# Patient Record
Sex: MALE | Race: WHITE | NOT HISPANIC OR LATINO | URBAN - METROPOLITAN AREA
[De-identification: names, ages, dates, MRNs, and addresses within clinical notes are randomized per-mention and may not be internally consistent; named-entity substitution may affect disease eponyms.]

---

## 2022-05-02 ENCOUNTER — TELEPHONE (OUTPATIENT)
Dept: HEMATOLOGY ONCOLOGY | Facility: CLINIC | Age: 57
End: 2022-05-02

## 2022-05-02 NOTE — TELEPHONE ENCOUNTER
Patients spouse, Sophia Waddell, calling to make an appointment, She will callback tomorrow because that is his eff date on her insurance and she doesn't have her card with her to give information  I also gave her fax number so patients notes can be sent  She indicated spouse was incarcerated and was not treated for his condition

## 2022-05-03 NOTE — TELEPHONE ENCOUNTER
Made call to [facility] to potentially schedule patient for a new patient appointment for [facility], left detail message to return my call at [phone]

## 2022-05-05 ENCOUNTER — TELEPHONE (OUTPATIENT)
Dept: HEMATOLOGY ONCOLOGY | Facility: CLINIC | Age: 57
End: 2022-05-05

## 2022-05-05 ENCOUNTER — DOCUMENTATION (OUTPATIENT)
Dept: HEMATOLOGY ONCOLOGY | Facility: CLINIC | Age: 57
End: 2022-05-05

## 2022-05-05 NOTE — TELEPHONE ENCOUNTER
Soft Intake Form   Patient Details   Jr Banuelos     1965     Reason For Appointment   Who is Calling? Spouse   If not patient, Name? Pattie Alicea   DID YOU CONFIRM INSURANCE WITH PATIENT? Approved by [employer], insurance by wife's plan pending   Who is the Referring Doctor? Doctor while incarcerated   What is the diagnosis? Liver cell carcinoma   Has this diagnosis been confirmed by a biopsy/surgery? If yes, what is the date it was done? Yes, Feb 2022 while incarcerated   Biopsy done at [place] [address]? If not, where was it done? Yes at [facility]   Was imaging done, and was it done at [facility]? If not, where was it done? Yes at [facility] and in [place]   Have you been seen by another Oncologist?  If so, who and where (name of facility, city and state) Yes, while incarcerated   For 2nd Opinions Only: Are you currently undergoing treatment, or are you scheduled to start treatment? If yes, name of facility, city and state  No   For "History Of" only: Have you completed treatment? Yes, long ago as child  Have you had Genetic Testing done in the past?  If so, advise to bring test results to their visit No   Record Gathering Information   Did you advise to have records faxed to [phone]? Yes   Did you advise to have disks sent to the proper address with imaging? ("History of" Patients)  5 years of imaging for breast patients-Mammos, [place] etc Yes   Scheduling Information   Did you send new patient paperwork? Email or mail? Yes, sent to email address on file  What is the best call back number?    (If the [facility] is calling, please use their number) [phone]   Miscellaneous Information    This intake approved by Fredy Ng and Elena Briones  Scheduled appmt 6/23 9:40AM DR Jaky Matthew at [facility]

## 2022-05-05 NOTE — PROGRESS NOTES
Will wait for patients records to begin coordination process and determine what the patient will need prior to his consult appointment

## 2022-05-06 NOTE — TELEPHONE ENCOUNTER
Intake received  No ins listed on acct  Will call pt to find out about ins    05/25/22  On 05/09/22 called pt to see about ins got voicemail left a message for pt to call me back    2ND ATTEMPT  Called pt to see about ins got voicemail left a message fo rpt to loren beye back  Checked promise & pt has active  [facility] effective 04/27/22 under recipient id# [id number]  Emailed the billing dept to see if they can add coverage  insurance education outreach not needed at this time

## 2022-05-11 ENCOUNTER — DOCUMENTATION (OUTPATIENT)
Dept: HEMATOLOGY ONCOLOGY | Facility: CLINIC | Age: 57
End: 2022-05-11

## 2022-05-11 NOTE — PROGRESS NOTES
Telephone call to Sis Beckham, patients spouse, regarding records as well as insurance information    Left my contact information and requested a return call

## 2022-05-11 NOTE — PROGRESS NOTES
Patients spouse Jesus Lee returned my call  She did request records be sent to us and also requested Bassem's state provided insurance be canceled to he could be added to her insurance  She has not received any updates  I provided my contact information and encouraged her to reach out for any needs with regard to Bassem's diagnosis  She was very appreciative

## 2022-06-06 ENCOUNTER — TELEPHONE (OUTPATIENT)
Dept: HEMATOLOGY ONCOLOGY | Facility: CLINIC | Age: 57
End: 2022-06-06

## 2022-06-06 ENCOUNTER — DOCUMENTATION (OUTPATIENT)
Dept: HEMATOLOGY ONCOLOGY | Facility: CLINIC | Age: 57
End: 2022-06-06

## 2022-06-06 NOTE — TELEPHONE ENCOUNTER
Soft Intake Form   Patient Details   Janelle Tom     1965     Reason For Appointment   Who is Calling? Spouse   If not patient, Name? Kerri Canseco     DID YOU CONFIRM INSURANCE WITH PATIENT? Who is the Referring Doctor? Provider while pt incarcerated   What is the diagnosis? Liver   Has this diagnosis been confirmed by a biopsy/surgery? If yes, what is the date it was done? Yes    Biopsy done at [facility]? If not, where was it done? No [facility]   Was imaging done, and was it done at [address] [place] [address]? If not, where was it done? no   Have you been seen by another Oncologist?  If so, who and where (name of facility, city and state) Yes while incarcerated   For 2nd Opinions Only: Are you currently undergoing treatment, or are you scheduled to start treatment? If yes, name of facility, city and state     For "History Of" only: Have you completed treatment? Have you had Genetic Testing done in the past?  If so, advise to bring test results to their visit no   Record Gathering Information   Did you advise to have records faxed to [phone]? Records scanned into chart   Did you advise to have disks sent to the proper address with imaging? ("History of" Patients)  5 years of imaging for breast patients-Mammos, [place] etc Yes from original intake   Scheduling Information   Did you send new patient paperwork? Email or mail? What is the best call back number? (If the [facility] is calling, please use their number) [phone]   Miscellaneous Information      Patient initially scheduled with Hem Onc    Rescheduled with Dr Suzan Andrade 6/14 NORIS

## 2022-06-06 NOTE — PROGRESS NOTES
Message noted: Chart reviewed and may refill medication as requested times one. Prescription sent to listed pharmacy. Pharmacy to notify patient.    Pt notified through [facility] Telephone call from Keely, patients spouse, regarding the change in [employer] insurance  He is now covered under her [address]  She will take a photo of the card - front and back - and will email to me  Aaliyah fabianized understanding

## 2022-06-09 PROBLEM — C22.0 HEPATOCELLULAR CARCINOMA (HCC): Status: ACTIVE | Noted: 2022-06-09

## 2022-06-10 ENCOUNTER — TELEPHONE (OUTPATIENT)
Dept: CARDIAC SURGERY | Facility: CLINIC | Age: 57
End: 2022-06-10

## 2022-06-10 NOTE — TELEPHONE ENCOUNTER
 Hello, can I please speak to (patient name) this is (enter your name here) calling from [facility]  Lu's practice) to remind you of your appointment on (date and time) at (location)  I am calling to review our no-show/cancelation policy and masking policy  Do you have a few minutes? We ask that you come at least 15 minutes early for your appointment to complete all paperwork  However, If you are up to 20 minutes late for your appointment, we may need to reschedule you  Any lateness to an appointment may result in an shorted visit, for our providers to offer you the most out of your consult, please arrive early  We require at least 24-hour notice for cancelations and if you miss your appointment 3 times, we may unfortunately not be able to reschedule any future visits  We ask that you please call our office in the event you are feeling ill as we may need to reschedule your appointment  You are allowed to bring only one visitor with you to your appointment, if your visitor is not feeling well we ask that you don't bring them  Our current masking policy is: if you are vaccinated masking is optional, if you are unvaccinated masking is required  We look forward to seeing you at your upcoming appointment!

## 2022-06-14 ENCOUNTER — TELEPHONE (OUTPATIENT)
Dept: GASTROENTEROLOGY | Facility: CLINIC | Age: 57
End: 2022-06-14

## 2022-06-14 ENCOUNTER — CONSULT (OUTPATIENT)
Dept: SURGICAL ONCOLOGY | Facility: CLINIC | Age: 57
End: 2022-06-14
Payer: COMMERCIAL

## 2022-06-14 VITALS
TEMPERATURE: 98 F | DIASTOLIC BLOOD PRESSURE: 98 MMHG | HEART RATE: 78 BPM | BODY MASS INDEX: 31.03 KG/M2 | HEIGHT: 69 IN | WEIGHT: 209.5 LBS | OXYGEN SATURATION: 98 % | RESPIRATION RATE: 16 BRPM | SYSTOLIC BLOOD PRESSURE: 140 MMHG

## 2022-06-14 DIAGNOSIS — C22.0 HEPATOCELLULAR CARCINOMA (HCC): Primary | ICD-10-CM

## 2022-06-14 PROCEDURE — 99244 OFF/OP CNSLTJ NEW/EST MOD 40: CPT | Performed by: STUDENT IN AN ORGANIZED HEALTH CARE EDUCATION/TRAINING PROGRAM

## 2022-06-14 NOTE — PROGRESS NOTES
Surgical Oncology Consultation    [address] [address] [place] [address]  [phone]    Patient:  Agustina Mota  1965  [id number]    Primary Care provider:  No primary care provider on file  No primary provider on file  Referring provider:  No referring provider defined for this encounter  Diagnoses and all orders for this visit:    Hepatocellular carcinoma ([address] )  -     MRI abdomen w wo contrast; Future  -     CT chest wo contrast; Future  -     AFP tumor marker; Future  -     CBC and Platelet; Future  -     Comprehensive metabolic panel; Future  -     Protime-INR; Future  -     Ambulatory referral to Gastroenterology; Future  -     Ambulatory Referral to Interventional Radiology; Future        Chief Complaint   Patient presents with    Consult       Return in about 3 months (around 9/14/2022) for Office Visit  Oncology History   Hepatocellular carcinoma ([address] )   2/8/2022 Biopsy    Poorly differentiated [address]  with patchy necrosis     6/9/2022 Initial Diagnosis    Hepatocellular carcinoma ([address] )         History of Present Illness  : This is a 49-year-old gentleman with what appears to be a diagnosis of multifocal HCC  The patient was previously incarcerated and treated at several different institutions  I have gathered this history from the patient as well as review of his records that have been sent to our facility  Briefly, the patient was treated for hepatitis-C years ago with complete sustained viral response  In 2018 it appears he had a Fibrosure liver biopsy, demonstrating a fibrosis score of F4  3/2020 he had labs drawn which suggested Kojo Lack A cirrhosis with preserved liver function  When incarcerated recently, he underwent ultrasound of the liver, which determined that there was a concerning lesion  This was in the fall   He then had an MRI 10/2021, which from what I can gather appeared to have 3 separate lesions  One was LI-RADS 4, 1 was LIRADS 5, and the other concerning for cholangiocarcinoma (this was reported in an oncology consult)  In February he underwent a percutaneous biopsy of the lesion suspicious for cholangio, revealing tissue consistent with hepatocellular carcinoma  It was then recommended by transplant surgery that he should undergo TACE, however, he ultimately did not end up having this procedure  He has since been released and would like to further pursue care  He states today that he is essentially asymptomatic  He states he has had no sequela of liver disease with no episodes of ascites, encephalopathy, or decompensation  He is eating well with regular bowel movements  No weight loss, no chest pain, no shortness of breath, no other systemic symptoms  ECOG of 0  Review of Systems  Complete ROS Surg Onc:   Constitutional: The patient denies new or recent history of general fatigue, no recent weight loss, no change in appetite  Eyes: No complaints of visual problems, no scleral icterus  ENT: No complaints of ear pain, no hoarseness, no difficulty swallowing,  no tinnitus and no new masses in head, oral cavity, or neck  Cardiovascular: No complaints of chest pain, no palpitations, no ankle edema  Respiratory: No complaints of shortness of breath, no cough  Gastrointestinal: No complaints of jaundice, no bloody stools, no pale stools  Genitourinary: No complaints of dysuria, no hematuria, no nocturia, no frequent urination, no urethral discharge  Musculoskeletal: No complaints of weakness, paralysis, joint stiffness or arthralgias  Integumentary: No complaints of rash, no new lesions  Neurological: No complaints of convulsions, no seizures, no dizziness  Hematologic/Lymphatic: No complaints of easy bruising  Endocrine:  No hot or cold intolerance  No polydipsia, polyphagia, or polyuria  Allergy/immunology:  No environmental allergies  No food allergies    Not immunocompromised  Patient Active Problem List   Diagnosis    Hepatocellular carcinoma ([facility] [address] )     No past medical history on file  No past surgical history on file  No family history on file  Social History     Socioeconomic History    Marital status: /Civil Union     Spouse name: Not on file    Number of children: Not on file    Years of education: Not on file    Highest education level: Not on file   Occupational History    Not on file   Tobacco Use    Smoking status: Not on file    Smokeless tobacco: Not on file   Substance and Sexual Activity    Alcohol use: Not on file    Drug use: Not on file    Sexual activity: Not on file   Other Topics Concern    Not on file   Social History Narrative    Not on file     Social Determinants of Health     Financial Resource Strain: Not on file   Food Insecurity: Not on file   Transportation Needs: Not on file   Physical Activity: Not on file   Stress: Not on file   Social Connections: Not on file   Intimate Partner Violence: Not on file   Housing Stability: Not on file     No current outpatient medications on file  No Known Allergies    Vitals:    06/14/22 0932   BP: 140/98   Pulse: 78   Resp: 16   Temp: 98 °F (36 7 °C)   SpO2: 98%       Physical Exam   General: Appears well, appears stated age  Skin: Warm, anicteric  HEENT: Normocephalic, atraumatic; sclera aniceteric, mucous membranes moist; cervical nodes without adenopathy  Cardiopulmonary: RRR, Easy WOB, no BLE edema  Abd: Flat and soft, nontender, no masses appreciated, no hepatosplenomegaly  MSK: Symmetric, no cyanosis, no overt weakness  Lymphatic: No cervical, axillary or inguinal lymphadenopathy  Neuro: Affect appropriate, no gross motor abnormalities      Pathology:  Reviewed in Media    Labs: Reviewed in EPIC    Imaging  No results found      I independently reviewed and interpreted the above laboratory and imaging data, including extensive oncology records, laboratory records, pathology records, imaging records  Discussion/Summary: This is a 51-year-old gentleman with what appears to be multifocal [address]  in the right lobe of the liver  At this juncture, he has not had cross-sectional imaging since the fall of 2021, and will would require repeat MRI as well as staging CT of the chest   Likewise, he will require AFP as well as complete laboratory evaluation to gauge the degree of his liver disease  I will refer him today to hepatology to establish care for his ongoing issues with his liver  Likewise, based on what I can gather thus far, it does appear that TACE or Y90 lobectomy will be the most appropriate course of treatment for him, and I will provide a referral to Interventional Radiology today  Once the MR and CT have been completing completed and his restaging is complete, I will call the patient to discuss any changes in treatment  I will otherwise see the patient in 3 months time after interventional radiology treatment  All questions were answered today the patient and his wife

## 2022-06-14 NOTE — TELEPHONE ENCOUNTER
Patients GI provider:  Dr Onofre Linear    Number to return call: [phone]    Reason for call: Hep/Onc calling to schedule NP appt  For Hepatocellular Carcinoma    Please call patient if can be seen sooner    Scheduled procedure/appointment date if applicable: Apt/procedure 8/31/22

## 2022-06-16 ENCOUNTER — TELEPHONE (OUTPATIENT)
Dept: GASTROENTEROLOGY | Facility: CLINIC | Age: 57
End: 2022-06-16

## 2022-06-16 NOTE — TELEPHONE ENCOUNTER
Called patient to see if he would like to come into GI office to see Dr Clinton Bishop around 1pm - wife answered and stated that she and patient are at work now and would not be able to come in

## 2022-06-16 NOTE — TELEPHONE ENCOUNTER
Called patient to see if he would like to come into hepatology office at 1pm at [facility] with Dr Brodie Bailey - left message for call back

## 2022-06-21 ENCOUNTER — HOSPITAL ENCOUNTER (OUTPATIENT)
Dept: RADIOLOGY | Facility: MEDICAL CENTER | Age: 57
Discharge: HOME | End: 2022-06-21
Payer: COMMERCIAL

## 2022-06-21 DIAGNOSIS — C22.0 HEPATOCELLULAR CARCINOMA (HCC): ICD-10-CM

## 2022-06-21 PROCEDURE — G1004 CDSM NDSC: HCPCS

## 2022-06-21 PROCEDURE — 71250 CT THORAX DX C-: CPT

## 2022-06-24 ENCOUNTER — TELEPHONE (OUTPATIENT)
Dept: HEMATOLOGY ONCOLOGY | Facility: CLINIC | Age: 57
End: 2022-06-24

## 2022-06-24 NOTE — TELEPHONE ENCOUNTER
Vee calling from [facility] radiology to inform Dr Shaggy Nettles that  Ct of chest from 6/21/22 has significant finds in Epic

## 2022-06-29 NOTE — TELEPHONE ENCOUNTER
Spoke to patient's wife Ty Rodriguez to offer sooner appointment 7/13/22 with Dr Stefan Watts as requested by Dr Reyna Sosa  MRI schedule 7/13/22 at 8 pm        Do you want to move patient to 7/13 or keep 8/31/22 scheduled appointment since MRI will not be completed?

## 2022-07-13 ENCOUNTER — HOSPITAL ENCOUNTER (OUTPATIENT)
Dept: MRI IMAGING | Facility: HOSPITAL | Age: 57
Discharge: HOME | End: 2022-07-13
Attending: STUDENT IN AN ORGANIZED HEALTH CARE EDUCATION/TRAINING PROGRAM
Payer: COMMERCIAL

## 2022-07-13 DIAGNOSIS — C22.0 HEPATOCELLULAR CARCINOMA (HCC): ICD-10-CM

## 2022-07-13 PROCEDURE — A9585 GADOBUTROL INJECTION: HCPCS | Performed by: STUDENT IN AN ORGANIZED HEALTH CARE EDUCATION/TRAINING PROGRAM

## 2022-07-13 PROCEDURE — 74183 MRI ABD W/O CNTR FLWD CNTR: CPT

## 2022-07-13 PROCEDURE — G1004 CDSM NDSC: HCPCS

## 2022-07-13 RX ADMIN — GADOBUTROL 9 ML: 604.72 INJECTION INTRAVENOUS at 21:42

## 2022-07-19 ENCOUNTER — TELEPHONE (OUTPATIENT)
Dept: SURGICAL ONCOLOGY | Facility: CLINIC | Age: 57
End: 2022-07-19

## 2022-07-19 DIAGNOSIS — C22.0 HEPATOCELLULAR CARCINOMA (HCC): Primary | ICD-10-CM

## 2022-07-19 NOTE — TELEPHONE ENCOUNTER
Tc to pts phone to discuss reason for not scheduling IR appt  Wife answered  She stated she was not aware that IR had been trying to call them to schedule an appt  She asked what this appt would be for  Informed her that they are interventional radiologists who would be further discussing liver directed therapy for the pts [address]  such as TACE or Y90  Wife verbalized understanding and agreeable  Discussed that I would be placing another referral for IR and that she should be expecting a call to schedule  Instructed her to call back with any further questions or concerns

## 2022-08-31 ENCOUNTER — OFFICE VISIT (OUTPATIENT)
Dept: GASTROENTEROLOGY | Facility: CLINIC | Age: 57
End: 2022-08-31
Payer: COMMERCIAL

## 2022-08-31 VITALS
HEART RATE: 82 BPM | OXYGEN SATURATION: 95 % | SYSTOLIC BLOOD PRESSURE: 136 MMHG | HEIGHT: 69 IN | TEMPERATURE: 98.1 F | DIASTOLIC BLOOD PRESSURE: 82 MMHG | WEIGHT: 208.4 LBS | BODY MASS INDEX: 30.87 KG/M2

## 2022-08-31 DIAGNOSIS — Z12.11 COLON CANCER SCREENING: Primary | ICD-10-CM

## 2022-08-31 DIAGNOSIS — C22.0 HEPATOCELLULAR CARCINOMA (HCC): ICD-10-CM

## 2022-08-31 PROCEDURE — 99244 OFF/OP CNSLTJ NEW/EST MOD 40: CPT | Performed by: INTERNAL MEDICINE

## 2022-08-31 NOTE — PATIENT INSTRUCTIONS
I will call you if I want you to have a liver biopsy with pressure measurement across your liver (of unaffected liver)

## 2022-09-01 NOTE — PROGRESS NOTES
[facility] - Outpatient Consultation  Honey Christine 62 y o  male MRN: [id number]  Encounter: [id number]    PCP:  No primary care provider on file , None  Referring Provider:  Trinidad Thomas MD, [phone]        ASSESSMENT AND PLAN:      Summary:    Mr David Mckeon is a 62y o  year old male with cirrhosis secondary to Chronic hepatitis-C and Chronic alcohol abuse which is well compensated, but complicated with multifocal [address]     Problem List and Plan:    1  Cirrhosis:   MELD-Na: 15  He has no physical evidence of cirrhosis/portal HTN  MRI confirms cirrhotic appearing liver, but no evidence of portal HTN (no splenomegaly or ascites)  2  Multifocal HCC:  Following with Dr Tory Whittaker  He has been trying to schedule an appointment with IR to discuss localregional therapy  I think given his poor follow-up (or inability to schedule appointments) TACE will likely be his best treatment option  3  Volume: No history of edema or ascites  Will continue to monitor with serial physical exams on subsequent office visits  Mr David Mckeon will contact our office if he should develop abdominal distention or lower extremity edema     4  Hepatic Encephalopathy: No history of hepatic encephalopathy  Mr David Mckeon and his family/care giver are aware of the signs/symptoms of hepatic encephalopathy and understand to seek immediate medical attention should they arise     5  Colon Cancer Screening: Mr David Mckeon has not yet had a screening colonoscopy  I have recommended he schedule a colonoscopy  6  Nutritional status: No significant sarcopenia noted  Encouraged high protein snacking, low salt diet  If weight loss evident, will refer to nutritional counseling  Health Maintenance:  1  Mr David Mckeon was counseled to avoid alcohol and tobacco products    2  Mr David Mckeon was also advised to avoid raw seafood/oysters and from swimming in unchlorinated perry, particularly from the [facility], due to increased risk of infection from Vibrio Vulnificus  3  Mr Adwoa Cartagena was also instructed to seek immediate medical attention should he develop fevers over 101 F, evidence of GI bleeding (black or bloody stools, bloody or coffee ground emesis) or confusion  4  Mr Adwoa Cartagena was advised to avoid NSAIDs, if possible, due to increase risk of renal dysfunction, and advised that if he should use acetaminophen, dose should not exceed 2 grams/day  5  Mr Adwoa Cartagena was recommend to remain up to date with adult vaccination, including influenza, pneumovax and meningococcus  Inactivated HSV and zoster vaccine is safe and encouraged by PCP  6  Mr Adwoa Cartagena was instructed to call either our office or that of his referring doctor should he develop worsening symptoms related to lower extremity edema, ascites, jaundice or excessive bruising/bleeding  FOLLOW-UP:  Return in about 4 weeks (around 9/28/2022)  VISIT DIAGNOSES AND ORDERS:      1  Colon cancer screening    2  Hepatocellular carcinoma ([facility] )      Orders Placed This Encounter   Procedures    CBC and differential    Protime-INR    Comprehensive metabolic panel    AFP tumor marker    Colonoscopy     ______________________________________________________________________    HPI:  Mr Sheree Rose is a 62 y o  male with medical history as outlined below, including hypertension, chronic hepatitis C and alcohol-related cirrhosis and multifocal HCC in the right lobe of the liver, who comes in today for initial consultation  He informs me that he was treated for chronic hepatitis C in [facility] (in which he spent 5 years) and achieved a sustained virologic response  He believes he had a liver biopsy at that time which showed cirrhosis  He denies any history of decompensated liver disease  During incarceration, he had an ultrasound which revealed a liver lesion, and an MRI in October 2021 showed there to be 3 separate lesions    He underwent a liver biopsy of the most easily accessible lesion in February which confirmed hepatocellular carcinoma  He saw oncology and was recommended to be seen by Surgical Oncology for discussion of surgical resection  He saw Dr Naheed Hamilton in mid June and had repeat MRI in mid July  This showed 4 lesions Segment 8, 4 0 x 3 1 cm, Segment 7/8, 4 3 x 4 6 cm, Segment 8, 1 6 x 1 6 cm, Segment 6, 1 1 x 1 2 cm  He was recommended to see Interventional Radiology for local regional therapy with either Y90 or tace, however he was unable to make a consultation appointment due to timing of his work  He is a  and works to 5:00 p m  and Interventional Radiology initially had offered appointment during the day, however did offer to make an appointment after hours  Unfortunately they have not yet been able to connect  Mr Ibrahima Quinones denies recent or history of yellow eyes/skin, dark urine, GI bleeding, abdominal distention with fluid, lower extremity swelling, easy bruising, excessive bleeding, pruritus or confusion  He denies abdominal pain, nausea, vomiting, heartburn, reflux, difficulty swallowing, early satiety, bloating, diarrhea, constipation or straining with passing stools  He denies weight loss  He has not had a colonoscopy  He states he had blood work done recently, and our office staff called to obtain a report      REVIEW OF SYSTEMS:    Review of Systems      Historical Information   Patient Active Problem List   Diagnosis    Hepatocellular carcinoma [facility] )     Past Medical History:   Diagnosis Date    Hypertension      Past Surgical History:   Procedure Laterality Date    HERNIA REPAIR       Social History   Social History     Substance and Sexual Activity   Alcohol Use Not Currently     Social History     Substance and Sexual Activity   Drug Use Never     Social History     Tobacco Use   Smoking Status Never Smoker   Smokeless Tobacco Current User    Types: Chew     Family History   Problem Relation Age of Onset    Cancer Mother Meds/Allergies     No current outpatient medications on file  No Known Allergies        Objective     Blood pressure 136/82, pulse 82, temperature 98 1 °F (36 7 °C), temperature source Tympanic, height 5' 9" (1 753 m), weight 94 5 kg (208 lb 6 4 oz), SpO2 95 %  Body mass index is 30 78 kg/m²  PHYSICAL EXAM:           Physical Exam         Lab Results:   06/21/22: INR 1 0, Cr 1 48, TBili 1 0, Na 134, Albumin 4 1, AST 27, ALT 35, Alk Phos 111, Hgb 14 6, Plts 206    MELD-Na:  15    Radiology Results:   I have reviewed the results of any radiology studies performed within the last 90 days  This includes:      No results found        Madina Grimm MD  Hepatology/Gastroenterology

## 2022-09-15 ENCOUNTER — DOCUMENTATION (OUTPATIENT)
Dept: HEMATOLOGY ONCOLOGY | Facility: CLINIC | Age: 57
End: 2022-09-15

## 2022-09-15 NOTE — PROGRESS NOTES
RECTAL/GI MULTIDISCIPLINARY CASE REVIEW    DATE:  9/15/2022      PRESENTING DOCTOR: Dr Jason Waddell       DIAGNOSIS: Hepatocellular carcinoma      Randell Lomas was presented at the Rectal/GI Multidisciplinary Conference today  PHYSICIAN RECOMMENDED PLAN:    -Recommend liver directed therapy   -Patient is scheduled with IR on 9/20/2022   -Follow up appointment with Dr Jason Waddell (surgical oncology) on 9/21/2022  Team agreed to plan  The final treatment plan will be left to the discretion of the patient and the treating physician  DISCLAIMERS:  TO THE TREATING PHYSICIAN:  This conference is a meeting of clinicians from various specialty areas who evaluate and discuss patients for whom a multidisciplinary treatment approach is being considered  Please note that the above opinion was a consensus of the conference attendees and is intended only to assist in quality care of the discussed patient  The responsibility for follow up on the input given during the conference, along with any final decisions regarding plan of care, is that of the patient and the patient's provider  Accordingly, appointments have only been recommended based on this information and have NOT been scheduled unless otherwise noted  TO THE PATIENT:  This summary is a brief record of major aspects of your cancer treatment  You may choose to share a copy with any of your doctors or nurses  However, this is not a detailed or comprehensive record of your care        NCCN guidelines were readily available for review at this discussion

## 2022-09-20 ENCOUNTER — TELEMEDICINE (OUTPATIENT)
Dept: INTERVENTIONAL RADIOLOGY/VASCULAR | Facility: CLINIC | Age: 57
End: 2022-09-20
Payer: COMMERCIAL

## 2022-09-20 DIAGNOSIS — C22.0 HEPATOCELLULAR CARCINOMA (HCC): ICD-10-CM

## 2022-09-20 PROCEDURE — 99243 OFF/OP CNSLTJ NEW/EST LOW 30: CPT | Performed by: RADIOLOGY

## 2022-09-20 RX ORDER — SODIUM CHLORIDE 9 MG/ML
75 INJECTION, SOLUTION INTRAVENOUS CONTINUOUS
OUTPATIENT
Start: 2022-09-20

## 2022-09-20 NOTE — PROGRESS NOTES
Virtual Brief Visit    Patient is located in the following state in which I hold an active license [place]      Assessment/Plan:    Problem List Items Addressed This Visit        Digestive    Hepatocellular carcinoma ([facility] [address] )     I had a long conversation with the patient and his wife over the phone  This is a 80-year-old male with history of hepatitis C and hepatocellular carcinoma  He had prior workup and treatment at different institution/s per chart review  He reported to me that he was initially diagnosed with liver tumor approximately 2 years ago and he was previously incarcerated and did not end up having any treatment for his liver tumor  A MRI abdomen from July 2022 demonstrated multiple lesions in the right hepatic lobe concerning for multifocal hepatocellular carcinoma  He has been referred to me by Dr Anand Smith (surgical oncology) to discuss local regional treatment options  His performance status is good (ECOG 0)  There is no recent laboratory work up in our [address] [facility] record but he reportedly has reasonable background liver function in child Myers A category based on my chart review of clinic notes  Based on [address] liver cancer [facility]) staging system, she is currently BCLC stage B (intermediate stage)  He is currently outside of the [address] [place] [address] for liver transplant consideration  The risks, benefits and alternatives have been discussed with the patient, including ablation, bland embolization, chemoembolization, radioembolization, systemic therapy, surgical resection and no therapy  Given the multifocal disease limited to the right hepatic lobe, I think lobar radioembolization is an excellent choice for him and likely gives him the longest duration of response among the above local regional treatments  We discussed the benefits versus risks including but not limited to bleeding, infection, and nontarget embolization    We also discussed that there is a chance of residual, recurrent, new disease or a combination which may require additional future treatments  I described to him some procedural details of Y 90 and what to expect on the days of the procedures (mapping and implant)  We talked about left radial artery access versus right groin access  We discussed that the purpose of the mapping study is to determine his candidacy for Y 90 treatment and there is a chance of not being able to offer Y 90 implant if his vascular anatomy and lung shunt fraction is not favorable  I emphasized the fact that this is not a curative treatment although he can potentially have a decent duration of sustained treatment response  After our discussion, he is agreeable to schedule for Y 90 mapping and implant     - ambulatory referral to radiation oncology for Y 90 radioembolization evaluation  - schedule for Y 90 mapping and treatment   - No food or drink after midnight prior to the procedures except for medications           Relevant Orders    IR Y-90 PRE-ANGIO/EMBO W/ LUNG SCAN    IR Y-90 RADIOEMBOLIZATION    Ambulatory Referral to Radiation Oncology          Recent Visits  No visits were found meeting these conditions  Showing recent visits within past 7 days and meeting all other requirements  Today's Visits  Date Type Provider Dept   09/20/22 Telemedicine MD Gerardo Rodriguez 57 today's visits and meeting all other requirements  Future Appointments  No visits were found meeting these conditions    Showing future appointments within next 150 days and meeting all other requirements         I spent 40 minutes directly with the patient during this visit

## 2022-09-20 NOTE — LETTER
September 20, 2022     Gail [address] [address]    Patient: Bret Diamond   YOB: 1965   Date of Visit: 9/20/2022       Dear Dr Karlie Lemus:    Thank you for referring Bret Diamond to me for evaluation  Below are my notes for this consultation  If you have questions, please do not hesitate to call me  I look forward to following your patient along with you  Sincerely,        Raymundo Cedeno MD        CC: MD Raymundo Schneider MD  9/20/2022  6:10 PM  Sign when Signing Visit    Virtual Brief Visit    Patient is located in the following state in which I hold an active license [place]      Assessment/Plan:    Problem List Items Addressed This Visit        Digestive    Hepatocellular carcinoma [facility])     I had a long conversation with the patient and his wife over the phone  This is a 63-year-old male with history of hepatitis C and hepatocellular carcinoma  He had prior workup and treatment at different institution/s per chart review  He reported to me that he was initially diagnosed with liver tumor approximately 2 years ago and he was previously incarcerated and did not end up having any treatment for his liver tumor  A MRI abdomen from July 2022 demonstrated multiple lesions in the right hepatic lobe concerning for multifocal hepatocellular carcinoma  He has been referred to me by Dr Jimmy Min (surgical oncology) to discuss local regional treatment options  His performance status is good (ECOG 0)  There is no recent laboratory work up in our [address] [place] [address] record but he reportedly has reasonable background liver function in child Myers A category based on my chart review of clinic notes  Based on [place] [address] clinic liver cancer [facility]) staging system, she is currently BCLC stage B (intermediate stage)  He is currently outside of the [address] [place] [address] for liver transplant consideration    The risks, benefits and alternatives have been discussed with the patient, including ablation, bland embolization, chemoembolization, radioembolization, systemic therapy, surgical resection and no therapy  Given the multifocal disease limited to the right hepatic lobe, I think lobar radioembolization is an excellent choice for him and likely gives him the longest duration of response among the above local regional treatments  We discussed the benefits versus risks including but not limited to bleeding, infection, and nontarget embolization  We also discussed that there is a chance of residual, recurrent, new disease or a combination which may require additional future treatments  I described to him some procedural details of Y 90 and what to expect on the days of the procedures (mapping and implant)  We talked about left radial artery access versus right groin access  We discussed that the purpose of the mapping study is to determine his candidacy for Y 90 treatment and there is a chance of not being able to offer Y 90 implant if his vascular anatomy and lung shunt fraction is not favorable  I emphasized the fact that this is not a curative treatment although he can potentially have a decent duration of sustained treatment response and the goal of treatment is at this time is to down stage him for consideration of liver transplant  After our discussion, he is agreeable to schedule for Y 90 mapping and implant     - ambulatory referral to radiation oncology for Y 90 radioembolization evaluation  - schedule for Y 90 mapping and treatment   - No food or drink after midnight prior to the procedures except for medications           Relevant Orders    IR Y-90 PRE-ANGIO/EMBO W/ LUNG SCAN    IR Y-90 RADIOEMBOLIZATION          Recent Visits  No visits were found meeting these conditions    Showing recent visits within past 7 days and meeting all other requirements  Today's Visits  Date Type Provider Dept   09/20/22 Telemedicine Malina Christianson MD [place] [address] [facility] Modi Showing today's visits and meeting all other requirements  Future Appointments  No visits were found meeting these conditions    Showing future appointments within next 150 days and meeting all other requirements         I spent 40 minutes directly with the patient during this visit

## 2022-09-20 NOTE — ASSESSMENT & PLAN NOTE
I had a long conversation with the patient and his wife over the phone  This is a 49-year-old male with history of hepatitis C and hepatocellular carcinoma  He had prior workup and treatment at different institution/s per chart review  He reported to me that he was initially diagnosed with liver tumor approximately 2 years ago and he was previously incarcerated and did not end up having any treatment for his liver tumor  A MRI abdomen from July 2022 demonstrated multiple lesions in the right hepatic lobe concerning for multifocal hepatocellular carcinoma  He has been referred to me by Dr Anjum Cisneros (surgical oncology) to discuss local regional treatment options  His performance status is good (ECOG 0)  There is no recent laboratory work up in our [address] [facility] record but he reportedly has reasonable background liver function in child Myers A category based on my chart review of clinic notes  Based on [facility] [place] [address] clinic liver cancer [facility]) staging system, she is currently BCLC stage B (intermediate stage)  He is currently outside of the [address] [place] [address] for liver transplant consideration  The risks, benefits and alternatives have been discussed with the patient, including ablation, bland embolization, chemoembolization, radioembolization, systemic therapy, surgical resection and no therapy  Given the multifocal disease limited to the right hepatic lobe, I think lobar radioembolization is an excellent choice for him and likely gives him the longest duration of response among the above local regional treatments  We discussed the benefits versus risks including but not limited to bleeding, infection, and nontarget embolization  We also discussed that there is a chance of residual, recurrent, new disease or a combination which may require additional future treatments  I described to him some procedural details of Y 90 and what to expect on the days of the procedures (mapping and implant)    We talked about left radial artery access versus right groin access  We discussed that the purpose of the mapping study is to determine his candidacy for Y 90 treatment and there is a chance of not being able to offer Y 90 implant if his vascular anatomy and lung shunt fraction is not favorable  I emphasized the fact that this is not a curative treatment although he can potentially have a decent duration of sustained treatment response   After our discussion, he is agreeable to schedule for Y 90 mapping and implant     - ambulatory referral to radiation oncology for Y 90 radioembolization evaluation  - schedule for Y 90 mapping and treatment   - No food or drink after midnight prior to the procedures except for medications

## 2022-09-20 NOTE — LETTER
September 20, 2022     [address] [address]    Patient: Randell Lomas   YOB: 1965   Date of Visit: 9/20/2022       Dear Dr Meghan Tapia:    Thank you for referring Randell Lomas to me for evaluation  Below are my notes for this consultation  If you have questions, please do not hesitate to call me  I look forward to following your patient along with you  Sincerely,        Toribio Conway MD        CC: MD Toribio Ambriz Ra, MD  9/20/2022  6:13 PM  Sign when Signing Visit    Virtual Brief Visit    Patient is located in the following state in which I hold an active license [place]      Assessment/Plan:    Problem List Items Addressed This Visit        Digestive    Hepatocellular carcinoma [facility])     I had a long conversation with the patient and his wife over the phone  This is a 59-year-old male with history of hepatitis C and hepatocellular carcinoma  He had prior workup and treatment at different institution/s per chart review  He reported to me that he was initially diagnosed with liver tumor approximately 2 years ago and he was previously incarcerated and did not end up having any treatment for his liver tumor  A MRI abdomen from July 2022 demonstrated multiple lesions in the right hepatic lobe concerning for multifocal hepatocellular carcinoma  He has been referred to me by Dr Jason Waddell (surgical oncology) to discuss local regional treatment options  His performance status is good (ECOG 0)  There is no recent laboratory work up in our [address] [place] [address] record but he reportedly has reasonable background liver function in child Myers A category based on my chart review of clinic notes  [facility] [place] [address] [facility]) staging system, she is currently BCLC stage B (intermediate stage)  He is currently outside of the [address] [place] [address] for liver transplant consideration    The risks, benefits and alternatives have been discussed with the patient, including ablation, bland embolization, chemoembolization, radioembolization, systemic therapy, surgical resection and no therapy  Given the multifocal disease limited to the right hepatic lobe, I think lobar radioembolization is an excellent choice for him and likely gives him the longest duration of response among the above local regional treatments  We discussed the benefits versus risks including but not limited to bleeding, infection, and nontarget embolization  We also discussed that there is a chance of residual, recurrent, new disease or a combination which may require additional future treatments  I described to him some procedural details of Y 90 and what to expect on the days of the procedures (mapping and implant)  We talked about left radial artery access versus right groin access  We discussed that the purpose of the mapping study is to determine his candidacy for Y 90 treatment and there is a chance of not being able to offer Y 90 implant if his vascular anatomy and lung shunt fraction is not favorable  I emphasized the fact that this is not a curative treatment although he can potentially have a decent duration of sustained treatment response  After our discussion, he is agreeable to schedule for Y 90 mapping and implant     - ambulatory referral to radiation oncology for Y 90 radioembolization evaluation  - schedule for Y 90 mapping and treatment   - No food or drink after midnight prior to the procedures except for medications           Relevant Orders    IR Y-90 PRE-ANGIO/EMBO W/ LUNG SCAN    IR Y-90 RADIOEMBOLIZATION    Ambulatory Referral to Radiation Oncology          Recent Visits  No visits were found meeting these conditions    Showing recent visits within past 7 days and meeting all other requirements  Today's Visits  Date Type Provider Dept   09/20/22 Telemedicine MD Gerardo Ren today's visits and meeting all other requirements  Future Appointments  No visits were found meeting these conditions    Showing future appointments within next 150 days and meeting all other requirements         I spent 40 minutes directly with the patient during this visit

## 2022-09-21 ENCOUNTER — APPOINTMENT (OUTPATIENT)
Dept: LAB | Facility: CLINIC | Age: 57
End: 2022-09-21
Payer: COMMERCIAL

## 2022-09-21 ENCOUNTER — OFFICE VISIT (OUTPATIENT)
Dept: SURGICAL ONCOLOGY | Facility: CLINIC | Age: 57
End: 2022-09-21
Payer: COMMERCIAL

## 2022-09-21 VITALS
HEART RATE: 71 BPM | OXYGEN SATURATION: 97 % | WEIGHT: 207 LBS | BODY MASS INDEX: 30.66 KG/M2 | TEMPERATURE: 97.3 F | HEIGHT: 69 IN | RESPIRATION RATE: 16 BRPM | SYSTOLIC BLOOD PRESSURE: 142 MMHG | DIASTOLIC BLOOD PRESSURE: 96 MMHG

## 2022-09-21 DIAGNOSIS — C22.0 HEPATOCELLULAR CARCINOMA (HCC): Primary | ICD-10-CM

## 2022-09-21 DIAGNOSIS — C22.0 HEPATOCELLULAR CARCINOMA (HCC): ICD-10-CM

## 2022-09-21 DIAGNOSIS — Z12.11 COLON CANCER SCREENING: ICD-10-CM

## 2022-09-21 LAB
AFP-TM SERPL-MCNC: 4961.3 NG/ML (ref 0.5–8)
ALBUMIN SERPL BCP-MCNC: 4 G/DL (ref 3.5–5)
ALP SERPL-CCNC: 120 U/L (ref 46–116)
ALT SERPL W P-5'-P-CCNC: 29 U/L (ref 12–78)
ANION GAP SERPL CALCULATED.3IONS-SCNC: 3 MMOL/L (ref 4–13)
AST SERPL W P-5'-P-CCNC: 22 U/L (ref 5–45)
BILIRUB SERPL-MCNC: 0.99 MG/DL (ref 0.2–1)
BUN SERPL-MCNC: 12 MG/DL (ref 5–25)
CALCIUM SERPL-MCNC: 9.4 MG/DL (ref 8.3–10.1)
CHLORIDE SERPL-SCNC: 105 MMOL/L (ref 96–108)
CO2 SERPL-SCNC: 28 MMOL/L (ref 21–32)
CREAT SERPL-MCNC: 1.19 MG/DL (ref 0.6–1.3)
ERYTHROCYTE [DISTWIDTH] IN BLOOD BY AUTOMATED COUNT: 13.6 % (ref 11.6–15.1)
GFR SERPL CREATININE-BSD FRML MDRD: 67 ML/MIN/1.73SQ M
GLUCOSE SERPL-MCNC: 97 MG/DL (ref 65–140)
HCT VFR BLD AUTO: 47.9 % (ref 36.5–49.3)
HGB BLD-MCNC: 15.7 G/DL (ref 12–17)
INR PPP: 0.97 (ref 0.84–1.19)
MCH RBC QN AUTO: 28.2 PG (ref 26.8–34.3)
MCHC RBC AUTO-ENTMCNC: 32.8 G/DL (ref 31.4–37.4)
MCV RBC AUTO: 86 FL (ref 82–98)
PLATELET # BLD AUTO: 212 THOUSANDS/UL (ref 149–390)
PMV BLD AUTO: 9.7 FL (ref 8.9–12.7)
POTASSIUM SERPL-SCNC: 4.4 MMOL/L (ref 3.5–5.3)
PROT SERPL-MCNC: 8.8 G/DL (ref 6.4–8.4)
PROTHROMBIN TIME: 13 SECONDS (ref 11.6–14.5)
RBC # BLD AUTO: 5.57 MILLION/UL (ref 3.88–5.62)
SODIUM SERPL-SCNC: 136 MMOL/L (ref 135–147)
WBC # BLD AUTO: 7.89 THOUSAND/UL (ref 4.31–10.16)

## 2022-09-21 PROCEDURE — 80053 COMPREHEN METABOLIC PANEL: CPT

## 2022-09-21 PROCEDURE — 82105 ALPHA-FETOPROTEIN SERUM: CPT

## 2022-09-21 PROCEDURE — 85027 COMPLETE CBC AUTOMATED: CPT

## 2022-09-21 PROCEDURE — 85610 PROTHROMBIN TIME: CPT

## 2022-09-21 PROCEDURE — 99214 OFFICE O/P EST MOD 30 MIN: CPT | Performed by: STUDENT IN AN ORGANIZED HEALTH CARE EDUCATION/TRAINING PROGRAM

## 2022-09-21 PROCEDURE — 36415 COLL VENOUS BLD VENIPUNCTURE: CPT

## 2022-09-21 NOTE — PROGRESS NOTES
Surgical Oncology Consultation    [address] Sanchez  [address] [facility] [address] [phone]    Patient:  Marko Jon  1965  [id number]    Primary Care provider:  Rodrigo Baldwin MD  [address] [address] [address] [address]    Referring provider:  No referring provider defined for this encounter  Diagnoses and all orders for this visit:    Hepatocellular carcinoma [facility])        Chief Complaint   Patient presents with    Follow-up       Return in about 4 months (around 1/21/2023) for Office Visit  Oncology History   Hepatocellular carcinoma ([facility] [address] )   2/8/2022 Biopsy    Poorly differentiated [facility] [address]  with patchy necrosis     6/9/2022 Initial Diagnosis    Hepatocellular carcinoma ([address] )         History of Present Illness  : This is a 70-year-old gentleman with what appears to be a diagnosis of multifocal HCC  The patient was previously incarcerated and treated at several different institutions  I have gathered this history from the patient as well as review of his records that have been sent to our facility  Briefly, the patient was treated for hepatitis-C years ago with complete sustained viral response  In 2018 it appears he had a Fibrosure liver biopsy, demonstrating a fibrosis score of F4  3/2020 he had labs drawn which suggested Aleck Aglvan A cirrhosis with preserved liver function  When incarcerated recently, he underwent ultrasound of the liver, which determined that there was a concerning lesion  This was in the fall  He then had an MRI 10/2021, which from what I can gather appeared to have 3 separate lesions  One was LI-RADS 4, 1 was LIRADS 5, and the other concerning for cholangiocarcinoma (this was reported in an oncology consult)  In February he underwent a percutaneous biopsy of the lesion suspicious for cholangio, revealing tissue consistent with hepatocellular carcinoma   It was then recommended by transplant surgery that he should undergo TACE, however, he ultimately did not end up having this procedure  He has since been released and would like to further pursue care  He states today that he is essentially asymptomatic  He states he has had no sequela of liver disease with no episodes of ascites, encephalopathy, or decompensation  He is eating well with regular bowel movements  No weight loss, no chest pain, no shortness of breath, no other systemic symptoms  ECOG of 0  Multifocal HCC in the right lobe of the liver  At this juncture, he has not had cross-sectional imaging since the fall of 2021, and will would require repeat MRI as well as staging CT of the chest   Likewise, he will require AFP as well as complete laboratory evaluation to gauge the degree of his liver disease  I will refer him today to hepatology to establish care for his ongoing issues with his liver  Likewise, based on what I can gather thus far, it does appear that TACE or Y90 lobectomy will be the most appropriate course of treatment for him, and I will provide a referral to Interventional Radiology today  Once the MR and CT have been completing completed and his restaging is complete, I will call the patient to discuss any changes in treatment  Interval   Labs were obtained at outside lab in June with lizette A-B cirrhosis  Delay in care but saw Dr Carlos Farah with IR patel and will be scheduled for Y90 2/2 multifocal HCC of right lobe  Will see Dr Bronwyn Polk in f/u for his liver disease next week  No changes since last being seen  Feels well, no abd pain, bone pain, HA, dizziness, fevers, chills, jaundice, weight loss  Review of Systems  Complete ROS Surg Onc:   Constitutional: The patient denies new or recent history of general fatigue, no recent weight loss, no change in appetite  Eyes: No complaints of visual problems, no scleral icterus     ENT: No complaints of ear pain, no hoarseness, no difficulty swallowing,  no tinnitus and no new masses in head, oral cavity, or neck  Cardiovascular: No complaints of chest pain, no palpitations, no ankle edema  Respiratory: No complaints of shortness of breath, no cough  Gastrointestinal: No complaints of jaundice, no bloody stools, no pale stools  Genitourinary: No complaints of dysuria, no hematuria, no nocturia, no frequent urination, no urethral discharge  Musculoskeletal: No complaints of weakness, paralysis, joint stiffness or arthralgias  Integumentary: No complaints of rash, no new lesions  Neurological: No complaints of convulsions, no seizures, no dizziness  Hematologic/Lymphatic: No complaints of easy bruising  Endocrine:  No hot or cold intolerance  No polydipsia, polyphagia, or polyuria  Allergy/immunology:  No environmental allergies  No food allergies  Not immunocompromised        Patient Active Problem List   Diagnosis    Hepatocellular carcinoma ([facility] )     Past Medical History:   Diagnosis Date    Hypertension      Past Surgical History:   Procedure Laterality Date    HERNIA REPAIR       Family History   Problem Relation Age of Onset    Cancer Mother      Social History     Socioeconomic History    Marital status: /Civil Union     Spouse name: Not on file    Number of children: Not on file    Years of education: Not on file    Highest education level: Not on file   Occupational History    Not on file   Tobacco Use    Smoking status: Never Smoker    Smokeless tobacco: Current User     Types: Chew   Vaping Use    Vaping Use: Never used   Substance and Sexual Activity    Alcohol use: Not Currently    Drug use: Never    Sexual activity: Not on file   Other Topics Concern    Not on file   Social History Narrative    Not on file     Social Determinants of Health     Financial Resource Strain: Not on file   Food Insecurity: Not on file   Transportation Needs: Not on file   Physical Activity: Not on file   Stress: Not on file   Social Connections: Not on file   Intimate Partner Violence: Not on file   Housing Stability: Not on file     No current outpatient medications on file  No Known Allergies    Vitals:    09/21/22 0942   BP: 142/96   Pulse: 71   Resp: 16   Temp: (!) 97 3 °F (36 3 °C)   SpO2: 97%       Physical Exam   General: Appears well, appears stated age  Skin: Warm, anicteric  HEENT: Normocephalic, atraumatic; sclera aniceteric, mucous membranes moist; cervical nodes without adenopathy  Cardiopulmonary: RRR, Easy WOB, no BLE edema  Abd: Flat and soft, nontender, no masses appreciated, no hepatosplenomegaly  MSK: Symmetric, no cyanosis, no overt weakness  Lymphatic: No cervical, axillary or inguinal lymphadenopathy  Neuro: Affect appropriate, no gross motor abnormalities      Pathology:  Reviewed in Media    Labs: Reviewed in EPIC    Imaging  No results found  I independently reviewed and interpreted the above laboratory and imaging data, including extensive oncology records, laboratory records, pathology records, imaging records  Discussed with Dr Juventino amato      Discussion/Summary: This is a 79-year-old gentleman with multifocal HCC of RT hepatic lobe  Last liver labs in June; pt will obtain repeat with AFP today  To be scheduled for Y90  I will see the patient in 4 months time after treatment  All questions were answered today the patient and his wife

## 2022-09-26 ENCOUNTER — DOCUMENTATION (OUTPATIENT)
Dept: HEMATOLOGY ONCOLOGY | Facility: CLINIC | Age: 57
End: 2022-09-26

## 2022-09-26 ENCOUNTER — PATIENT OUTREACH (OUTPATIENT)
Dept: HEMATOLOGY ONCOLOGY | Facility: CLINIC | Age: 57
End: 2022-09-26

## 2022-09-26 NOTE — PROGRESS NOTES
Received email from clara goldstein to see if pt has active medicaid  [facility]   I checked back to  06/01/22  Active under recipient RC#[id number]  Emailed this to clara pat

## 2022-09-26 NOTE — PROGRESS NOTES
Intake received, chart reviewed for need of external records      Outside records requested from:  Pathology completed on:02/08/2022 Chet  Fax: [phone]  Imaging completed on:  Fax:  Phone:

## 2022-09-28 ENCOUNTER — OFFICE VISIT (OUTPATIENT)
Dept: GASTROENTEROLOGY | Facility: CLINIC | Age: 57
End: 2022-09-28
Payer: COMMERCIAL

## 2022-09-28 VITALS
SYSTOLIC BLOOD PRESSURE: 140 MMHG | WEIGHT: 206 LBS | BODY MASS INDEX: 30.51 KG/M2 | HEART RATE: 76 BPM | OXYGEN SATURATION: 99 % | HEIGHT: 69 IN | DIASTOLIC BLOOD PRESSURE: 84 MMHG

## 2022-09-28 DIAGNOSIS — K70.30 ALCOHOLIC CIRRHOSIS OF LIVER WITHOUT ASCITES (HCC): ICD-10-CM

## 2022-09-28 DIAGNOSIS — C22.0 HEPATOCELLULAR CARCINOMA (HCC): Primary | ICD-10-CM

## 2022-09-28 PROCEDURE — 99215 OFFICE O/P EST HI 40 MIN: CPT | Performed by: INTERNAL MEDICINE

## 2022-10-04 ENCOUNTER — HOSPITAL ENCOUNTER (OUTPATIENT)
Dept: MRI IMAGING | Facility: HOSPITAL | Age: 57
Discharge: HOME | End: 2022-10-04
Attending: RADIOLOGY
Payer: COMMERCIAL

## 2022-10-04 ENCOUNTER — HOSPITAL ENCOUNTER (OUTPATIENT)
Dept: CT IMAGING | Facility: HOSPITAL | Age: 57
Discharge: HOME | End: 2022-10-04
Attending: RADIOLOGY
Payer: COMMERCIAL

## 2022-10-04 ENCOUNTER — RADIATION ONCOLOGY CONSULT (OUTPATIENT)
Dept: RADIATION ONCOLOGY | Facility: HOSPITAL | Age: 57
End: 2022-10-04
Attending: RADIOLOGY
Payer: COMMERCIAL

## 2022-10-04 VITALS
BODY MASS INDEX: 30.13 KG/M2 | SYSTOLIC BLOOD PRESSURE: 138 MMHG | DIASTOLIC BLOOD PRESSURE: 82 MMHG | WEIGHT: 204 LBS | HEART RATE: 75 BPM | OXYGEN SATURATION: 97 % | RESPIRATION RATE: 18 BRPM | TEMPERATURE: 97.1 F

## 2022-10-04 DIAGNOSIS — C22.0 HEPATOCELLULAR CARCINOMA (HCC): Primary | ICD-10-CM

## 2022-10-04 DIAGNOSIS — C22.0 HEPATOCELLULAR CARCINOMA (HCC): ICD-10-CM

## 2022-10-04 PROCEDURE — 99211 OFF/OP EST MAY X REQ PHY/QHP: CPT | Performed by: RADIOLOGY

## 2022-10-04 PROCEDURE — G0463 HOSPITAL OUTPT CLINIC VISIT: HCPCS | Performed by: RADIOLOGY

## 2022-10-04 PROCEDURE — G1004 CDSM NDSC: HCPCS

## 2022-10-04 PROCEDURE — 99204 OFFICE O/P NEW MOD 45 MIN: CPT | Performed by: RADIOLOGY

## 2022-10-04 PROCEDURE — 71260 CT THORAX DX C+: CPT

## 2022-10-04 PROCEDURE — A9585 GADOBUTROL INJECTION: HCPCS | Performed by: RADIOLOGY

## 2022-10-04 PROCEDURE — 74183 MRI ABD W/O CNTR FLWD CNTR: CPT

## 2022-10-04 PROCEDURE — 74177 CT ABD & PELVIS W/CONTRAST: CPT

## 2022-10-04 RX ORDER — LISINOPRIL AND HYDROCHLOROTHIAZIDE 25; 20 MG/1; MG/1
1 TABLET ORAL DAILY
COMMUNITY
End: 2022-10-13 | Stop reason: SDUPTHER

## 2022-10-04 RX ADMIN — GADOBUTROL 9 ML: 604.72 INJECTION INTRAVENOUS at 17:24

## 2022-10-04 RX ADMIN — IOHEXOL 100 ML: 300 INJECTION, SOLUTION INTRAVENOUS at 13:56

## 2022-10-04 NOTE — PROGRESS NOTES
Consultation - Radiation Oncology      YIN:[id number] : 1965  Encounter: [id number]  Patient Information: Omar Aguilar      CHIEF COMPLAINT  Chief Complaint   Patient presents with    Consult     Radiation Oncology      Cancer Staging  No matching staging information was found for the patient  History of Present Illness   Omar Aguilar is a 62 y o  male with Child Class A5 cirrhosis (prior history of hepatitis C and alcohol abuse, total bilirubin 0 99) and multifocal right hepatic lobe HCC, AJCC stage II T2N0M0 (multifocal, none >  5 cm) and BCLC stage B, presenting for consideration of Y-90 liver directed therapy  He is currently not meeting Donn criteria (multiple lesions,at least one > 3 cm)  He was under medical care at [facility] and surveillance liver US demonstrated an incidental liver mass in   MRI revealed two liver lesions (3 1 cm and 1 5 cm in the right lobe)  US guided liver biopsy [facility]  After discharge from the institution, he established care at [facility] in 2022  He underwent CT and MRI after meeting with Dr Pamela Yousif  MRI findings are described below  He is not a transplant candidate (outside [address] [place] [address])  He was referred for consideration of liver-directed therapy  Work-up is as below:  21 U/S of liver (indication: HCV) - [place] X-ray  Impression  1  Echogenic liver, consistent with steatosis vs hepatocellular disease  2  Isoechoic mass in the right lobe of the liver, cannot exclude HCC  Recommend further evaluation with three-phase liver CT or MRI       10/19/21 MRI abdomen [facility])  Impression:   Suspect hepatic cirrhosis  There are 2 right lobe hepatic lesions suspicious for hepatocellular carcinoma  Prominent upper abdominal lymph node          22 [facility]   Liver needle biopsy:  Poorly differentiated HCC with patchy necrosis        22 CT chest wo contrast  1   Several right lower lobe subcentimeter pulmonary nodules  Based on current Fleischner Society 2017 Guidelines on incidental pulmonary nodule, patients with a known malignancy are at increased risk of metastasis and should receive initial three month follow-up chest CT    2  Partially visualized hypoattenuating liver lesions, likely representing known hepatocellular carcinoma         7/13/22 MRI abdomen    1   Cirrhosis   Multiple right hepatic lobe observations:  - Segment 8, 4 0 x 3 1 cm, LR-M   - Segment 7/8, 4 3 x 4 6 cm, LR-5   - Segment 8, 1 6 x 1 6 cm, LR-M   - Segment 6, 1 1 x 1 2 cm, LR-5      One of these lesions has reportedly previously been biopsied showing hepatocellular carcinoma, although it is not clear which lesion was sampled   Although some of the lesions meet criteria for LR-M, all of the lesions may be part of the same process         9/15/22 RECTAL/GI MULTIDISCIPLINARY CASE REVIEW  -Recommend liver directed therapy   -Patient is scheduled with IR on 9/20/2022   -Follow up appointment with Dr Marah Edwards (surgical oncology) on 9/21/2022 9/20/22 IR - Dr Celia Saavedra  Given the multifocal disease limited to the right hepatic lobe, lobar radioembolization is an excellent choice for him and likely gives him the longest duration of response among the above local regional treatments  Pt agreed to scheduling for Y 90 mapping and implant  Referred to Rad Onc          9/21/22 Dr Marah Edwards, surg onc follow-up  Pt scheduled for Y90  Plan to repeat AFP today     Follow-up in 4 months, after treatment             Component AFP-TUMOR MARKER   Latest Ref Rng & Units 0 5 - 8 ng/mL   9/21/2022 4,961 3 (H)         Component      Latest Ref Rng & Units 9/21/2022   Sodium      135 - 147 mmol/L 136   Potassium      3 5 - 5 3 mmol/L 4 4   Chloride      96 - 108 mmol/L 105   CO2      21 - 32 mmol/L 28   Anion Gap      4 - 13 mmol/L 3 (L)   BUN      5 - 25 mg/dL 12   Creatinine      0 60 - 1 30 mg/dL 1 19   Glucose, Random      65 - 140 mg/dL 97   Calcium      8 3 - 10 1 mg/dL 9 4   AST      5 - 45 U/L 22   ALT      12 - 78 U/L 29   Alkaline Phosphatase      46 - 116 U/L 120 (H)   Total Protein      6 4 - 8 4 g/dL 8 8 (H)   Albumin      3 5 - 5 0 g/dL 4 0   TOTAL BILIRUBIN      0 20 - 1 00 mg/dL 0 99   eGFR      ml/min/1 73sq m 67         Upcomin23 Surg Ifeoma Ordoñez    Upon interview, he endorses the above history  He denies jaundice, pruritis, darker urine or acholic stools  He denies abdominal pain  He denies encephalopathy or ascites  He is without additional acute concerns  Historical Information   Oncology History   Hepatocellular carcinoma ([facility] )    Initial Diagnosis    Hepatocellular carcinoma ([facility] )     2022 Biopsy    [facility] DOSHI US guided liver biopsy: right liver lobe:  Poorly differentiated HCC with patchy necrosis             Past Medical History:   Diagnosis Date    Hypertension     Liver cancer ([facility] )      Past Surgical History:   Procedure Laterality Date    HERNIA REPAIR         Family History   Problem Relation Age of Onset    Cancer Mother        Social History   Social History     Substance and Sexual Activity   Alcohol Use Not Currently     Social History     Substance and Sexual Activity   Drug Use Yes    Types: Marijuana    Comment: medical marijuana     Social History     Tobacco Use   Smoking Status Former Smoker    Packs/day: 1 00    Types: Cigarettes    Quit date:     Years since quittin 7   Smokeless Tobacco Current User   Tobacco Comment    nicotine pouch (on)         Meds/Allergies     Current Outpatient Medications:     lisinopril-hydrochlorothiazide (PRINZIDE,ZESTORETIC) 20-25 MG per tablet, Take 1 tablet by mouth daily, Disp: , Rfl:   No Known Allergies      Review of Systems   Constitutional: Negative  HENT: Negative  Eyes: Negative  Respiratory: Negative  Cardiovascular: Negative  Gastrointestinal: Positive for abdominal pain (right sided in the morning)  Endocrine: Negative  Genitourinary: Negative  Musculoskeletal: Positive for back pain  Skin: Negative  Allergic/Immunologic: Negative  Neurological: Negative  Hematological: Negative  Psychiatric/Behavioral: Negative  OBJECTIVE:   /82 (BP Location: Right arm)   Pulse 75   Temp (!) 97 1 °F (36 2 °C) (Temporal)   Resp 18   Wt 92 5 kg (204 lb)   SpO2 97%   BMI 30 13 kg/m²   Pain Assessment:  0  Performance Status: Karnofsky: 90 - Able to carry on normal activity; minor signs or symptoms of disease     Physical Exam  HENT:      Head: Normocephalic and atraumatic  Mouth/Throat:      Mouth: Mucous membranes are moist       Pharynx: Oropharynx is clear  Eyes:      General: No scleral icterus  Pupils: Pupils are equal, round, and reactive to light  Cardiovascular:      Rate and Rhythm: Normal rate  Pulmonary:      Effort: Pulmonary effort is normal    Abdominal:      General: Abdomen is flat  There is no distension  Musculoskeletal:         General: Normal range of motion  Cervical back: Normal range of motion  Skin:     General: Skin is warm and dry  Coloration: Skin is not jaundiced  Neurological:      General: No focal deficit present  Mental Status: He is alert  Psychiatric:         Mood and Affect: Mood normal               RESULTS  Lab Results    Chemistry        Component Value Date/Time    K 4 4 09/21/2022 1100     09/21/2022 1100    CO2 28 09/21/2022 1100    BUN 12 09/21/2022 1100    CREATININE 1 19 09/21/2022 1100        Component Value Date/Time    CALCIUM 9 4 09/21/2022 1100    ALKPHOS 120 (H) 09/21/2022 1100    AST 22 09/21/2022 1100    ALT 29 09/21/2022 1100            Lab Results   Component Value Date    WBC 7 89 09/21/2022    HGB 15 7 09/21/2022    HCT 47 9 09/21/2022    MCV 86 09/21/2022     09/21/2022         Imaging Studies  No results found  Pathology: See above  ASSESSMENT  1   Hepatocellular carcinoma [facility])  Ambulatory Referral to Radiation Oncology    CT chest abdomen pelvis w contrast    MRI abdomen w wo contrast     Cancer Staging  Child Class A5 cirrhosis (prior history of hepatitis C and alcohol abuse, total bilirubin 0 99) and multifocal right hepatic lobe HCC, AJCC stage II T2N0M0 (multifocal, none >  5 cm) and BCLC stage B, presenting for consideration of Y-90 liver directed therapy  He is currently not meeting Ola criteria (multiple lesions,at least one > 3 cm)  PLAN/DISCUSSION  Orders Placed This Encounter   Procedures    CT chest abdomen pelvis w contrast    MRI abdomen w wo contrast          Annabelle Uribe is a 62 y o  male with Child Class A5 cirrhosis (prior history of hepatitis C and alcohol abuse, total bilirubin 0 99) and multifocal right hepatic lobe HCC, AJCC stage II T2N0M0 (multifocal, none >  5 cm) and BCLC stage B, presenting for consideration of Y-90 liver directed therapy  He is currently not meeting Donn criteria (multiple lesions,at least one > 3 cm)  He has been evaluated by Dr Roxane Borjas who obtained pre-treatment assessment of liver function and additional imaging to evaluate arterial anatomy and calculate estimated dose to the lung and GI structures  Lung shunting was up to 9 6% and there was no significant gastric or bowel activity  His recent bilirubin was 1 17, AlkPhos 265, and AST/ALT of 51/68 on 7/18/22  He is on anticoagulation for subsegmental PE but INR was 1 2 on 7/20/22  AFP was repeated 5,022 1 on 5/23/22  CT chest/abdomen and pelvis will be repeated today      We reviewed the options for locoregional therapy for liver HCCs including resection, ablation, Y90 radioembolization, TACE, and external beam radiation  Given the location and size of these lesions, I agree that he would be a candidate for Y-90 radioembolization, targeting the right hepatic lobe   We discussed the advantages and disadvantages of this approach, including the associated risks and toxicities of radioembolization including, but not limited to, fatigue, nausea, abdominal discomfort, diarrhea, decreased appetite, elevation of liver enzymes, radiation pneumonitis, injury to other abdominal viscera, and hepatic failure  Following completion of treatment he would have repeat blood work in approximately 6 weeks with repeat MRI of the liver performed in approximately 3 months  I discussed curative approaches for the management of [address]  including resection or transplantation  I also reviewed the potential downstaging achieved with Y-90 which may facilitate candidacy for curative treatment      The patient was given the opportunity to ask multiple questions, all of which were answered to his satisfaction  He was amenable to proceeding as described, and informed consent was signed  Prior to treatment, I have ordered repeat CT chest/abdomen/pelvis and MRI abdomen to assess local disease burden and reevaluate pulmonary nodules detected in 6/2022  Pre-treatment evaluate will be arranged through IR, Dr Marito Avila       He was encouraged to call with future questions or concerns  Thank you for involving me in [facility] opal  Esperanza Canseco  10/4/2022,11:15 AM      Portions of the record may have been created with voice recognition software  Occasional wrong word or "sound a like" substitutions may have occurred due to the inherent limitations of voice recognition software  Read the chart carefully and recognize, using context, where substitutions have occurred

## 2022-10-04 NOTE — PROGRESS NOTES
Jermaine Taylor 1965 is a 62 y o  male presents for Radiation Oncology consult for [address] , referred by IR, Dr Debi Hill to discuss Y-90 SIRSphere therapy  62year old male with history of cirrhosis, alcohol abuse, Hepatitis C treated with antiretroviral therapy  He was under medical care at [facility] while in a correctional institution ([address] [place] [address]) and surveillance US of liver showed an incidental liver mass in 2021  MRI showed 2 liver lesions (3 1 cm and 1 5 cm right lobe)  Prior US in 2019 was normal  US guided liver biopsy performed at [facility] on 2/8/22 revealed [address]  He was deemed not a good surgical candidate  Recommended regional liver chemoembolization with cisplatin to right liver lobe at [facility] liver cancer [place]  This was not done  After discharge from the institution, he established care at [place] [address] in June 2022 and met with Dr Fran Chang, Surg Onc  Further workup was ordered for restaging, then proceed with possible TACE or Y90 Sirspheres treatment  9/17/21 U/S of liver (indication: HCV) - [place] X-ray  Impression  1  Echogenic liver, consistent with steatosis vs hepatocellular disease  2  Isoechoic mass in the right lobe of the liver, cannot exclude HCC  Recommend further evaluation with three-phase liver CT or MRI  10/19/21 MRI abdomen [facility])  Impression:   Suspect hepatic cirrhosis  There are 2 right lobe hepatic lesions suspicious for hepatocellular carcinoma  Prominent upper abdominal lymph node  2/8/22 [facility]   Liver needle biopsy:  Poorly differentiated [address]  with patchy necrosis      6/21/22 CT chest wo contrast  1  Several right lower lobe subcentimeter pulmonary nodules  Based on current Fleischner Society 2017 Guidelines on incidental pulmonary nodule, patients with a known malignancy are at increased risk of metastasis and should receive initial three month follow-up chest CT     2  Partially visualized hypoattenuating liver lesions, likely representing known hepatocellular carcinoma  22 MRI abdomen    1  Cirrhosis  Multiple right hepatic lobe observations:  - Segment 8, 4 0 x 3 1 cm, LR-M   - Segment 7/8, 4 3 x 4 6 cm, LR-5   - Segment 8, 1 6 x 1 6 cm, LR-M   - Segment 6, 1 1 x 1 2 cm, LR-5  One of these lesions has reportedly previously been biopsied showing hepatocellular carcinoma, although it is not clear which lesion was sampled  Although some of the lesions meet criteria for LR-M, all of the lesions may be part of the same process  9/15/22 RECTAL/GI MULTIDISCIPLINARY CASE REVIEW  -Recommend liver directed therapy   -Patient is scheduled with IR on 2022   -Follow up appointment with Dr Wenceslao Castellanos (surgical oncology) on 2022 IR - Dr Ashutosh Lin  Given the multifocal disease limited to the right hepatic lobe, lobar radioembolization is an excellent choice for him and likely gives him the longest duration of response among the above local regional treatments  Pt agreed to scheduling for Y 90 mapping and implant  Referred to Rad Onc        22 Dr Wenceslao Castellanos, surg onc follow-up  Pt scheduled for Y90  Plan to repeat AFP today  Follow-up in 4 months, after treatment           Component AFP-TUMOR MARKER   Latest Ref Rng & Units 0 5 - 8 ng/mL   2022 4,961 3 (H)       Component      Latest Ref Rng & Units 2022   Sodium      135 - 147 mmol/L 136   Potassium      3 5 - 5 3 mmol/L 4 4   Chloride      96 - 108 mmol/L 105   CO2      21 - 32 mmol/L 28   Anion Gap      4 - 13 mmol/L 3 (L)   BUN      5 - 25 mg/dL 12   Creatinine      0 60 - 1 30 mg/dL 1 19   Glucose, Random      65 - 140 mg/dL 97   Calcium      8 3 - 10 1 mg/dL 9 4   AST      5 - 45 U/L 22   ALT      12 - 78 U/L 29   Alkaline Phosphatase      46 - 116 U/L 120 (H)   Total Protein      6 4 - 8 4 g/dL 8 8 (H)   Albumin      3 5 - 5 0 g/dL 4 0   TOTAL BILIRUBIN      0 20 - 1 00 mg/dL 0 99   eGFR      ml/min/1 73sq m 67       Upcomin23 Surg Onc, Georgiana Sukumar              Oncology History   Hepatocellular carcinoma ([facility] )   2022 Initial Diagnosis    Hepatocellular carcinoma ([facility] )     2/8/2022 Biopsy    [facility] Ronald DOSHI US guided liver biopsy: right liver lobe:  Poorly differentiated HCC with patchy necrosis           Review of Systems:  Review of Systems   Constitutional: Negative  HENT: Negative  Eyes: Negative  Respiratory: Negative  Cardiovascular: Negative  Gastrointestinal: Positive for abdominal pain (right sided in the morning)  Endocrine: Negative  Genitourinary: Negative  Musculoskeletal: Positive for back pain  Skin: Negative  Allergic/Immunologic: Negative  Neurological: Negative  Hematological: Negative  Psychiatric/Behavioral: Negative          Clinical Trial: no      Pain assessment: 3/10 right abdomen in the morning    PFT: n/a    Prior Radiation: no    Te[facility]    MST completed     Implantable Devices (Port, pacemaker, pain stimulator): no    Hip Replacement: no    Covid Vaccine Status: vaccinated     Health Maintenance   Topic Date Due    COVID-19 Vaccine (1) Never done    Hepatitis A Vaccine (1 of 2 - Risk 2-dose series) Never done    Pneumococcal Vaccine: Pediatrics (0 to 5 Years) and At-Risk Patients (6 to 59 Years) (1 - PCV) Never done    HIV Screening  Never done    BMI: Followup Plan  Never done    Annual Physical  Never done    Hepatitis B Vaccine (1 of 3 - Risk 3-dose series) Never done    DTaP,Tdap,and Td Vaccines (1 - Tdap) Never done    Colorectal Cancer Screening  Never done    Influenza Vaccine (1) Never done    Depression Screening  10/04/2023    BMI: Adult  10/04/2023    Hepatitis C Screening  Completed    HIB Vaccine  Aged Out    IPV Vaccine  Aged Out    Meningococcal ACWY Vaccine  Aged Out    HPV Vaccine  Aged Out       Past Medical History:   Diagnosis Date    Hypertension     Liver cancer ([facility] )        Past Surgical History:   Procedure Laterality Date  HERNIA REPAIR         Family History   Problem Relation Age of Onset    Cancer Mother        Social History     Tobacco Use    Smoking status: Former Smoker     Packs/day: 1 00     Types: Cigarettes     Quit date:      Years since quittin 7    Smokeless tobacco: Current User    Tobacco comment: nicotine pouch (on)   Vaping Use    Vaping Use: Never used   Substance Use Topics    Alcohol use: Not Currently    Drug use: Yes     Types: Marijuana     Comment: medical marijuana          Current Outpatient Medications:     lisinopril-hydrochlorothiazide (PRINZIDE,ZESTORETIC) 20-25 MG per tablet, Take 1 tablet by mouth daily, Disp: , Rfl:     No Known Allergies     Vitals:    10/04/22 1020   BP: 138/82   BP Location: Right arm   Pulse: 75   Resp: 18   Temp: (!) 97 1 °F (36 2 °C)   TempSrc: Temporal   SpO2: 97%   Weight: 92 5 kg (204 lb)

## 2022-10-11 ENCOUNTER — TELEPHONE (OUTPATIENT)
Dept: HEMATOLOGY ONCOLOGY | Facility: CLINIC | Age: 57
End: 2022-10-11

## 2022-10-11 NOTE — TELEPHONE ENCOUNTER
CALL TRANSFER   Reason for patient call? Patient returning Payal's call to further discuss his blood pressure medication  Patient's primary physician? Dr Wenceslao Castellanos   RN call was transferred to and time it was transferred? Jonn Demario @ 11:44AM   Informed patient that the message will be forwarded to the team and someone will get back to them as soon as possible    Did you relay this information to the patient?  Yes

## 2022-10-11 NOTE — TELEPHONE ENCOUNTER
Returned call to pt and left vmm explaining that Dr Ramin Hair can not refill his blood pressure medication as it was not ordered by her  Encouraged pt to check the prescription bottle to find ordering doctor and call them to request a refill  Instructed to call with any questions or concerns

## 2022-10-11 NOTE — TELEPHONE ENCOUNTER
CALL TRANSFER   Reason for patient call? Patient calling with concern as he is needing a refill of his blood pressure medication but is unsure of how to go about refilling this    Patient's primary physician? Dr Pamela Yousif   RN call was transferred to and time it was transferred? Lui Lay @ 11:12AM   Informed patient that the message will be forwarded to the team and someone will get back to them as soon as possible    Did you relay this information to the patient?  Yes

## 2022-10-11 NOTE — TELEPHONE ENCOUNTER
Returned call to pt  Pt calling from a different number than what is listed in his chart- [phone]  Added to his contact information as this is his mobile number  Pt states that he is almost out of his bp medications  The prescribing MD is from when he was incarcerated  Pt states that he has not established care with a pcp since then  Pt states he was not taking the bp meds until recently as his bp was elevated at an office visit  He is concerned as he is scheduled for surgery and does not want it to be delayed if his bp is high and he is out of his bp meds  Encouraged pt to establish care with a pcp who can take over managing his medications  Explained to him that Dr Pamela Yousif is not able to refill this medication as she is not the managing physician  Pt states his wife has a pcp  Encouraged him to reach out to their office to establish care and explain his situation as they may be able to supply a refill until the pt is able to be seen in the office  Pt verbalized understanding

## 2022-10-13 ENCOUNTER — OFFICE VISIT (OUTPATIENT)
Dept: FAMILY MEDICINE CLINIC | Facility: CLINIC | Age: 57
End: 2022-10-13
Payer: COMMERCIAL

## 2022-10-13 VITALS
RESPIRATION RATE: 18 BRPM | TEMPERATURE: 97.2 F | OXYGEN SATURATION: 97 % | HEIGHT: 69 IN | HEART RATE: 66 BPM | BODY MASS INDEX: 30.36 KG/M2 | DIASTOLIC BLOOD PRESSURE: 80 MMHG | SYSTOLIC BLOOD PRESSURE: 124 MMHG | WEIGHT: 205 LBS

## 2022-10-13 DIAGNOSIS — H66.3X9: ICD-10-CM

## 2022-10-13 DIAGNOSIS — C22.0 HEPATOCELLULAR CARCINOMA (HCC): Primary | ICD-10-CM

## 2022-10-13 DIAGNOSIS — I10 ESSENTIAL HYPERTENSION: ICD-10-CM

## 2022-10-13 PROCEDURE — 99204 OFFICE O/P NEW MOD 45 MIN: CPT | Performed by: FAMILY MEDICINE

## 2022-10-13 RX ORDER — LISINOPRIL AND HYDROCHLOROTHIAZIDE 25; 20 MG/1; MG/1
1 TABLET ORAL DAILY
Qty: 90 TABLET | Refills: 0 | Status: SHIPPED | OUTPATIENT
Start: 2022-10-13

## 2022-10-13 NOTE — PROGRESS NOTES
Subjective:      Patient ID: Vicenta Chavarria is a 62 y o  male  With hepatocellular carcinoma due to Hep C infection (was treated) and hypertension presents to re-establish care, has not been seen in 5 years  Released from skilled nursing 6 months ago, has a team of specialists treating his Hep C    Medication Refill  Pertinent negatives include no abdominal pain, arthralgias, chest pain, chills, coughing, fever, rash, sore throat or vomiting  Past Medical History:   Diagnosis Date   • Hypertension    • Liver cancer ([facility] [address] )        Family History   Problem Relation Age of Onset   • Cancer Mother        Past Surgical History:   Procedure Laterality Date   • HERNIA REPAIR          reports that he quit smoking about 5 years ago  His smoking use included cigarettes  He smoked 1 00 pack per day  He uses smokeless tobacco  He reports previous alcohol use  He reports current drug use  Drug: Marijuana  Current Outpatient Medications:   •  lisinopril-hydrochlorothiazide (PRINZIDE,ZESTORETIC) 20-25 MG per tablet, Take 1 tablet by mouth daily, Disp: 90 tablet, Rfl: 0    The following portions of the patient's history were reviewed and updated as appropriate: allergies, current medications, past family history, past medical history, past social history, past surgical history and problem list     Review of Systems   Constitutional: Negative for chills and fever  HENT: Negative for ear pain and sore throat  Eyes: Negative for pain and visual disturbance  Respiratory: Negative for cough and shortness of breath  Cardiovascular: Negative for chest pain and palpitations  Gastrointestinal: Negative for abdominal pain and vomiting  Genitourinary: Negative for dysuria and hematuria  Musculoskeletal: Negative for arthralgias and back pain  Skin: Negative for color change and rash  Neurological: Negative for seizures and syncope  All other systems reviewed and are negative          PHQ-2/9 Depression Screening Objective:    /80 (BP Location: Right arm, Patient Position: Sitting, Cuff Size: Adult)   Pulse 66   Temp (!) 97 2 °F (36 2 °C) (Temporal)   Resp 18   Ht 5' 9" (1 753 m)   Wt 93 kg (205 lb)   SpO2 97%   BMI 30 27 kg/m²      Physical Exam  Vitals and nursing note reviewed  Constitutional:       Appearance: Normal appearance  HENT:      Right Ear: External ear normal       Left Ear: Tympanic membrane, ear canal and external ear normal       Ears:      Comments: Right ear-Surgically absent ear drum, pus drainage    Mastoidectomy-right  Eyes:      General:         Right eye: No discharge  Left eye: No discharge  Conjunctiva/sclera: Conjunctivae normal    Cardiovascular:      Rate and Rhythm: Normal rate and regular rhythm  Heart sounds: No murmur heard  Pulmonary:      Effort: Pulmonary effort is normal       Breath sounds: Normal breath sounds  No wheezing  Abdominal:      General: There is no distension  Palpations: Abdomen is soft  Tenderness: There is no abdominal tenderness  Musculoskeletal:      Right lower leg: No edema  Left lower leg: No edema  Skin:     Findings: No lesion or rash  Neurological:      Mental Status: He is alert  Mental status is at baseline  Psychiatric:         Mood and Affect: Mood normal          Thought Content:  Thought content normal            Recent Results (from the past 8736 hour(s))   AFP tumor marker    Collection Time: 09/21/22 11:00 AM   Result Value Ref Range    AFP TUMOR MARKER 4,961 3 (H) 0 5 - 8 ng/mL   CBC and Platelet    Collection Time: 09/21/22 11:00 AM   Result Value Ref Range    WBC 7 89 4 31 - 10 16 Thousand/uL    RBC 5 57 3 88 - 5 62 Million/uL    Hemoglobin 15 7 12 0 - 17 0 g/dL    Hematocrit 47 9 36 5 - 49 3 %    MCV 86 82 - 98 fL    MCH 28 2 26 8 - 34 3 pg    MCHC 32 8 31 4 - 37 4 g/dL    RDW 13 6 11 6 - 15 1 %    Platelets 082 303 - 622 Thousands/uL    MPV 9 7 8 9 - 12 7 fL   Comprehensive metabolic panel    Collection Time: 09/21/22 11:00 AM   Result Value Ref Range    Sodium 136 135 - 147 mmol/L    Potassium 4 4 3 5 - 5 3 mmol/L    Chloride 105 96 - 108 mmol/L    CO2 28 21 - 32 mmol/L    ANION GAP 3 (L) 4 - 13 mmol/L    BUN 12 5 - 25 mg/dL    Creatinine 1 19 0 60 - 1 30 mg/dL    Glucose 97 65 - 140 mg/dL    Calcium 9 4 8 3 - 10 1 mg/dL    AST 22 5 - 45 U/L    ALT 29 12 - 78 U/L    Alkaline Phosphatase 120 (H) 46 - 116 U/L    Total Protein 8 8 (H) 6 4 - 8 4 g/dL    Albumin 4 0 3 5 - 5 0 g/dL    Total Bilirubin 0 99 0 20 - 1 00 mg/dL    eGFR 67 ml/min/1 73sq m   Protime-INR    Collection Time: 09/21/22 11:00 AM   Result Value Ref Range    Protime 13 0 11 6 - 14 5 seconds    INR 0 97 0 84 - 1 19   LAB PATH REFERRAL    Collection Time: 10/06/22  8:29 AM   Result Value Ref Range    Case Report       Surgical Pathology Report                         Case: [id number]                                   Authorizing Provider:  Miroslava Bowman MD      Collected:           10/06/2022 [id number]              Ordering Location:     [address] [place] [address]      Received:            10/06/2022 [place] [address] Specialty                                                                                  Laboratory                                                                   Pathologist:           Kristian Chung MD                                                                    Specimen:    Liver, Liver Biopsy (10 slides [id number] Counts include 234 beds at the [facility], collected 2/8/2022)                Final Diagnosis       OUTSIDE SLIDES FOR REVIEW (10 slides [id number] Counts include 234 beds at the [facility], collected 2/8/2022):      A  Liver, core needle biopsy:  -   Poorly differentiated carcinoma with patchy necrosis (see comment)  -   Background liver parenchyma with chronic inflammation        Comment: Submitted immunohistochemical stains show the tumor cells are positive for CK7 and CK19, and are negative for CK20,  HepPar1, , TTF1 and   Additional stains were performed by outside institution and are not submitted for review  Per report, the tumor cells stain positive for glypican 3, AFP, albumin mRNA and claudin 4, and stain negative for arginase, PAX8, GATA3, CDX2, and NKX3 1 and mucicarmine  The histomorphologic and immunophenotypic features are consistent with poorly differentiated carcinoma  The clinical history of cirrhosis, AFP and glypican 3 positivity favor hepatocellular carcinoma  However, lack of staining for HepPar1 and arginase, and expression of claudin 4, CK7 and CK19 suggest a cholangiocarcinoma component  Strong expression of albumin mRNA can be found in a majority of hepatocellular carcinoma  However, albumin mRNA can also be positive in a subset of intrahepatic cholangiocarcinoma and adenocarcinoma from other sites  Therefore, hepatocellular carcinoma with a cholangiocarcinoma component, so-called combined hepatocellular carcinoma-cholangiocarcinoma, cannot be excluded  There is insufficient background liver parenchyma for a definitive diagnosis of cirrhosis  Reference: Nery Guevara HD, Neelam T, Amanda Bell SS, Ruth Mendoza TC, Erasmo Watson RK, Chayito ARREOLA, Ashanti Levin TT, Jairo RP  Albumin In Situ Hybridization Can Be Positive in Adenocarcinomas and Other Tumors From Diverse Sites  Am J Clin Pathol  2019 Jul 5;152(2):190-199  doi: 10 [email or web address]  PMID: [id number]  Interpretation performed at [address] [address]       Microscopic Description       A  Liver, Liver Biopsy (10 slides [id number] [facility], collected 2/8/2022): Additional Information       All reported additional testing was performed with appropriately reactive controls    These tests were developed and their performance characteristics determined by [facility] or appropriate performing facility, though some tests may be performed on tissues which have not been validated for performance characteristics (such as staining performed on alcohol exposed cell blocks and decalcified tissues)  Results should be interpreted with caution and in the context of the patients’ clinical condition  These tests may not be cleared or approved by the [place]  Food and Drug Administration, though the FDA has determined that such clearance or approval is not necessary  These tests are used for clinical purposes and they should not be regarded as investigational or for research  This laboratory has been approved by [facility], designated as a high-complexity laboratory and is qualified to perform these tests  Shagufta Molina Description          Received for consult from [facility], [address] [facility], [address] [place] [address] ([phone]) 10 slides labeled [id number] and the corresponding pathology report on patient Omar Aguilar  Clinical Information       64year old male with liver lesions,cirrhosis, possible cholangiocarcinoma  Per EMR,   62year old male with history of cirrhosis, alcohol abuse, Hepatitis C treated with antiretroviral therapy  He was under medical care at [facility] while in a correctional institution ([address] [place] [address]) and surveillance US of liver showed an incidental liver mass in 2021  MRI showed 2 liver lesions (3 1 cm and 1 5 cm right lobe)  Prior US in 2019 was normal  US guided liver biopsy performed at [facility] on 2/8/22 revealed [address]  He was deemed not a good surgical candidate  Recommended regional liver chemoembolization with cisplatin to right liver lobe at [facility] liver cancer center  This was not done  After discharge from the institution, he established care at [place] [address] in June 2022 and met with Dr Pamela Yousif, Surg Onc   Further workup was ordered for restaging, then proceed with possible TACE or Y90 Sirspheres treatment      9/17/21 U/S of liver (indication: HCV) - [facility] X-ray  Impression  1  Echogenic liver, consistent with steatosis vs hepatocellular disease  2  Isoechoic mass in the right lobe of the liver, cannot exclude HCC  Recommend further evaluation with three-phase liver CT or MRI       10/19/21 MRI abdomen [facility])  Impression:   Suspect hepatic cirrhosis  There are 2 right lobe hepatic lesions suspicious for hepatocellular carcinoma  Prominent upper abdominal lymph node        2/8/22 [facility]   Liver needle biopsy:  Poorly differentiated HCC with patchy necrosis     6/21/22 CT chest wo contrast  1  Several right lower lobe subcentimeter pulmonary nodules  Based on current Fleischner Society 2017 Guidelines on incidental pulmonary nodule, patients with a known malignancy are at increased risk of metastasis and should receive initial three month follow-up chest CT    2  Partially visualized hypoattenuating liver lesions, likely representing known hepatocellular carcinoma       7/13/22 MRI abdomen    1   Cirrhosis   Multiple right hepatic lobe observations:  -  Segment 8, 4 0 x 3 1 cm, LR-M   - Segment 7/8, 4 3 x 4 6 cm, LR-5   - Segment 8, 1 6 x 1 6 cm, LR-M   - Segment 6, 1 1 x 1 2 cm, LR-5      One of these lesions has reportedly previously been biopsied showing hepatocellular carcinoma, although it is not clear which lesion was sampled   Although some of the lesions meet criteria for LR-M, all of the lesions may be part of the same process  Laboratory Results: I have personally reviewed the pertinent laboratory results/reports     Radiology/Other Diagnostic Testing Results: I have personally reviewed pertinent reports  MRI abdomen w wo contrast    Result Date: 10/5/2022  MRI - ABDOMEN - WITH AND WITHOUT CONTRAST INDICATION: 62 years / Male  C22 0: Liver cell carcinoma  Dr Rosales Courser note from 10/4/2022 was reviewed    Patient has history of hepatitis C, alcohol abuse, cirrhosis, and multifocal hepatocellular carcinoma with one of the lesions biopsied at an outside institution proven to be hepatocellular carcinoma  This MR was obtained to evaluate disease burden  COMPARISON: Abdomen MR from 7/13/2022  TECHNIQUE:  The following pulse sequences were obtained:  axial T1 weighted in/out of phase images, multiplanar T2 weighted images, axial DWI/ADC, pre-contrast axial T1 with fat saturation and dynamic multiphase post-contrast fat suppressed T1 weighted images    IV Contrast:  9 mL of Gadobutrol injection (SINGLE-DOSE) FINDINGS: LOWER CHEST:   Unremarkable  LIVER: Cirrhotic morphology of the liver  Observation: 1      Size: 5 5 x 4 8, increased from 4 0 x 3 1 cm (series 12 image 29)      Location: Segment 8      Enhancement: Rim arterial phase hyperenhancement  Nonperipheral "washout": No      Enhancing "capsule": Possible/Indeterminate      Threshold growth: No   Ancillary features: *  Favoring [address]  in particular: None *  Favoring malignancy: Restricted diffusion  Mild T2 hyperintensity  *  Favoring benignity: None  LI-RADS Category: LR-M Observation: 2      Size: 4 4 x 4 2 cm, not significantly changed from 4 3 x 4 6 cm (lesion best seen and measured on series 15 image 40, also seen on series 12 image 38 )       Location: Segment 7/8      Enhancement: Nonrim arterial phase hyperenhancement  Nonperipheral "washout": Yes      Enhancing "capsule": Yes      Threshold growth: No   Ancillary features: *  Favoring [address]  in particular: None *  Favoring malignancy:Restricted diffusion  Mild to moderate T2 hyperintensity  *  Favoring benignity:None  LI-RADS Category: LR-5  Observation: 3      Size: 1 7 x 1 5 cm not significantly changed in size (lesion best seen and measured on series 15 image 46  Also visible on series 12 image 42 )      Location: Segment 8      Enhancement: No arterial phase hyperenhancement  Nonperipheral "washout": No      Enhancing "capsule": Yes      Threshold growth: Not applicable   Ancillary features:  *  Favoring [address]  in particular: None *  Favoring malignancy:Restricted diffusion  Mild to moderate T2 hyperintensity  *  Favoring benignity:None  LI-RADS Category: LR-M Observation: 4      Size: 1 6 x 1 2 cm, mildly increased from 1 1 x 1 2 cm (series 12 image 53)      Location: Segment 6      Enhancement: Nonrim arterial phase hyperenhancement  Nonperipheral "washout": Yes (series 12 image 294)      Enhancing "capsule": Yes      Threshold growth: No Ancillary features: *  Favoring [address]  in particular: None *  Favoring malignancy:Restricted diffusion  Mild T2 hyperintensity  *  Favoring benignity:None  LI-RADS Category: LR-5 BILE DUCTS: No intrahepatic or extrahepatic bile duct dilation  GALLBLADDER:  Normal  PANCREAS:  Unremarkable  ADRENAL GLANDS:  Normal  SPLEEN:  Normal  KIDNEYS/PROXIMAL URETERS: No hydroureteronephrosis  No suspicious renal mass  BOWEL:   No dilated loops of bowel  PERITONEUM/RETROPERITONEUM: No mass  No ascites  LYMPH NODES: No abdominal lymphadenopathy  Unchanged skinny hepatis node measuring 1 9 x 1 3 cm (series series 15 image 60 ) and enlarging gastrohepatic ligament node measuring 1 7 x 1 3 cm, previously 10 x 10 mm (series 15 image 43 ) VASCULAR STRUCTURES:  No aneurysm  ABDOMINAL WALL:  Unremarkable  OSSEOUS STRUCTURES:  No suspicious osseous lesion  Again seen is a cirrhotic liver, with 4 observations, all right hepatic lobe, that are highly suspicious for malignancy, fully detailed above and summarized below: Multiple right hepatic lobe observations: - Segment 8, 5 5 x 4 8, increased from 4 0 x 3 1 cm (series 12 image 29) LR-M - Segment 7/8, 4 4 x 4 2 cm, not significantly changed from 4 3 x 4 6 cm (lesion best seen and measured on series 15 image 40, also seen on series 12 image 38 )   - Segment 8, 1 7 x 1 5 cm not significantly changed in size (lesion best seen and measured on series 15 image 46    Also visible on series 12 image 42 ) - Segment 6, 1 6 x 1 2 cm, mildly increased from 1 1 x 1 2 cm (series 12 image 53) One of these lesions has reportedly previously been biopsied showing hepatocellular carcinoma  Most likely all of these lesions are hepatocellular carcinomas  Unchanged skinny hepatis node measuring 1 9 x 1 3 cm (series series 15 image 60 ) and enlarging gastrohepatic ligament node measuring 1 7 x 1 3 cm, previously 10 x 10 mm (series 15 image 43 ) Workstation performed: [id number]     CT chest abdomen pelvis w contrast    Result Date: 10/4/2022  CT CHEST, ABDOMEN AND PELVIS WITH IV CONTRAST INDICATION:   C22 0: Liver cell carcinoma  COMPARISON:  CT from June 21, 2022, MRI from July 13, 2022 TECHNIQUE: CT examination of the chest, abdomen and pelvis was performed  Scanning through the abdomen was performed in arterial, venous and delayed phases according a protocol spefically designed to evaluate upper abdominal viscera  Axial, sagittal, and coronal 2D reformatted images were created from the source data and submitted for interpretation  Radiation dose length product (DLP) for this visit:  1467 mGy-cm   This examination, like all CT scans performed in the [facility], was performed utilizing techniques to minimize radiation dose exposure, including the use of iterative reconstruction and automated exposure control  IV Contrast:  100 mL of iohexol (OMNIPAQUE) Enteric Contrast: Enteric contrast was administered  FINDINGS: CHEST LUNGS:  Right lung: again noted is a nodule in the superior segment of the right lower lobe which measures 6 mm, stable as seen in image 58 series 5 A subpleural nodule seen in image 72 series 5 measuring about 6 mm, stable A right lower lobe lung nodule in image 58 series 5, measuring both 4 mm, remains nonmeasurable though mildly larger as compared to the previous study Additional small nonmeasurable lung nodules are seen in the left lung, measuring about 4 mm in the left upper lobe in image 61 series 606, stable    A small 2 mm nodule seen left lower lobe image 71 series 5 is not seen on the previous study No new measurable lung nodule seen PLEURA:  No pleural effusion seen No pneumothorax seen HEART/GREAT VESSELS: Heart is unremarkable for patient's age  No thoracic aortic aneurysm  MEDIASTINUM AND LUCIUS:  No new mediastinal lymph node enlargement No lymph node seen in the cardiophrenic angle region CHEST WALL AND LOWER NECK:  No significant axillary lymph node enlargement seen ABDOMEN LIVER/BILIARY TREE:  Cirrhotic liver is seen Observation 1:4 x 3 1 cm lesion, segment 8 image 48 series 4 with the renal arterial phase hyperenhancement there are with the perilesional areas of hyperenhancement with no washout on the MRI this is measured 3 8 x 3 3 cm, question has this patient has had treatment for this lesion Observation 2: In the segment 7/8, measuring 4 5 x 4 7 cm, demonstrates nodular rim arterial phase enhancement with washout with visualization of a enhancing capsule on the delayed images  On the previous the MRI this is measured at 4 7 x 3 7 cm  Observation 3; An additional lesion was described on the previous MRI anterior to the lesion to  This is again noted and measures 1 2 cm x 1 5 cm This is blending with the lesion 2 Observation 4: Normal renal arterial phase enhancing lesion measuring 1 3 x 1 3 cm, segment 6 as assessed on the previous MRI There is no new arterial phase enhancing lesion The portal vein is patent  GALLBLADDER:  No calcified gallstones  No pericholecystic inflammatory change  SPLEEN:  Unremarkable  PANCREAS:  No pancreatic ductal dilation and no pancreatic atrophy ADRENAL GLANDS:  Unremarkable  KIDNEYS/URETERS:  No hydronephrosis No renal calculi STOMACH AND BOWEL:  Unremarkable  APPENDIX:  No findings to suggest appendicitis  ABDOMINOPELVIC CAVITY:  Evie hepatis lymph nodes are seen measuring about 1 cm, stable Portal caval lymph nodes are seen measuring about 9 mm, stable VESSELS:  Unremarkable for patient's age   PELVIS REPRODUCTIVE ORGANS:  Prostate calcification seen URINARY BLADDER:  Unremarkable  ABDOMINAL WALL/INGUINAL REGIONS:  Unremarkable  OSSEOUS STRUCTURES:  No acute compression collapse vertebra No gross lytic lesion     The previously noted liver lesions, mildly larger as to the previous MRI from July 13, 2022 this may be related to comparison being performed on 2 different modalities No new hypervascular lesion with washout Portal vein remains patent  Patent hepatic artery which arises from the celiac trunk No new measurable lung nodule The previously noted nonmeasurable lung nodules remain stable  A new 2 mm nonmeasurable lung nodules in the left lower lobe, short interval follow-up suggested at 3 months The study was marked in EPIC for significant notification  Workstation performed: [id number]        Assessment/Plan:         Diagnoses and all orders for this visit:    Hepatocellular carcinoma ([facility] [address] )    Essential hypertension  -     lisinopril-hydrochlorothiazide (PRINZIDE,ZESTORETIC) 20-25 MG per tablet; Take 1 tablet by mouth daily    Chronic otitis media with purulent effusion  -     Ambulatory Referral to Otolaryngology; Future      I have reviewed his specialist consult , care plan and labs which are stable, -continue lisinopril-hctz  He does have chronic otitis of right ear with pus drainage- history of mastoiditis and surgeries of middle ear- recommend follow up with ENT  F/u with pcp in 3 months    Read package inserts for all medications before starting a new medications, call me if you have any questions  Patient was given opportunity to ask questions and all questions were answered  Disclaimer: Portions of the record may have been created with voice recognition software  Occasional wrong word or "sound a like" substitutions may have occurred due to the inherent limitations of voice recognition software  Read the chart carefully and recognize, using context, where substitutions have occurred   I have used the Epic copy/forward function to compose this note  I have reviewed my current note to ensure it reflects the current patient status, exam, assessment and plan

## 2022-11-01 ENCOUNTER — TELEPHONE (OUTPATIENT)
Dept: RADIOLOGY | Facility: HOSPITAL | Age: 57
End: 2022-11-01

## 2022-11-01 NOTE — PRE-PROCEDURE INSTRUCTIONS
Pre-procedure Instructions for Interventional Radiology  [address]  [place] [address] [phone]    You are scheduled for a/an Y-90 Mapping  On Tuesday 11/8/22  Your tentative arrival time is AM   Short stay will notify you the day before your procedure with the exact arrival time and the location to arrive  To prepare for your procedure:  1  Please arrange for someone to drive you home after the procedure and stay with you until the next morning if you are instructed to do so  This is typically for patients receiving some type of sedative or anesthetic for the procedure  2  DO NOT EAT OR DRINK ANYTHING after midnight on the evening before your procedure including candy & gum   3  ONLY SIPS OF WATER with your medications are allowed on the morning of your procedure  4  TAKE ALL OF YOUR REGULAR MEDICATIONS THE MORNING OF YOUR PROCEDURE with sips of water! We may call you to stop some of your blood sugar, blood pressure and blood thinning medications depending on the procedure  Please take all of these medications unless we instruct you to stop them  5  If you have an allergy to x-ray dye, please contact Interventional Radiology for an x-ray dye preparation which usually consists of an oral steroid and Benadryl  The day of your procedure:  1  Bring a list of the medications you take at home  2  Bring medications you take for breathing problems (such as inhalers), medications for chest pain, or both  3  Bring a case for your glasses or contacts  4  Bring your insurance card and a form of photo ID   5  Please leave all valuables such as credit cards and jewelry at home  6  Report to the registration desk in the main lobby at the [facility]  Ask to be directed to [facility]  7  While your procedure is being performed, your family may wait in the Radiology Waiting Room on the 1st floor in Radiology    if they need to leave, they may provide a number to be called following the procedure  8  Be prepared to stay overnight just in case  Sometimes procedures will indicate the need for further observation or treatment  9  If you are scheduled for a follow-up visit with the Interventional Radiologist after your procedure, you will be called with a date and time  10  Covid vaccine plus booster completed  Special Instructions (Medications to stop taking before your procedure etc ):  Above reviewed with his wife Adam Dale

## 2022-11-08 ENCOUNTER — HOSPITAL ENCOUNTER (OUTPATIENT)
Dept: RADIOLOGY | Facility: HOSPITAL | Age: 57
Discharge: HOME | End: 2022-11-08
Attending: STUDENT IN AN ORGANIZED HEALTH CARE EDUCATION/TRAINING PROGRAM

## 2022-11-08 ENCOUNTER — HOSPITAL ENCOUNTER (OUTPATIENT)
Dept: RADIOLOGY | Facility: HOSPITAL | Age: 57
Discharge: HOME | End: 2022-11-08
Attending: RADIOLOGY

## 2022-11-08 VITALS
BODY MASS INDEX: 30.51 KG/M2 | HEART RATE: 62 BPM | WEIGHT: 206 LBS | OXYGEN SATURATION: 99 % | TEMPERATURE: 97.3 F | RESPIRATION RATE: 18 BRPM | DIASTOLIC BLOOD PRESSURE: 73 MMHG | HEIGHT: 69 IN | SYSTOLIC BLOOD PRESSURE: 159 MMHG

## 2022-11-08 DIAGNOSIS — C22.0 HEPATOCELLULAR CARCINOMA (HCC): ICD-10-CM

## 2022-11-08 LAB
ALBUMIN SERPL BCP-MCNC: 3.7 G/DL (ref 3.5–5)
ALP SERPL-CCNC: 165 U/L (ref 46–116)
ALT SERPL W P-5'-P-CCNC: 38 U/L (ref 12–78)
ANION GAP SERPL CALCULATED.3IONS-SCNC: 6 MMOL/L (ref 4–13)
AST SERPL W P-5'-P-CCNC: 25 U/L (ref 5–45)
BASOPHILS # BLD AUTO: 0.04 THOUSANDS/ÂΜL (ref 0–0.1)
BASOPHILS NFR BLD AUTO: 1 % (ref 0–1)
BILIRUB SERPL-MCNC: 0.96 MG/DL (ref 0.2–1)
BUN SERPL-MCNC: 16 MG/DL (ref 5–25)
CALCIUM SERPL-MCNC: 9.5 MG/DL (ref 8.3–10.1)
CHLORIDE SERPL-SCNC: 103 MMOL/L (ref 96–108)
CO2 SERPL-SCNC: 26 MMOL/L (ref 21–32)
CREAT SERPL-MCNC: 1.18 MG/DL (ref 0.6–1.3)
EOSINOPHIL # BLD AUTO: 0.18 THOUSAND/ÂΜL (ref 0–0.61)
EOSINOPHIL NFR BLD AUTO: 2 % (ref 0–6)
ERYTHROCYTE [DISTWIDTH] IN BLOOD BY AUTOMATED COUNT: 13 % (ref 11.6–15.1)
GFR SERPL CREATININE-BSD FRML MDRD: 68 ML/MIN/1.73SQ M
GLUCOSE P FAST SERPL-MCNC: 105 MG/DL (ref 65–99)
GLUCOSE SERPL-MCNC: 105 MG/DL (ref 65–140)
HCT VFR BLD AUTO: 45 % (ref 36.5–49.3)
HGB BLD-MCNC: 14.9 G/DL (ref 12–17)
IMM GRANULOCYTES # BLD AUTO: 0.02 THOUSAND/UL (ref 0–0.2)
IMM GRANULOCYTES NFR BLD AUTO: 0 % (ref 0–2)
INR PPP: 0.94 (ref 0.84–1.19)
LYMPHOCYTES # BLD AUTO: 2.46 THOUSANDS/ÂΜL (ref 0.6–4.47)
LYMPHOCYTES NFR BLD AUTO: 33 % (ref 14–44)
MCH RBC QN AUTO: 27.6 PG (ref 26.8–34.3)
MCHC RBC AUTO-ENTMCNC: 33.1 G/DL (ref 31.4–37.4)
MCV RBC AUTO: 84 FL (ref 82–98)
MONOCYTES # BLD AUTO: 0.64 THOUSAND/ÂΜL (ref 0.17–1.22)
MONOCYTES NFR BLD AUTO: 9 % (ref 4–12)
NEUTROPHILS # BLD AUTO: 4.17 THOUSANDS/ÂΜL (ref 1.85–7.62)
NEUTS SEG NFR BLD AUTO: 55 % (ref 43–75)
NRBC BLD AUTO-RTO: 0 /100 WBCS
PLATELET # BLD AUTO: 202 THOUSANDS/UL (ref 149–390)
PMV BLD AUTO: 9.6 FL (ref 8.9–12.7)
POTASSIUM SERPL-SCNC: 4.2 MMOL/L (ref 3.5–5.3)
PROT SERPL-MCNC: 8.2 G/DL (ref 6.4–8.4)
PROTHROMBIN TIME: 12.7 SECONDS (ref 11.6–14.5)
RBC # BLD AUTO: 5.39 MILLION/UL (ref 3.88–5.62)
SODIUM SERPL-SCNC: 135 MMOL/L (ref 135–147)
WBC # BLD AUTO: 7.51 THOUSAND/UL (ref 4.31–10.16)

## 2022-11-08 RX ORDER — FENTANYL CITRATE 50 UG/ML
INJECTION, SOLUTION INTRAMUSCULAR; INTRAVENOUS CODE/TRAUMA/SEDATION MEDICATION
Status: COMPLETED | OUTPATIENT
Start: 2022-11-08 | End: 2022-11-08

## 2022-11-08 RX ORDER — SODIUM CHLORIDE 9 MG/ML
75 INJECTION, SOLUTION INTRAVENOUS CONTINUOUS
Status: DISCONTINUED | OUTPATIENT
Start: 2022-11-08 | End: 2022-11-09 | Stop reason: HOSPADM

## 2022-11-08 RX ORDER — LIDOCAINE HYDROCHLORIDE 10 MG/ML
INJECTION, SOLUTION EPIDURAL; INFILTRATION; INTRACAUDAL; PERINEURAL CODE/TRAUMA/SEDATION MEDICATION
Status: COMPLETED | OUTPATIENT
Start: 2022-11-08 | End: 2022-11-08

## 2022-11-08 RX ORDER — MIDAZOLAM HYDROCHLORIDE 2 MG/2ML
INJECTION, SOLUTION INTRAMUSCULAR; INTRAVENOUS CODE/TRAUMA/SEDATION MEDICATION
Status: COMPLETED | OUTPATIENT
Start: 2022-11-08 | End: 2022-11-08

## 2022-11-08 RX ADMIN — MIDAZOLAM 1 MG: 1 INJECTION INTRAMUSCULAR; INTRAVENOUS at 12:27

## 2022-11-08 RX ADMIN — FENTANYL CITRATE 50 MCG: 50 INJECTION INTRAMUSCULAR; INTRAVENOUS at 12:28

## 2022-11-08 RX ADMIN — FENTANYL CITRATE 50 MCG: 50 INJECTION INTRAMUSCULAR; INTRAVENOUS at 12:41

## 2022-11-08 RX ADMIN — SODIUM CHLORIDE 75 ML/HR: 0.9 INJECTION, SOLUTION INTRAVENOUS at 09:58

## 2022-11-08 RX ADMIN — MIDAZOLAM 1 MG: 1 INJECTION INTRAMUSCULAR; INTRAVENOUS at 12:40

## 2022-11-08 RX ADMIN — IOHEXOL 32 ML: 300 INJECTION, SOLUTION INTRAVENOUS at 14:16

## 2022-11-08 RX ADMIN — LIDOCAINE HYDROCHLORIDE 10 ML: 10 INJECTION, SOLUTION EPIDURAL; INFILTRATION; INTRACAUDAL; PERINEURAL at 12:41

## 2022-11-08 NOTE — DISCHARGE INSTRUCTIONS
Discharge Instructions for SIRS Mapping Procedure                                    A Sirs mapping procedure is an arteriogram  done to prepare your liver for a SIRS Implantation  During the mapping your doctor will embolize (block) some of your blood vessels to keep the SIRS                 Spheres from traveling to areas outside your liver        AFTER YOU LEAVE:     Self-care:   Limit activity: Rest for the remainder of the day of your procedure  Have some one with you until the next morning  Keep your arm or leg straight as much as possible   Rest as much as possible, sitting lying or reclining  Walk only to go to the bathroom, to bed or to eat  If the angiogram catheter was put in your leg, use the stairs as little as possible  No driving  Keep your wound clean and dry  You may shower 24 hours after your procedure  The bandage you have on should fall off in 2-3 days  If there is any drainage from the puncture site, you should put on a clean bandage  Watch for bleeding and bruising: It is normal to have a bruise and soreness where the angiogram catheter went in  Diet:   You may resume your regular diet  Small sips of flat soda will help with mild nausea  Drink more liquids than usual for the next 24 hours                      Medications              Resume your normal medications              Start taking Prilosec as prescribed  daily for the next 30 days               Please get the Medrol prescription filled prior to your implantation (you will start taking it after the implantation)             WHAT [address]    After your second procedure, the SIRS implant  For  72 hours  NO pregnant visitors or family  No physical contact with others for more than two hours  Sleep alone in bed  No children or pets sitting on your lap  Keep a distance of three feet between yourself and others         IMMEDIATELY Contact Interventional Radiology at [phone] [facility] PATIENTS: Contact Interventional Radiology at [phone]) [facility] PATIENTS: Contact Interventional Radiology at [phone]) if any of the following occur: If your bruise gets larger or if you notice any active bleeding  APPLY DIRECT PRESSURE TO THE BLEEDING SITE  If you notice increased swelling or have increased pain at the puncture site   If you have any numbness or pain in the extremity of the puncture site   If that extremity seems cold or pale  You have fever greater than 101  Persistent nausea or vomiting    Follow up with your primary healthcare provider  as directed: Write down your questions so you remember to ask them during your visits  Procedural Sedation   WHAT YOU NEED TO KNOW:   Procedural sedation is medicine used during procedures to help you feel relaxed and calm  You will remember little to none of the procedure  After sedation you may feel tired, weak, or unsteady on your feet  You may also have trouble concentrating or short-term memory loss  These symptoms should go away in 24 hours or less  DISCHARGE INSTRUCTIONS:     Call 911 or have someone else call for any of the following: You have sudden trouble breathing  You cannot be woken  Contact Interventional Radiology at [phone]   [facility] PATIENTS: Contact Interventional Radiology at [phone]) [facility] PATIENTS: Contact Interventional Radiology at [phone]) if any of the following occur: You have a severe headache or dizziness  Your heart is beating faster than usual     You have a fever or chills  Your skin is itchy, swollen, or you have a rash  You have nausea or are vomiting for more than 8 hours after the procedure  You have questions or concerns about your condition or care  Self-care:   Have someone stay with you for 24 hours  This person can drive you to errands and help you do things around the house   This person can also watch for problems  Rest and do quiet activities for 24 hours  Do not exercise, ride a bike, or play sports  Stand up slowly to prevent dizziness and falls  Take short walks around the house with another person  Slowly return to your usual activities the next day  Do not drive or use dangerous machines or tools for 24 hours  You may injure yourself or others  Examples include a lawnmower, saw, or drill  Do not return to work for 24 hours if you use dangerous machines or tools for work  Do not make important decisions for 24 hours  For example, do not sign important papers or invest money  Drink liquids as directed  Liquids help flush the sedation medicine out of your body  Ask how much liquid to drink each day and which liquids are best for you  Eat small, frequent meals to prevent nausea and vomiting  Start with clear liquids such as juice or broth  If you do not vomit after clear liquids, you can eat your usual foods  Do not drink alcohol or take medicines that make you drowsy  This includes medicines that help you sleep and anxiety medicines  Ask your healthcare provider if it is safe for you to take pain medicine  Follow up with your healthcare provider as directed: Write down your questions so you remember to ask them during your visits

## 2022-11-08 NOTE — SEDATION DOCUMENTATION
Hepatic arteriogram with Yitrium 90 mapping completed by Dr Varela Sites closure to Right CFA Puncture with good hemostasis  VSS  Denies pain at this time  Report called to Short Stay RN  Will need to go to Nuclear Medicine scan later today

## 2022-11-10 ENCOUNTER — TELEPHONE (OUTPATIENT)
Dept: RADIOLOGY | Facility: HOSPITAL | Age: 57
End: 2022-11-10

## 2022-11-10 DIAGNOSIS — C22.0 HEPATOCELLULAR CARCINOMA (HCC): Primary | ICD-10-CM

## 2022-11-10 RX ORDER — OMEPRAZOLE 40 MG/1
40 CAPSULE, DELAYED RELEASE ORAL DAILY
Qty: 30 CAPSULE | Refills: 0 | Status: SHIPPED | OUTPATIENT
Start: 2022-11-16

## 2022-11-10 RX ORDER — METHYLPREDNISOLONE 4 MG/1
TABLET ORAL
Qty: 1 EACH | Refills: 0 | Status: SHIPPED | OUTPATIENT
Start: 2022-11-18

## 2022-11-11 ENCOUNTER — TELEPHONE (OUTPATIENT)
Dept: RADIOLOGY | Facility: HOSPITAL | Age: 57
End: 2022-11-11

## 2022-11-11 NOTE — PRE-PROCEDURE INSTRUCTIONS
Pre-procedure Instructions for Interventional Radiology  [address] [place] [address] Dr Hayes [place] [address] [phone]    You are scheduled for a/an Y-90 Embolization  On Friday 11/18/22  Your tentative arrival time is 0715  Short stay will notify you the day before your procedure with the exact arrival time and the location to arrive  To prepare for your procedure:  1  Please arrange for someone to drive you home after the procedure and stay with you until the next morning if you are instructed to do so  This is typically for patients receiving some type of sedative or anesthetic for the procedure  2  DO NOT EAT OR DRINK ANYTHING after midnight on the evening before your procedure including candy & gum   3  ONLY SIPS OF WATER with your medications are allowed on the morning of your procedure  4  TAKE ALL OF YOUR REGULAR MEDICATIONS THE MORNING OF YOUR PROCEDURE with sips of water! We may call you to stop some of your blood sugar, blood pressure and blood thinning medications depending on the procedure  Please take all of these medications unless we instruct you to stop them  5  If you have an allergy to x-ray dye, please contact Interventional Radiology for an x-ray dye preparation which usually consists of an oral steroid and Benadryl  The day of your procedure:  1  Bring a list of the medications you take at home  2  Bring medications you take for breathing problems (such as inhalers), medications for chest pain, or both  3  Bring a case for your glasses or contacts  4  Bring your insurance card and a form of photo ID   5  Please leave all valuables such as credit cards and jewelry at home  6  Report to the registration desk in the main lobby at the [facility]  Ask to be directed to [facility]  7  While your procedure is being performed, your family may wait in the Radiology Waiting Room on the 1st floor in Radiology    if they need to leave, they may provide a number to be called following the procedure  8  Be prepared to stay overnight just in case  Sometimes procedures will indicate the need for further observation or treatment  9  If you are scheduled for a follow-up visit with the Interventional Radiologist after your procedure, you will be called with a date and time  10  Covid Vaccine and Booster completed  Special Instructions (Medications to stop taking before your procedure etc ):  Prilosec 40 mg  Daily start 11/16/22,Medral dose pack start PM 11/18/22

## 2022-11-15 ENCOUNTER — TELEPHONE (OUTPATIENT)
Dept: RADIOLOGY | Facility: HOSPITAL | Age: 57
End: 2022-11-15

## 2022-11-15 NOTE — PRE-PROCEDURE INSTRUCTIONS
Pre-procedure Instructions for Interventional Radiology  [address]  [place] [address] [phone]    You are scheduled for a/an Rescheduled Y-90 Radioembolization  On Tuesday 11/22/22  Your tentative arrival time is 0715  Short stay will notify you the day before your procedure with the exact arrival time and the location to arrive  To prepare for your procedure:  1  Please arrange for someone to drive you home after the procedure and stay with you until the next morning if you are instructed to do so  This is typically for patients receiving some type of sedative or anesthetic for the procedure  2  DO NOT EAT OR DRINK ANYTHING after midnight on the evening before your procedure including candy & gum   3  ONLY SIPS OF WATER with your medications are allowed on the morning of your procedure  4  TAKE ALL OF YOUR REGULAR MEDICATIONS THE MORNING OF YOUR PROCEDURE with sips of water! We may call you to stop some of your blood sugar, blood pressure and blood thinning medications depending on the procedure  Please take all of these medications unless we instruct you to stop them  5  If you have an allergy to x-ray dye, please contact Interventional Radiology for an x-ray dye preparation which usually consists of an oral steroid and Benadryl  The day of your procedure:  1  Bring a list of the medications you take at home  2  Bring medications you take for breathing problems (such as inhalers), medications for chest pain, or both  3  Bring a case for your glasses or contacts  4  Bring your insurance card and a form of photo ID   5  Please leave all valuables such as credit cards and jewelry at home  6  Report to the registration desk in the main lobby at the [address] [facility], Entr[facility] B  Ask to be directed to [facility]    7  While your procedure is being performed, your family may wait in the Radiology Waiting Room on the 1st floor in Radiology  if they need to leave, they may provide a number to be called following the procedure  8  Be prepared to stay overnight just in case  Sometimes procedures will indicate the need for further observation or treatment  9  If you are scheduled for a follow-up visit with the Interventional Radiologist after your procedure, you will be called with a date and time  Special Instructions (Medications to stop taking before your procedure etc ):  Zayra losec start 11/20/22 and medral Dose Pack PM 11/22/22

## 2022-11-18 ENCOUNTER — TELEPHONE (OUTPATIENT)
Dept: SURGICAL ONCOLOGY | Facility: CLINIC | Age: 57
End: 2022-11-18

## 2022-11-18 NOTE — TELEPHONE ENCOUNTER
Called and left message for patient  to reschedule appointment with Dr Hathaway on 1/18/23 due to [facility] being in a meeting in the morning  Left hope line number to reschedule

## 2022-11-22 ENCOUNTER — HOSPITAL ENCOUNTER (OUTPATIENT)
Dept: RADIOLOGY | Facility: HOSPITAL | Age: 57
Discharge: HOME | End: 2022-11-22
Attending: STUDENT IN AN ORGANIZED HEALTH CARE EDUCATION/TRAINING PROGRAM

## 2022-11-22 ENCOUNTER — HOSPITAL ENCOUNTER (OUTPATIENT)
Dept: RADIOLOGY | Facility: HOSPITAL | Age: 57
Discharge: HOME | End: 2022-11-22
Attending: RADIOLOGY

## 2022-11-22 VITALS
OXYGEN SATURATION: 99 % | DIASTOLIC BLOOD PRESSURE: 75 MMHG | RESPIRATION RATE: 31 BRPM | BODY MASS INDEX: 30.51 KG/M2 | TEMPERATURE: 97.5 F | SYSTOLIC BLOOD PRESSURE: 125 MMHG | HEART RATE: 68 BPM | WEIGHT: 206 LBS | HEIGHT: 69 IN

## 2022-11-22 DIAGNOSIS — C22.0 HEPATOCELLULAR CARCINOMA (HCC): ICD-10-CM

## 2022-11-22 PROBLEM — H66.3X9: Status: RESOLVED | Noted: 2022-10-13 | Resolved: 2022-11-22

## 2022-11-22 LAB
ALBUMIN SERPL BCP-MCNC: 3.5 G/DL (ref 3.5–5)
ALP SERPL-CCNC: 147 U/L (ref 46–116)
ALT SERPL W P-5'-P-CCNC: 29 U/L (ref 12–78)
ANION GAP SERPL CALCULATED.3IONS-SCNC: 4 MMOL/L (ref 4–13)
AST SERPL W P-5'-P-CCNC: 22 U/L (ref 5–45)
BASOPHILS # BLD AUTO: 0.04 THOUSANDS/ÂΜL (ref 0–0.1)
BASOPHILS NFR BLD AUTO: 1 % (ref 0–1)
BILIRUB SERPL-MCNC: 0.71 MG/DL (ref 0.2–1)
BUN SERPL-MCNC: 11 MG/DL (ref 5–25)
CALCIUM SERPL-MCNC: 9.1 MG/DL (ref 8.3–10.1)
CHLORIDE SERPL-SCNC: 105 MMOL/L (ref 96–108)
CO2 SERPL-SCNC: 28 MMOL/L (ref 21–32)
CREAT SERPL-MCNC: 1.04 MG/DL (ref 0.6–1.3)
EOSINOPHIL # BLD AUTO: 0.17 THOUSAND/ÂΜL (ref 0–0.61)
EOSINOPHIL NFR BLD AUTO: 3 % (ref 0–6)
ERYTHROCYTE [DISTWIDTH] IN BLOOD BY AUTOMATED COUNT: 12.9 % (ref 11.6–15.1)
GFR SERPL CREATININE-BSD FRML MDRD: 79 ML/MIN/1.73SQ M
GLUCOSE P FAST SERPL-MCNC: 117 MG/DL (ref 65–99)
GLUCOSE SERPL-MCNC: 117 MG/DL (ref 65–140)
HCT VFR BLD AUTO: 44.1 % (ref 36.5–49.3)
HGB BLD-MCNC: 14.5 G/DL (ref 12–17)
IMM GRANULOCYTES # BLD AUTO: 0.01 THOUSAND/UL (ref 0–0.2)
IMM GRANULOCYTES NFR BLD AUTO: 0 % (ref 0–2)
INR PPP: 0.95 (ref 0.84–1.19)
LYMPHOCYTES # BLD AUTO: 1.89 THOUSANDS/ÂΜL (ref 0.6–4.47)
LYMPHOCYTES NFR BLD AUTO: 29 % (ref 14–44)
MCH RBC QN AUTO: 27.9 PG (ref 26.8–34.3)
MCHC RBC AUTO-ENTMCNC: 32.9 G/DL (ref 31.4–37.4)
MCV RBC AUTO: 85 FL (ref 82–98)
MONOCYTES # BLD AUTO: 0.51 THOUSAND/ÂΜL (ref 0.17–1.22)
MONOCYTES NFR BLD AUTO: 8 % (ref 4–12)
NEUTROPHILS # BLD AUTO: 3.85 THOUSANDS/ÂΜL (ref 1.85–7.62)
NEUTS SEG NFR BLD AUTO: 59 % (ref 43–75)
NRBC BLD AUTO-RTO: 0 /100 WBCS
PLATELET # BLD AUTO: 187 THOUSANDS/UL (ref 149–390)
PMV BLD AUTO: 9.5 FL (ref 8.9–12.7)
POTASSIUM SERPL-SCNC: 4.2 MMOL/L (ref 3.5–5.3)
PROT SERPL-MCNC: 7.6 G/DL (ref 6.4–8.4)
PROTHROMBIN TIME: 12.9 SECONDS (ref 11.6–14.5)
RBC # BLD AUTO: 5.19 MILLION/UL (ref 3.88–5.62)
SODIUM SERPL-SCNC: 137 MMOL/L (ref 135–147)
WBC # BLD AUTO: 6.47 THOUSAND/UL (ref 4.31–10.16)

## 2022-11-22 RX ORDER — METRONIDAZOLE 500 MG/100ML
500 INJECTION, SOLUTION INTRAVENOUS ONCE
Status: COMPLETED | OUTPATIENT
Start: 2022-11-22 | End: 2022-11-22

## 2022-11-22 RX ORDER — CEFAZOLIN SODIUM 2 G/50ML
2000 SOLUTION INTRAVENOUS ONCE
Status: COMPLETED | OUTPATIENT
Start: 2022-11-22 | End: 2022-11-22

## 2022-11-22 RX ORDER — LIDOCAINE HYDROCHLORIDE 10 MG/ML
INJECTION, SOLUTION EPIDURAL; INFILTRATION; INTRACAUDAL; PERINEURAL AS NEEDED
Status: COMPLETED | OUTPATIENT
Start: 2022-11-22 | End: 2022-11-22

## 2022-11-22 RX ORDER — SODIUM CHLORIDE 9 MG/ML
75 INJECTION, SOLUTION INTRAVENOUS CONTINUOUS
Status: DISCONTINUED | OUTPATIENT
Start: 2022-11-22 | End: 2022-11-23 | Stop reason: HOSPADM

## 2022-11-22 RX ORDER — FENTANYL CITRATE 50 UG/ML
INJECTION, SOLUTION INTRAMUSCULAR; INTRAVENOUS AS NEEDED
Status: COMPLETED | OUTPATIENT
Start: 2022-11-22 | End: 2022-11-22

## 2022-11-22 RX ORDER — IODIXANOL 320 MG/ML
200 INJECTION, SOLUTION INTRAVASCULAR
Status: COMPLETED | OUTPATIENT
Start: 2022-11-22 | End: 2022-11-22

## 2022-11-22 RX ORDER — MIDAZOLAM HYDROCHLORIDE 2 MG/2ML
INJECTION, SOLUTION INTRAMUSCULAR; INTRAVENOUS AS NEEDED
Status: COMPLETED | OUTPATIENT
Start: 2022-11-22 | End: 2022-11-22

## 2022-11-22 RX ADMIN — MIDAZOLAM 0.5 MG: 1 INJECTION INTRAMUSCULAR; INTRAVENOUS at 09:39

## 2022-11-22 RX ADMIN — MIDAZOLAM 0.5 MG: 1 INJECTION INTRAMUSCULAR; INTRAVENOUS at 10:05

## 2022-11-22 RX ADMIN — SODIUM CHLORIDE 75 ML/HR: 0.9 INJECTION, SOLUTION INTRAVENOUS at 07:40

## 2022-11-22 RX ADMIN — CEFAZOLIN SODIUM 2000 MG: 2 SOLUTION INTRAVENOUS at 08:51

## 2022-11-22 RX ADMIN — FENTANYL CITRATE 50 MCG: 50 INJECTION INTRAMUSCULAR; INTRAVENOUS at 09:25

## 2022-11-22 RX ADMIN — LIDOCAINE HYDROCHLORIDE 10 ML: 10 INJECTION, SOLUTION EPIDURAL; INFILTRATION; INTRACAUDAL; PERINEURAL at 09:37

## 2022-11-22 RX ADMIN — IODIXANOL 60 ML: 320 INJECTION, SOLUTION INTRAVASCULAR at 10:47

## 2022-11-22 RX ADMIN — FENTANYL CITRATE 50 MCG: 50 INJECTION INTRAMUSCULAR; INTRAVENOUS at 09:39

## 2022-11-22 RX ADMIN — METRONIDAZOLE 500 MG: 500 SOLUTION INTRAVENOUS at 09:20

## 2022-11-22 RX ADMIN — MIDAZOLAM 1 MG: 1 INJECTION INTRAMUSCULAR; INTRAVENOUS at 09:25

## 2022-11-22 NOTE — DISCHARGE INSTRUCTIONS
SIRS IMPLANT DISCHARGE INSTRUCTIONS    WHAT YOU SHOULD KNOW:  For the next 72 hours after your SIRS implant NO pregnant visitors or family  No physical contact with others for more than two hours  Sleep alone in bed  No children or pets sitting on your lap  Keep a distance of three feet between yourself and others  AFTER YOU LEAVE:     Self-care:   Limit activity: Rest for the remainder of the day of your procedure  Have some one with you until the next morning  Keep your arm or leg straight as much as possible  Rest as much as possible, sitting lying or reclining  Walk only to go to the bathroom, to bed or to eat  If the angiogram catheter was put in your leg, use the stairs as little as possible  No driving  Keep your wound clean and dry  You may shower 24 hours after your procedure  The bandage you have on should fall off in 2-3 days  If there is any drainage from the puncture site, you should put on a clean bandage  Watch for bleeding and bruising: It is normal to have a bruise and soreness where the angiogram catheter went in  Medication Continue  to take Prilosec 20 mg daily for a total of  30 days after the initial mapping procedure  Start taking the  Medrol as directed  Diet: You may resume your regular diet  Small sips of flat soda will help with mild nausea  Drink more liquids than usual for the next 24 hours      IMMEDIATELY Contact Interventional Radiology at [phone] [place] PATIENTS: Contact Interventional Radiology at [phone]) [facility] PATIENTS: Contact Interventional Radiology at [phone]) if any of the following occur: If your bruise gets larger or if you notice any active bleeding  APPLY DIRECT PRESSURE TO THE BLEEDING SITE  If you notice increased swelling or have increased pain at the puncture site   If you have any numbness or pain in the extremity of the puncture site   If that extremity seems cold or pale      You have fever greater than 101  Persistent nausea or vomiting  Follow up with your primary healthcare provider  as directed: Write down your questions so you remember to ask them during your visits

## 2022-11-22 NOTE — BRIEF OP NOTE (RAD/CATH)
INTERVENTIONAL RADIOLOGY PROCEDURE NOTE    Date: 11/22/2022    Procedure:   Procedure Summary     Date: 11/22/22 Room / Location: [address] [place] [address] Interventional Radiology    Anesthesia Start:  Anesthesia Stop:     Procedure: IR Y-90 RADIOEMBOLIZATION Diagnosis:       Hepatocellular carcinoma ([address] )      (right lobe LIRADS 5 lesions)    Scheduled Providers: Ran Yates MD Responsible Provider:     Anesthesia Type: Not recorded ASA Status: Not recorded          Preoperative diagnosis:   1  Hepatocellular carcinoma (HCC)         Postoperative diagnosis: Same  Surgeon: Ran Yates MD     Assistant: None  No qualified resident was available  Blood loss:  Minimal    Specimens:  None     Findings:  Right hepatic lobe radioembolization  Complications: None immediate      Anesthesia: conscious sedation

## 2022-11-22 NOTE — SEDATION DOCUMENTATION
Y-90 radioembolization completed by Dr Esau guadarrama  [facility] start time at 10:35  Nuclear Medicine ready for scan at 11:10  Report called to Short Stay RN  Transported to Short Stay by [employer] staff

## 2022-12-06 ENCOUNTER — TELEPHONE (OUTPATIENT)
Dept: FAMILY MEDICINE CLINIC | Facility: CLINIC | Age: 57
End: 2022-12-06

## 2022-12-06 DIAGNOSIS — R73.09 ELEVATED GLUCOSE: Primary | ICD-10-CM

## 2022-12-30 ENCOUNTER — TELEPHONE (OUTPATIENT)
Dept: OTHER | Facility: OTHER | Age: 57
End: 2022-12-30

## 2022-12-30 NOTE — TELEPHONE ENCOUNTER
Patient's Wife called today regarding Pre Office Visit Lab Work  Please call Patient back to discuss

## 2023-01-03 NOTE — TELEPHONE ENCOUNTER
Left message on voice mail -please complete blood work (9/21 & 9/28/22)  Call back with any questions

## 2023-01-08 DIAGNOSIS — I10 ESSENTIAL HYPERTENSION: ICD-10-CM

## 2023-01-09 RX ORDER — LISINOPRIL AND HYDROCHLOROTHIAZIDE 25; 20 MG/1; MG/1
TABLET ORAL
Qty: 90 TABLET | Refills: 0 | Status: SHIPPED | OUTPATIENT
Start: 2023-01-09

## 2023-01-13 ENCOUNTER — TELEPHONE (OUTPATIENT)
Dept: SURGICAL ONCOLOGY | Facility: CLINIC | Age: 58
End: 2023-01-13

## 2023-01-17 ENCOUNTER — DOCUMENTATION (OUTPATIENT)
Dept: GASTROENTEROLOGY | Facility: CLINIC | Age: 58
End: 2023-01-17

## 2023-01-17 ENCOUNTER — OFFICE VISIT (OUTPATIENT)
Dept: GASTROENTEROLOGY | Facility: CLINIC | Age: 58
End: 2023-01-17

## 2023-01-17 ENCOUNTER — TELEPHONE (OUTPATIENT)
Dept: GASTROENTEROLOGY | Facility: CLINIC | Age: 58
End: 2023-01-17

## 2023-01-17 VITALS
WEIGHT: 201 LBS | DIASTOLIC BLOOD PRESSURE: 84 MMHG | HEART RATE: 76 BPM | SYSTOLIC BLOOD PRESSURE: 130 MMHG | HEIGHT: 69 IN | TEMPERATURE: 96.4 F | OXYGEN SATURATION: 99 % | BODY MASS INDEX: 29.77 KG/M2

## 2023-01-17 DIAGNOSIS — C22.0 HEPATOCELLULAR CARCINOMA (HCC): Primary | ICD-10-CM

## 2023-01-17 DIAGNOSIS — Z12.11 COLON CANCER SCREENING: ICD-10-CM

## 2023-01-17 DIAGNOSIS — Z12.11 COLON CANCER SCREENING: Primary | ICD-10-CM

## 2023-01-17 DIAGNOSIS — K70.30 ALCOHOLIC CIRRHOSIS OF LIVER WITHOUT ASCITES (HCC): ICD-10-CM

## 2023-01-17 NOTE — PROGRESS NOTES
[facility] - Outpatient Follow-Up  Bruce Reynolds 62 y o  male MRN: [id number]  Encounter: [id number]    PCP:  Lucy Deutsch MD, [phone]        ASSESSMENT AND PLAN:      Summary:    Mr Shane Higgins is a 62y o  year old male with cirrhosis secondary to Chronic hepatitis-C and Chronic alcohol abuse which is well compensated, but complicated with multifocal HCC    Problem List and Plan:    Cirrhosis:   MELD-Na: 7  He has no physical evidence of cirrhosis/portal HTN  Multifocal HCC (segments 6, 7/8) over Donn criteria for transplantation at this time  AFP of 4961:  Following with Dr Vale Owens and Dr Chester Mary  S/p CHRISTIAN on 11/22/22  No follow-up labs or imaging as of yet  CMP, CBC, PT/INR, AFP requested  MRI requested to be done within the next 2 weeks  If tumor is downstaged with AFP dropping to below 500, will refer for transplant evaluation  Volume: No history of edema or ascites  Will continue to monitor with serial physical exams on subsequent office visits  Mr Shane Higgins will contact our office if he should develop abdominal distention or lower extremity edema     Hepatic Encephalopathy: No history of hepatic encephalopathy  Mr Shane Higgins and his family/care giver are aware of the signs/symptoms of hepatic encephalopathy and understand to seek immediate medical attention should they arise     Colon Cancer Screening: Mr Shane Higgins has not yet had a screening colonoscopy  I have again recommended he schedule a colonoscopy, and he has agreed:  Currently schedueld with Dr Cornelious Ahumada in [place]  Nutritional status: No significant sarcopenia noted  Encouraged high protein snacking, low salt diet  If weight loss evident, will refer to nutritional counseling  Health Maintenance:  Mr Shane Higgins was counseled to avoid alcohol and tobacco products    Mr Shane Higgins was also advised to avoid raw seafood/oysters and from swimming in unchlorinated perry, particularly from the [facility], due to Verbal shift change report received from Nerissa Padron RN and given to EVERETTE Colvin (oncoming nurse) by Feroz Carver RN 
 (offgoing nurse). Report included the following information SBAR, Kardex, Intake/Output, MAR and Recent Results. Had pt for 1 hour with no untoward event noted increased risk of infection from Vibrio Vulnificus  Mr Refugio Dunlap was also instructed to seek immediate medical attention should he develop fevers over 101 F, evidence of GI bleeding (black or bloody stools, bloody or coffee ground emesis) or confusion  Mr Refugio Dunlap was advised to avoid NSAIDs, if possible, due to increase risk of renal dysfunction, and advised that if he should use acetaminophen, dose should not exceed 2 grams/day  Mr Refugio Dunlap was recommend to remain up to date with adult vaccination, including influenza, pneumovax and meningococcus  Inactivated HSV and zoster vaccine is safe and encouraged by PCP  Mr Refugio Dunlap was instructed to call either our office or that of his referring doctor should he develop worsening symptoms related to lower extremity edema, ascites, jaundice or excessive bruising/bleeding  FOLLOW-UP:  No follow-ups on file  VISIT DIAGNOSES AND ORDERS:      1  Hepatocellular carcinoma ([facility] [address] )    2  Alcoholic cirrhosis of liver without ascites ([facility] [address] )    3  Colon cancer screening      Orders Placed This Encounter   Procedures   • MRI abdomen w wo contrast   • CBC and differential   • Protime-INR   • Comprehensive metabolic panel   • AFP tumor marker     ______________________________________________________________________    HPI:  Mr Juni Moss is a 62 y o  male with medical history as outlined below, including hypertension, chronic hepatitis C (s/p SVR) and alcohol-related cirrhosis and multifocal HCC in the right lobe of the liver, who comes in today for follow-up after being seen in late September  Since that time, his case was discussed at [facility], he saw Dima Smith from [facility] in consultation and Dr Taylor Yusuf in follow-up  It was determined that his best course of treatment was liver directed therapy and he had TARE on 17/83/07 without complications  He has had no change in health since that time  He has not had any blood work or follow-up imaging yet        Nora Hopkins denies recent or history of yellow eyes/skin, dark urine, GI bleeding, abdominal distention with fluid, lower extremity swelling, easy bruising, excessive bleeding, pruritus or confusion  He denies nausea, vomiting, heartburn, reflux, difficulty swallowing, early satiety, bloating, diarrhea, constipation or straining with passing stools  He denies weight loss, although today's weight is 5 lbs less, which he relates to a return to work and increased physical activity  He does complain of upper abdominal pain in a bandlike distribution  He is eating well  He has started taking his BP meds routinely  REVIEW OF SYSTEMS:    Review of Systems   Constitutional: Negative for fatigue, fever and unexpected weight change  HENT: Negative for mouth sores, sore throat and trouble swallowing  Eyes: Negative for itching and visual disturbance  Respiratory: Negative for cough, chest tightness, shortness of breath and wheezing  Cardiovascular: Negative for chest pain, palpitations and leg swelling  Endocrine: Negative for cold intolerance, heat intolerance and polyuria  Genitourinary: Negative for difficulty urinating, dysuria and hematuria  Musculoskeletal: Negative for arthralgias, back pain and gait problem  Skin: Negative for color change and rash  Allergic/Immunologic: Negative for environmental allergies  Neurological: Negative for dizziness, weakness, light-headedness and numbness  Hematological: Does not bruise/bleed easily  Psychiatric/Behavioral: Negative for confusion and sleep disturbance  The patient is not nervous/anxious            Historical Information   Patient Active Problem List   Diagnosis   • Hepatocellular carcinoma [facility])   • Essential hypertension     Past Medical History:   Diagnosis Date   • Hypertension    • Liver cancer [facility])      Past Surgical History:   Procedure Laterality Date   • HERNIA REPAIR     • IR Y-90 PRE-ANGIO/EMBO W/ LUNG SCAN  11/8/2022   • IR Y-90 RADIOEMBOLIZATION  2022     Social History   Social History     Substance and Sexual Activity   Alcohol Use Not Currently     Social History     Substance and Sexual Activity   Drug Use Yes   • Types: Marijuana    Comment: medical marijuana     Social History     Tobacco Use   Smoking Status Former   • Packs/day: 1 00   • Types: Cigarettes   • Quit date:    • Years since quittin 0   Smokeless Tobacco Current   Tobacco Comments    nicotine pouch (on)     Family History   Problem Relation Age of Onset   • Cancer Mother        Meds/Allergies       Current Outpatient Medications:   •  lisinopril-hydrochlorothiazide (PRINZIDE,ZESTORETIC) 20-25 MG per tablet  •  methylPREDNISolone 4 MG tablet therapy pack  •  omeprazole (PriLOSEC) 40 MG capsule    No Known Allergies        Objective     Blood pressure 130/84, pulse 76, temperature (!) 96 4 °F (35 8 °C), temperature source Tympanic, height 5' 9" (1 753 m), weight 91 2 kg (201 lb), SpO2 99 %  Body mass index is 29 68 kg/m²  PHYSICAL EXAM:           Physical Exam  Vitals reviewed  Constitutional:       General: He is not in acute distress  Appearance: Normal appearance  He is not ill-appearing  HENT:      Head: Normocephalic and atraumatic  Nose: Nose normal       Mouth/Throat:      Pharynx: Oropharynx is clear  No posterior oropharyngeal erythema  Eyes:      General: No scleral icterus  Extraocular Movements: Extraocular movements intact  Cardiovascular:      Rate and Rhythm: Normal rate and regular rhythm  Heart sounds: No murmur heard  Pulmonary:      Effort: Pulmonary effort is normal  No respiratory distress  Breath sounds: Normal breath sounds  No rales  Abdominal:      General: There is no distension  Palpations: Abdomen is soft  There is no shifting dullness, fluid wave, hepatomegaly or splenomegaly  Tenderness: There is no abdominal tenderness  There is no guarding     Musculoskeletal:         General: No swelling  Normal range of motion  Cervical back: Normal range of motion and neck supple  Skin:     General: Skin is warm  Coloration: Skin is not jaundiced  Neurological:      General: No focal deficit present  Mental Status: He is oriented to person, place, and time  Psychiatric:         Mood and Affect: Mood normal               Lab Results:      Latest Reference Range & Units 09/21/22 11:00   Sodium 135 - 147 mmol/L 136   Potassium 3 5 - 5 3 mmol/L 4 4   Chloride 96 - 108 mmol/L 105   CO2 21 - 32 mmol/L 28   Anion Gap 4 - 13 mmol/L 3 (L)   BUN 5 - 25 mg/dL 12   Creatinine 0 60 - 1 30 mg/dL 1 19   Glucose, Random 65 - 140 mg/dL 97   Calcium 8 3 - 10 1 mg/dL 9 4   AST 5 - 45 U/L 22   ALT 12 - 78 U/L 29   Alkaline Phosphatase 46 - 116 U/L 120 (H)   Total Protein 6 4 - 8 4 g/dL 8 8 (H)   Albumin 3 5 - 5 0 g/dL 4 0   TOTAL BILIRUBIN 0 20 - 1 00 mg/dL 0 99   eGFR ml/min/1 73sq m 67   AFP-TUMOR MARKER 0 5 - 8 ng/mL 4,961 3 (H)   WBC 4 31 - 10 16 Thousand/uL 7 89   Red Blood Cell Count 3 88 - 5 62 Million/uL 5 57   Hemoglobin 12 0 - 17 0 g/dL 15 7   HCT 36 5 - 49 3 % 47 9   MCV 82 - 98 fL 86   MCH 26 8 - 34 3 pg 28 2   MCHC 31 4 - 37 4 g/dL 32 8   RDW 11 6 - 15 1 % 13 6   Platelet Count 536 - 390 Thousands/uL 212   MPV 8 9 - 12 7 fL 9 7   PROTIME 11 6 - 14 5 seconds 13 0   POCT INR 0 84 - 1 19  0 97   (L): Data is abnormally low  (H): Data is abnormally high    MELD-Na score: 6 at 11/22/2022  7:40 AM  MELD score: 6 at 11/22/2022  7:40 AM  Calculated from:  Serum Creatinine: 1 04 mg/dL at 11/22/2022  7:40 AM  Serum Sodium: 137 mmol/L at 11/22/2022  7:40 AM  Total Bilirubin: 0 71 mg/dL (Using min of 1 mg/dL) at 11/22/2022  7:40 AM  INR(ratio): 0 95 (Using min of 1) at 11/22/2022  7:40 AM  Age: 62 years    Radiology Results:   I have reviewed the results of any radiology studies performed within the last 90 days  This includes:      No results found        Lucio De Los Santos, MD  Hepatology/Gastroenterology

## 2023-01-19 ENCOUNTER — OFFICE VISIT (OUTPATIENT)
Dept: FAMILY MEDICINE CLINIC | Facility: CLINIC | Age: 58
End: 2023-01-19

## 2023-01-19 VITALS
OXYGEN SATURATION: 98 % | DIASTOLIC BLOOD PRESSURE: 82 MMHG | WEIGHT: 207 LBS | HEART RATE: 68 BPM | SYSTOLIC BLOOD PRESSURE: 130 MMHG | RESPIRATION RATE: 16 BRPM | BODY MASS INDEX: 30.66 KG/M2 | HEIGHT: 69 IN | TEMPERATURE: 98.5 F

## 2023-01-19 DIAGNOSIS — C22.0 HEPATOCELLULAR CARCINOMA (HCC): ICD-10-CM

## 2023-01-19 DIAGNOSIS — B18.2 CHRONIC HEPATITIS C WITHOUT HEPATIC COMA (HCC): ICD-10-CM

## 2023-01-19 DIAGNOSIS — I10 ESSENTIAL HYPERTENSION: Primary | ICD-10-CM

## 2023-01-19 DIAGNOSIS — K74.60 CIRRHOSIS OF LIVER WITHOUT ASCITES, UNSPECIFIED HEPATIC CIRRHOSIS TYPE (HCC): ICD-10-CM

## 2023-01-19 DIAGNOSIS — N52.9 ERECTILE DYSFUNCTION, UNSPECIFIED ERECTILE DYSFUNCTION TYPE: ICD-10-CM

## 2023-01-19 NOTE — ASSESSMENT & PLAN NOTE
Pt currently getting treatment  Saw GI earlier in the week and for labs  Pt also for MRI in Feb 2023 and will f/u with Surgical Oncology

## 2023-01-19 NOTE — ASSESSMENT & PLAN NOTE
BP good  Continue lisinopril HCT 20/25 qd  Pt advised to continue low Na diet and to exercise on a regular basis

## 2023-01-19 NOTE — PROGRESS NOTES
BMI Counseling: Body mass index is 30 57 kg/m²  The BMI is above normal  Nutrition recommendations include decreasing portion sizes, encouraging healthy choices of fruits and vegetables, consuming healthier snacks and moderation in carbohydrate intake  Exercise recommendations include exercising 3-5 times per week  No pharmacotherapy was ordered  Rationale for BMI follow-up plan is due to patient being overweight or obese  Assessment/Plan:         Problem List Items Addressed This Visit        Digestive    Hepatocellular carcinoma ([place] [address] )     Pt currently getting treatment  Saw GI earlier in the week and for labs  Pt also for MRI in Feb 2023 and will f/u with Surgical Oncology  Cirrhosis of liver (HCC)     Was seen on imaging but pt not having any sxs or manifestations of it  Chronic hepatitis C without hepatic coma (HCC)     Was treated in the past              Cardiovascular and Mediastinum    Essential hypertension - Primary     BP good  Continue lisinopril HCT 20/25 qd  Pt advised to continue low Na diet and to exercise on a regular basis  Other    ED (erectile dysfunction)     Pt has had it for past 3 mths since on BP med  Will try viagra 50 mg as needed  Subjective:      Patient ID: Tatiana Calhoun is a 62 y o  male  Pt here for f/u HTN, HCC, Cirrhosis, Hep C  Pt doing ok  No cp/sob  No headaches  BP has been good since back on med, but has had ED since back on it  Pt has been seeing GI and Surgical Oncology for [place] [address]  Pt getting treatment and for MRI in Feb 2023  No n/v/d  No abd pain  Saw GI in Jan 2023 and for labs  The following portions of the patient's history were reviewed and updated as appropriate:   Past Medical History:  He has a past medical history of Hypertension and Liver cancer ([place] [address] )  ,  _______________________________________________________________________  Medical Problems:  does not have any pertinent problems on file ,  _______________________________________________________________________  Past Surgical History:   has a past surgical history that includes Hernia repair; IR Y-90 pre-angio/embo w/ lung scan (11/8/2022); and IR Y-90 radioembolization (11/22/2022)  ,  _______________________________________________________________________  Family History:  family history includes Cancer in his mother ,  _______________________________________________________________________  Social History:   reports that he quit smoking about 6 years ago  His smoking use included cigarettes  He smoked an average of 1 pack per day  His smokeless tobacco use includes chew  He reports that he does not currently use alcohol  He reports current drug use  Drug: Marijuana  ,  _______________________________________________________________________  Allergies:  has No Known Allergies     _______________________________________________________________________  Current Outpatient Medications   Medication Sig Dispense Refill   • lisinopril-hydrochlorothiazide (PRINZIDE,ZESTORETIC) 20-25 MG per tablet TAKE 1 TABLET BY MOUTH EVERY DAY 90 tablet 0     No current facility-administered medications for this visit      _______________________________________________________________________  Review of Systems   Constitutional: Negative for fatigue and unexpected weight change  Respiratory: Negative for cough and shortness of breath  Cardiovascular: Negative for chest pain  Gastrointestinal: Negative for abdominal pain, constipation, diarrhea and vomiting  Genitourinary:        ED   Musculoskeletal: Negative for arthralgias  Neurological: Negative for dizziness and headaches  Psychiatric/Behavioral: Negative for dysphoric mood  The patient is not nervous/anxious            Objective:  Vitals:    01/19/23 1525   BP: 130/82   BP Location: Left arm   Patient Position: Sitting   Cuff Size: Standard   Pulse: 68   Resp: 16   Temp: 98 5 °F (36 9 °C)   TempSrc: Tympanic   SpO2: 98%   Weight: 93 9 kg (207 lb)   Height: 5' 9" (1 753 m)     Body mass index is 30 57 kg/m²  Physical Exam  Vitals and nursing note reviewed  Constitutional:       Appearance: Normal appearance  He is well-developed and normal weight  Neck:      Thyroid: No thyromegaly  Cardiovascular:      Rate and Rhythm: Normal rate and regular rhythm  Heart sounds: Normal heart sounds  No murmur heard  Pulmonary:      Effort: Pulmonary effort is normal  No respiratory distress  Breath sounds: Normal breath sounds  No wheezing  Musculoskeletal:      Cervical back: Normal range of motion and neck supple  Right lower leg: No edema  Left lower leg: No edema  Lymphadenopathy:      Cervical: No cervical adenopathy  Neurological:      Mental Status: He is alert and oriented to person, place, and time  Cranial Nerves: No cranial nerve deficit  Psychiatric:         Mood and Affect: Mood normal          Behavior: Behavior normal          Thought Content:  Thought content normal          Judgment: Judgment normal

## 2023-01-21 ENCOUNTER — APPOINTMENT (OUTPATIENT)
Dept: LAB | Facility: MEDICAL CENTER | Age: 58
End: 2023-01-21

## 2023-01-21 LAB
AFP-TM SERPL-MCNC: 574.5 NG/ML (ref 0.5–8)
ALBUMIN SERPL BCP-MCNC: 4.1 G/DL (ref 3.5–5)
ALP SERPL-CCNC: 101 U/L (ref 46–116)
ALT SERPL W P-5'-P-CCNC: 29 U/L (ref 12–78)
ANION GAP SERPL CALCULATED.3IONS-SCNC: 6 MMOL/L (ref 4–13)
AST SERPL W P-5'-P-CCNC: 29 U/L (ref 5–45)
BASOPHILS # BLD AUTO: 0.03 THOUSANDS/ÂΜL (ref 0–0.1)
BASOPHILS NFR BLD AUTO: 1 % (ref 0–1)
BILIRUB SERPL-MCNC: 1.22 MG/DL (ref 0.2–1)
BUN SERPL-MCNC: 15 MG/DL (ref 5–25)
CALCIUM SERPL-MCNC: 9.4 MG/DL (ref 8.3–10.1)
CHLORIDE SERPL-SCNC: 104 MMOL/L (ref 96–108)
CO2 SERPL-SCNC: 25 MMOL/L (ref 21–32)
CREAT SERPL-MCNC: 1.07 MG/DL (ref 0.6–1.3)
EOSINOPHIL # BLD AUTO: 0.18 THOUSAND/ÂΜL (ref 0–0.61)
EOSINOPHIL NFR BLD AUTO: 4 % (ref 0–6)
ERYTHROCYTE [DISTWIDTH] IN BLOOD BY AUTOMATED COUNT: 13.2 % (ref 11.6–15.1)
GFR SERPL CREATININE-BSD FRML MDRD: 76 ML/MIN/1.73SQ M
GLUCOSE P FAST SERPL-MCNC: 99 MG/DL (ref 65–99)
HCT VFR BLD AUTO: 43.9 % (ref 36.5–49.3)
HGB BLD-MCNC: 14.7 G/DL (ref 12–17)
IMM GRANULOCYTES # BLD AUTO: 0.01 THOUSAND/UL (ref 0–0.2)
IMM GRANULOCYTES NFR BLD AUTO: 0 % (ref 0–2)
INR PPP: 0.92 (ref 0.84–1.19)
LYMPHOCYTES # BLD AUTO: 0.82 THOUSANDS/ÂΜL (ref 0.6–4.47)
LYMPHOCYTES NFR BLD AUTO: 19 % (ref 14–44)
MCH RBC QN AUTO: 29.4 PG (ref 26.8–34.3)
MCHC RBC AUTO-ENTMCNC: 33.5 G/DL (ref 31.4–37.4)
MCV RBC AUTO: 88 FL (ref 82–98)
MONOCYTES # BLD AUTO: 0.59 THOUSAND/ÂΜL (ref 0.17–1.22)
MONOCYTES NFR BLD AUTO: 13 % (ref 4–12)
NEUTROPHILS # BLD AUTO: 2.77 THOUSANDS/ÂΜL (ref 1.85–7.62)
NEUTS SEG NFR BLD AUTO: 63 % (ref 43–75)
NRBC BLD AUTO-RTO: 0 /100 WBCS
PLATELET # BLD AUTO: 189 THOUSANDS/UL (ref 149–390)
PMV BLD AUTO: 10.2 FL (ref 8.9–12.7)
POTASSIUM SERPL-SCNC: 4.4 MMOL/L (ref 3.5–5.3)
PROT SERPL-MCNC: 7.7 G/DL (ref 6.4–8.4)
PROTHROMBIN TIME: 12.6 SECONDS (ref 11.6–14.5)
RBC # BLD AUTO: 5 MILLION/UL (ref 3.88–5.62)
SODIUM SERPL-SCNC: 135 MMOL/L (ref 135–147)
WBC # BLD AUTO: 4.4 THOUSAND/UL (ref 4.31–10.16)

## 2023-01-24 NOTE — PATIENT INSTRUCTIONS
MRI scheduled on 2/19 at [facility]      Scheduled date of colonoscopy (as of today): 2/17/23  Physician performing colonoscopy: Hien Green  Location of colonoscopy:[facility]  Bowel prep reviewed with patient: Golytely  Instructions reviewed with patient by: Syed Freeman  Clearances: N/A

## 2023-02-06 ENCOUNTER — TELEPHONE (OUTPATIENT)
Dept: GASTROENTEROLOGY | Facility: CLINIC | Age: 58
End: 2023-02-06

## 2023-02-06 NOTE — TELEPHONE ENCOUNTER
----- Message from Gume Kellogg sent at 2/6/2023  8:06 AM EST -----  Regarding: R/S  Good morning Valeriy Butler,  I just spoke to Miguel chester and he does have [address] as his [facility]  [address] [facility] Endoscopy is oon  Please reschedule him to a different location  The patient is aware you will be calling him to r/s  Thank you!

## 2023-02-06 NOTE — TELEPHONE ENCOUNTER
Pt is aware procedure rescheduled  Scheduled date of colonoscopy (as of today): 2/23/23  Physician performing colonoscopy: Thong Kinney  Location of colonoscopy: [facility]  Bowel prep reviewed with patient: Golytely  Instructions reviewed with patient by: Lee Merrill to [email or web address]  Clearances: N/A

## 2023-02-23 ENCOUNTER — HOSPITAL ENCOUNTER (OUTPATIENT)
Dept: GASTROENTEROLOGY | Facility: HOSPITAL | Age: 58
Setting detail: OUTPATIENT SURGERY
End: 2023-02-23
Attending: INTERNAL MEDICINE

## 2023-02-23 ENCOUNTER — ANESTHESIA (OUTPATIENT)
Dept: GASTROENTEROLOGY | Facility: HOSPITAL | Age: 58
End: 2023-02-23

## 2023-02-23 ENCOUNTER — ANESTHESIA EVENT (OUTPATIENT)
Dept: GASTROENTEROLOGY | Facility: HOSPITAL | Age: 58
End: 2023-02-23

## 2023-02-23 VITALS
SYSTOLIC BLOOD PRESSURE: 138 MMHG | DIASTOLIC BLOOD PRESSURE: 74 MMHG | TEMPERATURE: 97.6 F | RESPIRATION RATE: 16 BRPM | WEIGHT: 194 LBS | BODY MASS INDEX: 28.73 KG/M2 | OXYGEN SATURATION: 100 % | HEART RATE: 68 BPM | HEIGHT: 69 IN

## 2023-02-23 DIAGNOSIS — C22.0 HEPATOCELLULAR CARCINOMA (HCC): ICD-10-CM

## 2023-02-23 DIAGNOSIS — Z12.11 COLON CANCER SCREENING: ICD-10-CM

## 2023-02-23 PROBLEM — K63.5 COLON POLYP: Status: ACTIVE | Noted: 2023-02-23

## 2023-02-23 RX ORDER — PROPOFOL 10 MG/ML
INJECTION, EMULSION INTRAVENOUS AS NEEDED
Status: DISCONTINUED | OUTPATIENT
Start: 2023-02-23 | End: 2023-02-23

## 2023-02-23 RX ORDER — SODIUM CHLORIDE, SODIUM LACTATE, POTASSIUM CHLORIDE, CALCIUM CHLORIDE 600; 310; 30; 20 MG/100ML; MG/100ML; MG/100ML; MG/100ML
INJECTION, SOLUTION INTRAVENOUS CONTINUOUS PRN
Status: DISCONTINUED | OUTPATIENT
Start: 2023-02-23 | End: 2023-02-23

## 2023-02-23 RX ORDER — PROPOFOL 10 MG/ML
INJECTION, EMULSION INTRAVENOUS CONTINUOUS PRN
Status: DISCONTINUED | OUTPATIENT
Start: 2023-02-23 | End: 2023-02-23

## 2023-02-23 RX ORDER — EPHEDRINE SULFATE 50 MG/ML
INJECTION INTRAVENOUS AS NEEDED
Status: DISCONTINUED | OUTPATIENT
Start: 2023-02-23 | End: 2023-02-23

## 2023-02-23 RX ADMIN — EPHEDRINE SULFATE 10 MG: 50 INJECTION, SOLUTION INTRAVENOUS at 08:35

## 2023-02-23 RX ADMIN — PROPOFOL 100 MG: 10 INJECTION, EMULSION INTRAVENOUS at 07:31

## 2023-02-23 RX ADMIN — SODIUM CHLORIDE, SODIUM LACTATE, POTASSIUM CHLORIDE, AND CALCIUM CHLORIDE: .6; .31; .03; .02 INJECTION, SOLUTION INTRAVENOUS at 07:29

## 2023-02-23 RX ADMIN — PROPOFOL 100 MCG/KG/MIN: 10 INJECTION, EMULSION INTRAVENOUS at 07:32

## 2023-02-23 NOTE — H&P
History and Physical - [facility]  Luís Viera 62 y o  male MRN: [id number]      HPI: Luís Viera is a 62y o  year-old male who presents for colon cancer screening  REVIEW OF SYSTEMS: Per the HPI, and otherwise unremarkable  PAST MEDICAL/SURGICAL HISTORY:  Past Medical History:   Diagnosis Date   • Hypertension    • Liver cancer [facility])         Past Surgical History:   Procedure Laterality Date   • HERNIA REPAIR     • IR Y-90 PRE-ANGIO/EMBO W/ LUNG SCAN  2022   • IR Y-90 RADIOEMBOLIZATION  2022       FAMILY/SOCIAL HISTORY:  Family History   Problem Relation Age of Onset   • Cancer Mother        Social History     Tobacco Use   • Smoking status: Former     Packs/day: 1 00     Types: Cigarettes     Quit date:      Years since quittin 1   • Smokeless tobacco: Current     Types: Chew   • Tobacco comments:     nicotine pouch (on)   Vaping Use   • Vaping Use: Never used   Substance Use Topics   • Alcohol use: Not Currently   • Drug use: Yes     Types: Marijuana     Comment: medical marijuana       Meds/Allergies       Current Outpatient Medications:   •  lisinopril-hydrochlorothiazide (PRINZIDE,ZESTORETIC) 20-25 MG per tablet    Current Facility-Administered Medications:   •  polyethylene glycol (GOLYTELY) bowel prep 4,000 mL, 4,000 mL, Oral, See Admin Instructions    No Known Allergies    Objective     /81   Pulse 83   Temp (!) 97 4 °F (36 3 °C) (Temporal)   Resp (!) 24   Ht 5' 9" (1 753 m)   Wt 88 kg (194 lb)   SpO2 96%   BMI 28 65 kg/m²   PHYSICAL EXAM    GEN: NAD  CARDIO: RRR  PULM: CTA bilaterally  ABD: soft, non-tender, non-distended  EXT: no lower extremity edema  NEURO: AAOx3    ASSESSMENT/PLAN:  62y o  year old male here for COY  he is stable and optimized for his procedure  Informed consent was obtained for the procedure  Risks of infection, perforation and hemorrhage were discussed  The patient was agreeable to proceed with the procedure

## 2023-02-23 NOTE — ANESTHESIA PREPROCEDURE EVALUATION
Procedure:  COLONOSCOPY    Relevant Problems   ANESTHESIA (within normal limits)      CARDIO   (+) Essential hypertension      GI/HEPATIC   (+) Chronic hepatitis C without hepatic coma (HCC)   (+) Cirrhosis of liver (HCC)   (+) Hepatocellular carcinoma (HCC)        Physical Exam    Airway    Mallampati score: II  TM Distance: >3 FB  Neck ROM: full     Dental   upper dentures and lower dentures,     Cardiovascular  Rhythm: regular, Rate: normal, Cardiovascular exam normal    Pulmonary  Pulmonary exam normal Breath sounds clear to auscultation,     Other Findings        Anesthesia Plan  ASA Score- 3     Anesthesia Type- IV sedation with anesthesia with ASA Monitors  Additional Monitors:   Airway Plan:           Plan Factors-Exercise tolerance (METS): >4 METS  Chart reviewed  Existing labs reviewed  Patient summary reviewed  Patient is not a current smoker  Induction-     Postoperative Plan-     Informed Consent- Anesthetic plan and risks discussed with patient  I personally reviewed this patient with the CRNA  Discussed and agreed on the Anesthesia Plan with the CRNA  Janine Molina

## 2023-02-23 NOTE — ANESTHESIA POSTPROCEDURE EVALUATION
Post-Op Assessment Note    CV Status:  Stable  Pain Score: 0    Pain management: adequate     Mental Status:  Alert and awake   Hydration Status:  Stable   PONV Controlled:  None   Airway Patency:  Patent      Post Op Vitals Reviewed: Yes      Staff: CRNA         No notable events documented      /58 (02/23/23 0845)    Temp 97 6 °F (36 4 °C) (02/23/23 0845)    Pulse 71 (02/23/23 0845)   Resp 16 (02/23/23 0845)    SpO2 99 % (02/23/23 0845)

## 2023-03-06 ENCOUNTER — TELEPHONE (OUTPATIENT)
Dept: GASTROENTEROLOGY | Facility: CLINIC | Age: 58
End: 2023-03-06

## 2023-03-06 ENCOUNTER — HOSPITAL ENCOUNTER (OUTPATIENT)
Dept: MRI IMAGING | Facility: HOSPITAL | Age: 58
Discharge: HOME | End: 2023-03-06
Attending: INTERNAL MEDICINE

## 2023-03-06 DIAGNOSIS — Z12.11 COLON CANCER SCREENING: ICD-10-CM

## 2023-03-06 DIAGNOSIS — C22.0 HEPATOCELLULAR CARCINOMA (HCC): ICD-10-CM

## 2023-03-06 DIAGNOSIS — K70.30 ALCOHOLIC CIRRHOSIS OF LIVER WITHOUT ASCITES (HCC): ICD-10-CM

## 2023-03-06 RX ADMIN — GADOBUTROL 8 ML: 604.72 INJECTION INTRAVENOUS at 14:03

## 2023-03-06 NOTE — TELEPHONE ENCOUNTER
Left message with full details and if they have any questions to feel free to call me back  Recall order has been entered  Kyle Luevano,     Good news! Your pathology from your colonoscopy showed that your polyp was benign  However, I would recommend repeating your colonoscopy in 6 months because it was incomplete due to poor prep   Please contact me with any questions      Priyank Khoury

## 2023-04-22 ENCOUNTER — APPOINTMENT (OUTPATIENT)
Dept: LAB | Facility: MEDICAL CENTER | Age: 58
End: 2023-04-22

## 2023-04-22 DIAGNOSIS — K70.30 ALCOHOLIC CIRRHOSIS OF LIVER WITHOUT ASCITES (HCC): ICD-10-CM

## 2023-04-22 DIAGNOSIS — R73.09 ELEVATED GLUCOSE: ICD-10-CM

## 2023-04-22 DIAGNOSIS — C22.0 HEPATOCELLULAR CARCINOMA (HCC): ICD-10-CM

## 2023-04-22 LAB
EST. AVERAGE GLUCOSE BLD GHB EST-MCNC: 117 MG/DL
HBA1C MFR BLD: 5.7 %

## 2023-04-25 ENCOUNTER — TELEPHONE (OUTPATIENT)
Dept: FAMILY MEDICINE CLINIC | Facility: CLINIC | Age: 58
End: 2023-04-25

## 2023-04-25 DIAGNOSIS — I10 ESSENTIAL HYPERTENSION: ICD-10-CM

## 2023-04-25 RX ORDER — LISINOPRIL AND HYDROCHLOROTHIAZIDE 25; 20 MG/1; MG/1
1 TABLET ORAL DAILY
Qty: 90 TABLET | Refills: 0 | Status: SHIPPED | OUTPATIENT
Start: 2023-04-25

## 2023-04-25 RX ORDER — LISINOPRIL AND HYDROCHLOROTHIAZIDE 25; 20 MG/1; MG/1
1 TABLET ORAL DAILY
Qty: 90 TABLET | Refills: 0 | Status: SHIPPED | OUTPATIENT
Start: 2023-04-25 | End: 2023-04-25 | Stop reason: SDUPTHER

## 2023-04-25 NOTE — TELEPHONE ENCOUNTER
Moinca Corona is calling for a refill of Bassem's blood pressure medication,  Lisinopril-HCTZ 20/25mg 1 daily  Has an appt here in June    [facility] [place]

## 2023-04-26 ENCOUNTER — OFFICE VISIT (OUTPATIENT)
Dept: GASTROENTEROLOGY | Facility: CLINIC | Age: 58
End: 2023-04-26

## 2023-04-26 VITALS
BODY MASS INDEX: 29.27 KG/M2 | SYSTOLIC BLOOD PRESSURE: 128 MMHG | WEIGHT: 197.6 LBS | HEIGHT: 69 IN | TEMPERATURE: 97.1 F | DIASTOLIC BLOOD PRESSURE: 82 MMHG

## 2023-04-26 DIAGNOSIS — Z86.010 HISTORY OF COLON POLYPS: ICD-10-CM

## 2023-04-26 DIAGNOSIS — C22.0 HEPATOCELLULAR CARCINOMA (HCC): Primary | ICD-10-CM

## 2023-04-26 DIAGNOSIS — Z12.11 COLON CANCER SCREENING: ICD-10-CM

## 2023-04-26 DIAGNOSIS — K70.30 ALCOHOLIC CIRRHOSIS OF LIVER WITHOUT ASCITES (HCC): ICD-10-CM

## 2023-04-26 RX ORDER — POLYETHYLENE GLYCOL 3350 17 G/17G
238 POWDER, FOR SOLUTION ORAL ONCE
Qty: 238 G | Refills: 0 | Status: SHIPPED | OUTPATIENT
Start: 2023-04-26 | End: 2023-04-26

## 2023-04-26 RX ORDER — POLYETHYLENE GLYCOL 3350, SODIUM CHLORIDE, SODIUM BICARBONATE, POTASSIUM CHLORIDE 420; 11.2; 5.72; 1.48 G/4L; G/4L; G/4L; G/4L
4000 POWDER, FOR SOLUTION ORAL ONCE
Qty: 4000 ML | Refills: 0 | Status: SHIPPED | OUTPATIENT
Start: 2023-04-26 | End: 2023-04-26

## 2023-04-26 NOTE — PROGRESS NOTES
[facility] - Outpatient Follow-Up  Aleksandra Cartagena 62 y o  male MRN: [id number]  Encounter: [id number]    PCP:  Lonnie Gil MD, [phone]        ASSESSMENT AND PLAN:      Summary:    Mr Daquan Hdez is a 62y o  year old male with cirrhosis secondary to Chronic hepatitis-C and Chronic alcohol abuse which is well compensated, but complicated with multifocal HCC    Problem List and Plan:    Cirrhosis:   MELD-Na: 8  He has no physical evidence of cirrhosis/portal HTN  Multifocal HCC (segments 6, 7/8) outside of [place] Criteria prior to treatment  Initial  AFP of 4961:   S/p TARE on 11/22/22  Follow-up MRI showed no viable tumor -LIRADS LR-TR  Repeat AFP was down to 574 5, but increased to 1912 6 on most recent blood draw, suggesting viable tumor  Last CT chest in October showed some too small to characterize lung nodules  Will repeat CT chest now  MRI requested to be done in June (3 month post previous MRI)  If there is evidence of viable tumor and/or metastatic disease, will discuss repeat liver directed therapy and/or systemic therapy  CMP, CBC, PT/INR, AFP requested for early June and then every 8 weeks  Volume: No history of edema or ascites  Will continue to monitor with serial physical exams on subsequent office visits  Mr Daquan Hdez will contact our office if he should develop abdominal distention or lower extremity edema     Hepatic Encephalopathy: No history of hepatic encephalopathy  Mr Daquan Hdez and his family/care giver are aware of the signs/symptoms of hepatic encephalopathy and understand to seek immediate medical attention should they arise     Colon Cancer Screening: Colonoscopy done, however was poor prep with repeat colon requested to be done within 6 months  Nutritional status: No significant sarcopenia noted  Encouraged high protein snacking, low salt diet  If weight loss evident, will refer to nutritional counseling       Health Maintenance:  Mr Daquan Hdez was counseled to avoid alcohol and tobacco products  Mr Linda Lemons was also advised to avoid raw seafood/oysters and from swimming in unchlorinated perry, particularly from the [facility], due to increased risk of infection from Vibrio Vulnificus  Mr Linda Lemons was also instructed to seek immediate medical attention should he develop fevers over 101 F, evidence of GI bleeding (black or bloody stools, bloody or coffee ground emesis) or confusion  Mr Linda Lemons was advised to avoid NSAIDs, if possible, due to increase risk of renal dysfunction, and advised that if he should use acetaminophen, dose should not exceed 2 grams/day  Mr Linda Lemons was recommend to remain up to date with adult vaccination, including influenza, pneumovax and meningococcus  Inactivated HSV and zoster vaccine is safe and encouraged by PCP  Mr Linda Lemons was instructed to call either our office or that of his referring doctor should he develop worsening symptoms related to lower extremity edema, ascites, jaundice or excessive bruising/bleeding  FOLLOW-UP:  Return in about 9 weeks (around 6/28/2023)  VISIT DIAGNOSES AND ORDERS:      1  Hepatocellular carcinoma ([address] )    2  Alcoholic cirrhosis of liver without ascites ([address] )    3  Colon cancer screening    4  History of colon polyps      Orders Placed This Encounter   Procedures   • CT chest wo contrast   • MRI abdomen w wo contrast   • CBC and differential   • Protime-INR   • Comprehensive metabolic panel   • AFP tumor marker   • Colonoscopy     ______________________________________________________________________    HPI:  Mr Bethany Villalba is a 62 y o  male with medical history as outlined below, including hypertension, chronic hepatitis C (s/p SVR) and alcohol-related cirrhosis and multifocal HCC in the right lobe of the liver, who comes in today for follow-up after being seen in January  He underwent TARE and had follow-up imaging in March which showed no viable tumor (LR-TR)    However, AFP which had initially dropped from ~4000 to ~500, is back up to ~2000  He has had no change in health since that time  He states he feels very well  He had a colonoscopy for CRC screening, however it was an incomplete prep  One rectal polyp was removed  Mr Neema Mayer denies recent or history of yellow eyes/skin, dark urine, GI bleeding, abdominal distention with fluid, lower extremity swelling, easy bruising, excessive bleeding, pruritus or confusion  He denies nausea, vomiting, heartburn, reflux, difficulty swallowing, early satiety, bloating, diarrhea, constipation or straining with passing stools  He denies weight loss  He denies abdominal pain  He is eating well  He has started taking his BP meds routinely  REVIEW OF SYSTEMS:    Review of Systems   Constitutional: Negative for fatigue, fever and unexpected weight change  HENT: Negative for mouth sores, sore throat and trouble swallowing  Eyes: Negative for itching and visual disturbance  Respiratory: Negative for cough, chest tightness, shortness of breath and wheezing  Cardiovascular: Negative for chest pain, palpitations and leg swelling  Endocrine: Negative for cold intolerance, heat intolerance and polyuria  Genitourinary: Negative for difficulty urinating, dysuria and hematuria  Musculoskeletal: Negative for arthralgias, back pain and gait problem  Skin: Negative for color change and rash  Allergic/Immunologic: Negative for environmental allergies  Neurological: Negative for dizziness, weakness, light-headedness and numbness  Hematological: Does not bruise/bleed easily  Psychiatric/Behavioral: Negative for confusion and sleep disturbance  The patient is not nervous/anxious            Historical Information   Patient Active Problem List   Diagnosis   • Hepatocellular carcinoma [facility])   • Essential hypertension   • Chronic hepatitis C without hepatic coma (HCC)   • Cirrhosis of liver ([facility] )   • ED (erectile "dysfunction)   • Colon polyp     Past Medical History:   Diagnosis Date   • Hypertension    • Liver cancer [facility])      Past Surgical History:   Procedure Laterality Date   • HERNIA REPAIR     • IR Y-90 PRE-ANGIO/EMBO W/ LUNG SCAN  2022   • IR Y-90 RADIOEMBOLIZATION  2022     Social History   Social History     Substance and Sexual Activity   Alcohol Use Not Currently     Social History     Substance and Sexual Activity   Drug Use Yes   • Types: Marijuana    Comment: medical marijuana     Social History     Tobacco Use   Smoking Status Former   • Packs/day: 1 00   • Types: Cigarettes   • Quit date:    • Years since quittin 3   Smokeless Tobacco Current   • Types: Chew   Tobacco Comments    nicotine pouch (on)     Family History   Problem Relation Age of Onset   • Cancer Mother        Meds/Allergies       Current Outpatient Medications:   •  lisinopril-hydrochlorothiazide (PRINZIDE,ZESTORETIC) 20-25 MG per tablet  •  polyethylene glycol (MiraLax) 17 GM/SCOOP powder  •  polyethylene glycol-electrolytes (TriLyte) 4000 mL solution    No Known Allergies        Objective     Blood pressure 128/82, temperature (!) 97 1 °F (36 2 °C), temperature source Tympanic, height 5' 9\" (1 753 m), weight 89 6 kg (197 lb 9 6 oz)  Body mass index is 29 18 kg/m²  PHYSICAL EXAM:           Physical Exam  Vitals reviewed  Constitutional:       General: He is not in acute distress  Appearance: Normal appearance  He is not ill-appearing  HENT:      Head: Normocephalic and atraumatic  Nose: Nose normal       Mouth/Throat:      Pharynx: Oropharynx is clear  No posterior oropharyngeal erythema  Eyes:      General: No scleral icterus  Extraocular Movements: Extraocular movements intact  Cardiovascular:      Rate and Rhythm: Normal rate and regular rhythm  Heart sounds: No murmur heard  Pulmonary:      Effort: Pulmonary effort is normal  No respiratory distress        Breath sounds: Normal breath " sounds  No rales  Abdominal:      General: There is no distension  Palpations: Abdomen is soft  There is no shifting dullness, fluid wave, hepatomegaly or splenomegaly  Tenderness: There is no abdominal tenderness  There is no guarding  Musculoskeletal:         General: No swelling  Normal range of motion  Cervical back: Normal range of motion and neck supple  Skin:     General: Skin is warm  Coloration: Skin is not jaundiced  Neurological:      General: No focal deficit present  Mental Status: He is oriented to person, place, and time     Psychiatric:         Mood and Affect: Mood normal               Lab Results:      Latest Reference Range & Units 09/21/22 11:00   Sodium 135 - 147 mmol/L 136   Potassium 3 5 - 5 3 mmol/L 4 4   Chloride 96 - 108 mmol/L 105   CO2 21 - 32 mmol/L 28   Anion Gap 4 - 13 mmol/L 3 (L)   BUN 5 - 25 mg/dL 12   Creatinine 0 60 - 1 30 mg/dL 1 19   Glucose, Random 65 - 140 mg/dL 97   Calcium 8 3 - 10 1 mg/dL 9 4   AST 5 - 45 U/L 22   ALT 12 - 78 U/L 29   Alkaline Phosphatase 46 - 116 U/L 120 (H)   Total Protein 6 4 - 8 4 g/dL 8 8 (H)   Albumin 3 5 - 5 0 g/dL 4 0   TOTAL BILIRUBIN 0 20 - 1 00 mg/dL 0 99   eGFR ml/min/1 73sq m 67   AFP-TUMOR MARKER 0 5 - 8 ng/mL 4,961 3 (H)   WBC 4 31 - 10 16 Thousand/uL 7 89   Red Blood Cell Count 3 88 - 5 62 Million/uL 5 57   Hemoglobin 12 0 - 17 0 g/dL 15 7   HCT 36 5 - 49 3 % 47 9   MCV 82 - 98 fL 86   MCH 26 8 - 34 3 pg 28 2   MCHC 31 4 - 37 4 g/dL 32 8   RDW 11 6 - 15 1 % 13 6   Platelet Count 345 - 390 Thousands/uL 212   MPV 8 9 - 12 7 fL 9 7   PROTIME 11 6 - 14 5 seconds 13 0   POCT INR 0 84 - 1 19  0 97   (L): Data is abnormally low  (H): Data is abnormally high    MELD-Na score: 8 at 4/22/2023 11:17 AM  MELD score: 8 at 4/22/2023 11:17 AM  Calculated from:  Serum Creatinine: 1 07 mg/dL at 4/22/2023 11:17 AM  Serum Sodium: 134 mmol/L at 4/22/2023 11:17 AM  Total Bilirubin: 1 19 mg/dL at 4/22/2023 11:17 AM  INR(ratio): 0 94 (Using min of 1) at 4/22/2023 11:17 AM  Age: 62 years    Radiology Results:   I have reviewed the results of any radiology studies performed within the last 90 days  This includes:      No results found        Marina Norton MD  Hepatology/Gastroenterology

## 2023-04-26 NOTE — PATIENT INSTRUCTIONS
As discussed today:    Medications:  Continue taking your current medications without changes  Laboratory Testing (Listed below):  Please have blood work drawn in early June and then every 8 weeks  Radiology/Diagnostic Testing:  Please schedule a CT of the chest now and a multiphase MRI in June  Avoid alcohol and tobacco products  Also avoid raw seafood/oysters and avoid swimming/wading in unchlorinated perry, particularly from the [facility], due to increased risk of infection from a bacteria called Vibrio Vulnificus  Avoid medications called NSAID's (Motrin/Ibuprofen, Naproxen/Naprosyn/Aleve) if possible, due to increase risk of kidney dysfunction, and if you use medications containing acetaminophen (Tylenol, percocet, Endocet, and many cough/cold medications), the dose should not exceed 2 grams/day  Seek immediate medical attention if you develop fevers over 101 F, have evidence of GI bleeding (black or bloody stools, bloody or coffee ground emesis) or confusion  Call our office if you develop worsening symptoms related to lower extremity edema, ascites, jaundice or excessive bruising/bleeding  Ct-scan scheduled on 5/5 at [facility]  MRI scheduled on 6/7 at [facility]    Patient will check with his insurance regarding colonoscopy  Patient will call to schedule procedure

## 2023-05-16 ENCOUNTER — HOSPITAL ENCOUNTER (OUTPATIENT)
Dept: CT IMAGING | Facility: HOSPITAL | Age: 58
Discharge: HOME | End: 2023-05-16
Attending: INTERNAL MEDICINE

## 2023-05-16 DIAGNOSIS — K70.30 ALCOHOLIC CIRRHOSIS OF LIVER WITHOUT ASCITES (HCC): ICD-10-CM

## 2023-05-16 DIAGNOSIS — C22.0 HEPATOCELLULAR CARCINOMA (HCC): ICD-10-CM

## 2023-05-22 ENCOUNTER — TELEPHONE (OUTPATIENT)
Dept: GASTROENTEROLOGY | Facility: CLINIC | Age: 58
End: 2023-05-22

## 2023-05-22 ENCOUNTER — TRANSCRIBE ORDERS (OUTPATIENT)
Dept: GASTROENTEROLOGY | Facility: CLINIC | Age: 58
End: 2023-05-22

## 2023-05-22 DIAGNOSIS — R91.8 ABNORMAL CT SCAN, LUNG: Primary | ICD-10-CM

## 2023-05-22 DIAGNOSIS — C22.0 HEPATOCELLULAR CARCINOMA (HCC): ICD-10-CM

## 2023-05-22 DIAGNOSIS — R91.8 LUNG NODULES: ICD-10-CM

## 2023-05-22 DIAGNOSIS — C22.0 HEPATOCELLULAR CARCINOMA (HCC): Primary | ICD-10-CM

## 2023-05-22 NOTE — TELEPHONE ENCOUNTER
Called patient to discuss results of CT chest     Imaging was notable for an increase in size of lung nodules measuring up to 6mm with at least 1 new lung nodule  Given patient's history of malignancy ([address] ), differential includes pulmonary metastasis  Recommended short-term follow-up with CT chest without contrast x3 months  Orders placed today  Appears patient follows with surgical oncology, Dr Jam Gibbs, but is not following with medical oncology  Will inform patient's surgical oncologist of radiographic findings and coordinate an appointment with medical oncology given multifocal [address]  with possible lung metastasis  Patient gave verbal understanding and agrees with recommendations  All questions answered to his satisfaction  Does not appear to have follow-up scheduled with hepatology  Will request clerical staff to schedule patient for follow-up around 6/28/2023, as per Dr Toya Siegel last office visit note

## 2023-05-22 NOTE — TELEPHONE ENCOUNTER
Called pt , lvm to schedule f/u ov with Dr Izzy Arrieta around 6/28 as per Dr Amin Client last office visit notes

## 2023-05-22 NOTE — TELEPHONE ENCOUNTER
Patients GI provider:  Dr Rupali Jonas      Reason for call: [facility] called with significant findings on CT 5/16/2023  Please review results

## 2023-05-23 ENCOUNTER — TELEPHONE (OUTPATIENT)
Dept: HEMATOLOGY ONCOLOGY | Facility: CLINIC | Age: 58
End: 2023-05-23

## 2023-05-23 NOTE — TELEPHONE ENCOUNTER
I spoke with the patients wife Fred Morales in regards to rescheduling the patients appt due to a scheduling error  I apologized to Fred Morales for any inconvenience and informed Fred Morales that I can schedule the patient with Dr Orion Nichols on 5/25/23 at 9:40am for the same location  Fred Morales states understanding and accepts appt

## 2023-05-24 NOTE — TELEPHONE ENCOUNTER
Patient scheduled follow-up office visit on 6/27 at [facility] with Dr Cyril Tavera  Mailed appointment card to patient  (Dr Tato Fregoso will be in fellow clinic)  Also mailed Ct scan order to patient with instructions to call central scheduling at [phone] to schedule the CT scan appointment

## 2023-05-25 ENCOUNTER — PATIENT OUTREACH (OUTPATIENT)
Dept: UROLOGY | Facility: AMBULATORY SURGERY CENTER | Age: 58
End: 2023-05-25

## 2023-05-25 ENCOUNTER — CONSULT (OUTPATIENT)
Dept: HEMATOLOGY ONCOLOGY | Facility: CLINIC | Age: 58
End: 2023-05-25

## 2023-05-25 VITALS
BODY MASS INDEX: 29.25 KG/M2 | HEART RATE: 68 BPM | WEIGHT: 197.5 LBS | HEIGHT: 69 IN | DIASTOLIC BLOOD PRESSURE: 74 MMHG | RESPIRATION RATE: 22 BRPM | TEMPERATURE: 97.2 F | SYSTOLIC BLOOD PRESSURE: 128 MMHG | OXYGEN SATURATION: 98 %

## 2023-05-25 DIAGNOSIS — C22.0 HEPATOCELLULAR CARCINOMA (HCC): ICD-10-CM

## 2023-05-25 DIAGNOSIS — B18.2 CHRONIC HEPATITIS C WITHOUT HEPATIC COMA (HCC): ICD-10-CM

## 2023-05-25 DIAGNOSIS — K74.60 CIRRHOSIS OF LIVER WITHOUT ASCITES, UNSPECIFIED HEPATIC CIRRHOSIS TYPE (HCC): Primary | ICD-10-CM

## 2023-05-25 NOTE — PROGRESS NOTES
Alfredo Awan  1965  [address] [facility] [place]  [address] [facility] [address]  [place] [id number]  HEMATOLOGY/ONCOLOGY CONSULTATION REPORT    DISCUSSION/SUMMARY:      26-year-old male with a somewhat complicated prior GI/liver history  Patient was previously diagnosed with hepatocellular carcinoma, elevated alpha-fetoprotein level  Patient underwent Y90 treatment in November 2022  Alpha-fetoprotein level came down nicely  Unfortunately the tumor marker has again begun to rise  The May 16, 2023 CAT scan of the chest demonstrated enlarging pulmonary nodules  Mr Victor M Castillo feels well and clinically there are no concerning findings  Other laboratory test results are good/acceptable  The plan is for patient to be presented at the GI tumor board  The pathology results need to be clarified (hepatocellular carcinoma versus hepatocellular carcinoma with cholangiocarcinoma component)  Patient would then be a candidate for systemic treatment  As above Child Myers class = A     NCCN guidelines 1/20/2023 states that for patients with hepatocellular carcinoma, good performance, Child Myers class a, category 1 treatment includes atezolizumab and bevacizumab or tremelimumab + durvalumab (also category 1)  We discussed immunotherapy treatments in general   We discussed the potential benefits versus risks/toxicities  Patient states that he is feeling quite well presently and is not 100% sure he wishes to embark on systemic treatments  Patient will continue his follow-up with GI; there may be a need for local treatments to the liver in the future  Mr Victor M Castillo demonstrated a good understanding of the situation, same with his wife  Patient was appropriately anxious  Patient seemed to be looking for a quick fix to his present issues    We discussed the fact that the systemic treatments may be long-term and that his cancer may not be curable - patient became upset with this   Further discussions will be needed in the future; patient would also be a good candidate for palliative care in the future  Patient is to return in a few weeks but this will change depending upon the above  Patient knows to call the hematology/oncology office if there are any other questions or concerns  Carefully review your medication list and verify that the list is accurate and up-to-date  Please call the hematology/oncology office if there are medications missing from the list, medications on the list that you are not currently taking or if there is a dosage or instruction that is different from how you're taking that medication  Patient goals and areas of care: Verify pathology and treatment options  Barriers to care: None  Patient is able to self-care  ______________________________________________________________________________________    Chief Complaint   Patient presents with   • Consult   • Hepatocellular carcinoma     Oncology History   Hepatocellular carcinoma ([facility] [address] )   2022 Initial Diagnosis    Hepatocellular carcinoma ([facility] [address] )     2/8/2022 Biopsy    [facility] DOSHI US guided liver biopsy: right liver lobe:  Poorly differentiated HCC with patchy necrosis         History of Present Illness: 80-year-old male with history of cirrhosis secondary to chronic hepatitis C, history of alcohol abuse, previously diagnosed with multifocal hepatocellular carcinoma  Patient underwent TARE on November 22, 2022  Recent scans demonstrated concerning enlarging pulmonary lesions  Alpha-fetoprotein level has also been rising  Mr Isis Schulz presents with his wife  Patient states feeling quite well  No nausea or vomiting, no GI issues, no diarrhea  Weight is stable  No fevers or signs of infection  No bruising or bleeding issues  Patient continues to work full-time, approximately 60 hours a week as a   No headaches, blurred vision or dizziness  No pain control issues      Review of Systems   Constitutional: Negative  HENT: Negative  Eyes: Negative  Respiratory: Negative  Cardiovascular: Negative  Gastrointestinal: Negative  Endocrine: Negative  Genitourinary: Negative  Musculoskeletal: Negative  Skin: Negative  Allergic/Immunologic: Negative  Neurological: Negative  Hematological: Negative  Psychiatric/Behavioral: The patient is nervous/anxious  All other systems reviewed and are negative      Patient Active Problem List   Diagnosis   • Hepatocellular carcinoma ([facility] [address] )   • Essential hypertension   • Chronic hepatitis C without hepatic coma (HCC)   • Cirrhosis of liver (HCC)   • ED (erectile dysfunction)   • Colon polyp     Past Medical History:   Diagnosis Date   • Hypertension    • Liver cancer [facility])      Past Surgical History:   Procedure Laterality Date   • HERNIA REPAIR     • IR Y-90 PRE-ANGIO/EMBO W/ LUNG SCAN  2022   • IR Y-90 RADIOEMBOLIZATION  2022     Family History   Problem Relation Age of Onset   • Cancer Mother      Social History     Socioeconomic History   • Marital status: /Civil Union     Spouse name: Not on file   • Number of children: Not on file   • Years of education: Not on file   • Highest education level: Not on file   Occupational History   • Not on file   Tobacco Use   • Smoking status: Former     Packs/day: 1 00     Types: Cigarettes     Quit date:      Years since quittin 3   • Smokeless tobacco: Current     Types: Chew   • Tobacco comments:     nicotine pouch (on)   Vaping Use   • Vaping Use: Never used   Substance and Sexual Activity   • Alcohol use: Not Currently   • Drug use: Yes     Types: Marijuana     Comment: medical marijuana   • Sexual activity: Not Currently   Other Topics Concern   • Not on file   Social History Narrative   • Not on file     Social Determinants of Health     Financial Resource Strain: Not on file   Food Insecurity: Not on file   Transportation Needs: Not on file   Physical Activity: Not on file   Stress: Not on file   Social Connections: Not on file   Intimate Partner Violence: Not on file   Housing Stability: Not on file       Current Outpatient Medications:   •  lisinopril-hydrochlorothiazide (PRINZIDE,ZESTORETIC) 20-25 MG per tablet, Take 1 tablet by mouth daily, Disp: 90 tablet, Rfl: 0  •  polyethylene glycol (MiraLax) 17 GM/SCOOP powder, Take 238 g by mouth once for 1 dose Take 238 g my mouth  Use as directed, Disp: 238 g, Rfl: 0  •  polyethylene glycol-electrolytes (TriLyte) 4000 mL solution, Take 4,000 mL by mouth once for 1 dose Take 4000 mL by mouth once for 1 dose  Use as directed, Disp: 4000 mL, Rfl: 0    No Known Allergies    Vitals:    05/25/23 0933   BP: 128/74   Pulse: 68   Resp: 22   Temp: (!) 97 2 °F (36 2 °C)   SpO2: 98%     Physical Exam  Constitutional:       Appearance: He is well-developed  Comments: Well-nourished male, no respiratory distress, no signs of pain   HENT:      Head: Normocephalic and atraumatic  Right Ear: External ear normal       Left Ear: External ear normal    Eyes:      Conjunctiva/sclera: Conjunctivae normal       Pupils: Pupils are equal, round, and reactive to light  Cardiovascular:      Rate and Rhythm: Normal rate and regular rhythm  Heart sounds: Normal heart sounds  Pulmonary:      Effort: Pulmonary effort is normal       Breath sounds: Normal breath sounds  Comments: Clear bilaterally  Abdominal:      General: Bowel sounds are normal       Palpations: Abdomen is soft  Comments: + Bowel sounds, nontender, soft, no paraspinal megaly, no guarding   Musculoskeletal:         General: Normal range of motion  Cervical back: Normal range of motion and neck supple  Skin:     General: Skin is warm  Comments: Good color, warm, moist, no petechiae or ecchymoses   Neurological:      Mental Status: He is alert and oriented to person, place, and time        Deep Tendon Reflexes: Reflexes are normal and symmetric  Psychiatric:         Behavior: Behavior normal          Thought Content: Thought content normal          Judgment: Judgment normal      Extremities: No lower extreme edema, no cords, pulses are 1+  Lymphatics: None within the neck, supraclavicular region, axilla bilaterally    Labs    4/22/2023 WBC = 4 83 hemoglobin = 15 4 hematocrit = 44 9 platelet = 512 neutrophil = 66% PT = 12 8 INR = 0 94 BUN = 14 creatinine = 1 07 calcium = 9 5 AST = 36 ALT = 33 alkaline phosphatase = 87 total protein = 7 9 total bilirubin = 1 19 albumin = 4 0 PT = 12 8 INR = 0 94        Imaging    5/16/2023 CAT scan chest without contrast    IMPRESSION:     Increase in size of lung nodules measuring up to 6 mm with at least one new lung nodule  Given the patient's history of malignancy, differential includes pulmonary metastasis  Recommend short-term follow-up with CT without contrast in 3 months  3/6/2023 MRI abdomen with and without contrast    IMPRESSION:     All of the previously seen observations now show normal posttreatment perilesional arterial phase enhancement or are no longer apparent status post treatment  LR- TR Nonviable  7/13/2022 MRI abdomen    IMPRESSION:     1  Cirrhosis  Multiple right hepatic lobe observations:  - Segment 8, 4 0 x 3 1 cm, LR-M   - Segment 7/8, 4 3 x 4 6 cm, LR-5   - Segment 8, 1 6 x 1 6 cm, LR-M   - Segment 6, 1 1 x 1 2 cm, LR-5      One of these lesions has reportedly previously been biopsied showing hepatocellular carcinoma, although it is not clear which lesion was sampled  Although some of the lesions meet criteria for LR-M, all of the lesions may be part of the same process      Pathology    Case Report   Surgical Pathology Report                         Case: [id number]                                    Authorizing Provider: Jason Bedoya MD      Collected:           10/06/2022 0829               Ordering Location:     [facility]      Received:            10/06/2022 0829                                      [facility]                                                                                   Laboratory                                                                    Pathologist:           Ho Goncalves MD                                                                     Specimen:    Liver, Liver Biopsy (10 slides [id number] [facility], collected 2/8/2022)                Final Diagnosis   OUTSIDE SLIDES FOR REVIEW (10 slides [id number] [facility], collected 2/8/2022):       A  Liver, core needle biopsy:  -   Poorly differentiated carcinoma with patchy necrosis (see comment)  -   Background liver parenchyma with chronic inflammation        Comment: Submitted immunohistochemical stains show the tumor cells are positive for CK7 and CK19, and are negative for CK20,  HepPar1, , TTF1 and   Additional stains were performed by outside institution and are not submitted for review  Per report, the tumor cells stain positive for glypican 3, AFP, albumin mRNA and claudin 4, and stain negative for arginase, PAX8, GATA3, CDX2, and NKX3 1 and mucicarmine        The histomorphologic and immunophenotypic features are consistent with poorly differentiated carcinoma  The clinical history of cirrhosis, AFP and glypican 3 positivity favor hepatocellular carcinoma  However, lack of staining for HepPar1 and arginase, and expression of claudin 4, CK7 and CK19 suggest a cholangiocarcinoma component  Strong expression of albumin mRNA can be found in a majority of hepatocellular carcinoma  However, albumin mRNA can also be positive in a subset of intrahepatic cholangiocarcinoma and adenocarcinoma from other sites  Therefore, hepatocellular carcinoma with a cholangiocarcinoma component, so-called combined hepatocellular carcinoma-cholangiocarcinoma, cannot be excluded    There is insufficient background liver parenchyma for a definitive diagnosis of cirrhosis      Reference: Serena Samaniego, Trupti HD, Neelam T, Veena S, Santoyo Selin Bravos, Sebastien VS, Ruth TC, Erasmo Watson RK, Chayito ARREOLA, Scarlet Khalil TT, Jairo RP  Albumin In Situ Hybridization Can Be Positive in Adenocarcinomas and Other Tumors From Diverse Sites  Am J Clin Pathol  2019 Jul 5;152(2):190-199  doi: 10 [id number]   PMID: [id number]      Interpretation performed at , [address] [place] [address]    Electronically signed by Srinath Pacheco MD on 10/6/2022 at  3:49 PM

## 2023-05-25 NOTE — PROGRESS NOTES
Phone outreach to the patient for initial contact, tracking, and assessment   I left message with my contact information on the patient's vm introducing myself and asking to please return my call to discuss next steps

## 2023-05-26 ENCOUNTER — PATIENT OUTREACH (OUTPATIENT)
Dept: HEMATOLOGY ONCOLOGY | Facility: CLINIC | Age: 58
End: 2023-05-26

## 2023-05-26 DIAGNOSIS — C22.0 HEPATOCELLULAR CARCINOMA (HCC): Primary | ICD-10-CM

## 2023-05-26 NOTE — PROGRESS NOTES
"Phone outreach to the patient, I introduced myself and explained my role  I went over his upcoming appointments and the patient is aware of them  The patient reports understanding his diagnosis and was \"calm\" when speaking with him  Pt reports feeling well and states \"I feel as strong as a horse  Pt also reports a good appetite and is eating nromally and denies any weight loss  He denies N/V or diarrhea at present  Pt reports living at home with his wife and is a good support for him  He denies any barriers getting to or from any of his appointment and states his  He denies any physical barriers at present  I did place a referral for Social Work  I explained the service and what they can offer and the patient agreed  The patient did see Dr Gerard Garcia in consult and the patient will be discussed at tumor board and he will return to Dr Gerard Garcia for care planning  We completed a General Assessment together, I provided my contact information and Jenn Luna and instructed him to please call if he has any questions or concerns  I thanked him for his time      "

## 2023-05-31 ENCOUNTER — DOCUMENTATION (OUTPATIENT)
Dept: HEMATOLOGY ONCOLOGY | Facility: CLINIC | Age: 58
End: 2023-05-31

## 2023-05-31 ENCOUNTER — PATIENT OUTREACH (OUTPATIENT)
Dept: CASE MANAGEMENT | Facility: HOSPITAL | Age: 58
End: 2023-05-31

## 2023-05-31 NOTE — PROGRESS NOTES
In-basket message received from Dr Orion Nichols to add patient to [facility] on 6/15/2023  Chart reviewed and prep started  Pending MRI scheduled on 6/7  I will follow up on results

## 2023-06-07 ENCOUNTER — HOSPITAL ENCOUNTER (OUTPATIENT)
Dept: MRI IMAGING | Facility: HOSPITAL | Age: 58
Discharge: HOME | End: 2023-06-07
Attending: INTERNAL MEDICINE
Payer: COMMERCIAL

## 2023-06-07 DIAGNOSIS — C22.0 HEPATOCELLULAR CARCINOMA (HCC): ICD-10-CM

## 2023-06-07 DIAGNOSIS — K70.30 ALCOHOLIC CIRRHOSIS OF LIVER WITHOUT ASCITES (HCC): ICD-10-CM

## 2023-06-07 PROCEDURE — G1004 CDSM NDSC: HCPCS

## 2023-06-07 PROCEDURE — A9585 GADOBUTROL INJECTION: HCPCS | Performed by: INTERNAL MEDICINE

## 2023-06-07 PROCEDURE — 74183 MRI ABD W/O CNTR FLWD CNTR: CPT

## 2023-06-07 RX ADMIN — GADOBUTROL 8 ML: 604.72 INJECTION INTRAVENOUS at 20:35

## 2023-06-09 ENCOUNTER — TELEPHONE (OUTPATIENT)
Dept: GASTROENTEROLOGY | Facility: AMBULARY SURGERY CENTER | Age: 58
End: 2023-06-09

## 2023-06-09 DIAGNOSIS — K70.30 ALCOHOLIC CIRRHOSIS OF LIVER WITHOUT ASCITES (HCC): Primary | ICD-10-CM

## 2023-06-09 DIAGNOSIS — C22.0 HEPATOCELLULAR CARCINOMA (HCC): ICD-10-CM

## 2023-06-09 NOTE — TELEPHONE ENCOUNTER
Called patient to discuss the results of his MRI  Discussed that his previously seen hepatic observations now showed expected posttreatment perilesional history of phase enhancement unchanged from his MRI on 3/6/2023 (LI-RADS LR TR nonviable) and that there were no new hepatic observations but there was worsening gastrohepatic and portacaval adenopathy which will be monitored with future imaging  Will defer to ordering provider regarding timing of repeat imaging, likely x3 months  Patient gave verbal understanding and all questions were answered to his satisfaction

## 2023-06-09 NOTE — TELEPHONE ENCOUNTER
Patients GI provider:  Dr Chetan Hamilton     Number to return call: [phone]     Reason for call: Alejandro Martinez from Radiology called stated there are significant findings in MRI from 6-7-2023    Tiger texted     Scheduled procedure/appointment date if applicable: Apt/procedure

## 2023-06-09 NOTE — PROGRESS NOTES
MRI abd completed on 6/7 but results are not finalized  Called radiology reading room and spoke with [facility] requesting STAT read

## 2023-06-14 ENCOUNTER — DOCUMENTATION (OUTPATIENT)
Dept: HEMATOLOGY ONCOLOGY | Facility: CLINIC | Age: 58
End: 2023-06-14

## 2023-06-14 NOTE — PROGRESS NOTES
Chart reviewed in preparation of Upper GI Tumor Conference presentation by Dr Johnetta Rinne  Patient established care with medical oncology for hepatocellular carcinoma

## 2023-06-15 ENCOUNTER — DOCUMENTATION (OUTPATIENT)
Dept: HEMATOLOGY ONCOLOGY | Facility: CLINIC | Age: 58
End: 2023-06-15

## 2023-06-15 NOTE — PROGRESS NOTES
RECTAL/GI MULTIDISCIPLINARY CASE REVIEW    DATE:  6/15/2023      PRESENTING DOCTOR:  Dr Calderon Gupta      DIAGNOSIS:  Veronica Malik was presented at the Rectal/GI Multidisciplinary Conference today  PHYSICIAN RECOMMENDED PLAN:    - The recommended plan is for systemic therapy  Team agreed to plan  The final treatment plan will be left to the discretion of the patient and the treating physician  DISCLAIMERS:  TO THE TREATING PHYSICIAN:  This conference is a meeting of clinicians from various specialty areas who evaluate and discuss patients for whom a multidisciplinary treatment approach is being considered  Please note that the above opinion was a consensus of the conference attendees and is intended only to assist in quality care of the discussed patient  The responsibility for follow up on the input given during the conference, along with any final decisions regarding plan of care, is that of the patient and the patient's provider  Accordingly, appointments have only been recommended based on this information and have NOT been scheduled unless otherwise noted  TO THE PATIENT:  This summary is a brief record of major aspects of your cancer treatment  You may choose to share a copy with any of your doctors or nurses  However, this is not a detailed or comprehensive record of your care        NCCN guidelines were readily available for review at this discussion

## 2023-06-16 ENCOUNTER — PATIENT OUTREACH (OUTPATIENT)
Dept: CASE MANAGEMENT | Facility: HOSPITAL | Age: 58
End: 2023-06-16

## 2023-06-16 NOTE — PROGRESS NOTES
LSW attempted to contact patient to initiate assessment and DT  No answer  Voicemail was left including my name and phone number for patient to return call  Additional Progress Note...

## 2023-06-19 ENCOUNTER — PATIENT OUTREACH (OUTPATIENT)
Dept: CASE MANAGEMENT | Facility: HOSPITAL | Age: 58
End: 2023-06-19

## 2023-06-19 NOTE — PROGRESS NOTES
LSW with second attempt to contact patient to initiate assessment and DT  No answer  Voicemail was left including my name and phone number for patient to return call

## 2023-06-20 ENCOUNTER — PATIENT OUTREACH (OUTPATIENT)
Dept: CASE MANAGEMENT | Facility: HOSPITAL | Age: 58
End: 2023-06-20

## 2023-06-20 ENCOUNTER — OFFICE VISIT (OUTPATIENT)
Dept: FAMILY MEDICINE CLINIC | Facility: CLINIC | Age: 58
End: 2023-06-20
Payer: COMMERCIAL

## 2023-06-20 VITALS
DIASTOLIC BLOOD PRESSURE: 88 MMHG | BODY MASS INDEX: 29.33 KG/M2 | RESPIRATION RATE: 20 BRPM | HEIGHT: 69 IN | WEIGHT: 198 LBS | OXYGEN SATURATION: 98 % | SYSTOLIC BLOOD PRESSURE: 136 MMHG | HEART RATE: 80 BPM | TEMPERATURE: 98.4 F

## 2023-06-20 DIAGNOSIS — I10 ESSENTIAL HYPERTENSION: ICD-10-CM

## 2023-06-20 DIAGNOSIS — C22.0 HEPATOCELLULAR CARCINOMA (HCC): Primary | ICD-10-CM

## 2023-06-20 PROCEDURE — 99213 OFFICE O/P EST LOW 20 MIN: CPT | Performed by: FAMILY MEDICINE

## 2023-06-20 NOTE — ASSESSMENT & PLAN NOTE
BP has been good at home  Continue lisinopril HCT 20/25 qd  Pt advised to continue low Na diet and to exercise on a regular basis

## 2023-06-20 NOTE — PROGRESS NOTES
YAJAIRAW attempted third contact with patient this morning to complete initial assessment and DT  YAJAIRAW left voicemail with return name and  phone number  YAJAIRAW will close case for now and instructed patient of OSW role and resources and he can return my call at anytime

## 2023-06-20 NOTE — ASSESSMENT & PLAN NOTE
Patient was treated but now has recurrence  Patient has increased pulmonary nodules and has increased abdominal LAD  AFP also increased to 500 to 1900 in April 2023  Patient to see Oncology on 6/22 and will likely start chemotherapy

## 2023-06-20 NOTE — PROGRESS NOTES
Depression Screening and Follow-up Plan: Patient was screened for depression during today's encounter  They screened negative with a PHQ-2 score of 0  Assessment/Plan:         Problem List Items Addressed This Visit        Digestive    Hepatocellular carcinoma [facility]) - Primary     Patient was treated but now has recurrence  Patient has increased pulmonary nodules and has increased abdominal LAD  AFP also increased to 500 to 1900 in April 2023  Patient to see Oncology on 6/22 and will likely start chemotherapy  Cardiovascular and Mediastinum    Essential hypertension     BP has been good at home  Continue lisinopril HCT 20/25 qd  Pt advised to continue low Na diet and to exercise on a regular basis  Subjective:      Patient ID: Liset Valdes is a 62 y o  male  Patient here for consultation about his hepatocellular CA  Patient has been seeing Oncology and they want to start chemotherapy  Patient had CT of chest and MRI A/P recently  Patient had increased size and number of lung nodules  Patient also has enlarged nodes in abdomenal  ALP has increased from 500 to 1900 in April 2023  The following portions of the patient's history were reviewed and updated as appropriate:   Past Medical History:  He has a past medical history of Hypertension and Liver cancer ([address] )  ,  _______________________________________________________________________  Medical Problems:  does not have any pertinent problems on file ,  _______________________________________________________________________  Past Surgical History:   has a past surgical history that includes Hernia repair; IR Y-90 pre-angio/embo w/ lung scan (11/8/2022); and IR Y-90 radioembolization (11/22/2022)  ,  _______________________________________________________________________  Family History:  family history includes Cancer in his mother ,  _______________________________________________________________________  Social History:   reports "that he quit smoking about 6 years ago  His smoking use included cigarettes  He has a 30 00 pack-year smoking history  His smokeless tobacco use includes chew  He reports that he does not currently use alcohol  He reports current drug use  Drug: Marijuana  ,  _______________________________________________________________________  Allergies:  has No Known Allergies     _______________________________________________________________________  Current Outpatient Medications   Medication Sig Dispense Refill   • lisinopril-hydrochlorothiazide (PRINZIDE,ZESTORETIC) 20-25 MG per tablet Take 1 tablet by mouth daily 90 tablet 0   • polyethylene glycol (MiraLax) 17 GM/SCOOP powder Take 238 g by mouth once for 1 dose Take 238 g my mouth  Use as directed 238 g 0   • polyethylene glycol-electrolytes (TriLyte) 4000 mL solution Take 4,000 mL by mouth once for 1 dose Take 4000 mL by mouth once for 1 dose  Use as directed 4000 mL 0     No current facility-administered medications for this visit      _______________________________________________________________________  Review of Systems   Constitutional: Negative for fatigue and unexpected weight change  Respiratory: Negative for cough and shortness of breath  Cardiovascular: Negative for chest pain  Gastrointestinal: Negative for abdominal pain, constipation, diarrhea and vomiting  Musculoskeletal: Negative for arthralgias  Neurological: Negative for dizziness and headaches  Psychiatric/Behavioral: Negative for dysphoric mood  The patient is not nervous/anxious  Objective:  Vitals:    06/20/23 1002 06/20/23 1032   BP: 148/80 136/88   BP Location: Left arm Left arm   Patient Position: Sitting Sitting   Cuff Size: Standard Standard   Pulse: 80    Resp: 20    Temp: 98 4 °F (36 9 °C)    TempSrc: Tympanic    SpO2: 98%    Weight: 89 8 kg (198 lb)    Height: 5' 9\" (1 753 m)      Body mass index is 29 24 kg/m²  Physical Exam  Vitals and nursing note reviewed   " Constitutional:       Appearance: Normal appearance  He is well-developed and normal weight  Neck:      Thyroid: No thyromegaly  Cardiovascular:      Rate and Rhythm: Normal rate and regular rhythm  Heart sounds: Normal heart sounds  No murmur heard  Pulmonary:      Effort: Pulmonary effort is normal  No respiratory distress  Breath sounds: Normal breath sounds  No wheezing  Musculoskeletal:      Cervical back: Normal range of motion and neck supple  Right lower leg: No edema  Left lower leg: No edema  Lymphadenopathy:      Cervical: No cervical adenopathy  Neurological:      Mental Status: He is alert and oriented to person, place, and time  Cranial Nerves: No cranial nerve deficit  Psychiatric:         Mood and Affect: Mood normal          Behavior: Behavior normal          Thought Content:  Thought content normal          Judgment: Judgment normal

## 2023-06-22 ENCOUNTER — OFFICE VISIT (OUTPATIENT)
Dept: HEMATOLOGY ONCOLOGY | Facility: CLINIC | Age: 58
End: 2023-06-22
Payer: COMMERCIAL

## 2023-06-22 ENCOUNTER — TELEPHONE (OUTPATIENT)
Dept: HEMATOLOGY ONCOLOGY | Facility: CLINIC | Age: 58
End: 2023-06-22

## 2023-06-22 VITALS
WEIGHT: 197.5 LBS | HEIGHT: 69 IN | DIASTOLIC BLOOD PRESSURE: 80 MMHG | OXYGEN SATURATION: 97 % | SYSTOLIC BLOOD PRESSURE: 124 MMHG | TEMPERATURE: 97.4 F | RESPIRATION RATE: 18 BRPM | BODY MASS INDEX: 29.25 KG/M2 | HEART RATE: 84 BPM

## 2023-06-22 DIAGNOSIS — K74.60 CIRRHOSIS OF LIVER WITHOUT ASCITES, UNSPECIFIED HEPATIC CIRRHOSIS TYPE (HCC): ICD-10-CM

## 2023-06-22 DIAGNOSIS — C22.0 HEPATOCELLULAR CARCINOMA (HCC): Primary | ICD-10-CM

## 2023-06-22 DIAGNOSIS — B18.2 CHRONIC HEPATITIS C WITHOUT HEPATIC COMA (HCC): ICD-10-CM

## 2023-06-22 PROCEDURE — 99215 OFFICE O/P EST HI 40 MIN: CPT | Performed by: INTERNAL MEDICINE

## 2023-06-22 NOTE — TELEPHONE ENCOUNTER
Patient seen by Dr Margi Nunez  Consent signed for atezolizumab and avastin  Patient will obtain lab and urine tests prior to each cycle  Will send to Infusion schedulers to arrange appts  Patient aware of plan

## 2023-06-22 NOTE — PROGRESS NOTES
[facility]  1965  [address] [facility]  [address] [address] Formerly [facility], [facility] [address]    DISCUSSION/SUMMARY:      49-year-old male with a somewhat complicated prior GI/liver history  Patient was previously diagnosed with hepatocellular carcinoma, elevated alpha-fetoprotein level  Patient underwent Y90 treatment in November 2022  Alpha-fetoprotein level came down nicely  Unfortunately the tumor marker recently began to rise  The May 16, 2023 CAT scan of the chest demonstrated enlarging pulmonary nodules  More recent MRI of the abdomen demonstrated worsening gastrohepatic and portacaval adenopathy (although the liver lesions were unchanged)  Mr Dank Black continues to feel well and clinically there are no concerning findings  Patient was recently presented at the GI tumor board  The plan is to begin systemic treatment  NCCN guidelines 1/20/2023 states that for patients with hepatocellular carcinoma, good performance, Child Myers class a, category 1 treatment includes atezolizumab and bevacizumab or tremelimumab + durvalumab (also category 1)  Regimen  Atezolizumab 1200 mg IV day 1  Bevacizumab 15 mg/kilogram IV day 1  Cycle length = 21 days  Goal = prolongation of life, improvement of quality of life    Nursing staff spent time with patient today going over the specifics, potential benefits versus risks toxicities of atezolizumab and bevacizumab  We will see how patient tolerates the regimen but a port may eventually be needed  Patient will continue his follow-up with GI  As seen in the MRI results below, patient has had a good response to the Y90 treatment in the liver  Patient is to return in 6 weeks  Patient knows to call the hematology/oncology office if there are any other questions or concerns  Carefully review your medication list and verify that the list is accurate and up-to-date   Please call the hematology/oncology office if there are medications missing from the list, medications on the list that you are not currently taking or if there is a dosage or instruction that is different from how you're taking that medication  Patient goals and areas of care: Begin systemic treatment  Barriers to care: None  Patient is able to self-care  ______________________________________________________________________________________    Chief Complaint   Patient presents with   • Follow-up   • Metastatic hepatocellular carcinoma     Oncology History   Hepatocellular carcinoma ([facility] [address] )   2022 Initial Diagnosis    Hepatocellular carcinoma ([facility] [address] )     2/8/2022 Biopsy    [facility] DOSHI US guided liver biopsy: right liver lobe:  Poorly differentiated HCC with patchy necrosis       6/23/2023 -  Chemotherapy    alteplase (CATHFLO), 2 mg, Intracatheter, Every 1 Minute as needed, 0 of 6 cycles  atezolizumab (TECENTRIQ) IVPB, 1,200 mg, Intravenous, Once, 0 of 6 cycles  bevacizumab (AVASTIN) IVPB, 15 mg/kg, Intravenous, Once, 0 of 6 cycles       History of Present Illness: 80-year-old male with history of cirrhosis secondary to chronic hepatitis C, history of alcohol abuse, previously diagnosed with multifocal hepatocellular carcinoma  Patient underwent TARE on November 22, 2022  Recent scans demonstrated concerning enlarging pulmonary lesions  Alpha-fetoprotein level has also been rising  Mr Lillian Benavides represents with his wife/girlfriend  Patient continues to feel quite well  No respiratory issues  No abdominal pain, no nausea or vomiting  Appetite is good, weight is stable  Patient continues to work full-time as a   No bruising or bleeding issues  No fevers or signs of infection  Review of Systems   Constitutional: Negative  HENT: Negative  Eyes: Negative  Respiratory: Negative  Cardiovascular: Negative  Gastrointestinal: Negative  Endocrine: Negative  Genitourinary: Negative  Musculoskeletal: Negative      Skin: Negative  Allergic/Immunologic: Negative  Neurological: Negative  Hematological: Negative  Psychiatric/Behavioral: The patient is nervous/anxious  All other systems reviewed and are negative      Patient Active Problem List   Diagnosis   • Hepatocellular carcinoma ([facility] [address] )   • Essential hypertension   • Chronic hepatitis C without hepatic coma (HCC)   • Cirrhosis of liver (HCC)   • ED (erectile dysfunction)   • Colon polyp     Past Medical History:   Diagnosis Date   • Hypertension    • Liver cancer [facility])      Past Surgical History:   Procedure Laterality Date   • HERNIA REPAIR     • IR Y-90 PRE-ANGIO/EMBO W/ LUNG SCAN  2022   • IR Y-90 RADIOEMBOLIZATION  2022     Family History   Problem Relation Age of Onset   • Cancer Mother      Social History     Socioeconomic History   • Marital status: /Civil Union     Spouse name: Not on file   • Number of children: Not on file   • Years of education: Not on file   • Highest education level: Not on file   Occupational History   • Not on file   Tobacco Use   • Smoking status: Former     Packs/day: 1 00     Years: 30 00     Total pack years: 30 00     Types: Cigarettes     Quit date:      Years since quittin 4   • Smokeless tobacco: Current     Types: Chew   • Tobacco comments:     nicotine pouch (on)   Vaping Use   • Vaping Use: Never used   Substance and Sexual Activity   • Alcohol use: Not Currently   • Drug use: Yes     Types: Marijuana     Comment: medical marijuana   • Sexual activity: Yes     Partners: Female   Other Topics Concern   • Not on file   Social History Narrative   • Not on file     Social Determinants of Health     Financial Resource Strain: Not on file   Food Insecurity: Not on file   Transportation Needs: Not on file   Physical Activity: Not on file   Stress: Not on file   Social Connections: Not on file   Intimate Partner Violence: Not on file   Housing Stability: Not on file       Current Outpatient Medications:   • lisinopril-hydrochlorothiazide (PRINZIDE,ZESTORETIC) 20-25 MG per tablet, Take 1 tablet by mouth daily, Disp: 90 tablet, Rfl: 0  •  polyethylene glycol (MiraLax) 17 GM/SCOOP powder, Take 238 g by mouth once for 1 dose Take 238 g my mouth  Use as directed (Patient not taking: Reported on 6/22/2023), Disp: 238 g, Rfl: 0  •  polyethylene glycol-electrolytes (TriLyte) 4000 mL solution, Take 4,000 mL by mouth once for 1 dose Take 4000 mL by mouth once for 1 dose  Use as directed (Patient not taking: Reported on 6/22/2023), Disp: 4000 mL, Rfl: 0    No Known Allergies    Vitals:    06/22/23 1543   BP: 124/80   Pulse: 84   Resp: 18   Temp: (!) 97 4 °F (36 3 °C)   SpO2: 97%     Physical Exam  Constitutional:       Appearance: He is well-developed  Comments: Well-nourished male, no respiratory distress, no signs of pain   HENT:      Head: Normocephalic and atraumatic  Right Ear: External ear normal       Left Ear: External ear normal    Eyes:      Conjunctiva/sclera: Conjunctivae normal       Pupils: Pupils are equal, round, and reactive to light  Cardiovascular:      Rate and Rhythm: Normal rate and regular rhythm  Heart sounds: Normal heart sounds  Pulmonary:      Effort: Pulmonary effort is normal       Breath sounds: Normal breath sounds  Comments: Clear bilaterally  Abdominal:      General: Bowel sounds are normal       Palpations: Abdomen is soft  Comments: + Bowel sounds, nontender, soft, no paraspinal megaly, no guarding   Musculoskeletal:         General: Normal range of motion  Cervical back: Normal range of motion and neck supple  Skin:     General: Skin is warm  Comments: Good color, warm, moist, no petechiae or ecchymoses   Neurological:      Mental Status: He is alert and oriented to person, place, and time  Deep Tendon Reflexes: Reflexes are normal and symmetric  Psychiatric:         Behavior: Behavior normal          Thought Content:  Thought content normal  Judgment: Judgment normal      Extremities: No lower extreme edema, no cords, pulses are 1+  Lymphatics: None within the neck, supraclavicular region, axilla bilaterally    Labs    4/22/2023 WBC = 4 83 hemoglobin = 15 4 hematocrit = 44 9 platelet = 242 neutrophil = 66% PT = 12 8 INR = 0 94 BUN = 14 creatinine = 1 07 calcium = 9 5 AST = 36 ALT = 33 alkaline phosphatase = 87 total protein = 7 9 total bilirubin = 1 19 albumin = 4 0 PT = 12 8 INR = 0 94        Imaging    6/7/2023 MRI abdomen with and without contrast    IMPRESSION:     Previously seen hepatic observations now show expected posttreatment perilesional arterial phase enhancement unchanged from 3/6/2023  LI- RADS LR TR nonviable      No new hepatic observations      Worsened gastrohepatic and portacaval adenopathy  5/16/2023 CAT scan chest without contrast    IMPRESSION:     Increase in size of lung nodules measuring up to 6 mm with at least one new lung nodule  Given the patient's history of malignancy, differential includes pulmonary metastasis  Recommend short-term follow-up with CT without contrast in 3 months  3/6/2023 MRI abdomen with and without contrast    IMPRESSION:     All of the previously seen observations now show normal posttreatment perilesional arterial phase enhancement or are no longer apparent status post treatment  LR- TR Nonviable  7/13/2022 MRI abdomen    IMPRESSION:     1  Cirrhosis  Multiple right hepatic lobe observations:  - Segment 8, 4 0 x 3 1 cm, LR-M   - Segment 7/8, 4 3 x 4 6 cm, LR-5   - Segment 8, 1 6 x 1 6 cm, LR-M   - Segment 6, 1 1 x 1 2 cm, LR-5      One of these lesions has reportedly previously been biopsied showing hepatocellular carcinoma, although it is not clear which lesion was sampled  Although some of the lesions meet criteria for LR-M, all of the lesions may be part of the same process      Pathology    Case Report   Surgical Pathology Report                         Case: [id number]                                    Authorizing Provider: Bonnie Calhoun MD      Collected:           10/06/2022 0829               Ordering Location:     [facility]      Received:            10/06/2022 [facility]                                      Hospital Specialty                                                                                   Laboratory                                                                    Pathologist:           Luis Owens MD                                                                     Specimen:    Liver, Liver Biopsy (10 slides [id number], collected 2/8/2022)                Final Diagnosis   OUTSIDE SLIDES FOR REVIEW (10 slides [id number], collected 2/8/2022):       A  Liver, core needle biopsy:  -   Poorly differentiated carcinoma with patchy necrosis (see comment)  -   Background liver parenchyma with chronic inflammation        Comment: Submitted immunohistochemical stains show the tumor cells are positive for CK7 and CK19, and are negative for CK20,  HepPar1, , TTF1 and   Additional stains were performed by outside institution and are not submitted for review  Per report, the tumor cells stain positive for glypican 3, AFP, albumin mRNA and claudin 4, and stain negative for arginase, PAX8, GATA3, CDX2, and NKX3 1 and mucicarmine        The histomorphologic and immunophenotypic features are consistent with poorly differentiated carcinoma  The clinical history of cirrhosis, AFP and glypican 3 positivity favor hepatocellular carcinoma  However, lack of staining for HepPar1 and arginase, and expression of claudin 4, CK7 and CK19 suggest a cholangiocarcinoma component  Strong expression of albumin mRNA can be found in a majority of hepatocellular carcinoma  However, albumin mRNA can also be positive in a subset of intrahepatic cholangiocarcinoma and adenocarcinoma from other sites    Therefore, hepatocellular carcinoma with a cholangiocarcinoma component, so-called combined hepatocellular carcinoma-cholangiocarcinoma, cannot be excluded  There is insufficient background liver parenchyma for a definitive diagnosis of cirrhosis      Reference: Reena Slaughter, Trupti HD, Neelam T, Veena S, Natanael Raygoza, Sebastien VS, Ruth TC, Erasmo Watson RK, Chayito ARREOLA, Anyi Cisneros TT, Jairo RP  Albumin In Situ Hybridization Can Be Positive in Adenocarcinomas and Other Tumors From Diverse Sites  Am J Clin Pathol  2019 Jul 5;152(2):190-199  doi: 10 [email or web address]   PMID: [id number]      Interpretation performed at [address]    Electronically signed by Ritchie Lefort, MD on 10/6/2022 at  3:49 PM

## 2023-06-23 ENCOUNTER — TELEPHONE (OUTPATIENT)
Dept: HEMATOLOGY ONCOLOGY | Facility: CLINIC | Age: 58
End: 2023-06-23

## 2023-06-23 NOTE — TELEPHONE ENCOUNTER
What would be a preferred day of the week that would work best for your infusion appointment? Any  Do you prefer mornings or afternoons for your appointments? 12pm  Are there any days or dates that do not work for your schedule, including any upcoming vacations? 6/27, 7/28, 7/29, 8/1  We are going to try our best to schedule you at the infusion center closest to your home  In the event that we are unable to what would be your next preferred infusion site or sites? [place]    1  [place]  2  [place]    Do you have transportation to take you to all of your appointments?  yes  Would you like the infusion center to draw labs from your port? (disregard if patient doesn't have a port or need labs for infusion appointment) n/a

## 2023-06-23 NOTE — TELEPHONE ENCOUNTER
Spoke with wife, she saw appointments on MyChart  Confirmed first 2 verbally  Has my phone number for any questions

## 2023-06-23 NOTE — TELEPHONE ENCOUNTER
Tried calling wife Bibiana Moise to review schedule, but there was a problem with the phone connection, will try again later

## 2023-06-23 NOTE — TELEPHONE ENCOUNTER
Called and spoke to patients wife regarding scheduling 3 wk fu appt with Dr Leandro Lopez  Confirmed with Aaliyah schedule appt is for 7/17/2023 at 3:00pm at [facility]

## 2023-06-24 ENCOUNTER — APPOINTMENT (OUTPATIENT)
Dept: LAB | Facility: MEDICAL CENTER | Age: 58
End: 2023-06-24
Payer: COMMERCIAL

## 2023-06-24 DIAGNOSIS — C22.0 HEPATOCELLULAR CARCINOMA (HCC): ICD-10-CM

## 2023-06-24 LAB
AFP-TM SERPL-MCNC: 8227.11 NG/ML (ref 0–9)
ALBUMIN SERPL BCP-MCNC: 4 G/DL (ref 3.5–5)
ALP SERPL-CCNC: 103 U/L (ref 46–116)
ALT SERPL W P-5'-P-CCNC: 28 U/L (ref 12–78)
ANION GAP SERPL CALCULATED.3IONS-SCNC: 2 MMOL/L
AST SERPL W P-5'-P-CCNC: 24 U/L (ref 5–45)
BACTERIA UR QL AUTO: ABNORMAL /HPF
BASOPHILS # BLD AUTO: 0.04 THOUSANDS/ÂΜL (ref 0–0.1)
BASOPHILS NFR BLD AUTO: 1 % (ref 0–1)
BILIRUB SERPL-MCNC: 0.83 MG/DL (ref 0.2–1)
BILIRUB UR QL STRIP: NEGATIVE
BUN SERPL-MCNC: 12 MG/DL (ref 5–25)
CALCIUM SERPL-MCNC: 9.7 MG/DL (ref 8.3–10.1)
CHLORIDE SERPL-SCNC: 106 MMOL/L (ref 96–108)
CLARITY UR: CLEAR
CO2 SERPL-SCNC: 28 MMOL/L (ref 21–32)
COLOR UR: YELLOW
CREAT SERPL-MCNC: 1.12 MG/DL (ref 0.6–1.3)
EOSINOPHIL # BLD AUTO: 0.1 THOUSAND/ÂΜL (ref 0–0.61)
EOSINOPHIL NFR BLD AUTO: 2 % (ref 0–6)
ERYTHROCYTE [DISTWIDTH] IN BLOOD BY AUTOMATED COUNT: 13.2 % (ref 11.6–15.1)
GFR SERPL CREATININE-BSD FRML MDRD: 72 ML/MIN/1.73SQ M
GLUCOSE SERPL-MCNC: 85 MG/DL (ref 65–140)
GLUCOSE UR STRIP-MCNC: NEGATIVE MG/DL
HCT VFR BLD AUTO: 45.3 % (ref 36.5–49.3)
HGB BLD-MCNC: 15.4 G/DL (ref 12–17)
HGB UR QL STRIP.AUTO: NEGATIVE
IMM GRANULOCYTES # BLD AUTO: 0.02 THOUSAND/UL (ref 0–0.2)
IMM GRANULOCYTES NFR BLD AUTO: 0 % (ref 0–2)
INR PPP: 0.93 (ref 0.84–1.19)
KETONES UR STRIP-MCNC: NEGATIVE MG/DL
LEUKOCYTE ESTERASE UR QL STRIP: NEGATIVE
LYMPHOCYTES # BLD AUTO: 1.12 THOUSANDS/ÂΜL (ref 0.6–4.47)
LYMPHOCYTES NFR BLD AUTO: 19 % (ref 14–44)
MCH RBC QN AUTO: 29.7 PG (ref 26.8–34.3)
MCHC RBC AUTO-ENTMCNC: 34 G/DL (ref 31.4–37.4)
MCV RBC AUTO: 87 FL (ref 82–98)
MONOCYTES # BLD AUTO: 0.61 THOUSAND/ÂΜL (ref 0.17–1.22)
MONOCYTES NFR BLD AUTO: 10 % (ref 4–12)
MUCOUS THREADS UR QL AUTO: ABNORMAL
NEUTROPHILS # BLD AUTO: 3.98 THOUSANDS/ÂΜL (ref 1.85–7.62)
NEUTS SEG NFR BLD AUTO: 68 % (ref 43–75)
NITRITE UR QL STRIP: NEGATIVE
NON-SQ EPI CELLS URNS QL MICRO: ABNORMAL /HPF
NRBC BLD AUTO-RTO: 0 /100 WBCS
OTHER STN SPEC: ABNORMAL
PH UR STRIP.AUTO: 7 [PH]
PLATELET # BLD AUTO: 234 THOUSANDS/UL (ref 149–390)
PMV BLD AUTO: 9.9 FL (ref 8.9–12.7)
POTASSIUM SERPL-SCNC: 4.6 MMOL/L (ref 3.5–5.3)
PROT SERPL-MCNC: 8.1 G/DL (ref 6.4–8.4)
PROT UR STRIP-MCNC: ABNORMAL MG/DL
PROTHROMBIN TIME: 12.6 SECONDS (ref 11.6–14.5)
RBC # BLD AUTO: 5.19 MILLION/UL (ref 3.88–5.62)
RBC #/AREA URNS AUTO: ABNORMAL /HPF
SODIUM SERPL-SCNC: 136 MMOL/L (ref 135–147)
SP GR UR STRIP.AUTO: 1.02 (ref 1–1.03)
T3FREE SERPL-MCNC: 3.32 PG/ML (ref 2.5–3.9)
TSH SERPL DL<=0.05 MIU/L-ACNC: 2.48 UIU/ML (ref 0.45–4.5)
UROBILINOGEN UR STRIP-ACNC: <2 MG/DL
WBC # BLD AUTO: 5.87 THOUSAND/UL (ref 4.31–10.16)
WBC #/AREA URNS AUTO: ABNORMAL /HPF

## 2023-06-24 PROCEDURE — 81001 URINALYSIS AUTO W/SCOPE: CPT

## 2023-06-24 PROCEDURE — 84481 FREE ASSAY (FT-3): CPT

## 2023-06-24 PROCEDURE — 84443 ASSAY THYROID STIM HORMONE: CPT

## 2023-06-27 ENCOUNTER — OFFICE VISIT (OUTPATIENT)
Dept: GASTROENTEROLOGY | Facility: CLINIC | Age: 58
End: 2023-06-27
Payer: COMMERCIAL

## 2023-06-27 VITALS
HEIGHT: 69 IN | WEIGHT: 194 LBS | SYSTOLIC BLOOD PRESSURE: 140 MMHG | BODY MASS INDEX: 28.73 KG/M2 | TEMPERATURE: 97 F | DIASTOLIC BLOOD PRESSURE: 80 MMHG

## 2023-06-27 DIAGNOSIS — C22.0 HEPATOCELLULAR CARCINOMA (HCC): Primary | ICD-10-CM

## 2023-06-27 DIAGNOSIS — K70.30 ALCOHOLIC CIRRHOSIS OF LIVER WITHOUT ASCITES (HCC): ICD-10-CM

## 2023-06-27 DIAGNOSIS — Z12.11 COLON CANCER SCREENING: ICD-10-CM

## 2023-06-27 PROCEDURE — 99213 OFFICE O/P EST LOW 20 MIN: CPT | Performed by: INTERNAL MEDICINE

## 2023-06-27 RX ORDER — SODIUM CHLORIDE 9 MG/ML
20 INJECTION, SOLUTION INTRAVENOUS ONCE
Status: CANCELLED | OUTPATIENT
Start: 2023-06-29

## 2023-06-27 RX ORDER — POLYETHYLENE GLYCOL 3350, SODIUM SULFATE ANHYDROUS, SODIUM BICARBONATE, SODIUM CHLORIDE, POTASSIUM CHLORIDE 236; 22.74; 6.74; 5.86; 2.97 G/4L; G/4L; G/4L; G/4L; G/4L
4000 POWDER, FOR SOLUTION ORAL ONCE
Qty: 4000 ML | Refills: 0 | Status: SHIPPED | OUTPATIENT
Start: 2023-06-27 | End: 2023-06-27

## 2023-06-27 NOTE — PROGRESS NOTES
[facility]patient Follow[facility]up [facility]  Adryan Herbert 62 y o  male MRN: [id number]  Encounter: [id number]          ASSESSMENT AND PLAN:    Adryan Herbert is a 62 y o  male with hx of HCV/alcoholic cirrhosis with multifocal HCC s/p Y90 presenting for follow up  Cirrhosis  MELD 3 0: 8 at 6/24/2023 10:27 AM  MELD-Na: 7 at 6/24/2023 10:27 AM  Calculated from:  Serum Creatinine: 1 12 mg/dL at 6/24/2023 10:27 AM  Serum Sodium: 136 mmol/L at 6/24/2023 10:27 AM  Total Bilirubin: 0 83 mg/dL (Using min of 1 mg/dL) at 6/24/2023 10:27 AM  Serum Albumin: 4 0 g/dL (Using max of 3 5 g/dL) at 6/24/2023 10:27 AM  INR(ratio): 0 93 (Using min of 1) at 6/24/2023 10:27 AM  Age at listing (hypothetical): 62 years  Sex: Male at 6/24/2023 10:27 AM  · HE  · None  · Ascites  · None  · EV  · Has not had an EGD per patient  · Will plan for EGD with colonoscopy as below    [address]   · AFP ~8,000  · Starting on immunotherapy infusions this Thursday  · F/u with oncology, rad onc  · F/u in our office in 3-4 months  · Repeat CMP, CBC, IL/INR, AFP in 8 weeks    Colon Cancer Screening  · Last colonoscopy 2/23/23 with incomplete prep, recommended for 6 month recall       1  Hepatocellular carcinoma ([address] )    2  Alcoholic cirrhosis of liver without ascites ([address] )    3  Colon cancer screening        Orders Placed This Encounter   Procedures   • Comprehensive metabolic panel   • AFP tumor marker   • CBC and Platelet   • Protime-INR   • EGD   • Colonoscopy     ______________________________________________________________________    SUBJECTIVE:    Adryan Herbert is a 62 y o  male with hx of HCV/alcoholic cirrhosis with multifocal HCC s/p Y90 presenting for follow up  Last seen in clinic by Dr Jesusita Proctor on 4/26/23  At that time, had undergone TARE and f/u imaging with no viable tumor, but AFP had increased from 500 to 2000 suggesting viable tumor  Had a colonoscopy with incomplete prep   Was ordered for repeat CT scan, CMP/CBC/PT,INR/AFP for early   Imaging showed lung nodules worsening in size from prior, with one new nodule suggestive of pulmonary metastasis  This was May 2023, ordered for repeat MRI done on 23  This showed expected posttreatment perilesional arterial phase enhancement unchanged from 3/6/23, no new hepatic observations, worsened gastrohepatic and portocaval adenoapthy  Ordered for repeat MRI in 3 months  AFP currently 8,227 11 from 1,912 6  CMP normal, INR normal, CBC normal      Per oncology note 23, was recently presented at GI tumor board and is being planned for initiation of systemic treatment  Is being planned for atezolizumab and bevacizumab  Pt feels well, no ascites, no HE  Pt has no questions or concerns  REVIEW OF SYSTEMS IS OTHERWISE NEGATIVE        Historical Information   Past Medical History:   Diagnosis Date   • Hypertension    • Liver cancer [facility])      Past Surgical History:   Procedure Laterality Date   • HERNIA REPAIR     • IR Y-90 PRE-ANGIO/EMBO W/ LUNG SCAN  2022   • IR Y-90 RADIOEMBOLIZATION  2022     Social History   Social History     Substance and Sexual Activity   Alcohol Use Not Currently     Social History     Substance and Sexual Activity   Drug Use Yes   • Types: Marijuana    Comment: medical marijuana     Social History     Tobacco Use   Smoking Status Former   • Packs/day: 1 00   • Years: 30 00   • Total pack years: 30 00   • Types: Cigarettes   • Quit date:    • Years since quittin 4   Smokeless Tobacco Current   • Types: Chew   Tobacco Comments    nicotine pouch (on)     Family History   Problem Relation Age of Onset   • Cancer Mother        Meds/Allergies       Current Outpatient Medications:   •  lisinopril-hydrochlorothiazide (PRINZIDE,ZESTORETIC) 20-25 MG per tablet  •  polyethylene glycol (MiraLax) 17 GM/SCOOP powder  •  polyethylene glycol-electrolytes (TriLyte) 4000 mL solution    No Known Allergies        Objective     Blood "pressure 140/80, temperature (!) 97 °F (36 1 °C), temperature source Tympanic, height 5' 9\" (1 753 m), weight 88 kg (194 lb)  Body mass index is 28 65 kg/m²  Wt Readings from Last 3 Encounters:   06/27/23 88 kg (194 lb)   06/22/23 89 6 kg (197 lb 8 oz)   06/20/23 89 8 kg (198 lb)        PHYSICAL EXAM:      General Appearance:   Alert, cooperative, no distress   HEENT:   Normocephalic, atraumatic, anicteric  Neck:  Supple, symmetrical, trachea midline   Lungs:   Clear to auscultation bilaterally; no rales, rhonchi or wheezing; respirations unlabored    Heart[de-identified]   Regular rate and rhythm; no murmur, rub, or gallop     Abdomen:   Soft, non-tender, non-distended; normal bowel sounds; no masses, no organomegaly    Genitalia:   Deferred    Rectal:   Deferred    Extremities:  No cyanosis, clubbing or edema    Pulses:  2+ and symmetric    Skin:  No jaundice, rashes, or lesions    Lymph nodes:  No palpable cervical lymphadenopathy        Lab Results:       Lab Units 06/24/23  1027 04/22/23  1117 01/21/23  1009 11/22/22  0740 11/08/22  0958 09/21/22  1100   SODIUM mmol/L 136 134* 135 137 135 136   POTASSIUM mmol/L 4 6 4 1 4 4 4 2 4 2 4 4   CHLORIDE mmol/L 106 104 104 105 103 105   CO2 mmol/L 28 24 25 28 26 28   BUN mg/dL 12 14 15 11 16 12   CREATININE mg/dL 1 12 1 07 1 07 1 04 1 18 1 19   GLUCOSE RANDOM mg/dL 85 102  --  117 105 97   CALCIUM mg/dL 9 7 9 5 9 4 9 1 9 5 9 4            Lab Units 06/24/23  1027 04/22/23  1117 01/21/23  1009 11/22/22  0740 11/08/22  0958   TOTAL PROTEIN g/dL 8 1 7 9 7 7 7 6 8 2   ALBUMIN g/dL 4 0 4 0 4 1 3 5 3 7   TOTAL BILIRUBIN mg/dL 0 83 1 19* 1 22* 0 71 0 96   AST U/L 24 36 29 22 25   ALT U/L 28 33 29 29 38   ALK PHOS U/L 103 87 101 147* 165*           Lab Units 06/24/23  1027 04/22/23  1117 01/21/23  1009 11/22/22  0740 11/08/22  0958   WBC Thousand/uL 5 87 4 83 4 40 6 47 7 51   HEMOGLOBIN g/dL 15 4 15 4 14 7 14 5 14 9   HEMATOCRIT % 45 3 44 9 43 9 44 1 45 0   PLATELETS Thousands/uL 234 " "178 189 187 202   MCV fL 87 85 88 85 84   MCH pg 29 7 29 1 29 4 27 9 27 6   MCHC g/dL 34 0 34 3 33 5 32 9 33 1   RDW % 13 2 12 5 13 2 12 9 13 0   MPV fL 9 9 10 5 10 2 9 5 9 6   NRBC AUTO /100 WBCs 0 0 0 0 0   NEUTROS PCT % 68 66 63 59 55   IMMATURE GRANULOCYTES % % 0 0 0 0 0   LYMPHS PCT % 19 20 19 29 33   MONOS PCT % 10 11 13* 8 9   EOS PCT % 2 3 4 3 2   BASOS PCT % 1 0 1 1 1   NEUTROS ABS Thousands/µL 3 98 3 19 2 77 3 85 4 17   IMMATURE GRANULOCYES ABS Thousand/uL 0 02 0 01 0 01 0 01 0 02   LYMPHS ABS Thousands/µL 1 12 0 97 0 82 1 89 2 46   MONOS ABS Thousand/µL 0 61 0 51 0 59 0 51 0 64   EOS ABS Thousand/µL 0 10 0 13 0 18 0 17 0 18   BASOS ABS Thousands/µL 0 04 0 02 0 03 0 04 0 04       No results for input(s): \"IRON\", \"TRANSFERRIN\", \"TRANSFERSAT\", \"FERRITIN\", \"RETIC\" in the last 15623 hours  No results found for: \"CRP\"    Lab Results   Component Value Date    ZYP4BNIPGQVA 2 479 06/24/2023       Radiology Results:   MRI abdomen w wo contrast    Addendum Date: 6/13/2023 Addendum:   ADDENDUM: Some of the peripheral areas of enhancement in both treated observation 1 and 2 show nodular components with probable washout there is these should be considered LR TR equivocal observations  Result Date: 6/13/2023  Narrative: MRI - ABDOMEN - WITH AND WITHOUT CONTRAST INDICATION: 62 years / Male  C22 0: Liver cell carcinoma K70 30: Alcoholic cirrhosis of liver without ascites  COMPARISON: MRI abdomen 3/6/2023 and 10/4/2022  TECHNIQUE:  Multiplanar/multisequence MRI of the abdomen was performed before and after administration of contrast  IV Contrast:  8 mL of Gadobutrol injection (SINGLE-DOSE) FINDINGS: LOWER CHEST:   Unremarkable  LIVER: Cirrhotic morphology of the liver  Treated observation: 1 Size 4 6 x 2 9 cm #11/30, is unchanged when accounting for differences in measurement technique from 3/6/2023   Pretreatment size of 5 5 x 4 8 cm pretreatment category LR-M Location: Segment 8 Enhancement: Treatment-specific " expected enhancement pattern  LI-RADS Category: LR- TR Nonviable  Treated observation: 2 Size: 4 4 1 x 4 0 #11/23, also unchanged from 3/6/2023 accounting for differences in measurement technique  Pretreatment size of 4 4 x 4 2 cm pretreatment category LR-5 Location: Segment 7/8 Enhancement: Treatment-specific expected enhancement pattern  LI-RADS Category: LR- TR Nonviable  Treated observation: 3 Size: This lesion is no longer well visualized  Pretreatment category LR-M Location: Segment 8 Enhancement: Treatment-specific expected enhancement pattern  LI-RADS Category: LR- TR Nonviable  Treated observaxtion: 4 Size: No longer well visualized  Pretreatment category of LR-5 Location: Segment 6 Enhancement: Treatment-specific expected enhancement pattern  LI-RADS Category: LR- TR Nonviable  No new findings  The hepatic veins and portal veins are patent  BILE DUCTS: No intrahepatic or extrahepatic bile duct dilation  GALLBLADDER:  Normal  PANCREAS:  Unremarkable  ADRENAL GLANDS:  Normal  SPLEEN:  Normal  KIDNEYS/PROXIMAL URETERS: No hydroureteronephrosis  No suspicious renal mass  BOWEL:   No dilated loops of bowel  PERITONEUM/RETROPERITONEUM: No mass  No ascites  LYMPH NODES: Enlarged gastrohepatic and portacaval adenopathy with dominant node now measuring 3 0 x 2 9 cm #16/12 previously measured 1 8 x 1 6 cm  Large skinny hepatic node measures 15 mm in short axis, #6/17  And previously measured 11 mm  VASCULAR STRUCTURES:  No aneurysm  ABDOMINAL WALL:  Unremarkable  OSSEOUS STRUCTURES:  No suspicious osseous lesion  Impression: Previously seen hepatic observations now show expected posttreatment perilesional arterial phase enhancement unchanged from 3/6/2023  LI- RADS LR TR nonviable  No new hepatic observations  Worsened gastrohepatic and portacaval adenopathy  The study was marked in EPIC for significant notification   Workstation performed: [id number]       Gastroenterological Procedures:       Xavier Thorne MD  PGY-4 Gastroenterology Fellow  6/27/2023 1:45 PM

## 2023-06-29 ENCOUNTER — HOSPITAL ENCOUNTER (OUTPATIENT)
Dept: INFUSION CENTER | Facility: HOSPITAL | Age: 58
Discharge: HOME | End: 2023-06-29
Attending: INTERNAL MEDICINE
Payer: COMMERCIAL

## 2023-06-29 VITALS
WEIGHT: 192.24 LBS | TEMPERATURE: 97.2 F | SYSTOLIC BLOOD PRESSURE: 146 MMHG | BODY MASS INDEX: 28.47 KG/M2 | HEIGHT: 69 IN | OXYGEN SATURATION: 98 % | DIASTOLIC BLOOD PRESSURE: 93 MMHG | RESPIRATION RATE: 18 BRPM | HEART RATE: 84 BPM

## 2023-06-29 DIAGNOSIS — C22.0 HEPATOCELLULAR CARCINOMA (HCC): Primary | ICD-10-CM

## 2023-06-29 PROCEDURE — 96413 CHEMO IV INFUSION 1 HR: CPT

## 2023-06-29 PROCEDURE — 96417 CHEMO IV INFUS EACH ADDL SEQ: CPT

## 2023-06-29 RX ORDER — SODIUM CHLORIDE 9 MG/ML
20 INJECTION, SOLUTION INTRAVENOUS ONCE
Status: COMPLETED | OUTPATIENT
Start: 2023-06-29 | End: 2023-06-29

## 2023-06-29 RX ADMIN — SODIUM CHLORIDE 20 ML/HR: 0.9 INJECTION, SOLUTION INTRAVENOUS at 09:00

## 2023-06-29 RX ADMIN — BEVACIZUMAB-AWWB 1300 MG: 400 INJECTION, SOLUTION INTRAVENOUS at 09:08

## 2023-06-29 RX ADMIN — ATEZOLIZUMAB 1200 MG: 1200 INJECTION, SOLUTION INTRAVENOUS at 10:48

## 2023-06-29 NOTE — PLAN OF CARE
Problem: Potential for Falls  Goal: Patient will remain free of falls  Description: INTERVENTIONS:  - Educate patient/family on patient safety including physical limitations  - Instruct patient to call for assistance with activity   - Keep Call bell within reach  - Initiate and maintain comfort rounds  Outcome: Progressing

## 2023-07-07 ENCOUNTER — TELEPHONE (OUTPATIENT)
Dept: HEMATOLOGY ONCOLOGY | Facility: CLINIC | Age: 58
End: 2023-07-07

## 2023-07-07 NOTE — TELEPHONE ENCOUNTER
I called [facility] regarding an appointment that they have scheduled with Garo Elena PA-C scheduled on 08/15/23     I left a voicemail explaining to patient that this appointment will need to be rescheduled due to a change in the providers schedule. I encouraged patient to call [facility] at [phone] to reschedule. A [employer] message (if applicable) has been sent to patient relaying the above information and advising patient to call [facility] and reschedule their appointment.

## 2023-07-12 ENCOUNTER — TELEPHONE (OUTPATIENT)
Dept: HEMATOLOGY ONCOLOGY | Facility: CLINIC | Age: 58
End: 2023-07-12

## 2023-07-12 NOTE — TELEPHONE ENCOUNTER
I called [facility] regarding an appointment that they have scheduled with Kaylene Fuentes PA-C scheduled on 8/15/23     I left a voicemail explaining to patient that this appointment will need to be rescheduled due to a change in the providers schedule. Patient was advised to call [facility] to reschedule. A [email or web address] message (if applicable) has been sent to patient relaying the above information and advising patient to call [facility] and reschedule their appointment.

## 2023-07-13 ENCOUNTER — TELEPHONE (OUTPATIENT)
Dept: HEMATOLOGY ONCOLOGY | Facility: CLINIC | Age: 58
End: 2023-07-13

## 2023-07-13 RX ORDER — SODIUM CHLORIDE 9 MG/ML
20 INJECTION, SOLUTION INTRAVENOUS ONCE
Status: CANCELLED | OUTPATIENT
Start: 2023-07-20

## 2023-07-13 NOTE — TELEPHONE ENCOUNTER
Appointment Change  Cancel, Reschedule, Change to Virtual      Who are you speaking with? Spouse   If it is not the patient, are they listed on an active communication consent form? Yes   Which provider is the appointment scheduled with? Heather Darby PA-C   When is the appointment scheduled? Please list date and time 8/15/23 1:30PM   At which location is the appointment scheduled to take place[facility]   Was the appointment rescheduled or changed from an in person visit to a virtual visit? If so, please list the details of the change. 9/12/23 3:20Pm Dr Yomaira Lancaster   What is the reason for the appointment change? Provider no longer going to [facility], patient wanted to stick with Dr Yomaira Lancaster only   Was [facility] transport scheduled for this visit? No   Does [facility] transport need to be scheduled for the new visit (if applicable) No   Does the patient need an infusion appointment rescheduled? No   Does the patient have an infusion appointment scheduled? If so, when? Yes, 7/20/23   Is the patient undergoing chemotherapy? Yes   Was the no-show policy reviewed for appointments being changed with less then 24 hours of notice?  No

## 2023-07-14 ENCOUNTER — TELEPHONE (OUTPATIENT)
Dept: HEMATOLOGY ONCOLOGY | Facility: CLINIC | Age: 58
End: 2023-07-14

## 2023-07-14 NOTE — TELEPHONE ENCOUNTER
LVM to the patient in regards to his lab work that needs to be completed prior to his appt on 7/17/23 at 3:00pm.

## 2023-07-17 ENCOUNTER — APPOINTMENT (OUTPATIENT)
Dept: LAB | Facility: CLINIC | Age: 58
End: 2023-07-17
Payer: COMMERCIAL

## 2023-07-17 ENCOUNTER — OFFICE VISIT (OUTPATIENT)
Dept: HEMATOLOGY ONCOLOGY | Facility: MEDICAL CENTER | Age: 58
End: 2023-07-17
Payer: COMMERCIAL

## 2023-07-17 VITALS
WEIGHT: 191.6 LBS | TEMPERATURE: 98.7 F | OXYGEN SATURATION: 100 % | DIASTOLIC BLOOD PRESSURE: 80 MMHG | BODY MASS INDEX: 28.38 KG/M2 | SYSTOLIC BLOOD PRESSURE: 118 MMHG | HEIGHT: 69 IN | RESPIRATION RATE: 18 BRPM | HEART RATE: 86 BPM

## 2023-07-17 DIAGNOSIS — B18.2 CHRONIC HEPATITIS C WITHOUT HEPATIC COMA (HCC): ICD-10-CM

## 2023-07-17 DIAGNOSIS — C22.0 HEPATOCELLULAR CARCINOMA (HCC): Primary | ICD-10-CM

## 2023-07-17 DIAGNOSIS — C22.0 HEPATOCELLULAR CARCINOMA (HCC): ICD-10-CM

## 2023-07-17 LAB
ALBUMIN SERPL BCP-MCNC: 4.5 G/DL (ref 3.5–5)
ALP SERPL-CCNC: 84 U/L (ref 34–104)
ALT SERPL W P-5'-P-CCNC: 19 U/L (ref 7–52)
ANION GAP SERPL CALCULATED.3IONS-SCNC: 9 MMOL/L
AST SERPL W P-5'-P-CCNC: 28 U/L (ref 13–39)
BACTERIA UR QL AUTO: ABNORMAL /HPF
BASOPHILS # BLD AUTO: 0.05 THOUSANDS/ÂΜL (ref 0–0.1)
BASOPHILS NFR BLD AUTO: 1 % (ref 0–1)
BILIRUB SERPL-MCNC: 1.18 MG/DL (ref 0.2–1)
BILIRUB UR QL STRIP: NEGATIVE
BUN SERPL-MCNC: 19 MG/DL (ref 5–25)
CALCIUM SERPL-MCNC: 9.7 MG/DL (ref 8.4–10.2)
CHLORIDE SERPL-SCNC: 100 MMOL/L (ref 96–108)
CLARITY UR: CLEAR
CO2 SERPL-SCNC: 26 MMOL/L (ref 21–32)
COLOR UR: YELLOW
CREAT SERPL-MCNC: 1.43 MG/DL (ref 0.6–1.3)
EOSINOPHIL # BLD AUTO: 0.07 THOUSAND/ÂΜL (ref 0–0.61)
EOSINOPHIL NFR BLD AUTO: 1 % (ref 0–6)
ERYTHROCYTE [DISTWIDTH] IN BLOOD BY AUTOMATED COUNT: 13.2 % (ref 11.6–15.1)
GFR SERPL CREATININE-BSD FRML MDRD: 53 ML/MIN/1.73SQ M
GLUCOSE SERPL-MCNC: 94 MG/DL (ref 65–140)
GLUCOSE UR STRIP-MCNC: NEGATIVE MG/DL
HCT VFR BLD AUTO: 44.3 % (ref 36.5–49.3)
HGB BLD-MCNC: 15.3 G/DL (ref 12–17)
HGB UR QL STRIP.AUTO: ABNORMAL
HYALINE CASTS #/AREA URNS LPF: ABNORMAL /LPF
IMM GRANULOCYTES # BLD AUTO: 0.02 THOUSAND/UL (ref 0–0.2)
IMM GRANULOCYTES NFR BLD AUTO: 0 % (ref 0–2)
KETONES UR STRIP-MCNC: NEGATIVE MG/DL
LEUKOCYTE ESTERASE UR QL STRIP: NEGATIVE
LYMPHOCYTES # BLD AUTO: 1 THOUSANDS/ÂΜL (ref 0.6–4.47)
LYMPHOCYTES NFR BLD AUTO: 14 % (ref 14–44)
MCH RBC QN AUTO: 29.9 PG (ref 26.8–34.3)
MCHC RBC AUTO-ENTMCNC: 34.5 G/DL (ref 31.4–37.4)
MCV RBC AUTO: 87 FL (ref 82–98)
MONOCYTES # BLD AUTO: 0.78 THOUSAND/ÂΜL (ref 0.17–1.22)
MONOCYTES NFR BLD AUTO: 11 % (ref 4–12)
MUCOUS THREADS UR QL AUTO: ABNORMAL
NEUTROPHILS # BLD AUTO: 5.27 THOUSANDS/ÂΜL (ref 1.85–7.62)
NEUTS SEG NFR BLD AUTO: 73 % (ref 43–75)
NITRITE UR QL STRIP: NEGATIVE
NON-SQ EPI CELLS URNS QL MICRO: ABNORMAL /HPF
NRBC BLD AUTO-RTO: 0 /100 WBCS
PH UR STRIP.AUTO: 6 [PH]
PLATELET # BLD AUTO: 170 THOUSANDS/UL (ref 149–390)
PMV BLD AUTO: 9.2 FL (ref 8.9–12.7)
POTASSIUM SERPL-SCNC: 4.3 MMOL/L (ref 3.5–5.3)
PROT SERPL-MCNC: 7.6 G/DL (ref 6.4–8.4)
PROT UR STRIP-MCNC: ABNORMAL MG/DL
RBC # BLD AUTO: 5.12 MILLION/UL (ref 3.88–5.62)
RBC #/AREA URNS AUTO: ABNORMAL /HPF
SODIUM SERPL-SCNC: 135 MMOL/L (ref 135–147)
SP GR UR STRIP.AUTO: 1.02 (ref 1–1.03)
T3FREE SERPL-MCNC: 3.45 PG/ML (ref 2.5–3.9)
T4 FREE SERPL-MCNC: 0.8 NG/DL (ref 0.61–1.12)
TSH SERPL DL<=0.05 MIU/L-ACNC: 7.3 UIU/ML (ref 0.45–4.5)
UROBILINOGEN UR STRIP-ACNC: 2 MG/DL
WBC # BLD AUTO: 7.19 THOUSAND/UL (ref 4.31–10.16)
WBC #/AREA URNS AUTO: ABNORMAL /HPF

## 2023-07-17 PROCEDURE — 84439 ASSAY OF FREE THYROXINE: CPT

## 2023-07-17 PROCEDURE — 84481 FREE ASSAY (FT-3): CPT

## 2023-07-17 PROCEDURE — 80053 COMPREHEN METABOLIC PANEL: CPT

## 2023-07-17 PROCEDURE — 85025 COMPLETE CBC W/AUTO DIFF WBC: CPT

## 2023-07-17 PROCEDURE — 84443 ASSAY THYROID STIM HORMONE: CPT

## 2023-07-17 PROCEDURE — 99214 OFFICE O/P EST MOD 30 MIN: CPT | Performed by: INTERNAL MEDICINE

## 2023-07-17 PROCEDURE — 36415 COLL VENOUS BLD VENIPUNCTURE: CPT

## 2023-07-17 PROCEDURE — 81001 URINALYSIS AUTO W/SCOPE: CPT

## 2023-07-17 NOTE — PROGRESS NOTES
Elida Velasquez  1965  [facility] [place] [id number] [address] [facility] [id number]    DISCUSSION/SUMMARY:      40-year-old male with a somewhat complicated prior GI/liver history. Patient was previously diagnosed with hepatocellular carcinoma, elevated alpha-fetoprotein level. Patient underwent Y90 treatment in November 2022. Alpha-fetoprotein level came down nicely. Unfortunately the tumor marker recently began to rise. The May 16, 2023 CAT scan of the chest demonstrated enlarging pulmonary nodules. More recent MRI of the abdomen demonstrated worsening gastrohepatic and portacaval adenopathy (although the liver lesions were unchanged). Patient was recently presented at the GI tumor board. The consensus was to begin systemic treatment. NCCN guidelines 1/20/2023 states that for patients with hepatocellular carcinoma, good performance, Child Myers class a, category 1 treatment includes atezolizumab and bevacizumab or tremelimumab + durvalumab (also category 1). Regimen  Atezolizumab 1200 mg IV day 1  Bevacizumab 15 mg/kilogram IV day 1  Cycle length = 21 days  Goal = prolongation of life, improvement of quality of life    Nursing staff previously spent time with patient today going over the specifics, potential benefits versus risks toxicities of atezolizumab and bevacizumab. Patient tolerated the first cycle well. Eventually patient will need to rethink port placement. Patient states having all required medications at home. Eventually the AFP will need to be repeated. Patient will continue his follow-up with GI. As seen in the MRI results below, patient has had a good response to the Y90 treatment in the liver. Patient is to return in 6 weeks. Patient knows to call the hematology/oncology office if there are any other questions or concerns.     Carefully review your medication list and verify that the list is accurate and up-to-date. Please call the hematology/oncology office if there are medications missing from the list, medications on the list that you are not currently taking or if there is a dosage or instruction that is different from how you're taking that medication. Patient goals and areas of care: Continue with the atezolizumab and bevacizumab  Barriers to care: None  Patient is able to self-care  ______________________________________________________________________________________    Chief Complaint   Patient presents with   • Follow-up     Hepatocellular carcinoma      Oncology History   Hepatocellular carcinoma ([address])   2022 Initial Diagnosis    Hepatocellular carcinoma ([address])     2/8/2022 Biopsy    [facility] Ronald DOSHI US guided liver biopsy: right liver lobe:  Poorly differentiated HCC with patchy necrosis       6/29/2023 -  Chemotherapy    alteplase (CATHFLO), 2 mg, Intracatheter, Every 1 Minute as needed, 1 of 6 cycles  atezolizumab (TECENTRIQ) IVPB, 1,200 mg, Intravenous, Once, 1 of 6 cycles  Administration: 1,200 mg (6/29/2023)  bevacizumab-awwb (MVASI) IVPB, 1,345 mg (100 % of original dose 15 mg/kg), Intravenous, Once, 1 of 6 cycles  Dose modification: 15 mg/kg (original dose 15 mg/kg, Cycle 1), 15 mg/kg (original dose 15 mg/kg, Cycle 2), 15 mg/kg (original dose 15 mg/kg, Cycle 3)  Administration: 1,300 mg (6/29/2023)       History of Present Illness: 60-year-old male with history of cirrhosis secondary to chronic hepatitis C, history of alcohol abuse, previously diagnosed with multifocal hepatocellular carcinoma. Patient underwent TARE on November 22, 2022. Recent scans demonstrated concerning enlarging pulmonary lesions. Alpha-fetoprotein level has also been rising. Patient was recently presented at the GI tumor board and the plan was systemic treatment. Patient has received 1 cycle of atezolizumab and bevacizumab. Mr. Sherri Gutiérrez returns for follow-up. Patient states feeling well, baseline.   No pain control issues. No bruising or bleeding problems. No fevers or signs of infection. Patient tolerated the first cycle quite well, no side effects. Activities are still baseline, patient is working full-time as a . No pain control issues. Review of Systems   Constitutional: Negative. HENT: Negative. Eyes: Negative. Respiratory: Negative. Cardiovascular: Negative. Gastrointestinal: Negative. Endocrine: Negative. Genitourinary: Negative. Musculoskeletal: Negative. Skin: Negative. Allergic/Immunologic: Negative. Neurological: Negative. Hematological: Negative. Psychiatric/Behavioral: The patient is nervous/anxious. All other systems reviewed and are negative.     Patient Active Problem List   Diagnosis   • Hepatocellular carcinoma ([address])   • Essential hypertension   • Chronic hepatitis C without hepatic coma (HCC)   • Cirrhosis of liver (HCC)   • ED (erectile dysfunction)   • Colon polyp     Past Medical History:   Diagnosis Date   • Hypertension    • Liver cancer [facility])      Past Surgical History:   Procedure Laterality Date   • HERNIA REPAIR     • IR Y-90 PRE-ANGIO/EMBO W/ LUNG SCAN  2022   • IR Y-90 RADIOEMBOLIZATION  2022     Family History   Problem Relation Age of Onset   • Cancer Mother      Social History     Socioeconomic History   • Marital status: /Civil Union     Spouse name: Not on file   • Number of children: Not on file   • Years of education: Not on file   • Highest education level: Not on file   Occupational History   • Not on file   Tobacco Use   • Smoking status: Former     Packs/day: 1.00     Years: 30.00     Total pack years: 30.00     Types: Cigarettes     Quit date:      Years since quittin.5   • Smokeless tobacco: Current     Types: Chew   • Tobacco comments:     nicotine pouch (on)   Vaping Use   • Vaping Use: Never used   Substance and Sexual Activity   • Alcohol use: Not Currently   • Drug use: Yes     Types: Marijuana     Comment: medical marijuana   • Sexual activity: Yes     Partners: Female   Other Topics Concern   • Not on file   Social History Narrative   • Not on file     Social Determinants of Health     Financial Resource Strain: Not on file   Food Insecurity: Not on file   Transportation Needs: Not on file   Physical Activity: Not on file   Stress: Not on file   Social Connections: Not on file   Intimate Partner Violence: Not on file   Housing Stability: Not on file       Current Outpatient Medications:   •  lisinopril-hydrochlorothiazide (PRINZIDE,ZESTORETIC) 20-25 MG per tablet, Take 1 tablet by mouth daily, Disp: 90 tablet, Rfl: 0  •  polyethylene glycol (Golytely) 4000 mL solution, Take 4,000 mL by mouth once for 1 dose Take 4000 mL by mouth once for 1 dose. Use as directed (Patient not taking: Reported on 7/17/2023), Disp: 4000 mL, Rfl: 0  •  polyethylene glycol (MiraLax) 17 GM/SCOOP powder, Take 238 g by mouth once for 1 dose Take 238 g my mouth. Use as directed (Patient not taking: Reported on 6/22/2023), Disp: 238 g, Rfl: 0  •  polyethylene glycol-electrolytes (TriLyte) 4000 mL solution, Take 4,000 mL by mouth once for 1 dose Take 4000 mL by mouth once for 1 dose. Use as directed (Patient not taking: Reported on 6/22/2023), Disp: 4000 mL, Rfl: 0    No Known Allergies    Vitals:    07/17/23 1517   BP: 118/80   Pulse: 86   Resp: 18   Temp: 98.7 °F (37.1 °C)   SpO2: 100%     Physical Exam  Constitutional:       Appearance: He is well-developed. Comments: Well-nourished male, no respiratory distress, no signs of pain   HENT:      Head: Normocephalic and atraumatic. Right Ear: External ear normal.      Left Ear: External ear normal.   Eyes:      Conjunctiva/sclera: Conjunctivae normal.      Pupils: Pupils are equal, round, and reactive to light. Cardiovascular:      Rate and Rhythm: Normal rate and regular rhythm. Heart sounds: Normal heart sounds.    Pulmonary:      Effort: Pulmonary effort is normal.      Breath sounds: Normal breath sounds. Comments: Clear bilaterally  Abdominal:      General: Bowel sounds are normal.      Palpations: Abdomen is soft. Comments: + Bowel sounds, nontender, soft, no paraspinal megaly, no guarding   Musculoskeletal:         General: Normal range of motion. Cervical back: Normal range of motion and neck supple. Skin:     General: Skin is warm. Comments: Good color, warm, moist, no petechiae or ecchymoses   Neurological:      Mental Status: He is alert and oriented to person, place, and time. Deep Tendon Reflexes: Reflexes are normal and symmetric. Psychiatric:         Behavior: Behavior normal.         Thought Content: Thought content normal.         Judgment: Judgment normal.     Extremities: No lower extreme edema, no cords, pulses are 1+  Lymphatics: None within the neck, supraclavicular region, axilla bilaterally    Labs    7/17/2023 WBC = 7.19 hemoglobin = 15.3 hematocrit = 44.3 platelet = 317 neutrophil = 73% TSH = 7.297 BUN = 19 creatinine = 1.43 calcium = 9.7 LFTs WNL total bilirubin = 1.18    4/22/2023 WBC = 4.83 hemoglobin = 15.4 hematocrit = 44.9 platelet = 810 neutrophil = 66% PT = 12.8 INR = 0.94 BUN = 14 creatinine = 1.07 calcium = 9.5 AST = 36 ALT = 33 alkaline phosphatase = 87 total protein = 7.9 total bilirubin = 1.19 albumin = 4.0 PT = 12.8 INR = 0.94        Imaging    6/7/2023 MRI abdomen with and without contrast    IMPRESSION:     Previously seen hepatic observations now show expected posttreatment perilesional arterial phase enhancement unchanged from 3/6/2023. LI- RADS LR TR nonviable.     No new hepatic observations.     Worsened gastrohepatic and portacaval adenopathy. 5/16/2023 CAT scan chest without contrast    IMPRESSION:     Increase in size of lung nodules measuring up to 6 mm with at least one new lung nodule. Given the patient's history of malignancy, differential includes pulmonary metastasis.  Recommend short-term follow-up with CT without contrast in 3 months. 3/6/2023 MRI abdomen with and without contrast    IMPRESSION:     All of the previously seen observations now show normal posttreatment perilesional arterial phase enhancement or are no longer apparent status post treatment. LR- TR Nonviable. 7/13/2022 MRI abdomen    IMPRESSION:     1. Cirrhosis. Multiple right hepatic lobe observations:  - Segment 8, 4.0 x 3.1 cm, LR-M.  - Segment 7/8, 4.3 x 4.6 cm, LR-5.  - Segment 8, 1.6 x 1.6 cm, LR-M.  - Segment 6, 1.1 x 1.2 cm, LR-5.     One of these lesions has reportedly previously been biopsied showing hepatocellular carcinoma, although it is not clear which lesion was sampled. Although some of the lesions meet criteria for LR-M, all of the lesions may be part of the same process. Pathology    Case Report   Surgical Pathology Report                         Case: [id number]                                    Authorizing Provider: Deandra Antoine MD      Collected:           10/06/2022 [facility]               Ordering Location:     [facility]      Received:            10/06/2022 [address] [place] [address] Specialty                                                                                   Laboratory                                                                    Pathologist:           Kojo Hyatt MD                                                                     Specimen:    Liver, Liver Biopsy (10 slides [id number] [facility], collected 2/8/2022)                Final Diagnosis   OUTSIDE SLIDES FOR REVIEW (10 slides [id number] [facility], collected 2/8/2022):       A. Liver, core needle biopsy:  -   Poorly differentiated carcinoma with patchy necrosis (see comment).   -   Background liver parenchyma with chronic inflammation.       Comment: Submitted immunohistochemical stains show the tumor cells are positive for CK7 and CK19, and are negative for CK20, HepPar1, , TTF1 and . Additional stains were performed by outside institution and are not submitted for review. Per report, the tumor cells stain positive for glypican 3, AFP, albumin mRNA and claudin 4, and stain negative for arginase, PAX8, GATA3, CDX2, and NKX3.1 and mucicarmine.       The histomorphologic and immunophenotypic features are consistent with poorly differentiated carcinoma. The clinical history of cirrhosis, AFP and glypican 3 positivity favor hepatocellular carcinoma. However, lack of staining for HepPar1 and arginase, and expression of claudin 4, CK7 and CK19 suggest a cholangiocarcinoma component. Strong expression of albumin mRNA can be found in a majority of hepatocellular carcinoma. However, albumin mRNA can also be positive in a subset of intrahepatic cholangiocarcinoma and adenocarcinoma from other sites. Therefore, hepatocellular carcinoma with a cholangiocarcinoma component, so-called combined hepatocellular carcinoma-cholangiocarcinoma, cannot be excluded. There is insufficient background liver parenchyma for a definitive diagnosis of cirrhosis.     Reference: Kathryn Esparza, Trupti HD, Neelam T, Veena S, Sebastien Killian VS, Ruth TC, Luz RK, [place] Nisha ARREOLA, Kianna Rocha TT, Jairo RP. Albumin In Situ Hybridization Can Be Positive in Adenocarcinomas and Other Tumors From Diverse Sites. Am J Clin Pathol. 2019 Jul 5;152(2):190-199. doi: 10.1093/ajcp/zyb057.  PMID: 22295977.     Interpretation performed at , [address] [place] [address]    Electronically signed by Yesica Sharma MD on 10/6/2022 at  3:49 PM

## 2023-07-19 RX ORDER — SODIUM CHLORIDE 9 MG/ML
20 INJECTION, SOLUTION INTRAVENOUS ONCE
OUTPATIENT
Start: 2023-08-10

## 2023-07-20 ENCOUNTER — HOSPITAL ENCOUNTER (OUTPATIENT)
Dept: INFUSION CENTER | Facility: HOSPITAL | Age: 58
Discharge: HOME | End: 2023-07-20
Attending: INTERNAL MEDICINE
Payer: COMMERCIAL

## 2023-07-20 VITALS
OXYGEN SATURATION: 98 % | HEART RATE: 69 BPM | TEMPERATURE: 97.3 F | HEIGHT: 69 IN | BODY MASS INDEX: 28.44 KG/M2 | WEIGHT: 192.02 LBS | SYSTOLIC BLOOD PRESSURE: 128 MMHG | DIASTOLIC BLOOD PRESSURE: 68 MMHG | RESPIRATION RATE: 18 BRPM

## 2023-07-20 DIAGNOSIS — C22.0 HEPATOCELLULAR CARCINOMA (HCC): Primary | ICD-10-CM

## 2023-07-20 PROCEDURE — 96413 CHEMO IV INFUSION 1 HR: CPT

## 2023-07-20 PROCEDURE — 96417 CHEMO IV INFUS EACH ADDL SEQ: CPT

## 2023-07-20 RX ORDER — SODIUM CHLORIDE 9 MG/ML
20 INJECTION, SOLUTION INTRAVENOUS ONCE
Status: COMPLETED | OUTPATIENT
Start: 2023-07-20 | End: 2023-07-20

## 2023-07-20 RX ADMIN — BEVACIZUMAB-AWWB 1300 MG: 400 INJECTION, SOLUTION INTRAVENOUS at 13:27

## 2023-07-20 RX ADMIN — SODIUM CHLORIDE 20 ML/HR: 0.9 INJECTION, SOLUTION INTRAVENOUS at 13:27

## 2023-07-20 RX ADMIN — ATEZOLIZUMAB 1200 MG: 1200 INJECTION, SOLUTION INTRAVENOUS at 14:27

## 2023-07-20 NOTE — PLAN OF CARE
Problem: Potential for Falls  Goal: Patient will remain free of falls  Description: INTERVENTIONS:  - Educate patient/family on patient safety including physical limitations  - Instruct patient to call for assistance with activity   - Keep Call bell within reach  Outcome: Progressing     Problem: DISCHARGE PLANNING  Goal: Discharge to home or other facility with appropriate resources  Description: INTERVENTIONS:  - Identify barriers to discharge w/patient and caregiver  - Arrange for needed discharge resources and transportation as appropriate  - Identify discharge learning needs (meds, wound care, etc.)  - Arrange for interpretive services to assist at discharge as needed  - Refer to Case Management Department for coordinating discharge planning if the patient needs post-hospital services based on physician/advanced practitioner order or complex needs related to functional status, cognitive ability, or social support system  Outcome: Progressing     Problem: Knowledge Deficit  Goal: Patient/family/caregiver demonstrates understanding of disease process, treatment plan, medications, and discharge instructions  Description: Complete learning assessment and assess knowledge base.   Interventions:  - Provide teaching at level of understanding  - Provide teaching via preferred learning methods  Outcome: Progressing

## 2023-07-28 DIAGNOSIS — I10 ESSENTIAL HYPERTENSION: ICD-10-CM

## 2023-07-28 RX ORDER — LISINOPRIL AND HYDROCHLOROTHIAZIDE 25; 20 MG/1; MG/1
1 TABLET ORAL DAILY
Qty: 90 TABLET | Refills: 1 | Status: SHIPPED | OUTPATIENT
Start: 2023-07-28

## 2023-08-09 ENCOUNTER — APPOINTMENT (OUTPATIENT)
Dept: LAB | Facility: CLINIC | Age: 58
End: 2023-08-09
Payer: COMMERCIAL

## 2023-08-09 DIAGNOSIS — C22.0 HEPATOCELLULAR CARCINOMA (HCC): ICD-10-CM

## 2023-08-09 LAB
ALBUMIN SERPL BCP-MCNC: 4.5 G/DL (ref 3.5–5)
ALP SERPL-CCNC: 94 U/L (ref 34–104)
ALT SERPL W P-5'-P-CCNC: 18 U/L (ref 7–52)
ANION GAP SERPL CALCULATED.3IONS-SCNC: 7 MMOL/L
AST SERPL W P-5'-P-CCNC: 24 U/L (ref 13–39)
BACTERIA UR QL AUTO: ABNORMAL /HPF
BASOPHILS # BLD AUTO: 0.03 THOUSANDS/ÂΜL (ref 0–0.1)
BASOPHILS NFR BLD AUTO: 1 % (ref 0–1)
BILIRUB SERPL-MCNC: 0.76 MG/DL (ref 0.2–1)
BILIRUB UR QL STRIP: NEGATIVE
BUN SERPL-MCNC: 14 MG/DL (ref 5–25)
CALCIUM SERPL-MCNC: 9.5 MG/DL (ref 8.4–10.2)
CHLORIDE SERPL-SCNC: 102 MMOL/L (ref 96–108)
CLARITY UR: CLEAR
CO2 SERPL-SCNC: 27 MMOL/L (ref 21–32)
COLOR UR: ABNORMAL
CREAT SERPL-MCNC: 1.07 MG/DL (ref 0.6–1.3)
EOSINOPHIL # BLD AUTO: 0.12 THOUSAND/ÂΜL (ref 0–0.61)
EOSINOPHIL NFR BLD AUTO: 2 % (ref 0–6)
ERYTHROCYTE [DISTWIDTH] IN BLOOD BY AUTOMATED COUNT: 13.6 % (ref 11.6–15.1)
GFR SERPL CREATININE-BSD FRML MDRD: 76 ML/MIN/1.73SQ M
GLUCOSE SERPL-MCNC: 93 MG/DL (ref 65–140)
GLUCOSE UR STRIP-MCNC: NEGATIVE MG/DL
HCT VFR BLD AUTO: 43 % (ref 36.5–49.3)
HGB BLD-MCNC: 14.7 G/DL (ref 12–17)
HGB UR QL STRIP.AUTO: ABNORMAL
IMM GRANULOCYTES # BLD AUTO: 0.02 THOUSAND/UL (ref 0–0.2)
IMM GRANULOCYTES NFR BLD AUTO: 0 % (ref 0–2)
KETONES UR STRIP-MCNC: NEGATIVE MG/DL
LEUKOCYTE ESTERASE UR QL STRIP: NEGATIVE
LYMPHOCYTES # BLD AUTO: 1.03 THOUSANDS/ÂΜL (ref 0.6–4.47)
LYMPHOCYTES NFR BLD AUTO: 19 % (ref 14–44)
MCH RBC QN AUTO: 29.6 PG (ref 26.8–34.3)
MCHC RBC AUTO-ENTMCNC: 34.2 G/DL (ref 31.4–37.4)
MCV RBC AUTO: 87 FL (ref 82–98)
MONOCYTES # BLD AUTO: 0.57 THOUSAND/ÂΜL (ref 0.17–1.22)
MONOCYTES NFR BLD AUTO: 11 % (ref 4–12)
NEUTROPHILS # BLD AUTO: 3.64 THOUSANDS/ÂΜL (ref 1.85–7.62)
NEUTS SEG NFR BLD AUTO: 67 % (ref 43–75)
NITRITE UR QL STRIP: NEGATIVE
NON-SQ EPI CELLS URNS QL MICRO: ABNORMAL /HPF
NRBC BLD AUTO-RTO: 0 /100 WBCS
PH UR STRIP.AUTO: 6 [PH]
PLATELET # BLD AUTO: 155 THOUSANDS/UL (ref 149–390)
PMV BLD AUTO: 9.2 FL (ref 8.9–12.7)
POTASSIUM SERPL-SCNC: 4.5 MMOL/L (ref 3.5–5.3)
PROT SERPL-MCNC: 7.5 G/DL (ref 6.4–8.4)
PROT UR STRIP-MCNC: NEGATIVE MG/DL
RBC # BLD AUTO: 4.97 MILLION/UL (ref 3.88–5.62)
RBC #/AREA URNS AUTO: ABNORMAL /HPF
SODIUM SERPL-SCNC: 136 MMOL/L (ref 135–147)
SP GR UR STRIP.AUTO: 1.02 (ref 1–1.03)
T3FREE SERPL-MCNC: 3.86 PG/ML (ref 2.5–3.9)
TSH SERPL DL<=0.05 MIU/L-ACNC: 2.8 UIU/ML (ref 0.45–4.5)
UROBILINOGEN UR STRIP-ACNC: <2 MG/DL
WBC # BLD AUTO: 5.41 THOUSAND/UL (ref 4.31–10.16)
WBC #/AREA URNS AUTO: ABNORMAL /HPF

## 2023-08-09 PROCEDURE — 81001 URINALYSIS AUTO W/SCOPE: CPT

## 2023-08-09 PROCEDURE — 84443 ASSAY THYROID STIM HORMONE: CPT

## 2023-08-09 PROCEDURE — 80053 COMPREHEN METABOLIC PANEL: CPT

## 2023-08-09 PROCEDURE — 36415 COLL VENOUS BLD VENIPUNCTURE: CPT

## 2023-08-09 PROCEDURE — 85025 COMPLETE CBC W/AUTO DIFF WBC: CPT

## 2023-08-09 PROCEDURE — 84481 FREE ASSAY (FT-3): CPT

## 2023-08-10 ENCOUNTER — HOSPITAL ENCOUNTER (OUTPATIENT)
Dept: INFUSION CENTER | Facility: CLINIC | Age: 58
Discharge: HOME | End: 2023-08-10
Payer: COMMERCIAL

## 2023-08-10 VITALS
HEART RATE: 71 BPM | BODY MASS INDEX: 28.05 KG/M2 | OXYGEN SATURATION: 97 % | DIASTOLIC BLOOD PRESSURE: 70 MMHG | WEIGHT: 190 LBS | TEMPERATURE: 96.6 F | RESPIRATION RATE: 18 BRPM | SYSTOLIC BLOOD PRESSURE: 130 MMHG

## 2023-08-10 DIAGNOSIS — C22.0 HEPATOCELLULAR CARCINOMA (HCC): Primary | ICD-10-CM

## 2023-08-10 PROCEDURE — 96413 CHEMO IV INFUSION 1 HR: CPT

## 2023-08-10 PROCEDURE — 96417 CHEMO IV INFUS EACH ADDL SEQ: CPT

## 2023-08-10 RX ORDER — SODIUM CHLORIDE 9 MG/ML
20 INJECTION, SOLUTION INTRAVENOUS ONCE
Status: COMPLETED | OUTPATIENT
Start: 2023-08-10 | End: 2023-08-10

## 2023-08-10 RX ADMIN — SODIUM CHLORIDE 20 ML/HR: 0.9 INJECTION, SOLUTION INTRAVENOUS at 10:22

## 2023-08-10 RX ADMIN — BEVACIZUMAB-AWWB 1300 MG: 400 INJECTION, SOLUTION INTRAVENOUS at 10:46

## 2023-08-10 RX ADMIN — ATEZOLIZUMAB 1200 MG: 1200 INJECTION, SOLUTION INTRAVENOUS at 11:22

## 2023-08-10 NOTE — PROGRESS NOTES
Patient arrives to infusion center for D1 C3 MVASI/Tecentriq. Patient offers no acute complaints, reports he is tolerating treatment well so far. PIV inserted without issue, brisk blood return noted. Patient resting on recliner chair, loren bell within reach.

## 2023-08-10 NOTE — PROGRESS NOTES
Patient tolerated treatment today without complications.  Patient confirmed upcoming appointment and declined print out

## 2023-08-11 ENCOUNTER — TELEPHONE (OUTPATIENT)
Dept: FAMILY MEDICINE CLINIC | Facility: CLINIC | Age: 58
End: 2023-08-11

## 2023-08-11 DIAGNOSIS — I10 ESSENTIAL HYPERTENSION: ICD-10-CM

## 2023-08-11 RX ORDER — LISINOPRIL AND HYDROCHLOROTHIAZIDE 25; 20 MG/1; MG/1
1 TABLET ORAL DAILY
Qty: 90 TABLET | Refills: 1 | Status: SHIPPED | OUTPATIENT
Start: 2023-08-11

## 2023-08-11 NOTE — TELEPHONE ENCOUNTER
Patient needs refill on   lisinopril-hydrochlorothiazide (PRINZIDE,ZESTORETIC) 20-25 MG per tablet    [facility]

## 2023-08-24 RX ORDER — SODIUM CHLORIDE 9 MG/ML
20 INJECTION, SOLUTION INTRAVENOUS ONCE
Status: CANCELLED | OUTPATIENT
Start: 2023-08-31

## 2023-08-29 ENCOUNTER — TELEPHONE (OUTPATIENT)
Dept: GASTROENTEROLOGY | Facility: CLINIC | Age: 58
End: 2023-08-29

## 2023-08-29 ENCOUNTER — APPOINTMENT (OUTPATIENT)
Dept: LAB | Facility: CLINIC | Age: 58
End: 2023-08-29
Payer: COMMERCIAL

## 2023-08-29 DIAGNOSIS — C22.0 HEPATOCELLULAR CARCINOMA (HCC): ICD-10-CM

## 2023-08-29 LAB
ALBUMIN SERPL BCP-MCNC: 4.6 G/DL (ref 3.5–5)
ALP SERPL-CCNC: 78 U/L (ref 34–104)
ALT SERPL W P-5'-P-CCNC: 21 U/L (ref 7–52)
ANION GAP SERPL CALCULATED.3IONS-SCNC: 7 MMOL/L
AST SERPL W P-5'-P-CCNC: 26 U/L (ref 13–39)
BACTERIA UR QL AUTO: NORMAL /HPF
BILIRUB SERPL-MCNC: 1.26 MG/DL (ref 0.2–1)
BILIRUB UR QL STRIP: NEGATIVE
BUN SERPL-MCNC: 18 MG/DL (ref 5–25)
CALCIUM SERPL-MCNC: 9.6 MG/DL (ref 8.4–10.2)
CHLORIDE SERPL-SCNC: 100 MMOL/L (ref 96–108)
CLARITY UR: CLEAR
CO2 SERPL-SCNC: 27 MMOL/L (ref 21–32)
COLOR UR: ABNORMAL
CREAT SERPL-MCNC: 1 MG/DL (ref 0.6–1.3)
GFR SERPL CREATININE-BSD FRML MDRD: 82 ML/MIN/1.73SQ M
GLUCOSE SERPL-MCNC: 95 MG/DL (ref 65–140)
GLUCOSE UR STRIP-MCNC: NEGATIVE MG/DL
HAV AB SER QL IA: REACTIVE
HAV IGM SER QL: ABNORMAL
HBV CORE IGM SER QL: REACTIVE
HBV SURFACE AB SER-ACNC: 20.9 MIU/ML
HBV SURFACE AG SER QL: ABNORMAL
HCV AB SER QL: REACTIVE
HGB UR QL STRIP.AUTO: ABNORMAL
KETONES UR STRIP-MCNC: NEGATIVE MG/DL
LEUKOCYTE ESTERASE UR QL STRIP: NEGATIVE
NITRITE UR QL STRIP: NEGATIVE
NON-SQ EPI CELLS URNS QL MICRO: NORMAL /HPF
PH UR STRIP.AUTO: 5.5 [PH]
POTASSIUM SERPL-SCNC: 3.9 MMOL/L (ref 3.5–5.3)
PROT SERPL-MCNC: 7.6 G/DL (ref 6.4–8.4)
PROT UR STRIP-MCNC: NEGATIVE MG/DL
RBC #/AREA URNS AUTO: NORMAL /HPF
SODIUM SERPL-SCNC: 134 MMOL/L (ref 135–147)
SP GR UR STRIP.AUTO: 1.02 (ref 1–1.03)
T3FREE SERPL-MCNC: 3.54 PG/ML (ref 2.5–3.9)
T4 FREE SERPL-MCNC: 0.81 NG/DL (ref 0.61–1.12)
TSH SERPL DL<=0.05 MIU/L-ACNC: 5.12 UIU/ML (ref 0.45–4.5)
UROBILINOGEN UR STRIP-ACNC: <2 MG/DL
WBC #/AREA URNS AUTO: NORMAL /HPF

## 2023-08-29 PROCEDURE — 84443 ASSAY THYROID STIM HORMONE: CPT

## 2023-08-29 PROCEDURE — 36415 COLL VENOUS BLD VENIPUNCTURE: CPT

## 2023-08-29 PROCEDURE — 80053 COMPREHEN METABOLIC PANEL: CPT

## 2023-08-29 PROCEDURE — 84481 FREE ASSAY (FT-3): CPT

## 2023-08-29 PROCEDURE — 84439 ASSAY OF FREE THYROXINE: CPT

## 2023-08-29 PROCEDURE — 81001 URINALYSIS AUTO W/SCOPE: CPT

## 2023-08-29 NOTE — TELEPHONE ENCOUNTER
Left message on patient's voice mail with reminder to schedule repeat MRI. Central scheduling number provided.

## 2023-08-30 LAB — HBV CORE AB SER QL: REACTIVE

## 2023-08-31 ENCOUNTER — HOSPITAL ENCOUNTER (OUTPATIENT)
Dept: INFUSION CENTER | Facility: CLINIC | Age: 58
Discharge: HOME | End: 2023-08-31
Payer: COMMERCIAL

## 2023-08-31 VITALS
SYSTOLIC BLOOD PRESSURE: 130 MMHG | DIASTOLIC BLOOD PRESSURE: 60 MMHG | BODY MASS INDEX: 27.97 KG/M2 | TEMPERATURE: 97.1 F | RESPIRATION RATE: 18 BRPM | OXYGEN SATURATION: 98 % | HEART RATE: 77 BPM | WEIGHT: 189.5 LBS

## 2023-08-31 DIAGNOSIS — C22.0 HEPATOCELLULAR CARCINOMA (HCC): Primary | ICD-10-CM

## 2023-08-31 LAB
HCV RNA SERPL NAA+PROBE-ACNC: NORMAL IU/ML
TEST INFORMATION: NORMAL

## 2023-08-31 PROCEDURE — 96417 CHEMO IV INFUS EACH ADDL SEQ: CPT

## 2023-08-31 PROCEDURE — 96413 CHEMO IV INFUSION 1 HR: CPT

## 2023-08-31 RX ORDER — SODIUM CHLORIDE 9 MG/ML
20 INJECTION, SOLUTION INTRAVENOUS ONCE
Status: COMPLETED | OUTPATIENT
Start: 2023-08-31 | End: 2023-08-31

## 2023-08-31 RX ADMIN — SODIUM CHLORIDE 20 ML/HR: 0.9 INJECTION, SOLUTION INTRAVENOUS at 12:30

## 2023-08-31 RX ADMIN — ATEZOLIZUMAB 1200 MG: 1200 INJECTION, SOLUTION INTRAVENOUS at 13:41

## 2023-08-31 RX ADMIN — BEVACIZUMAB-AWWB 1300 MG: 400 INJECTION, SOLUTION INTRAVENOUS at 13:04

## 2023-08-31 NOTE — PROGRESS NOTES
Patient presents today for treatment with Mvasi and Tecentriq. Patient complains of new onset joint pain in hands and feet, onset approximately two weeks ago. 5/10 pain to hands. Labs from 8/29/2023 reviewed. TSH slightly elevated, hepatitis antibodies reactive. Mari Venegas RN in Dr. Kenney Branch office made aware. Okay to proceed with treatment. Peripheral IV inserted without incident.

## 2023-09-01 ENCOUNTER — PATIENT OUTREACH (OUTPATIENT)
Dept: HEMATOLOGY ONCOLOGY | Facility: CLINIC | Age: 58
End: 2023-09-01

## 2023-09-01 NOTE — PROGRESS NOTES
Phone outreach to the patient to follow up with how he is doing with treatment. I left a message on the patient’s vm explaining the reason for my call and to contact me when possible for a short discussion.

## 2023-09-05 ENCOUNTER — PATIENT OUTREACH (OUTPATIENT)
Dept: HEMATOLOGY ONCOLOGY | Facility: CLINIC | Age: 58
End: 2023-09-05

## 2023-09-08 ENCOUNTER — PATIENT OUTREACH (OUTPATIENT)
Dept: HEMATOLOGY ONCOLOGY | Facility: CLINIC | Age: 58
End: 2023-09-08

## 2023-09-12 ENCOUNTER — OFFICE VISIT (OUTPATIENT)
Dept: HEMATOLOGY ONCOLOGY | Facility: CLINIC | Age: 58
End: 2023-09-12
Payer: COMMERCIAL

## 2023-09-12 VITALS
OXYGEN SATURATION: 99 % | TEMPERATURE: 98 F | DIASTOLIC BLOOD PRESSURE: 90 MMHG | HEART RATE: 91 BPM | WEIGHT: 186 LBS | RESPIRATION RATE: 16 BRPM | SYSTOLIC BLOOD PRESSURE: 140 MMHG | BODY MASS INDEX: 27.45 KG/M2

## 2023-09-12 DIAGNOSIS — C22.0 HEPATOCELLULAR CARCINOMA (HCC): Primary | ICD-10-CM

## 2023-09-12 DIAGNOSIS — B18.2 CHRONIC HEPATITIS C WITHOUT HEPATIC COMA (HCC): ICD-10-CM

## 2023-09-12 PROCEDURE — 99214 OFFICE O/P EST MOD 30 MIN: CPT | Performed by: INTERNAL MEDICINE

## 2023-09-12 NOTE — PROGRESS NOTES
Alessandro Aviles  1965  [facility] [place] [id number] [address] [facility] [id number]    DISCUSSION/SUMMARY:      55-year-old male with a somewhat complicated prior GI/liver history. Patient was previously diagnosed with hepatocellular carcinoma, elevated alpha-fetoprotein level. Patient underwent Y90 treatment in November 2022. Alpha-fetoprotein level came down nicely. Unfortunately the tumor marker recently began to rise. The May 16, 2023 CAT scan of the chest demonstrated enlarging pulmonary nodules. More recent MRI of the abdomen demonstrated worsening gastrohepatic and portacaval adenopathy (although the liver lesions were unchanged). Alpha-fetoprotein was found to be significantly elevated. Patient was recently presented at the GI tumor board. The consensus was to begin systemic treatment. NCCN guidelines 1/20/2023 states that for patients with hepatocellular carcinoma, good performance, Child Myers class A, category 1 treatment includes atezolizumab and bevacizumab or tremelimumab + durvalumab (also category 1). Regimen  Atezolizumab 1200 mg IV day 1  Bevacizumab 15 mg/kilogram IV day 1  Cycle length = 21 days  Goal = prolongation of life, improvement of quality of life    Nursing staff previously spent time with patient today going over the specifics, potential benefits versus risks toxicities of atezolizumab and bevacizumab. Presently Mr. Zac Braxton states feeling okay, about the same as before. Clinically there are no concerning findings. The recent laboratory test results are good/acceptable, the alpha-fetoprotein has come down from approximately 8000 to approximately 1800 - obviously good. The plan is to continue with the atezolizumab and bevacizumab. Etiology for the body aches is not clear, possibly related to the treatments, possibly related to patient's physically demanding work.   We discussed options including pain control; Mr. Shane Hardin does not want to start any pain control medications at this time. Patient will follow-up with GI as directed. As discussed before, patient previously demonstrated a good response to the Y90 treatment of the liver. Repeat scanning will eventually be needed. The specifics are not entirely clear but patient believes that he only need 2 additional cycles of treatment. We talked about the fact that the liver cancer is likely not curable and that systemic treatments may need to go on for a long time. Patient is to return in 6 weeks. Patient knows to call the hematology/oncology office if there are any other questions or concerns. Carefully review your medication list and verify that the list is accurate and up-to-date. Please call the hematology/oncology office if there are medications missing from the list, medications on the list that you are not currently taking or if there is a dosage or instruction that is different from how you're taking that medication.     Patient goals and areas of care: Continue with the atezolizumab and bevacizumab  Barriers to care: None  Patient is able to self-care  ______________________________________________________________________________________    Chief Complaint   Patient presents with   • Follow-up   • Hepatocellular carcinoma on treatment     Oncology History   Hepatocellular carcinoma ([address])   2022 Initial Diagnosis    Hepatocellular carcinoma ([address])     2/8/2022 Biopsy    [facility] DOSHI US guided liver biopsy: right liver lobe:  Poorly differentiated HCC with patchy necrosis       6/29/2023 -  Chemotherapy    alteplase (CATHFLO), 2 mg, Intracatheter, Every 1 Minute as needed, 4 of 6 cycles  atezolizumab (TECENTRIQ) IVPB, 1,200 mg, Intravenous, Once, 4 of 6 cycles  Administration: 1,200 mg (6/29/2023), 1,200 mg (7/20/2023), 1,200 mg (8/10/2023), 1,200 mg (8/31/2023)  bevacizumab-awwb (MVASI) IVPB, 1,345 mg (100 % of original dose 15 mg/kg), Intravenous, Once, 4 of 6 cycles  Dose modification: 15 mg/kg (original dose 15 mg/kg, Cycle 1), 15 mg/kg (original dose 15 mg/kg, Cycle 2), 15 mg/kg (original dose 15 mg/kg, Cycle 3)  Administration: 1,300 mg (6/29/2023), 1,300 mg (7/20/2023), 1,300 mg (8/10/2023), 1,300 mg (8/31/2023)       History of Present Illness: 49-year-old male with history of cirrhosis secondary to chronic hepatitis C, history of alcohol abuse, previously diagnosed with multifocal hepatocellular carcinoma. Patient underwent TARE on November 22, 2022. Recent scans demonstrated concerning enlarging pulmonary lesions. Alpha-fetoprotein level has also been rising. Patient was recently presented at the GI tumor board and the plan was systemic treatment. Patient has completed 4 cycles of treatment and returns for follow-up. Mr. Sylvain Armstrong states feeling pretty good for the most part. Patient has some arthritic complaints in his hand and his legs; patient is working full-time as a . This has happened recently; patient was able to get through the first 3 cycles without any pain control issues. Appetite is good, no GI problems. No bruising or bleeding issues. No respiratory problems. No fevers or signs of infection. Review of Systems   Constitutional: Negative. HENT: Negative. Eyes: Negative. Respiratory: Negative. Cardiovascular: Negative. Gastrointestinal: Negative. Endocrine: Negative. Genitourinary: Negative. Musculoskeletal: Positive for arthralgias. Skin: Negative. Allergic/Immunologic: Negative. Neurological: Negative. Hematological: Negative. Psychiatric/Behavioral: The patient is nervous/anxious. All other systems reviewed and are negative.     Patient Active Problem List   Diagnosis   • Hepatocellular carcinoma [facility])   • Essential hypertension   • Chronic hepatitis C without hepatic coma (HCC)   • Cirrhosis of liver [facility])   • ED (erectile dysfunction)   • Colon polyp Past Medical History:   Diagnosis Date   • Hypertension    • Liver cancer [facility])      Past Surgical History:   Procedure Laterality Date   • HERNIA REPAIR     • IR Y-90 PRE-ANGIO/EMBO W/ LUNG SCAN  2022   • IR Y-90 RADIOEMBOLIZATION  2022     Family History   Problem Relation Age of Onset   • Cancer Mother      Social History     Socioeconomic History   • Marital status: /Civil Union     Spouse name: Not on file   • Number of children: Not on file   • Years of education: Not on file   • Highest education level: Not on file   Occupational History   • Not on file   Tobacco Use   • Smoking status: Former     Packs/day: 1.00     Years: 30.00     Total pack years: 30.00     Types: Cigarettes     Quit date:      Years since quittin.6   • Smokeless tobacco: Current     Types: Chew   • Tobacco comments:     nicotine pouch (on)   Vaping Use   • Vaping Use: Never used   Substance and Sexual Activity   • Alcohol use: Not Currently   • Drug use: Yes     Types: Marijuana     Comment: medical marijuana   • Sexual activity: Yes     Partners: Female   Other Topics Concern   • Not on file   Social History Narrative   • Not on file     Social Determinants of Health     Financial Resource Strain: Not on file   Food Insecurity: Not on file   Transportation Needs: Not on file   Physical Activity: Not on file   Stress: Not on file   Social Connections: Not on file   Intimate Partner Violence: Not on file   Housing Stability: Not on file       Current Outpatient Medications:   •  lisinopril-hydrochlorothiazide (PRINZIDE,ZESTORETIC) 20-25 MG per tablet, Take 1 tablet by mouth daily, Disp: 90 tablet, Rfl: 1  •  polyethylene glycol (Golytely) 4000 mL solution, Take 4,000 mL by mouth once for 1 dose Take 4000 mL by mouth once for 1 dose.  Use as directed (Patient not taking: Reported on 2023), Disp: 4000 mL, Rfl: 0  •  polyethylene glycol (MiraLax) 17 GM/SCOOP powder, Take 238 g by mouth once for 1 dose Take 238 g my mouth. Use as directed (Patient not taking: Reported on 6/22/2023), Disp: 238 g, Rfl: 0  •  polyethylene glycol-electrolytes (TriLyte) 4000 mL solution, Take 4,000 mL by mouth once for 1 dose Take 4000 mL by mouth once for 1 dose. Use as directed (Patient not taking: Reported on 6/22/2023), Disp: 4000 mL, Rfl: 0    No Known Allergies    Vitals:    09/12/23 1504   BP: 140/90   Pulse: 91   Resp: 16   Temp: 98 °F (36.7 °C)   SpO2: 99%     Physical Exam  Constitutional:       Appearance: He is well-developed. Comments: Well-nourished male, no respiratory distress, no signs of pain   HENT:      Head: Normocephalic and atraumatic. Right Ear: External ear normal.      Left Ear: External ear normal.   Eyes:      Conjunctiva/sclera: Conjunctivae normal.      Pupils: Pupils are equal, round, and reactive to light. Cardiovascular:      Rate and Rhythm: Normal rate and regular rhythm. Heart sounds: Normal heart sounds. Pulmonary:      Effort: Pulmonary effort is normal.      Breath sounds: Normal breath sounds. Comments: Clear bilaterally  Abdominal:      General: Bowel sounds are normal.      Palpations: Abdomen is soft. Comments: + Bowel sounds, nontender, soft, no paraspinal megaly, no guarding   Musculoskeletal:         General: Normal range of motion. Cervical back: Normal range of motion and neck supple. Comments: No swelling in the digits or hands, good range of motion in upper and lower extremities, equal bilaterally   Skin:     General: Skin is warm. Comments: Good color, warm, moist, no petechiae or ecchymoses   Neurological:      Mental Status: He is alert and oriented to person, place, and time. Deep Tendon Reflexes: Reflexes are normal and symmetric. Psychiatric:         Behavior: Behavior normal.         Thought Content:  Thought content normal.         Judgment: Judgment normal.     Extremities: No lower extreme edema bilaterally, no cords, pulses are 1+  Lymphatics: None within the neck, supraclavicular region, axilla bilaterally    Labs        8/29/2023 WBC = 6.8 hemoglobin = 15.3 hematocrit = 45 platelet = 257 neutrophil = 76% TSH = 5.122 BUN = 18 creatinine = 1.00 calcium = 9.6 AST = 26 ALT = 21 alkaline phosphatase = 78 total protein = 7.6 total bilirubin = 1.26    7/17/2023 WBC = 7.19 hemoglobin = 15.3 hematocrit = 44.3 platelet = 851 neutrophil = 73% TSH = 7.297 BUN = 19 creatinine = 1.43 calcium = 9.7 LFTs WNL total bilirubin = 1.18  4/22/2023 WBC = 4.83 hemoglobin = 15.4 hematocrit = 44.9 platelet = 621 neutrophil = 66% PT = 12.8 INR = 0.94 BUN = 14 creatinine = 1.07 calcium = 9.5 AST = 36 ALT = 33 alkaline phosphatase = 87 total protein = 7.9 total bilirubin = 1.19 albumin = 4.0 PT = 12.8 INR = 0.94    Imaging    6/7/2023 MRI abdomen with and without contrast    IMPRESSION:     Previously seen hepatic observations now show expected posttreatment perilesional arterial phase enhancement unchanged from 3/6/2023. LI- RADS LR TR nonviable.     No new hepatic observations.     Worsened gastrohepatic and portacaval adenopathy. 5/16/2023 CAT scan chest without contrast    IMPRESSION:     Increase in size of lung nodules measuring up to 6 mm with at least one new lung nodule. Given the patient's history of malignancy, differential includes pulmonary metastasis. Recommend short-term follow-up with CT without contrast in 3 months. 3/6/2023 MRI abdomen with and without contrast    IMPRESSION:     All of the previously seen observations now show normal posttreatment perilesional arterial phase enhancement or are no longer apparent status post treatment. LR- TR Nonviable. 7/13/2022 MRI abdomen    IMPRESSION:     1. Cirrhosis.   Multiple right hepatic lobe observations:  - Segment 8, 4.0 x 3.1 cm, LR-M.  - Segment 7/8, 4.3 x 4.6 cm, LR-5.  - Segment 8, 1.6 x 1.6 cm, LR-M.  - Segment 6, 1.1 x 1.2 cm, LR-5.     One of these lesions has reportedly previously been biopsied showing hepatocellular carcinoma, although it is not clear which lesion was sampled. Although some of the lesions meet criteria for LR-M, all of the lesions may be part of the same process. Pathology    Case Report   Surgical Pathology Report                         Case: [id number]                                    Authorizing Provider: Benjie Hodgson MD      Collected:           10/06/2022 0829               Ordering Location:     [facility]      Received:            10/06/2022 29                                      [facility]                                                                                   Laboratory                                                                    Pathologist:           Jam Rodriguez MD                                                                     Specimen:    Liver, Liver Biopsy (10 slides [id number] [facility], collected 2/8/2022)                Final Diagnosis   OUTSIDE SLIDES FOR REVIEW (10 slides [id number] [facility], collected 2/8/2022):       A. Liver, core needle biopsy:  -   Poorly differentiated carcinoma with patchy necrosis (see comment). -   Background liver parenchyma with chronic inflammation.       Comment: Submitted immunohistochemical stains show the tumor cells are positive for CK7 and CK19, and are negative for CK20,  HepPar1, , TTF1 and . Additional stains were performed by outside institution and are not submitted for review. Per report, the tumor cells stain positive for glypican 3, AFP, albumin mRNA and claudin 4, and stain negative for arginase, PAX8, GATA3, CDX2, and NKX3.1 and mucicarmine.       The histomorphologic and immunophenotypic features are consistent with poorly differentiated carcinoma. The clinical history of cirrhosis, AFP and glypican 3 positivity favor hepatocellular carcinoma.   However, lack of staining for HepPar1 and arginase, and expression of claudin 4, CK7 and CK19 suggest a cholangiocarcinoma component. Strong expression of albumin mRNA can be found in a majority of hepatocellular carcinoma. However, albumin mRNA can also be positive in a subset of intrahepatic cholangiocarcinoma and adenocarcinoma from other sites. Therefore, hepatocellular carcinoma with a cholangiocarcinoma component, so-called combined hepatocellular carcinoma-cholangiocarcinoma, cannot be excluded. There is insufficient background liver parenchyma for a definitive diagnosis of cirrhosis.     Reference: Noy Kong, Trupti HD, Neelam T, Veena S, Natanael Kumar, Sebastien VS, Ruth TC, Luz RK, [facility] Cent[facility] ARREOLA, Katherine Lorenzana TT, Jairo RP. Albumin In Situ Hybridization Can Be Positive in Adenocarcinomas and Other Tumors From Diverse Sites. Am J Clin Pathol. 2019 Jul 5;152(2):190-199. doi: 10.1093/ajcp/[email or web address].  PMID: 27159204.     Interpretation performed at [address]    Electronically signed by Jurgen Whitmore MD on 10/6/2022 at  3:49 PM

## 2023-09-14 RX ORDER — SODIUM CHLORIDE 9 MG/ML
20 INJECTION, SOLUTION INTRAVENOUS ONCE
Status: CANCELLED | OUTPATIENT
Start: 2023-09-21

## 2023-09-19 ENCOUNTER — APPOINTMENT (OUTPATIENT)
Dept: LAB | Facility: CLINIC | Age: 58
End: 2023-09-19
Payer: COMMERCIAL

## 2023-09-19 DIAGNOSIS — C22.0 HEPATOCELLULAR CARCINOMA (HCC): ICD-10-CM

## 2023-09-19 LAB
ALBUMIN SERPL BCP-MCNC: 4.7 G/DL (ref 3.5–5)
ALP SERPL-CCNC: 74 U/L (ref 34–104)
ALT SERPL W P-5'-P-CCNC: 21 U/L (ref 7–52)
ANION GAP SERPL CALCULATED.3IONS-SCNC: 7 MMOL/L
AST SERPL W P-5'-P-CCNC: 27 U/L (ref 13–39)
BACTERIA UR QL AUTO: NORMAL /HPF
BASOPHILS # BLD AUTO: 0.03 THOUSANDS/ÂΜL (ref 0–0.1)
BASOPHILS NFR BLD AUTO: 1 % (ref 0–1)
BILIRUB SERPL-MCNC: 0.86 MG/DL (ref 0.2–1)
BILIRUB UR QL STRIP: NEGATIVE
BUN SERPL-MCNC: 20 MG/DL (ref 5–25)
CALCIUM SERPL-MCNC: 9.9 MG/DL (ref 8.4–10.2)
CHLORIDE SERPL-SCNC: 103 MMOL/L (ref 96–108)
CLARITY UR: CLEAR
CO2 SERPL-SCNC: 27 MMOL/L (ref 21–32)
COLOR UR: YELLOW
CREAT SERPL-MCNC: 1.2 MG/DL (ref 0.6–1.3)
EOSINOPHIL # BLD AUTO: 0.12 THOUSAND/ÂΜL (ref 0–0.61)
EOSINOPHIL NFR BLD AUTO: 2 % (ref 0–6)
ERYTHROCYTE [DISTWIDTH] IN BLOOD BY AUTOMATED COUNT: 13.3 % (ref 11.6–15.1)
GFR SERPL CREATININE-BSD FRML MDRD: 66 ML/MIN/1.73SQ M
GLUCOSE SERPL-MCNC: 85 MG/DL (ref 65–140)
GLUCOSE UR STRIP-MCNC: NEGATIVE MG/DL
HCT VFR BLD AUTO: 45.4 % (ref 36.5–49.3)
HGB BLD-MCNC: 15.6 G/DL (ref 12–17)
HGB UR QL STRIP.AUTO: ABNORMAL
IMM GRANULOCYTES # BLD AUTO: 0.01 THOUSAND/UL (ref 0–0.2)
IMM GRANULOCYTES NFR BLD AUTO: 0 % (ref 0–2)
KETONES UR STRIP-MCNC: NEGATIVE MG/DL
LEUKOCYTE ESTERASE UR QL STRIP: NEGATIVE
LYMPHOCYTES # BLD AUTO: 1 THOUSANDS/ÂΜL (ref 0.6–4.47)
LYMPHOCYTES NFR BLD AUTO: 19 % (ref 14–44)
MCH RBC QN AUTO: 30.7 PG (ref 26.8–34.3)
MCHC RBC AUTO-ENTMCNC: 34.4 G/DL (ref 31.4–37.4)
MCV RBC AUTO: 89 FL (ref 82–98)
MONOCYTES # BLD AUTO: 0.5 THOUSAND/ÂΜL (ref 0.17–1.22)
MONOCYTES NFR BLD AUTO: 10 % (ref 4–12)
NEUTROPHILS # BLD AUTO: 3.5 THOUSANDS/ÂΜL (ref 1.85–7.62)
NEUTS SEG NFR BLD AUTO: 68 % (ref 43–75)
NITRITE UR QL STRIP: NEGATIVE
NON-SQ EPI CELLS URNS QL MICRO: NORMAL /HPF
NRBC BLD AUTO-RTO: 0 /100 WBCS
PH UR STRIP.AUTO: 6.5 [PH]
PLATELET # BLD AUTO: 159 THOUSANDS/UL (ref 149–390)
PMV BLD AUTO: 9.8 FL (ref 8.9–12.7)
POTASSIUM SERPL-SCNC: 4.8 MMOL/L (ref 3.5–5.3)
PROT SERPL-MCNC: 7.8 G/DL (ref 6.4–8.4)
PROT UR STRIP-MCNC: NEGATIVE MG/DL
RBC # BLD AUTO: 5.08 MILLION/UL (ref 3.88–5.62)
RBC #/AREA URNS AUTO: NORMAL /HPF
SODIUM SERPL-SCNC: 137 MMOL/L (ref 135–147)
SP GR UR STRIP.AUTO: 1.02 (ref 1–1.03)
T3FREE SERPL-MCNC: 3.65 PG/ML (ref 2.5–3.9)
TSH SERPL DL<=0.05 MIU/L-ACNC: 6.74 UIU/ML (ref 0.45–4.5)
UROBILINOGEN UR STRIP-ACNC: <2 MG/DL
WBC # BLD AUTO: 5.16 THOUSAND/UL (ref 4.31–10.16)
WBC #/AREA URNS AUTO: NORMAL /HPF

## 2023-09-19 PROCEDURE — 85025 COMPLETE CBC W/AUTO DIFF WBC: CPT

## 2023-09-19 PROCEDURE — 80053 COMPREHEN METABOLIC PANEL: CPT

## 2023-09-19 PROCEDURE — 84439 ASSAY OF FREE THYROXINE: CPT

## 2023-09-19 PROCEDURE — 36415 COLL VENOUS BLD VENIPUNCTURE: CPT

## 2023-09-19 PROCEDURE — 84481 FREE ASSAY (FT-3): CPT

## 2023-09-19 PROCEDURE — 84443 ASSAY THYROID STIM HORMONE: CPT

## 2023-09-19 PROCEDURE — 81001 URINALYSIS AUTO W/SCOPE: CPT

## 2023-09-20 LAB — T4 FREE SERPL-MCNC: 0.79 NG/DL (ref 0.61–1.12)

## 2023-09-21 ENCOUNTER — HOSPITAL ENCOUNTER (OUTPATIENT)
Dept: INFUSION CENTER | Facility: CLINIC | Age: 58
Discharge: HOME | End: 2023-09-21
Payer: COMMERCIAL

## 2023-09-21 VITALS
SYSTOLIC BLOOD PRESSURE: 118 MMHG | HEART RATE: 86 BPM | TEMPERATURE: 97 F | OXYGEN SATURATION: 98 % | BODY MASS INDEX: 27.31 KG/M2 | WEIGHT: 185 LBS | RESPIRATION RATE: 18 BRPM | DIASTOLIC BLOOD PRESSURE: 77 MMHG

## 2023-09-21 DIAGNOSIS — C22.0 HEPATOCELLULAR CARCINOMA (HCC): Primary | ICD-10-CM

## 2023-09-21 PROCEDURE — 96413 CHEMO IV INFUSION 1 HR: CPT

## 2023-09-21 PROCEDURE — 96417 CHEMO IV INFUS EACH ADDL SEQ: CPT

## 2023-09-21 RX ORDER — SODIUM CHLORIDE 9 MG/ML
20 INJECTION, SOLUTION INTRAVENOUS ONCE
Status: COMPLETED | OUTPATIENT
Start: 2023-09-21 | End: 2023-09-21

## 2023-09-21 RX ADMIN — BEVACIZUMAB-AWWB 1300 MG: 400 INJECTION, SOLUTION INTRAVENOUS at 12:45

## 2023-09-21 RX ADMIN — SODIUM CHLORIDE 20 ML/HR: 0.9 INJECTION, SOLUTION INTRAVENOUS at 12:00

## 2023-09-21 RX ADMIN — ATEZOLIZUMAB 1200 MG: 1200 INJECTION, SOLUTION INTRAVENOUS at 13:24

## 2023-09-21 NOTE — PLAN OF CARE
Problem: Knowledge Deficit  Goal: Patient/family/caregiver demonstrates understanding of disease process, treatment plan, medications, and discharge instructions  Description: Complete learning assessment and assess knowledge base.   Interventions:  - Provide teaching at level of understanding  - Provide teaching via preferred learning methods  Outcome: Completed

## 2023-09-26 ENCOUNTER — OFFICE VISIT (OUTPATIENT)
Dept: GASTROENTEROLOGY | Facility: CLINIC | Age: 58
End: 2023-09-26
Payer: COMMERCIAL

## 2023-09-26 VITALS
BODY MASS INDEX: 27.99 KG/M2 | DIASTOLIC BLOOD PRESSURE: 90 MMHG | HEIGHT: 69 IN | WEIGHT: 189 LBS | TEMPERATURE: 97.2 F | SYSTOLIC BLOOD PRESSURE: 136 MMHG

## 2023-09-26 DIAGNOSIS — R91.8 LUNG NODULES: ICD-10-CM

## 2023-09-26 DIAGNOSIS — K70.30 ALCOHOLIC CIRRHOSIS OF LIVER WITHOUT ASCITES (HCC): ICD-10-CM

## 2023-09-26 DIAGNOSIS — C22.0 HEPATOCELLULAR CARCINOMA (HCC): Primary | ICD-10-CM

## 2023-09-26 PROCEDURE — 99215 OFFICE O/P EST HI 40 MIN: CPT | Performed by: INTERNAL MEDICINE

## 2023-09-26 NOTE — PROGRESS NOTES
[facility] - Outpatient Follow-Up  Alessandro Aviles 62 y.o. male MRN: [id number]  Encounter: [id number]    PCP:  Nathaly Loza MD, [phone]        ASSESSMENT AND PLAN:      Summary:    Mr. Zac Braxton is a 62y.o. year old male with cirrhosis secondary to Chronic hepatitis-C and Chronic alcohol abuse which is well compensated, but complicated with multifocal HCC    Problem List and Plan:    1. Cirrhosis:   MELD-Na: 9  He has no physical evidence of cirrhosis/portal HTN. 2. Multifocal HCC (segments 6, 7/8) outside of [facility] Criteria prior to treatment. Initial  AFP of 4961, up to 8200 and down back to [address] after starting bevacizumab and atezolizumab. Most recent infusion on 9/21/23.  a. S/p TARE on 11/22/22.  i. Most recent MRI in June showed LIRADS LR-TR equivocal with some enlarged gastrohepatic and portocaval lymph nodes. ii. Last CT chest in October showed some too small to characterize lung nodules. Repeat CT chest in May showed increase in size of the the one nodule and at least one new nodule.  iii. Continue beva/azeto Rx with oncology. iv. MRI requested to be done in early September, not yet scheduled. I have re-requested he schedule the MRI now.  v. CMP, CBC, PT/INR, AFP requested per oncology with CC to me. 3. Volume: No history of edema or ascites. Will continue to monitor with serial physical exams on subsequent office visits. Mr. Zac Braxton will contact our office if he should develop abdominal distention or lower extremity edema. .  4. Hepatic Encephalopathy: No history of hepatic encephalopathy. Mr. Zac Braxton and his family/care giver are aware of the signs/symptoms of hepatic encephalopathy and understand to seek immediate medical attention should they arise. .  5. Colon Cancer Screening: Colonoscopy done, however was poor prep with repeat colon requested to be done within 6 months. Patient would like to hold off on EGD/colon for now.   6. Nutritional status: No significant sarcopenia noted. Encouraged high protein snacking, low salt diet. If weight loss evident, will refer to nutritional counseling. Health Maintenance:  1. Mr. Lindy Santiago was counseled to avoid alcohol and tobacco products. 2. Mr. Lindy Santiago was also advised to avoid raw seafood/oysters and from swimming in unchlorinated perry, particularly from the [facility], due to increased risk of infection from Vibrio Vulnificus. 3. Mr. Lindy Santiago was also instructed to seek immediate medical attention should he develop fevers over 101 F, evidence of GI bleeding (black or bloody stools, bloody or coffee ground emesis) or confusion. 4. Mr. Lindy Santiago was advised to avoid NSAIDs, if possible, due to increase risk of renal dysfunction, and advised that if he should use acetaminophen, dose should not exceed 2 grams/day. 5. Mr. Lindy Santiago was recommend to remain up to date with adult vaccination, including influenza, pneumovax and meningococcus. Inactivated HSV and zoster vaccine is safe and encouraged by PCP. 6. Mr. Lindy Santiago was instructed to call either our office or that of his referring doctor should he develop worsening symptoms related to lower extremity edema, ascites, jaundice or excessive bruising/bleeding. FOLLOW-UP:  Return in about 3 months (around 12/26/2023). VISIT DIAGNOSES AND ORDERS:      No diagnosis found. No orders of the defined types were placed in this encounter.    ______________________________________________________________________    Interval update 9/26/2023:  Since his last office visit, he started treatment with atezolizumab and bevacizumab. He states he had some joint pain for a day or 2 after the first infusion however has tolerated the most recent infusion without any significant side effects and in fact went to work later that day. He has been feeling very well overall.   Mr. Lindy Santiago denies recent or history of yellow eyes/skin, dark urine, GI bleeding, abdominal distention with fluid, lower extremity swelling, easy bruising, excessive bleeding, pruritus or confusion. He denies weight loss or any significant fatigue or weakness. He denies abdominal pain, nausea, vomiting, heartburn, reflux, difficulty swallowing, early satiety, bloating, diarrhea, constipation or straining with passing stools. He does complain of some queasiness related to foul odors. He states he is more sensitive to foul odors since stopping smoking. He had blood work drawn last week which showed normal LFTs, CBC, PT/INR and a much reduced alpha-fetoprotein. History:    HPI from office visit 6/27/2023:  Fannie Zhong is a 62 y.o. male with hx of HCV/alcoholic cirrhosis with multifocal HCC s/p Y90 presenting for follow up.     Last seen in clinic by Dr. Latonya Winston on 4/26/23. At that time, had undergone TARE and f/u imaging with no viable tumor, but AFP had increased from 500 to 2000 suggesting viable tumor. Had a colonoscopy with incomplete prep. Was ordered for repeat CT scan, CMP/CBC/PT,INR/AFP for early June.      Imaging showed lung nodules worsening in size from prior, with one new nodule suggestive of pulmonary metastasis. This was May 2023, ordered for repeat MRI done on 6/7/23. This showed expected posttreatment perilesional arterial phase enhancement unchanged from 3/6/23, no new hepatic observations, worsened gastrohepatic and portocaval adenoapthy. Ordered for repeat MRI in 3 months.     AFP currently 8,227.11 from 1,912.6. CMP normal, INR normal, CBC normal.      Per oncology note 6/22/23, was recently presented at GI tumor board and is being planned for initiation of systemic treatment. Is being planned for atezolizumab and bevacizumab. REVIEW OF SYSTEMS:    Review of Systems   Constitutional: Negative for fatigue, fever and unexpected weight change. HENT: Negative for mouth sores, sore throat and trouble swallowing. Eyes: Negative for itching and visual disturbance.    Respiratory: Negative for cough, chest tightness, shortness of breath and wheezing. Cardiovascular: Negative for chest pain, palpitations and leg swelling. Endocrine: Negative for cold intolerance, heat intolerance and polyuria. Genitourinary: Negative for difficulty urinating, dysuria and hematuria. Musculoskeletal: Negative for arthralgias, back pain and gait problem. Skin: Negative for color change and rash. Allergic/Immunologic: Negative for environmental allergies. Neurological: Negative for dizziness, weakness, light-headedness and numbness. Hematological: Does not bruise/bleed easily. Psychiatric/Behavioral: Negative for confusion and sleep disturbance. The patient is not nervous/anxious.           Historical Information   Patient Active Problem List   Diagnosis   • Hepatocellular carcinoma ([address])   • Essential hypertension   • Chronic hepatitis C without hepatic coma (HCC)   • Cirrhosis of liver (HCC)   • ED (erectile dysfunction)   • Colon polyp     Past Medical History:   Diagnosis Date   • Hypertension    • Liver cancer [facility])      Past Surgical History:   Procedure Laterality Date   • HERNIA REPAIR     • IR Y-90 PRE-ANGIO/EMBO W/ LUNG SCAN  2022   • IR Y-90 RADIOEMBOLIZATION  2022     Social History   Social History     Substance and Sexual Activity   Alcohol Use Not Currently     Social History     Substance and Sexual Activity   Drug Use Yes   • Types: Marijuana    Comment: medical marijuana     Social History     Tobacco Use   Smoking Status Former   • Packs/day: 1.00   • Years: 30.00   • Total pack years: 30.00   • Types: Cigarettes   • Quit date:    • Years since quittin.7   Smokeless Tobacco Current   • Types: Chew   Tobacco Comments    nicotine pouch (on)     Family History   Problem Relation Age of Onset   • Cancer Mother        Meds/Allergies       Current Outpatient Medications:   •  lisinopril-hydrochlorothiazide (PRINZIDE,ZESTORETIC) 20-25 MG per tablet  •  polyethylene glycol (Golytely) 4000 mL solution  •  polyethylene glycol (MiraLax) 17 GM/SCOOP powder  •  polyethylene glycol-electrolytes (TriLyte) 4000 mL solution    No Known Allergies        Objective     Blood pressure 136/90, temperature (!) 97.2 °F (36.2 °C), temperature source Tympanic, height 5' 9" (1.753 m), weight 85.7 kg (189 lb). Body mass index is 27.91 kg/m². PHYSICAL EXAM:           Physical Exam  Vitals reviewed. Constitutional:       General: He is not in acute distress. Appearance: Normal appearance. He is not ill-appearing. HENT:      Head: Normocephalic and atraumatic. Nose: Nose normal.      Mouth/Throat:      Pharynx: Oropharynx is clear. No posterior oropharyngeal erythema. Eyes:      General: No scleral icterus. Extraocular Movements: Extraocular movements intact. Cardiovascular:      Rate and Rhythm: Normal rate and regular rhythm. Heart sounds: No murmur heard. Pulmonary:      Effort: Pulmonary effort is normal. No respiratory distress. Breath sounds: Normal breath sounds. No rales. Abdominal:      General: There is no distension. Palpations: Abdomen is soft. There is no shifting dullness, fluid wave, hepatomegaly or splenomegaly. Tenderness: There is no abdominal tenderness. There is no guarding. Musculoskeletal:         General: No swelling. Normal range of motion. Cervical back: Normal range of motion and neck supple. Skin:     General: Skin is warm. Coloration: Skin is not jaundiced. Neurological:      General: No focal deficit present. Mental Status: He is oriented to person, place, and time.    Psychiatric:         Mood and Affect: Mood normal.            Lab Results:   MELD 3.0: 9 at 8/29/2023 12:51 PM  MELD-Na: 7 at 8/29/2023 12:51 PM  Calculated from:  Serum Creatinine: 1.00 mg/dL at 8/29/2023 12:51 PM  Serum Sodium: 134 mmol/L at 8/29/2023 12:51 PM  Total Bilirubin: 1.26 mg/dL at 8/29/2023 12:51 PM  Serum Albumin: 4.6 g/dL (Using max of 3.5 g/dL) at 8/29/2023 12:51 PM  INR(ratio): 1.04 at 8/29/2023 12:51 PM  Age at listing (hypothetical): 62 years  Sex: Male at 8/29/2023 12:51 PM      Radiology Results:   I have reviewed the results of any radiology studies performed within the last 90 days. This includes:    MRI Abd w wo Contrast 6/7/23:     Previously seen hepatic observations now show expected posttreatment perilesional arterial phase enhancement unchanged from 3/6/2023. LI- RADS LR TR nonviable.     No new hepatic observations.     Worsened gastrohepatic and portacaval adenopathy.     ADDENDUM:     Some of the peripheral areas of enhancement in both treated observation 1 and 2 show nodular components with probable washout there is these should be considered LR TR equivocal observations    Fox Aden MD  Hepatology/Gastroenterology

## 2023-09-26 NOTE — PATIENT INSTRUCTIONS
As discussed today:    Medications:  Continue taking your current medications without changes  Laboratory Testing (Listed below):  Please have blood work drawn as requested by your oncologist.  Radiology/Diagnostic Testing:  Please schedule a multiphase MRI as soon as possible as it has already been 3 months since your last MRI. Avoid alcohol and tobacco products. Also avoid raw seafood/oysters and avoid swimming/wading in unchlorinated perry, particularly from the [facility], due to increased risk of infection from a bacteria called Vibrio Vulnificus. Avoid medications called NSAID's (Motrin/Ibuprofen, Naproxen/Naprosyn/Aleve) if possible, due to increase risk of kidney dysfunction, and if you use medications containing acetaminophen (Tylenol, percocet, Endocet, and many cough/cold medications), the dose should not exceed 2 grams/day. Seek immediate medical attention if you develop fevers over 101 F, have evidence of GI bleeding (black or bloody stools, bloody or coffee ground emesis) or confusion. Call our office if you develop worsening symptoms related to lower extremity edema, ascites, jaundice or excessive bruising/bleeding. MRI scheduled on 10/25 at [facility].

## 2023-10-05 DIAGNOSIS — C22.0 HEPATOCELLULAR CARCINOMA (HCC): Primary | ICD-10-CM

## 2023-10-05 RX ORDER — SODIUM CHLORIDE 9 MG/ML
20 INJECTION, SOLUTION INTRAVENOUS ONCE
OUTPATIENT
Start: 2023-10-10

## 2023-10-09 ENCOUNTER — LAB (OUTPATIENT)
Dept: LAB | Facility: CLINIC | Age: 58
End: 2023-10-09
Payer: COMMERCIAL

## 2023-10-09 DIAGNOSIS — C22.0 HEPATOCELLULAR CARCINOMA (HCC): ICD-10-CM

## 2023-10-09 LAB
ALBUMIN SERPL BCP-MCNC: 4.6 G/DL (ref 3.5–5)
ALP SERPL-CCNC: 79 U/L (ref 34–104)
ALT SERPL W P-5'-P-CCNC: 18 U/L (ref 7–52)
ANION GAP SERPL CALCULATED.3IONS-SCNC: 6 MMOL/L
AST SERPL W P-5'-P-CCNC: 24 U/L (ref 13–39)
BASOPHILS # BLD AUTO: 0.03 THOUSANDS/ÂΜL (ref 0–0.1)
BASOPHILS NFR BLD AUTO: 1 % (ref 0–1)
BILIRUB SERPL-MCNC: 0.78 MG/DL (ref 0.2–1)
BILIRUB UR QL STRIP: NEGATIVE
BUN SERPL-MCNC: 18 MG/DL (ref 5–25)
CALCIUM SERPL-MCNC: 9.9 MG/DL (ref 8.4–10.2)
CHLORIDE SERPL-SCNC: 100 MMOL/L (ref 96–108)
CLARITY UR: CLEAR
CO2 SERPL-SCNC: 29 MMOL/L (ref 21–32)
COLOR UR: NORMAL
CREAT SERPL-MCNC: 1.13 MG/DL (ref 0.6–1.3)
EOSINOPHIL # BLD AUTO: 0.18 THOUSAND/ÂΜL (ref 0–0.61)
EOSINOPHIL NFR BLD AUTO: 3 % (ref 0–6)
ERYTHROCYTE [DISTWIDTH] IN BLOOD BY AUTOMATED COUNT: 13.4 % (ref 11.6–15.1)
GFR SERPL CREATININE-BSD FRML MDRD: 71 ML/MIN/1.73SQ M
GLUCOSE SERPL-MCNC: 95 MG/DL (ref 65–140)
GLUCOSE UR STRIP-MCNC: NEGATIVE MG/DL
HCT VFR BLD AUTO: 45 % (ref 36.5–49.3)
HGB BLD-MCNC: 15.2 G/DL (ref 12–17)
HGB UR QL STRIP.AUTO: NEGATIVE
IMM GRANULOCYTES # BLD AUTO: 0.01 THOUSAND/UL (ref 0–0.2)
IMM GRANULOCYTES NFR BLD AUTO: 0 % (ref 0–2)
KETONES UR STRIP-MCNC: NEGATIVE MG/DL
LEUKOCYTE ESTERASE UR QL STRIP: NEGATIVE
LYMPHOCYTES # BLD AUTO: 0.9 THOUSANDS/ÂΜL (ref 0.6–4.47)
LYMPHOCYTES NFR BLD AUTO: 17 % (ref 14–44)
MCH RBC QN AUTO: 30 PG (ref 26.8–34.3)
MCHC RBC AUTO-ENTMCNC: 33.8 G/DL (ref 31.4–37.4)
MCV RBC AUTO: 89 FL (ref 82–98)
MONOCYTES # BLD AUTO: 0.56 THOUSAND/ÂΜL (ref 0.17–1.22)
MONOCYTES NFR BLD AUTO: 11 % (ref 4–12)
NEUTROPHILS # BLD AUTO: 3.66 THOUSANDS/ÂΜL (ref 1.85–7.62)
NEUTS SEG NFR BLD AUTO: 68 % (ref 43–75)
NITRITE UR QL STRIP: NEGATIVE
NRBC BLD AUTO-RTO: 0 /100 WBCS
PH UR STRIP.AUTO: 6.5 [PH]
PLATELET # BLD AUTO: 150 THOUSANDS/UL (ref 149–390)
PMV BLD AUTO: 9.6 FL (ref 8.9–12.7)
POTASSIUM SERPL-SCNC: 4.5 MMOL/L (ref 3.5–5.3)
PROT SERPL-MCNC: 7.8 G/DL (ref 6.4–8.4)
PROT UR STRIP-MCNC: NEGATIVE MG/DL
RBC # BLD AUTO: 5.07 MILLION/UL (ref 3.88–5.62)
SODIUM SERPL-SCNC: 135 MMOL/L (ref 135–147)
SP GR UR STRIP.AUTO: 1.01 (ref 1–1.03)
T3FREE SERPL-MCNC: 3.9 PG/ML (ref 2.5–3.9)
T4 FREE SERPL-MCNC: 0.83 NG/DL (ref 0.61–1.12)
TSH SERPL DL<=0.05 MIU/L-ACNC: 5.79 UIU/ML (ref 0.45–4.5)
UROBILINOGEN UR STRIP-ACNC: <2 MG/DL
WBC # BLD AUTO: 5.34 THOUSAND/UL (ref 4.31–10.16)

## 2023-10-09 PROCEDURE — 81003 URINALYSIS AUTO W/O SCOPE: CPT

## 2023-10-09 PROCEDURE — 84481 FREE ASSAY (FT-3): CPT

## 2023-10-09 PROCEDURE — 84443 ASSAY THYROID STIM HORMONE: CPT

## 2023-10-09 PROCEDURE — 80053 COMPREHEN METABOLIC PANEL: CPT

## 2023-10-09 PROCEDURE — 36415 COLL VENOUS BLD VENIPUNCTURE: CPT

## 2023-10-09 PROCEDURE — 85025 COMPLETE CBC W/AUTO DIFF WBC: CPT

## 2023-10-09 PROCEDURE — 84439 ASSAY OF FREE THYROXINE: CPT

## 2023-10-10 ENCOUNTER — HOSPITAL ENCOUNTER (OUTPATIENT)
Dept: INFUSION CENTER | Facility: CLINIC | Age: 58
Discharge: HOME | End: 2023-10-10
Payer: COMMERCIAL

## 2023-10-10 VITALS
RESPIRATION RATE: 18 BRPM | HEART RATE: 77 BPM | BODY MASS INDEX: 27.85 KG/M2 | DIASTOLIC BLOOD PRESSURE: 87 MMHG | SYSTOLIC BLOOD PRESSURE: 137 MMHG | TEMPERATURE: 97.9 F | WEIGHT: 188 LBS | HEIGHT: 69 IN

## 2023-10-10 DIAGNOSIS — C22.0 HEPATOCELLULAR CARCINOMA (HCC): Primary | ICD-10-CM

## 2023-10-10 PROCEDURE — 96413 CHEMO IV INFUSION 1 HR: CPT

## 2023-10-10 PROCEDURE — 96417 CHEMO IV INFUS EACH ADDL SEQ: CPT

## 2023-10-10 RX ORDER — SODIUM CHLORIDE 9 MG/ML
20 INJECTION, SOLUTION INTRAVENOUS ONCE
Status: COMPLETED | OUTPATIENT
Start: 2023-10-10 | End: 2023-10-10

## 2023-10-10 RX ADMIN — ATEZOLIZUMAB 1200 MG: 1200 INJECTION, SOLUTION INTRAVENOUS at 13:22

## 2023-10-10 RX ADMIN — BEVACIZUMAB-AWWB 1300 MG: 400 INJECTION, SOLUTION INTRAVENOUS at 12:50

## 2023-10-10 RX ADMIN — SODIUM CHLORIDE 20 ML/HR: 0.9 INJECTION, SOLUTION INTRAVENOUS at 12:26

## 2023-10-10 NOTE — PROGRESS NOTES
Pt received chemo infusion w/out any adverse effects, offers no complaints. No further appts scheduled. Meeting wRisa Bee end of month

## 2023-10-12 NOTE — RESULT ENCOUNTER NOTE
Dr. Denzel Reich MD,  Mr. Robert Gonsalez results were normal and he has been notified via [address] [place] [address].

## 2023-10-17 ENCOUNTER — OFFICE VISIT (OUTPATIENT)
Dept: FAMILY MEDICINE CLINIC | Facility: CLINIC | Age: 58
End: 2023-10-17
Payer: COMMERCIAL

## 2023-10-17 VITALS
HEIGHT: 69 IN | DIASTOLIC BLOOD PRESSURE: 88 MMHG | BODY MASS INDEX: 28.58 KG/M2 | SYSTOLIC BLOOD PRESSURE: 138 MMHG | HEART RATE: 68 BPM | RESPIRATION RATE: 16 BRPM | OXYGEN SATURATION: 99 % | WEIGHT: 193 LBS

## 2023-10-17 DIAGNOSIS — Z12.5 PROSTATE CANCER SCREENING: ICD-10-CM

## 2023-10-17 DIAGNOSIS — K74.60 CIRRHOSIS OF LIVER WITHOUT ASCITES, UNSPECIFIED HEPATIC CIRRHOSIS TYPE (HCC): ICD-10-CM

## 2023-10-17 DIAGNOSIS — C22.0 HEPATOCELLULAR CARCINOMA (HCC): ICD-10-CM

## 2023-10-17 DIAGNOSIS — I10 ESSENTIAL HYPERTENSION: Primary | ICD-10-CM

## 2023-10-17 DIAGNOSIS — Z13.6 SCREENING FOR CARDIOVASCULAR CONDITION: ICD-10-CM

## 2023-10-17 DIAGNOSIS — K63.5 POLYP OF COLON, UNSPECIFIED PART OF COLON, UNSPECIFIED TYPE: ICD-10-CM

## 2023-10-17 DIAGNOSIS — E03.8 SUBCLINICAL HYPOTHYROIDISM: ICD-10-CM

## 2023-10-17 PROCEDURE — 99214 OFFICE O/P EST MOD 30 MIN: CPT | Performed by: FAMILY MEDICINE

## 2023-10-17 RX ORDER — AMLODIPINE BESYLATE 2.5 MG/1
2.5 TABLET ORAL DAILY
Qty: 90 TABLET | Refills: 2 | Status: SHIPPED | OUTPATIENT
Start: 2023-10-17

## 2023-10-17 RX ORDER — LISINOPRIL AND HYDROCHLOROTHIAZIDE 25; 20 MG/1; MG/1
1 TABLET ORAL DAILY
Qty: 90 TABLET | Refills: 2 | Status: SHIPPED | OUTPATIENT
Start: 2023-10-17

## 2023-10-17 NOTE — ASSESSMENT & PLAN NOTE
Patient was treated in the past but now has recurrence. Continue management per Oncology and next appointment is on 10/31/23. Finished chemotherapy 2 weeks ago and will go for MRI.

## 2023-10-17 NOTE — ASSESSMENT & PLAN NOTE
Patient had colonoscopy in Feb 2023 by Dr Kd Gasca and he had 1 polyp. Needs recheck due to poor prep.

## 2023-10-17 NOTE — PROGRESS NOTES
Depression Screening and Follow-up Plan: Patient was screened for depression during today's encounter. They screened negative with a PHQ-2 score of 0. Assessment/Plan:         Problem List Items Addressed This Visit        Digestive    Hepatocellular carcinoma [facility])     Patient was treated in the past but now has recurrence. Continue management per Oncology and next appointment is on 10/31/23. Finished chemotherapy 2 weeks ago and will go for MRI. Colon polyp     Patient had colonoscopy in Feb 2023 by Dr Naresh Robles and he had 1 polyp. Needs recheck due to poor prep. Cirrhosis of liver (HCC)     Was seen on imaging but pt not having any sxs or manifestations of it. Endocrine    Subclinical hypothyroidism     TSH high but T4 and T3 normal in Oct 2023. Will monitor. Cardiovascular and Mediastinum    Essential hypertension - Primary     Blood pressure borderline high. Continue lisinopril HCT 20/25 daily and add amlodipine 2.5 mg qd. Pt advised to continue low Na diet and to exercise on a regular basis. CBC and CMP ok in Oct 2023. Relevant Medications    amLODIPine (NORVASC) 2.5 mg tablet    lisinopril-hydrochlorothiazide (PRINZIDE,ZESTORETIC) 20-25 MG per tablet   Other Visit Diagnoses     Screening for cardiovascular condition        Relevant Orders    Lipid panel    Prostate cancer screening        Relevant Orders    PSA, Total Screen            Subjective:      Patient ID: Samara Fairchild is a 62 y.o. male. Patient here for follow-up Hypertension, [address], Cirrhosis. Patient doing ok. No chest pain or shortness of breath. Had last chemotherapy 2 weeks ago and going to have MRI done. Not checking blood pressure at home.          The following portions of the patient's history were reviewed and updated as appropriate:   Past Medical History:  He has a past medical history of Hypertension and Liver cancer [facility]).,  _______________________________________________________________________  Medical Problems:  does not have any pertinent problems on file.,  _______________________________________________________________________  Past Surgical History:   has a past surgical history that includes Hernia repair; IR Y-90 pre-angio/embo w/ lung scan (11/8/2022); and IR Y-90 radioembolization (11/22/2022). ,  _______________________________________________________________________  Family History:  family history includes Cancer in his mother.,  _______________________________________________________________________  Social History:   reports that he quit smoking about 6 years ago. His smoking use included cigarettes. He has a 30.00 pack-year smoking history. His smokeless tobacco use includes chew. He reports that he does not currently use alcohol. He reports current drug use. Drug: Marijuana. ,  _______________________________________________________________________  Allergies:  has No Known Allergies. .  _______________________________________________________________________  Current Outpatient Medications   Medication Sig Dispense Refill   • amLODIPine (NORVASC) 2.5 mg tablet Take 1 tablet (2.5 mg total) by mouth daily 90 tablet 2   • lisinopril-hydrochlorothiazide (PRINZIDE,ZESTORETIC) 20-25 MG per tablet Take 1 tablet by mouth daily 90 tablet 2     No current facility-administered medications for this visit.     _______________________________________________________________________  Review of Systems   Constitutional:  Negative for fatigue and unexpected weight change. Respiratory:  Negative for cough and shortness of breath. Cardiovascular:  Negative for chest pain. Gastrointestinal:  Negative for abdominal pain, constipation, diarrhea and vomiting. Musculoskeletal:  Negative for arthralgias. Neurological:  Negative for dizziness and headaches. Psychiatric/Behavioral:  Negative for dysphoric mood.  The patient is not nervous/anxious. Objective:  Vitals:    10/17/23 0858 10/17/23 0922   BP: 154/92 138/88   BP Location: Left arm Left arm   Patient Position: Sitting Sitting   Cuff Size: Standard Standard   Pulse: 68    Resp: 16    SpO2: 99%    Weight: 87.5 kg (193 lb)    Height: 5' 9" (1.753 m)      Body mass index is 28.5 kg/m². Physical Exam  Vitals and nursing note reviewed. Constitutional:       Appearance: Normal appearance. He is well-developed and normal weight. Neck:      Thyroid: No thyromegaly. Cardiovascular:      Rate and Rhythm: Normal rate and regular rhythm. Heart sounds: Normal heart sounds. No murmur heard. Pulmonary:      Effort: Pulmonary effort is normal. No respiratory distress. Breath sounds: Normal breath sounds. No wheezing. Musculoskeletal:      Cervical back: Normal range of motion and neck supple. Right lower leg: No edema. Left lower leg: No edema. Lymphadenopathy:      Cervical: No cervical adenopathy. Neurological:      Mental Status: He is alert and oriented to person, place, and time. Cranial Nerves: No cranial nerve deficit. Psychiatric:         Mood and Affect: Mood normal.         Behavior: Behavior normal.         Thought Content:  Thought content normal.         Judgment: Judgment normal.

## 2023-10-17 NOTE — ASSESSMENT & PLAN NOTE
Blood pressure borderline high. Continue lisinopril HCT 20/25 daily and add amlodipine 2.5 mg qd. Pt advised to continue low Na diet and to exercise on a regular basis. CBC and CMP ok in Oct 2023.

## 2023-10-25 ENCOUNTER — HOSPITAL ENCOUNTER (OUTPATIENT)
Dept: MRI IMAGING | Facility: HOSPITAL | Age: 58
Discharge: HOME | End: 2023-10-25
Payer: COMMERCIAL

## 2023-10-25 DIAGNOSIS — C22.0 HEPATOCELLULAR CARCINOMA (HCC): ICD-10-CM

## 2023-10-25 DIAGNOSIS — K70.30 ALCOHOLIC CIRRHOSIS OF LIVER WITHOUT ASCITES (HCC): ICD-10-CM

## 2023-10-25 PROCEDURE — A9585 GADOBUTROL INJECTION: HCPCS | Performed by: FAMILY MEDICINE

## 2023-10-25 PROCEDURE — 74183 MRI ABD W/O CNTR FLWD CNTR: CPT

## 2023-10-25 PROCEDURE — G1004 CDSM NDSC: HCPCS

## 2023-10-25 RX ORDER — GADOBUTROL 604.72 MG/ML
8 INJECTION INTRAVENOUS
Status: COMPLETED | OUTPATIENT
Start: 2023-10-25 | End: 2023-10-25

## 2023-10-25 RX ADMIN — GADOBUTROL 8 ML: 604.72 INJECTION INTRAVENOUS at 07:34

## 2023-10-31 ENCOUNTER — OFFICE VISIT (OUTPATIENT)
Dept: HEMATOLOGY ONCOLOGY | Facility: CLINIC | Age: 58
End: 2023-10-31
Payer: COMMERCIAL

## 2023-10-31 VITALS
WEIGHT: 195 LBS | HEIGHT: 69 IN | BODY MASS INDEX: 28.88 KG/M2 | DIASTOLIC BLOOD PRESSURE: 84 MMHG | SYSTOLIC BLOOD PRESSURE: 152 MMHG | OXYGEN SATURATION: 99 % | TEMPERATURE: 98.2 F | HEART RATE: 71 BPM | RESPIRATION RATE: 17 BRPM

## 2023-10-31 DIAGNOSIS — B18.2 CHRONIC HEPATITIS C WITHOUT HEPATIC COMA (HCC): Primary | ICD-10-CM

## 2023-10-31 DIAGNOSIS — K74.60 CIRRHOSIS OF LIVER WITHOUT ASCITES, UNSPECIFIED HEPATIC CIRRHOSIS TYPE (HCC): ICD-10-CM

## 2023-10-31 DIAGNOSIS — C22.0 HEPATOCELLULAR CARCINOMA (HCC): ICD-10-CM

## 2023-10-31 PROCEDURE — 99214 OFFICE O/P EST MOD 30 MIN: CPT | Performed by: INTERNAL MEDICINE

## 2023-10-31 RX ORDER — SODIUM CHLORIDE 9 MG/ML
20 INJECTION, SOLUTION INTRAVENOUS ONCE
Status: CANCELLED | OUTPATIENT
Start: 2023-11-02

## 2023-10-31 NOTE — PROGRESS NOTES
Sandie Stout  1965  [facility] [place] [facility] [place] [address] [address] [facility] [id number]    DISCUSSION/SUMMARY:      60-year-old male with a somewhat complicated prior GI/liver history. Patient was previously diagnosed with hepatocellular carcinoma, elevated alpha-fetoprotein level. Patient underwent Y90 treatment in November 2022. Alpha-fetoprotein level came down nicely. Unfortunately the tumor marker recently began to rise. The May 16, 2023 CAT scan of the chest demonstrated enlarging pulmonary nodules. More recent MRI of the abdomen demonstrated worsening gastrohepatic and portacaval adenopathy (although the liver lesions were unchanged). Alpha-fetoprotein was found to be significantly elevated. Patient was recently presented at the GI tumor board. The consensus was to begin systemic treatment. NCCN guidelines 1/20/2023 states that for patients with hepatocellular carcinoma, good performance, Child Myers class A, category 1 treatment includes atezolizumab and bevacizumab or tremelimumab + durvalumab (also category 1). Regimen  Atezolizumab 1200 mg IV day 1  Bevacizumab 15 mg/kilogram IV day 1  Cycle length = 21 days  Goal = prolongation of life, improvement of quality of life    Presently Mr. Mirian Blum feels well and clinically there are no concerning findings. Recent blood work was good/acceptable. AFP has been reordered; need to monitor the trend. Patient recently underwent MRI/abdomen; results are still pending. For the time being, patient will continue with the atezolizumab and bevacizumab. Etiology for the body aches is not clear, possibly related to the treatments, possibly related to patient's physically demanding work. The aches are about the same as before, patient is still able to work full-time. Previously we discussed options including pain medications, palliative care - patient deferred.     Patient will follow-up with GI as directed. Patient once again asked about when he will be completed with the chemotherapy. We once again discussed the fact that he has recurrent and metastatic disease and that the treatments are likely for a long time. Patient is to return in 6 weeks. Patient knows to call the hematology/oncology office if there are any other questions or concerns. Carefully review your medication list and verify that the list is accurate and up-to-date. Please call the hematology/oncology office if there are medications missing from the list, medications on the list that you are not currently taking or if there is a dosage or instruction that is different from how you're taking that medication.     Patient goals and areas of care: Continue with the atezolizumab and bevacizumab  Barriers to care: None  Patient is able to self-care  ______________________________________________________________________________________    Chief Complaint   Patient presents with    Follow-up    Hepatocellular carcinoma     Oncology History   Hepatocellular carcinoma ([address])   2022 Initial Diagnosis    Hepatocellular carcinoma ([address])     2/8/2022 Biopsy    [facility] DOSHI US guided liver biopsy: right liver lobe:  Poorly differentiated HCC with patchy necrosis       6/29/2023 -  Chemotherapy    alteplase (CATHFLO), 2 mg, Intracatheter, Every 1 Minute as needed, 6 of 10 cycles  atezolizumab (TECENTRIQ) IVPB, 1,200 mg, Intravenous, Once, 6 of 10 cycles  Administration: 1,200 mg (6/29/2023), 1,200 mg (7/20/2023), 1,200 mg (8/10/2023), 1,200 mg (8/31/2023), 1,200 mg (9/21/2023)  bevacizumab-awwb (MVASI) IVPB, 1,345 mg (100 % of original dose 15 mg/kg), Intravenous, Once, 6 of 10 cycles  Dose modification: 15 mg/kg (original dose 15 mg/kg, Cycle 1), 15 mg/kg (original dose 15 mg/kg, Cycle 2), 15 mg/kg (original dose 15 mg/kg, Cycle 3)  Administration: 1,300 mg (6/29/2023), 1,300 mg (7/20/2023), 1,300 mg (8/10/2023), 1,300 mg (8/31/2023), 1,300 mg (9/21/2023)       History of Present Illness: 49-year-old male with history of cirrhosis secondary to chronic hepatitis C, history of alcohol abuse, previously diagnosed with multifocal hepatocellular carcinoma. Patient underwent TARE on November 22, 2022. Recent scans demonstrated concerning enlarging pulmonary lesions. Alpha-fetoprotein level has also been rising. Patient was recently presented at the GI tumor board and the plan was systemic treatment. Patient has completed 4 cycles of treatment and returns for follow-up. Mr. Ruthie Brooks states feeling, about the same as before. Patient continues to work full-time, body aches in the fingers and hands are the same as before. No respiratory issues. No GI or  problems. Appetite is good, weight is stable. Review of Systems   Constitutional: Negative. HENT: Negative. Eyes: Negative. Respiratory: Negative. Cardiovascular: Negative. Gastrointestinal: Negative. Endocrine: Negative. Genitourinary: Negative. Musculoskeletal:  Positive for arthralgias. Skin: Negative. Allergic/Immunologic: Negative. Neurological: Negative. Hematological: Negative. Psychiatric/Behavioral:  The patient is nervous/anxious. All other systems reviewed and are negative.     Patient Active Problem List   Diagnosis    Hepatocellular carcinoma ([address])    Essential hypertension    Chronic hepatitis C without hepatic coma (HCC)    Cirrhosis of liver (HCC)    ED (erectile dysfunction)    Colon polyp    Subclinical hypothyroidism     Past Medical History:   Diagnosis Date    Hypertension     Liver cancer [facility])      Past Surgical History:   Procedure Laterality Date    HERNIA REPAIR      IR Y-90 PRE-ANGIO/EMBO W/ LUNG SCAN  11/8/2022    IR Y-90 RADIOEMBOLIZATION  11/22/2022     Family History   Problem Relation Age of Onset    Cancer Mother      Social History     Socioeconomic History    Marital status: /Civil Union     Spouse name: Not on file    Number of children: Not on file    Years of education: Not on file    Highest education level: Not on file   Occupational History    Not on file   Tobacco Use    Smoking status: Former     Packs/day: 1.00     Years: 30.00     Total pack years: 30.00     Types: Cigarettes     Quit date:      Years since quittin.8    Smokeless tobacco: Current     Types: Chew    Tobacco comments:     nicotine pouch (on)   Vaping Use    Vaping Use: Never used   Substance and Sexual Activity    Alcohol use: Not Currently    Drug use: Yes     Types: Marijuana     Comment: medical marijuana    Sexual activity: Yes     Partners: Female   Other Topics Concern    Not on file   Social History Narrative    Not on file     Social Determinants of Health     Financial Resource Strain: Not on file   Food Insecurity: Not on file   Transportation Needs: Not on file   Physical Activity: Not on file   Stress: Not on file   Social Connections: Not on file   Intimate Partner Violence: Not on file   Housing Stability: Not on file       Current Outpatient Medications:     amLODIPine (NORVASC) 2.5 mg tablet, Take 1 tablet (2.5 mg total) by mouth daily, Disp: 90 tablet, Rfl: 2    lisinopril-hydrochlorothiazide (PRINZIDE,ZESTORETIC) 20-25 MG per tablet, Take 1 tablet by mouth daily, Disp: 90 tablet, Rfl: 2    No Known Allergies    Vitals:    10/31/23 1350   BP: 152/84   Pulse: 71   Resp: 17   Temp: 98.2 °F (36.8 °C)   SpO2: 99%     Physical Exam  Constitutional:       Appearance: He is well-developed. Comments: Well-nourished male, no respiratory distress, no signs of pain   HENT:      Head: Normocephalic and atraumatic. Right Ear: External ear normal.      Left Ear: External ear normal.   Eyes:      Conjunctiva/sclera: Conjunctivae normal.      Pupils: Pupils are equal, round, and reactive to light. Cardiovascular:      Rate and Rhythm: Normal rate and regular rhythm. Heart sounds: Normal heart sounds.    Pulmonary: Effort: Pulmonary effort is normal.      Breath sounds: Normal breath sounds. Comments: Clear bilaterally  Abdominal:      General: Bowel sounds are normal.      Palpations: Abdomen is soft. Comments: + Bowel sounds, nontender, soft, no paraspinal megaly, no guarding   Musculoskeletal:         General: Normal range of motion. Cervical back: Normal range of motion and neck supple. Comments: No swelling in the digits or hands, good range of motion in upper and lower extremities, equal bilaterally   Skin:     General: Skin is warm. Comments: Good color, warm, moist, no petechiae or ecchymoses   Neurological:      Mental Status: He is alert and oriented to person, place, and time. Deep Tendon Reflexes: Reflexes are normal and symmetric. Psychiatric:         Behavior: Behavior normal.         Thought Content: Thought content normal.         Judgment: Judgment normal.     Extremities: No lower extreme edema bilaterally, no cords, pulses are 1+  Lymphatics: None within the neck, supraclavicular region, axilla bilaterally    Labs    10/9/2023 WBC = 5.34 hemoglobin = 15.2 hematocrit = 45 platelet = 796 neutrophil = 68% TSH = 5.791 BUN = 18 creatinine = 1.13 calcium = 9.9 LFTs WNL        8/29/2023 WBC = 6.8 hemoglobin = 15.3 hematocrit = 45 platelet = 302 neutrophil = 76% TSH = 5.122 BUN = 18 creatinine = 1.00 calcium = 9.6 AST = 26 ALT = 21 alkaline phosphatase = 78 total protein = 7.6 total bilirubin = 1.26    Imaging    10/25/2023 MRI abdomen with and without contrast - results are still pending    6/7/2023 MRI abdomen with and without contrast    IMPRESSION:     Previously seen hepatic observations now show expected posttreatment perilesional arterial phase enhancement unchanged from 3/6/2023. LI- RADS LR TR nonviable. No new hepatic observations. Worsened gastrohepatic and portacaval adenopathy.     5/16/2023 CAT scan chest without contrast    IMPRESSION:     Increase in size of lung nodules measuring up to 6 mm with at least one new lung nodule. Given the patient's history of malignancy, differential includes pulmonary metastasis. Recommend short-term follow-up with CT without contrast in 3 months. 3/6/2023 MRI abdomen with and without contrast    IMPRESSION:     All of the previously seen observations now show normal posttreatment perilesional arterial phase enhancement or are no longer apparent status post treatment. LR- TR Nonviable. 7/13/2022 MRI abdomen    IMPRESSION:     1. Cirrhosis. Multiple right hepatic lobe observations:  - Segment 8, 4.0 x 3.1 cm, LR-M.  - Segment 7/8, 4.3 x 4.6 cm, LR-5.  - Segment 8, 1.6 x 1.6 cm, LR-M.  - Segment 6, 1.1 x 1.2 cm, LR-5. One of these lesions has reportedly previously been biopsied showing hepatocellular carcinoma, although it is not clear which lesion was sampled. Although some of the lesions meet criteria for LR-M, all of the lesions may be part of the same process. Pathology    Case Report   Surgical Pathology Report                         Case: [id number]                                    Authorizing Provider:  Manjinder Salas MD      Collected:           10/06/2022 [facility]               Ordering Location:     [facility]      Received:            10/06/2022 [address] [place] [address] Specialty                                                                                   Laboratory                                                                    Pathologist:           Keyona Camarena MD                                                                     Specimen:    Liver, Liver Biopsy (10 slides [address] [facility],[address], collected 2/8/2022)                Final Diagnosis   OUTSIDE SLIDES FOR REVIEW (10 slides [address] [facility],[address], collected 2/8/2022):       A. Liver, core needle biopsy:  -   Poorly differentiated carcinoma with patchy necrosis (see comment).   -   Background liver parenchyma with chronic inflammation. Comment: Submitted immunohistochemical stains show the tumor cells are positive for CK7 and CK19, and are negative for CK20,  HepPar1, , TTF1 and . Additional stains were performed by outside institution and are not submitted for review. Per report, the tumor cells stain positive for glypican 3, AFP, albumin mRNA and claudin 4, and stain negative for arginase, PAX8, GATA3, CDX2, and NKX3.1 and mucicarmine. The histomorphologic and immunophenotypic features are consistent with poorly differentiated carcinoma. The clinical history of cirrhosis, AFP and glypican 3 positivity favor hepatocellular carcinoma. However, lack of staining for HepPar1 and arginase, and expression of claudin 4, CK7 and CK19 suggest a cholangiocarcinoma component. Strong expression of albumin mRNA can be found in a majority of hepatocellular carcinoma. However, albumin mRNA can also be positive in a subset of intrahepatic cholangiocarcinoma and adenocarcinoma from other sites. Therefore, hepatocellular carcinoma with a cholangiocarcinoma component, so-called combined hepatocellular carcinoma-cholangiocarcinoma, cannot be excluded. There is insufficient background liver parenchyma for a definitive diagnosis of cirrhosis. Reference: Erika Morales HD, Neelam T, Dorota Briggs SS, Ruth BERMUDEZ TC, Luz RK, [facility], Benedicta Laurence Calvo TT, Jairo RP. Albumin In Situ Hybridization Can Be Positive in Adenocarcinomas and Other Tumors From Diverse Sites. Am J Clin Pathol. 2019 Jul 5;152(2):190-199. doi: 10.1093/ajcp/lfb132. PMID: 37306891.      Interpretation performed at [address]    Electronically signed by Sonja Quinones MD on 10/6/2022 at  3:49 PM

## 2023-11-01 ENCOUNTER — TELEPHONE (OUTPATIENT)
Dept: HEMATOLOGY ONCOLOGY | Facility: MEDICAL CENTER | Age: 58
End: 2023-11-01

## 2023-11-01 ENCOUNTER — APPOINTMENT (OUTPATIENT)
Dept: LAB | Facility: CLINIC | Age: 58
End: 2023-11-01
Payer: COMMERCIAL

## 2023-11-01 DIAGNOSIS — C22.0 HEPATOCELLULAR CARCINOMA (HCC): ICD-10-CM

## 2023-11-01 LAB
AFP-TM SERPL-MCNC: 33.51 NG/ML (ref 0–9)
ALBUMIN SERPL BCP-MCNC: 4.5 G/DL (ref 3.5–5)
ALP SERPL-CCNC: 66 U/L (ref 34–104)
ALT SERPL W P-5'-P-CCNC: 16 U/L (ref 7–52)
ANION GAP SERPL CALCULATED.3IONS-SCNC: 8 MMOL/L
AST SERPL W P-5'-P-CCNC: 22 U/L (ref 13–39)
BACTERIA UR QL AUTO: ABNORMAL /HPF
BASOPHILS # BLD AUTO: 0.02 THOUSANDS/ÂΜL (ref 0–0.1)
BASOPHILS NFR BLD AUTO: 0 % (ref 0–1)
BILIRUB SERPL-MCNC: 1.12 MG/DL (ref 0.2–1)
BILIRUB UR QL STRIP: NEGATIVE
BUN SERPL-MCNC: 16 MG/DL (ref 5–25)
CALCIUM SERPL-MCNC: 9.6 MG/DL (ref 8.4–10.2)
CHLORIDE SERPL-SCNC: 101 MMOL/L (ref 96–108)
CLARITY UR: CLEAR
CO2 SERPL-SCNC: 25 MMOL/L (ref 21–32)
COLOR UR: YELLOW
CREAT SERPL-MCNC: 1.23 MG/DL (ref 0.6–1.3)
EOSINOPHIL # BLD AUTO: 0.17 THOUSAND/ÂΜL (ref 0–0.61)
EOSINOPHIL NFR BLD AUTO: 3 % (ref 0–6)
ERYTHROCYTE [DISTWIDTH] IN BLOOD BY AUTOMATED COUNT: 13.1 % (ref 11.6–15.1)
GFR SERPL CREATININE-BSD FRML MDRD: 64 ML/MIN/1.73SQ M
GLUCOSE SERPL-MCNC: 87 MG/DL (ref 65–140)
GLUCOSE UR STRIP-MCNC: NEGATIVE MG/DL
HCT VFR BLD AUTO: 43.2 % (ref 36.5–49.3)
HGB BLD-MCNC: 14.8 G/DL (ref 12–17)
HGB UR QL STRIP.AUTO: ABNORMAL
HYALINE CASTS #/AREA URNS LPF: ABNORMAL /LPF
IMM GRANULOCYTES # BLD AUTO: 0.02 THOUSAND/UL (ref 0–0.2)
IMM GRANULOCYTES NFR BLD AUTO: 0 % (ref 0–2)
KETONES UR STRIP-MCNC: NEGATIVE MG/DL
LEUKOCYTE ESTERASE UR QL STRIP: NEGATIVE
LYMPHOCYTES # BLD AUTO: 1.09 THOUSANDS/ÂΜL (ref 0.6–4.47)
LYMPHOCYTES NFR BLD AUTO: 19 % (ref 14–44)
MCH RBC QN AUTO: 30.2 PG (ref 26.8–34.3)
MCHC RBC AUTO-ENTMCNC: 34.3 G/DL (ref 31.4–37.4)
MCV RBC AUTO: 88 FL (ref 82–98)
MONOCYTES # BLD AUTO: 0.57 THOUSAND/ÂΜL (ref 0.17–1.22)
MONOCYTES NFR BLD AUTO: 10 % (ref 4–12)
MUCOUS THREADS UR QL AUTO: ABNORMAL
NEUTROPHILS # BLD AUTO: 3.76 THOUSANDS/ÂΜL (ref 1.85–7.62)
NEUTS SEG NFR BLD AUTO: 68 % (ref 43–75)
NITRITE UR QL STRIP: NEGATIVE
NON-SQ EPI CELLS URNS QL MICRO: ABNORMAL /HPF
NRBC BLD AUTO-RTO: 0 /100 WBCS
PH UR STRIP.AUTO: 6 [PH]
PLATELET # BLD AUTO: 169 THOUSANDS/UL (ref 149–390)
PMV BLD AUTO: 9.8 FL (ref 8.9–12.7)
POTASSIUM SERPL-SCNC: 4 MMOL/L (ref 3.5–5.3)
PROT SERPL-MCNC: 7.6 G/DL (ref 6.4–8.4)
PROT UR STRIP-MCNC: ABNORMAL MG/DL
RBC # BLD AUTO: 4.9 MILLION/UL (ref 3.88–5.62)
RBC #/AREA URNS AUTO: ABNORMAL /HPF
SODIUM SERPL-SCNC: 134 MMOL/L (ref 135–147)
SP GR UR STRIP.AUTO: 1.03 (ref 1–1.03)
T3FREE SERPL-MCNC: 3.41 PG/ML (ref 2.5–3.9)
T4 FREE SERPL-MCNC: 0.86 NG/DL (ref 0.61–1.12)
TSH SERPL DL<=0.05 MIU/L-ACNC: 5.25 UIU/ML (ref 0.45–4.5)
UROBILINOGEN UR STRIP-ACNC: <2 MG/DL
WBC # BLD AUTO: 5.63 THOUSAND/UL (ref 4.31–10.16)
WBC #/AREA URNS AUTO: ABNORMAL /HPF

## 2023-11-01 PROCEDURE — 84439 ASSAY OF FREE THYROXINE: CPT

## 2023-11-01 PROCEDURE — 85025 COMPLETE CBC W/AUTO DIFF WBC: CPT

## 2023-11-01 PROCEDURE — 84443 ASSAY THYROID STIM HORMONE: CPT

## 2023-11-01 PROCEDURE — 36415 COLL VENOUS BLD VENIPUNCTURE: CPT

## 2023-11-01 PROCEDURE — 80053 COMPREHEN METABOLIC PANEL: CPT

## 2023-11-01 PROCEDURE — 84481 FREE ASSAY (FT-3): CPT

## 2023-11-01 PROCEDURE — 82105 ALPHA-FETOPROTEIN SERUM: CPT

## 2023-11-01 PROCEDURE — 81001 URINALYSIS AUTO W/SCOPE: CPT

## 2023-11-02 ENCOUNTER — HOSPITAL ENCOUNTER (OUTPATIENT)
Dept: INFUSION CENTER | Facility: CLINIC | Age: 58
Discharge: HOME | End: 2023-11-02
Payer: COMMERCIAL

## 2023-11-02 VITALS
RESPIRATION RATE: 18 BRPM | HEIGHT: 69 IN | HEART RATE: 70 BPM | DIASTOLIC BLOOD PRESSURE: 60 MMHG | WEIGHT: 194 LBS | SYSTOLIC BLOOD PRESSURE: 120 MMHG | TEMPERATURE: 97 F | OXYGEN SATURATION: 98 % | BODY MASS INDEX: 28.73 KG/M2

## 2023-11-02 DIAGNOSIS — C22.0 HEPATOCELLULAR CARCINOMA (HCC): Primary | ICD-10-CM

## 2023-11-02 PROCEDURE — 96413 CHEMO IV INFUSION 1 HR: CPT

## 2023-11-02 PROCEDURE — 96417 CHEMO IV INFUS EACH ADDL SEQ: CPT

## 2023-11-02 RX ORDER — SODIUM CHLORIDE 9 MG/ML
20 INJECTION, SOLUTION INTRAVENOUS ONCE
Status: COMPLETED | OUTPATIENT
Start: 2023-11-02 | End: 2023-11-02

## 2023-11-02 RX ADMIN — SODIUM CHLORIDE 20 ML/HR: 0.9 INJECTION, SOLUTION INTRAVENOUS at 08:41

## 2023-11-02 RX ADMIN — BEVACIZUMAB-AWWB 1300 MG: 400 INJECTION, SOLUTION INTRAVENOUS at 09:21

## 2023-11-02 RX ADMIN — ATEZOLIZUMAB 1200 MG: 1200 INJECTION, SOLUTION INTRAVENOUS at 08:44

## 2023-11-02 NOTE — PROGRESS NOTES
Patient to infusion for Mvasi and Tecentriq. Pt states he has some mild joint pain in his right hand. Labs reviewed from 11/1/23. BP with within parameters for treatment today. Call bell within reach.

## 2023-11-15 ENCOUNTER — TELEPHONE (OUTPATIENT)
Age: 58
End: 2023-11-15

## 2023-11-15 DIAGNOSIS — K70.30 ALCOHOLIC CIRRHOSIS OF LIVER WITHOUT ASCITES (HCC): Primary | ICD-10-CM

## 2023-11-15 DIAGNOSIS — C22.0 HEPATOCELLULAR CARCINOMA (HCC): ICD-10-CM

## 2023-11-15 NOTE — TELEPHONE ENCOUNTER
Patients GI provider:  Teja Tellez    Number to return call: [phone]    Reason for call: Pt calling stating he missed a call from our number, do not see any documentation of an outreach made to the pt. Reminded him of his upcoming appt on 12/20/2023, confirmed.      Scheduled procedure/appointment date if applicable: 69/62/4365 - Follow Up - Teja Tellez

## 2023-11-17 RX ORDER — SODIUM CHLORIDE 9 MG/ML
20 INJECTION, SOLUTION INTRAVENOUS ONCE
Status: CANCELLED | OUTPATIENT
Start: 2023-11-24

## 2023-11-21 ENCOUNTER — APPOINTMENT (OUTPATIENT)
Dept: LAB | Facility: CLINIC | Age: 58
End: 2023-11-21
Payer: COMMERCIAL

## 2023-11-21 DIAGNOSIS — C22.0 HEPATOCELLULAR CARCINOMA (HCC): ICD-10-CM

## 2023-11-21 DIAGNOSIS — C22.0 HEPATOCELLULAR CARCINOMA (HCC): Primary | ICD-10-CM

## 2023-11-21 LAB
ALBUMIN SERPL BCP-MCNC: 4.6 G/DL (ref 3.5–5)
ALP SERPL-CCNC: 59 U/L (ref 34–104)
ALT SERPL W P-5'-P-CCNC: 17 U/L (ref 7–52)
ANION GAP SERPL CALCULATED.3IONS-SCNC: 6 MMOL/L
AST SERPL W P-5'-P-CCNC: 26 U/L (ref 13–39)
BACTERIA UR QL AUTO: ABNORMAL /HPF
BASOPHILS # BLD AUTO: 0.03 THOUSANDS/ÂΜL (ref 0–0.1)
BASOPHILS NFR BLD AUTO: 1 % (ref 0–1)
BILIRUB SERPL-MCNC: 1 MG/DL (ref 0.2–1)
BILIRUB UR QL STRIP: NEGATIVE
BUN SERPL-MCNC: 16 MG/DL (ref 5–25)
CALCIUM SERPL-MCNC: 9.3 MG/DL (ref 8.4–10.2)
CHLORIDE SERPL-SCNC: 100 MMOL/L (ref 96–108)
CLARITY UR: CLEAR
CO2 SERPL-SCNC: 27 MMOL/L (ref 21–32)
COLOR UR: YELLOW
CREAT SERPL-MCNC: 1.11 MG/DL (ref 0.6–1.3)
EOSINOPHIL # BLD AUTO: 0.11 THOUSAND/ÂΜL (ref 0–0.61)
EOSINOPHIL NFR BLD AUTO: 2 % (ref 0–6)
ERYTHROCYTE [DISTWIDTH] IN BLOOD BY AUTOMATED COUNT: 13.2 % (ref 11.6–15.1)
GFR SERPL CREATININE-BSD FRML MDRD: 72 ML/MIN/1.73SQ M
GLUCOSE P FAST SERPL-MCNC: 117 MG/DL (ref 65–99)
GLUCOSE UR STRIP-MCNC: NEGATIVE MG/DL
HCT VFR BLD AUTO: 44.8 % (ref 36.5–49.3)
HGB BLD-MCNC: 15.1 G/DL (ref 12–17)
HGB UR QL STRIP.AUTO: ABNORMAL
IMM GRANULOCYTES # BLD AUTO: 0.02 THOUSAND/UL (ref 0–0.2)
IMM GRANULOCYTES NFR BLD AUTO: 0 % (ref 0–2)
KETONES UR STRIP-MCNC: NEGATIVE MG/DL
LEUKOCYTE ESTERASE UR QL STRIP: NEGATIVE
LYMPHOCYTES # BLD AUTO: 0.87 THOUSANDS/ÂΜL (ref 0.6–4.47)
LYMPHOCYTES NFR BLD AUTO: 16 % (ref 14–44)
MCH RBC QN AUTO: 30 PG (ref 26.8–34.3)
MCHC RBC AUTO-ENTMCNC: 33.7 G/DL (ref 31.4–37.4)
MCV RBC AUTO: 89 FL (ref 82–98)
MONOCYTES # BLD AUTO: 0.54 THOUSAND/ÂΜL (ref 0.17–1.22)
MONOCYTES NFR BLD AUTO: 10 % (ref 4–12)
MUCOUS THREADS UR QL AUTO: ABNORMAL
NEUTROPHILS # BLD AUTO: 3.94 THOUSANDS/ÂΜL (ref 1.85–7.62)
NEUTS SEG NFR BLD AUTO: 71 % (ref 43–75)
NITRITE UR QL STRIP: NEGATIVE
NON-SQ EPI CELLS URNS QL MICRO: ABNORMAL /HPF
NRBC BLD AUTO-RTO: 0 /100 WBCS
PH UR STRIP.AUTO: 6 [PH]
PLATELET # BLD AUTO: 144 THOUSANDS/UL (ref 149–390)
PLATELET BLD QL SMEAR: ADEQUATE
PMV BLD AUTO: 9.5 FL (ref 8.9–12.7)
POTASSIUM SERPL-SCNC: 4.1 MMOL/L (ref 3.5–5.3)
PROT SERPL-MCNC: 7.4 G/DL (ref 6.4–8.4)
PROT UR STRIP-MCNC: ABNORMAL MG/DL
RBC # BLD AUTO: 5.03 MILLION/UL (ref 3.88–5.62)
RBC #/AREA URNS AUTO: ABNORMAL /HPF
RBC MORPH BLD: NORMAL
SODIUM SERPL-SCNC: 133 MMOL/L (ref 135–147)
SP GR UR STRIP.AUTO: 1.02 (ref 1–1.03)
T3FREE SERPL-MCNC: 3.13 PG/ML (ref 2.5–3.9)
T4 FREE SERPL-MCNC: 0.67 NG/DL (ref 0.61–1.12)
TSH SERPL DL<=0.05 MIU/L-ACNC: 4.79 UIU/ML (ref 0.45–4.5)
UROBILINOGEN UR STRIP-ACNC: <2 MG/DL
WBC # BLD AUTO: 5.51 THOUSAND/UL (ref 4.31–10.16)
WBC #/AREA URNS AUTO: ABNORMAL /HPF

## 2023-11-21 PROCEDURE — 84481 FREE ASSAY (FT-3): CPT

## 2023-11-21 PROCEDURE — 36415 COLL VENOUS BLD VENIPUNCTURE: CPT

## 2023-11-21 PROCEDURE — 84439 ASSAY OF FREE THYROXINE: CPT

## 2023-11-21 PROCEDURE — 81001 URINALYSIS AUTO W/SCOPE: CPT

## 2023-11-21 PROCEDURE — 80053 COMPREHEN METABOLIC PANEL: CPT

## 2023-11-21 PROCEDURE — 84443 ASSAY THYROID STIM HORMONE: CPT

## 2023-11-21 PROCEDURE — 85025 COMPLETE CBC W/AUTO DIFF WBC: CPT

## 2023-11-24 ENCOUNTER — HOSPITAL ENCOUNTER (OUTPATIENT)
Dept: INFUSION CENTER | Facility: CLINIC | Age: 58
End: 2023-11-24
Payer: COMMERCIAL

## 2023-11-24 VITALS
TEMPERATURE: 97.5 F | RESPIRATION RATE: 18 BRPM | HEART RATE: 69 BPM | OXYGEN SATURATION: 98 % | BODY MASS INDEX: 29.03 KG/M2 | HEIGHT: 69 IN | SYSTOLIC BLOOD PRESSURE: 140 MMHG | DIASTOLIC BLOOD PRESSURE: 70 MMHG | WEIGHT: 196 LBS

## 2023-11-24 DIAGNOSIS — C22.0 HEPATOCELLULAR CARCINOMA (HCC): Primary | ICD-10-CM

## 2023-11-24 PROCEDURE — 96413 CHEMO IV INFUSION 1 HR: CPT

## 2023-11-24 PROCEDURE — 96417 CHEMO IV INFUS EACH ADDL SEQ: CPT

## 2023-11-24 RX ORDER — SODIUM CHLORIDE 9 MG/ML
20 INJECTION, SOLUTION INTRAVENOUS ONCE
Status: COMPLETED | OUTPATIENT
Start: 2023-11-24 | End: 2023-11-24

## 2023-11-24 RX ADMIN — BEVACIZUMAB-AWWB 1300 MG: 400 INJECTION, SOLUTION INTRAVENOUS at 13:21

## 2023-11-24 RX ADMIN — SODIUM CHLORIDE 20 ML/HR: 0.9 INJECTION, SOLUTION INTRAVENOUS at 12:25

## 2023-11-24 RX ADMIN — ATEZOLIZUMAB 1200 MG: 1200 INJECTION, SOLUTION INTRAVENOUS at 12:45

## 2023-11-24 NOTE — PROGRESS NOTES
Pt here for Avastin/Tecentriq. Vitals stable; labs within parameters for treatment. Callbell within reach.

## 2023-12-07 RX ORDER — SODIUM CHLORIDE 9 MG/ML
20 INJECTION, SOLUTION INTRAVENOUS ONCE
OUTPATIENT
Start: 2023-12-14

## 2023-12-12 ENCOUNTER — TELEPHONE (OUTPATIENT)
Dept: HEMATOLOGY ONCOLOGY | Facility: CLINIC | Age: 58
End: 2023-12-12

## 2023-12-12 NOTE — TELEPHONE ENCOUNTER
Appointment Change  Cancel, Reschedule, Change to Virtual      Who are you speaking with? Spouse   If it is not the patient, is the caller listed on the communication consent form? Yes   Which provider is the appointment scheduled with? Dr. Adeline Ball   When was the original appointment scheduled? Please list date and time 12/13/23 @ 840   At which location is the appointment scheduled to take place? [facility]   Was the appointment rescheduled? Was the appointment changed from an in person visit to a virtual visit? If so, please list the details of the change. 1/30/24 @ 940   What is the reason for the appointment change? Needed a different day       Was STAR transport scheduled? No   Does STAR transport need to be scheduled for the new visit (if applicable) No   Does the patient need an infusion appointment rescheduled? No   Does the patient have an upcoming infusion appointment scheduled? If so, when? No   Is the patient undergoing chemotherapy? No   For appointments cancelled with less than 24 hours:  Was the no-show policy reviewed?  N/A

## 2023-12-13 ENCOUNTER — APPOINTMENT (OUTPATIENT)
Dept: LAB | Facility: CLINIC | Age: 58
End: 2023-12-13
Payer: COMMERCIAL

## 2023-12-13 ENCOUNTER — TELEPHONE (OUTPATIENT)
Dept: INFUSION CENTER | Facility: CLINIC | Age: 58
End: 2023-12-13

## 2023-12-13 DIAGNOSIS — Z13.6 SCREENING FOR CARDIOVASCULAR CONDITION: ICD-10-CM

## 2023-12-13 DIAGNOSIS — C22.0 HEPATOCELLULAR CARCINOMA (HCC): ICD-10-CM

## 2023-12-13 DIAGNOSIS — Z12.5 PROSTATE CANCER SCREENING: ICD-10-CM

## 2023-12-13 LAB
ALBUMIN SERPL BCP-MCNC: 4.9 G/DL (ref 3.5–5)
ALP SERPL-CCNC: 66 U/L (ref 34–104)
ALT SERPL W P-5'-P-CCNC: 18 U/L (ref 7–52)
ANION GAP SERPL CALCULATED.3IONS-SCNC: 6 MMOL/L
AST SERPL W P-5'-P-CCNC: 24 U/L (ref 13–39)
BACTERIA UR QL AUTO: ABNORMAL /HPF
BASOPHILS # BLD AUTO: 0.03 THOUSANDS/ÂΜL (ref 0–0.1)
BASOPHILS NFR BLD AUTO: 1 % (ref 0–1)
BILIRUB SERPL-MCNC: 1.12 MG/DL (ref 0.2–1)
BILIRUB UR QL STRIP: NEGATIVE
BUN SERPL-MCNC: 20 MG/DL (ref 5–25)
CALCIUM SERPL-MCNC: 9.7 MG/DL (ref 8.4–10.2)
CHLORIDE SERPL-SCNC: 105 MMOL/L (ref 96–108)
CLARITY UR: CLEAR
CO2 SERPL-SCNC: 27 MMOL/L (ref 21–32)
COLOR UR: ABNORMAL
CREAT SERPL-MCNC: 1.21 MG/DL (ref 0.6–1.3)
EOSINOPHIL # BLD AUTO: 0.2 THOUSAND/ÂΜL (ref 0–0.61)
EOSINOPHIL NFR BLD AUTO: 3 % (ref 0–6)
ERYTHROCYTE [DISTWIDTH] IN BLOOD BY AUTOMATED COUNT: 13.2 % (ref 11.6–15.1)
GFR SERPL CREATININE-BSD FRML MDRD: 65 ML/MIN/1.73SQ M
GLUCOSE SERPL-MCNC: 73 MG/DL (ref 65–140)
GLUCOSE UR STRIP-MCNC: NEGATIVE MG/DL
HCT VFR BLD AUTO: 45.1 % (ref 36.5–49.3)
HGB BLD-MCNC: 15.1 G/DL (ref 12–17)
HGB UR QL STRIP.AUTO: ABNORMAL
HYALINE CASTS #/AREA URNS LPF: ABNORMAL /LPF
IMM GRANULOCYTES # BLD AUTO: 0.03 THOUSAND/UL (ref 0–0.2)
IMM GRANULOCYTES NFR BLD AUTO: 1 % (ref 0–2)
KETONES UR STRIP-MCNC: NEGATIVE MG/DL
LEUKOCYTE ESTERASE UR QL STRIP: NEGATIVE
LYMPHOCYTES # BLD AUTO: 1.21 THOUSANDS/ÂΜL (ref 0.6–4.47)
LYMPHOCYTES NFR BLD AUTO: 19 % (ref 14–44)
MCH RBC QN AUTO: 29.7 PG (ref 26.8–34.3)
MCHC RBC AUTO-ENTMCNC: 33.5 G/DL (ref 31.4–37.4)
MCV RBC AUTO: 89 FL (ref 82–98)
MONOCYTES # BLD AUTO: 0.72 THOUSAND/ÂΜL (ref 0.17–1.22)
MONOCYTES NFR BLD AUTO: 11 % (ref 4–12)
MUCOUS THREADS UR QL AUTO: ABNORMAL
NEUTROPHILS # BLD AUTO: 4.27 THOUSANDS/ÂΜL (ref 1.85–7.62)
NEUTS SEG NFR BLD AUTO: 65 % (ref 43–75)
NITRITE UR QL STRIP: NEGATIVE
NON-SQ EPI CELLS URNS QL MICRO: ABNORMAL /HPF
NRBC BLD AUTO-RTO: 0 /100 WBCS
PH UR STRIP.AUTO: 5.5 [PH]
PLATELET # BLD AUTO: 157 THOUSANDS/UL (ref 149–390)
PMV BLD AUTO: 9.8 FL (ref 8.9–12.7)
POTASSIUM SERPL-SCNC: 4.3 MMOL/L (ref 3.5–5.3)
PROT SERPL-MCNC: 7.7 G/DL (ref 6.4–8.4)
PROT UR STRIP-MCNC: ABNORMAL MG/DL
RBC # BLD AUTO: 5.08 MILLION/UL (ref 3.88–5.62)
RBC #/AREA URNS AUTO: ABNORMAL /HPF
SODIUM SERPL-SCNC: 138 MMOL/L (ref 135–147)
SP GR UR STRIP.AUTO: 1.03 (ref 1–1.03)
T3FREE SERPL-MCNC: 3.59 PG/ML (ref 2.5–3.9)
T4 FREE SERPL-MCNC: 0.73 NG/DL (ref 0.61–1.12)
TSH SERPL DL<=0.05 MIU/L-ACNC: 7.05 UIU/ML (ref 0.45–4.5)
UROBILINOGEN UR STRIP-ACNC: <2 MG/DL
WBC # BLD AUTO: 6.46 THOUSAND/UL (ref 4.31–10.16)
WBC #/AREA URNS AUTO: ABNORMAL /HPF

## 2023-12-13 PROCEDURE — 80053 COMPREHEN METABOLIC PANEL: CPT

## 2023-12-13 PROCEDURE — 36415 COLL VENOUS BLD VENIPUNCTURE: CPT

## 2023-12-13 PROCEDURE — 85025 COMPLETE CBC W/AUTO DIFF WBC: CPT

## 2023-12-13 PROCEDURE — 81001 URINALYSIS AUTO W/SCOPE: CPT

## 2023-12-13 PROCEDURE — 84481 FREE ASSAY (FT-3): CPT

## 2023-12-13 PROCEDURE — 84443 ASSAY THYROID STIM HORMONE: CPT

## 2023-12-13 PROCEDURE — 84439 ASSAY OF FREE THYROXINE: CPT

## 2023-12-13 NOTE — TELEPHONE ENCOUNTER
Called patient to remind him to get lab work done before appointment tomorrow Thursday 12/14. Message and call back number was left.

## 2023-12-14 ENCOUNTER — TELEPHONE (OUTPATIENT)
Dept: HEMATOLOGY ONCOLOGY | Facility: CLINIC | Age: 58
End: 2023-12-14

## 2023-12-14 ENCOUNTER — HOSPITAL ENCOUNTER (OUTPATIENT)
Dept: INFUSION CENTER | Facility: CLINIC | Age: 58
Discharge: HOME | End: 2023-12-14
Payer: COMMERCIAL

## 2023-12-14 VITALS
SYSTOLIC BLOOD PRESSURE: 142 MMHG | OXYGEN SATURATION: 98 % | WEIGHT: 196.21 LBS | BODY MASS INDEX: 28.98 KG/M2 | DIASTOLIC BLOOD PRESSURE: 72 MMHG | HEART RATE: 79 BPM | TEMPERATURE: 98 F | RESPIRATION RATE: 18 BRPM

## 2023-12-14 DIAGNOSIS — C22.0 HEPATOCELLULAR CARCINOMA (HCC): Primary | ICD-10-CM

## 2023-12-14 PROCEDURE — 96413 CHEMO IV INFUSION 1 HR: CPT

## 2023-12-14 PROCEDURE — 96417 CHEMO IV INFUS EACH ADDL SEQ: CPT

## 2023-12-14 RX ORDER — SODIUM CHLORIDE 9 MG/ML
20 INJECTION, SOLUTION INTRAVENOUS ONCE
Status: COMPLETED | OUTPATIENT
Start: 2023-12-14 | End: 2023-12-14

## 2023-12-14 RX ADMIN — BEVACIZUMAB-AWWB 1300 MG: 400 INJECTION, SOLUTION INTRAVENOUS at 13:13

## 2023-12-14 RX ADMIN — SODIUM CHLORIDE 20 ML/HR: 0.9 INJECTION, SOLUTION INTRAVENOUS at 12:44

## 2023-12-14 RX ADMIN — ATEZOLIZUMAB 1200 MG: 1200 INJECTION, SOLUTION INTRAVENOUS at 13:43

## 2023-12-14 NOTE — PROGRESS NOTES
Per Dr. uLndberg patient should have repeat AFP prior to his next f/u on 1/30/24.  Patient will need many labs as well for his family MD so he will have all labs drawn prior to his 1/26 infusion in prep for his f/u on 1/30 with Dr. Lundberg and on 2/20 with his family doctor.  Patient is aware he will need to fast for 10-12 hours. At conclusion of appt will give patient AVS with above instructions.

## 2023-12-14 NOTE — PROGRESS NOTES
Patient tolerated treatment and was discharged post, he will RTO 1/4 for his next cycle, AVS given.

## 2023-12-14 NOTE — PLAN OF CARE
Problem: Potential for Falls  Goal: Patient will remain free of falls  Description: INTERVENTIONS:  - Educate patient/family on patient safety including physical limitations  - Instruct patient to call for assistance with activity   - Consult OT/PT to assist with strengthening/mobility   - Keep Call bell within reach  - Keep bed low and locked with side rails adjusted as appropriate  - Keep care items and personal belongings within reach  - Initiate and maintain comfort rounds  - Make Fall Risk Sign visible to staff  - Apply yellow socks and bracelet for high fall risk patients  - Consider moving patient to room near nurses station  Outcome: Progressing     Problem: Knowledge Deficit  Goal: Patient/family/caregiver demonstrates understanding of disease process, treatment plan, medications, and discharge instructions  Description: Complete learning assessment and assess knowledge base.  Interventions:  - Provide teaching at level of understanding  - Provide teaching via preferred learning methods  Outcome: Progressing

## 2023-12-14 NOTE — PATIENT INSTRUCTIONS
December 2023 Sunday Monday Tuesday Wednesday Thursday Friday Saturday                            1     2                3     4     5     6     7     8     9                10     11     12     13    LAB WALK IN   3:20 PM   (5 min.)   AN MOB PHLEB CHAIR 2   [facility]    INF ONCOLOGY TX-TREATMENT PLAN  12:00 PM   (150 min.)   AN INF CHAIR 7   [facility] 15     16           Cycle 9, Day 1     17     18     19     20    HEPATOLOGY FOLLOW UP PG   2:15 PM   (30 min.)   Lola Mcgill PA-C   [facility] 21     22     23                24     25     26     27     28     29     30                31                                                     Treatment Details         12/14/2023 - Cycle 9, Day 1      Chemotherapy: ONCBCN PROVIDER COMMUNICATION, BEVACIZUMAB-AWWB IVPB, ATEZOLIZUMAB IVPB, ONCBCN PROVIDER COMMUNICATION8        January 2024 Sunday Monday Tuesday Wednesday Thursday Friday Saturday        1     2     3     4    INF ONCOLOGY TX-TREATMENT PLAN  12:00 PM   (150 min.)   AN INF BED 1   [facility] 5     6           Cycle 10, Day 1     7     8     9     10     11     12     13                14     15     16     17     18     19     20                21     22     23     24     25     26    INF ONCOLOGY TX-TREATMENT PLAN  12:30 PM   (150 min.)   AN INF CHAIR 4   [facility] 27           Cycle 11, Day 1     28     29     30    FOLLOW UP PG   9:25 AM   (20 min.)   Taye Lundberg MD   [facility] [place] 31                                      Treatment Details         1/4/2024 - Cycle 10, Day 1      Chemotherapy: ONCBCN PROVIDER COMMUNICATION, BEVACIZUMAB-AWWB IVPB, ATEZOLIZUMAB IVPB, ONCBCN PROVIDER COMMUNICATION8    1/25/2024 - Cycle 11, Day 1      Chemotherapy: ONCBCN  PROVIDER COMMUNICATION, BEVACIZUMAB-AWWB IVPB, ATEZOLIZUMAB IVPB, ONCBCN PROVIDER COMMUNICATION8

## 2023-12-14 NOTE — PROGRESS NOTES
Patient here for treatment and is doing ok, no c/o or changes to report.  Is asking about a tumor marker redraw. Will reach out and see when doctor wants this repeated, last was 11/1/23.

## 2023-12-28 RX ORDER — SODIUM CHLORIDE 9 MG/ML
20 INJECTION, SOLUTION INTRAVENOUS ONCE
Status: CANCELLED | OUTPATIENT
Start: 2024-01-04

## 2024-01-03 ENCOUNTER — APPOINTMENT (OUTPATIENT)
Dept: LAB | Facility: CLINIC | Age: 59
End: 2024-01-03
Payer: COMMERCIAL

## 2024-01-03 DIAGNOSIS — C22.0 HEPATOCELLULAR CARCINOMA (HCC): ICD-10-CM

## 2024-01-03 PROBLEM — E78.00 HYPERCHOLESTEROLEMIA: Status: ACTIVE | Noted: 2024-01-03

## 2024-01-03 LAB
ALBUMIN SERPL BCP-MCNC: 4.4 G/DL (ref 3.5–5)
ALP SERPL-CCNC: 60 U/L (ref 34–104)
ALT SERPL W P-5'-P-CCNC: 16 U/L (ref 7–52)
ANION GAP SERPL CALCULATED.3IONS-SCNC: 2 MMOL/L
AST SERPL W P-5'-P-CCNC: 21 U/L (ref 13–39)
BACTERIA UR QL AUTO: ABNORMAL /HPF
BASOPHILS # BLD AUTO: 0.03 THOUSANDS/ÂΜL (ref 0–0.1)
BASOPHILS NFR BLD AUTO: 1 % (ref 0–1)
BILIRUB SERPL-MCNC: 1.35 MG/DL (ref 0.2–1)
BILIRUB UR QL STRIP: NEGATIVE
BUN SERPL-MCNC: 15 MG/DL (ref 5–25)
CALCIUM SERPL-MCNC: 9.4 MG/DL (ref 8.4–10.2)
CHLORIDE SERPL-SCNC: 105 MMOL/L (ref 96–108)
CHOLEST SERPL-MCNC: 242 MG/DL
CLARITY UR: CLEAR
CO2 SERPL-SCNC: 30 MMOL/L (ref 21–32)
COLOR UR: YELLOW
CREAT SERPL-MCNC: 1.15 MG/DL (ref 0.6–1.3)
EOSINOPHIL # BLD AUTO: 0.2 THOUSAND/ÂΜL (ref 0–0.61)
EOSINOPHIL NFR BLD AUTO: 4 % (ref 0–6)
ERYTHROCYTE [DISTWIDTH] IN BLOOD BY AUTOMATED COUNT: 13.2 % (ref 11.6–15.1)
GFR SERPL CREATININE-BSD FRML MDRD: 69 ML/MIN/1.73SQ M
GLUCOSE P FAST SERPL-MCNC: 111 MG/DL (ref 65–99)
GLUCOSE UR STRIP-MCNC: NEGATIVE MG/DL
HCT VFR BLD AUTO: 48 % (ref 36.5–49.3)
HDLC SERPL-MCNC: 50 MG/DL
HGB BLD-MCNC: 16.2 G/DL (ref 12–17)
HGB UR QL STRIP.AUTO: NEGATIVE
IMM GRANULOCYTES # BLD AUTO: 0.02 THOUSAND/UL (ref 0–0.2)
IMM GRANULOCYTES NFR BLD AUTO: 0 % (ref 0–2)
KETONES UR STRIP-MCNC: NEGATIVE MG/DL
LDLC SERPL CALC-MCNC: 136 MG/DL (ref 0–100)
LEUKOCYTE ESTERASE UR QL STRIP: NEGATIVE
LYMPHOCYTES # BLD AUTO: 0.98 THOUSANDS/ÂΜL (ref 0.6–4.47)
LYMPHOCYTES NFR BLD AUTO: 18 % (ref 14–44)
MCH RBC QN AUTO: 30.2 PG (ref 26.8–34.3)
MCHC RBC AUTO-ENTMCNC: 33.8 G/DL (ref 31.4–37.4)
MCV RBC AUTO: 90 FL (ref 82–98)
MONOCYTES # BLD AUTO: 0.48 THOUSAND/ÂΜL (ref 0.17–1.22)
MONOCYTES NFR BLD AUTO: 9 % (ref 4–12)
MUCOUS THREADS UR QL AUTO: ABNORMAL
NEUTROPHILS # BLD AUTO: 3.66 THOUSANDS/ÂΜL (ref 1.85–7.62)
NEUTS SEG NFR BLD AUTO: 68 % (ref 43–75)
NITRITE UR QL STRIP: NEGATIVE
NON-SQ EPI CELLS URNS QL MICRO: ABNORMAL /HPF
NONHDLC SERPL-MCNC: 192 MG/DL
NRBC BLD AUTO-RTO: 0 /100 WBCS
PH UR STRIP.AUTO: 7 [PH]
PLATELET # BLD AUTO: 133 THOUSANDS/UL (ref 149–390)
PMV BLD AUTO: 9.9 FL (ref 8.9–12.7)
POTASSIUM SERPL-SCNC: 5.2 MMOL/L (ref 3.5–5.3)
PROT SERPL-MCNC: 7.3 G/DL (ref 6.4–8.4)
PROT UR STRIP-MCNC: ABNORMAL MG/DL
PSA SERPL-MCNC: 0.44 NG/ML (ref 0–4)
RBC # BLD AUTO: 5.36 MILLION/UL (ref 3.88–5.62)
RBC #/AREA URNS AUTO: ABNORMAL /HPF
SODIUM SERPL-SCNC: 137 MMOL/L (ref 135–147)
SP GR UR STRIP.AUTO: 1.03 (ref 1–1.03)
T3FREE SERPL-MCNC: 3.32 PG/ML (ref 2.5–3.9)
T4 FREE SERPL-MCNC: 0.71 NG/DL (ref 0.61–1.12)
TRIGL SERPL-MCNC: 280 MG/DL
TSH SERPL DL<=0.05 MIU/L-ACNC: 4.69 UIU/ML (ref 0.45–4.5)
UROBILINOGEN UR STRIP-ACNC: <2 MG/DL
WBC # BLD AUTO: 5.37 THOUSAND/UL (ref 4.31–10.16)
WBC #/AREA URNS AUTO: ABNORMAL /HPF

## 2024-01-03 PROCEDURE — 36415 COLL VENOUS BLD VENIPUNCTURE: CPT

## 2024-01-03 PROCEDURE — 85025 COMPLETE CBC W/AUTO DIFF WBC: CPT

## 2024-01-03 PROCEDURE — 84481 FREE ASSAY (FT-3): CPT

## 2024-01-03 PROCEDURE — 84439 ASSAY OF FREE THYROXINE: CPT

## 2024-01-03 PROCEDURE — G0103 PSA SCREENING: HCPCS

## 2024-01-03 PROCEDURE — 80061 LIPID PANEL: CPT

## 2024-01-03 PROCEDURE — 80053 COMPREHEN METABOLIC PANEL: CPT

## 2024-01-03 PROCEDURE — 81001 URINALYSIS AUTO W/SCOPE: CPT

## 2024-01-03 PROCEDURE — 84443 ASSAY THYROID STIM HORMONE: CPT

## 2024-01-04 ENCOUNTER — HOSPITAL ENCOUNTER (OUTPATIENT)
Dept: INFUSION CENTER | Facility: CLINIC | Age: 59
Discharge: HOME | End: 2024-01-04
Payer: COMMERCIAL

## 2024-01-04 ENCOUNTER — TELEPHONE (OUTPATIENT)
Dept: HEMATOLOGY ONCOLOGY | Facility: CLINIC | Age: 59
End: 2024-01-04

## 2024-01-04 VITALS
SYSTOLIC BLOOD PRESSURE: 140 MMHG | HEART RATE: 77 BPM | RESPIRATION RATE: 18 BRPM | BODY MASS INDEX: 29.33 KG/M2 | DIASTOLIC BLOOD PRESSURE: 88 MMHG | HEIGHT: 69 IN | TEMPERATURE: 97.2 F | WEIGHT: 198 LBS

## 2024-01-04 DIAGNOSIS — C22.0 HEPATOCELLULAR CARCINOMA (HCC): Primary | ICD-10-CM

## 2024-01-04 PROCEDURE — 96417 CHEMO IV INFUS EACH ADDL SEQ: CPT

## 2024-01-04 PROCEDURE — 96413 CHEMO IV INFUSION 1 HR: CPT

## 2024-01-04 RX ORDER — SODIUM CHLORIDE 9 MG/ML
20 INJECTION, SOLUTION INTRAVENOUS ONCE
Status: COMPLETED | OUTPATIENT
Start: 2024-01-04 | End: 2024-01-04

## 2024-01-04 RX ADMIN — ATEZOLIZUMAB 1200 MG: 1200 INJECTION, SOLUTION INTRAVENOUS at 13:36

## 2024-01-04 RX ADMIN — BEVACIZUMAB-AWWB 1300 MG: 400 INJECTION, SOLUTION INTRAVENOUS at 12:55

## 2024-01-04 RX ADMIN — SODIUM CHLORIDE 20 ML/HR: 0.9 INJECTION, SOLUTION INTRAVENOUS at 12:31

## 2024-01-04 NOTE — PROGRESS NOTES
Patient arrives for D1C10 MVASI and Tecentriq. PIV placed L FA without difficulty, brisk blood return, flushed without resistance. Labs reviewed from 1/3, no parameters for treatment. /88, okay for treatment today. Patient resting comfortably on recliner, call bell within reach.

## 2024-01-04 NOTE — PROGRESS NOTES
Patient inquiring about next AFP tumor marker blood test. FYI sent to Edwige ALEXANDRE in Dr Lundberg's office - bili trending up to 1.35 and +2 protein on UA. Edwige ALEXANDRE will order AFP tumor marker. Patient reports he will get this drawn with labs prior to next treatment in prep for f/u with Dr Lundberg.

## 2024-01-08 ENCOUNTER — HOSPITAL ENCOUNTER (OUTPATIENT)
Dept: CT IMAGING | Facility: HOSPITAL | Age: 59
Discharge: HOME | End: 2024-01-08
Payer: COMMERCIAL

## 2024-01-08 DIAGNOSIS — C22.0 HEPATOCELLULAR CARCINOMA (HCC): ICD-10-CM

## 2024-01-08 DIAGNOSIS — R91.8 LUNG NODULES: ICD-10-CM

## 2024-01-08 DIAGNOSIS — R91.8 ABNORMAL CT SCAN, LUNG: ICD-10-CM

## 2024-01-08 PROCEDURE — 71250 CT THORAX DX C-: CPT

## 2024-01-08 PROCEDURE — G1004 CDSM NDSC: HCPCS

## 2024-01-19 RX ORDER — SODIUM CHLORIDE 9 MG/ML
20 INJECTION, SOLUTION INTRAVENOUS ONCE
Status: CANCELLED | OUTPATIENT
Start: 2024-01-26

## 2024-01-24 ENCOUNTER — APPOINTMENT (OUTPATIENT)
Dept: LAB | Facility: CLINIC | Age: 59
End: 2024-01-24
Payer: COMMERCIAL

## 2024-01-24 DIAGNOSIS — C22.0 HEPATOCELLULAR CARCINOMA (HCC): ICD-10-CM

## 2024-01-24 LAB
AFP-TM SERPL-MCNC: 18.93 NG/ML (ref 0–9)
ALBUMIN SERPL BCP-MCNC: 4.4 G/DL (ref 3.5–5)
ALP SERPL-CCNC: 71 U/L (ref 34–104)
ALT SERPL W P-5'-P-CCNC: 17 U/L (ref 7–52)
ANION GAP SERPL CALCULATED.3IONS-SCNC: 5 MMOL/L
AST SERPL W P-5'-P-CCNC: 23 U/L (ref 13–39)
BACTERIA UR QL AUTO: ABNORMAL /HPF
BASOPHILS # BLD AUTO: 0.03 THOUSANDS/ÂΜL (ref 0–0.1)
BASOPHILS NFR BLD AUTO: 1 % (ref 0–1)
BILIRUB SERPL-MCNC: 0.79 MG/DL (ref 0.2–1)
BILIRUB UR QL STRIP: NEGATIVE
BUN SERPL-MCNC: 12 MG/DL (ref 5–25)
CALCIUM SERPL-MCNC: 9.5 MG/DL (ref 8.4–10.2)
CHLORIDE SERPL-SCNC: 102 MMOL/L (ref 96–108)
CLARITY UR: CLEAR
CO2 SERPL-SCNC: 29 MMOL/L (ref 21–32)
COLOR UR: ABNORMAL
CREAT SERPL-MCNC: 1.1 MG/DL (ref 0.6–1.3)
EOSINOPHIL # BLD AUTO: 0.28 THOUSAND/ÂΜL (ref 0–0.61)
EOSINOPHIL NFR BLD AUTO: 5 % (ref 0–6)
ERYTHROCYTE [DISTWIDTH] IN BLOOD BY AUTOMATED COUNT: 12.8 % (ref 11.6–15.1)
GFR SERPL CREATININE-BSD FRML MDRD: 73 ML/MIN/1.73SQ M
GLUCOSE SERPL-MCNC: 142 MG/DL (ref 65–140)
GLUCOSE UR STRIP-MCNC: NEGATIVE MG/DL
HCT VFR BLD AUTO: 46.7 % (ref 36.5–49.3)
HGB BLD-MCNC: 15.5 G/DL (ref 12–17)
HGB UR QL STRIP.AUTO: NEGATIVE
IMM GRANULOCYTES # BLD AUTO: 0.02 THOUSAND/UL (ref 0–0.2)
IMM GRANULOCYTES NFR BLD AUTO: 0 % (ref 0–2)
KETONES UR STRIP-MCNC: NEGATIVE MG/DL
LEUKOCYTE ESTERASE UR QL STRIP: NEGATIVE
LYMPHOCYTES # BLD AUTO: 1.31 THOUSANDS/ÂΜL (ref 0.6–4.47)
LYMPHOCYTES NFR BLD AUTO: 23 % (ref 14–44)
MCH RBC QN AUTO: 29.5 PG (ref 26.8–34.3)
MCHC RBC AUTO-ENTMCNC: 33.2 G/DL (ref 31.4–37.4)
MCV RBC AUTO: 89 FL (ref 82–98)
MONOCYTES # BLD AUTO: 0.49 THOUSAND/ÂΜL (ref 0.17–1.22)
MONOCYTES NFR BLD AUTO: 8 % (ref 4–12)
MUCOUS THREADS UR QL AUTO: ABNORMAL
NEUTROPHILS # BLD AUTO: 3.69 THOUSANDS/ÂΜL (ref 1.85–7.62)
NEUTS SEG NFR BLD AUTO: 63 % (ref 43–75)
NITRITE UR QL STRIP: NEGATIVE
NON-SQ EPI CELLS URNS QL MICRO: ABNORMAL /HPF
NRBC BLD AUTO-RTO: 0 /100 WBCS
PH UR STRIP.AUTO: 6.5 [PH]
PLATELET # BLD AUTO: 151 THOUSANDS/UL (ref 149–390)
PLATELET BLD QL SMEAR: ADEQUATE
PMV BLD AUTO: 9.4 FL (ref 8.9–12.7)
POTASSIUM SERPL-SCNC: 4.7 MMOL/L (ref 3.5–5.3)
PROT SERPL-MCNC: 7.4 G/DL (ref 6.4–8.4)
PROT UR STRIP-MCNC: ABNORMAL MG/DL
RBC # BLD AUTO: 5.26 MILLION/UL (ref 3.88–5.62)
RBC #/AREA URNS AUTO: ABNORMAL /HPF
RBC MORPH BLD: NORMAL
SODIUM SERPL-SCNC: 136 MMOL/L (ref 135–147)
SP GR UR STRIP.AUTO: 1.02 (ref 1–1.03)
T3FREE SERPL-MCNC: 2.95 PG/ML (ref 2.5–3.9)
TSH SERPL DL<=0.05 MIU/L-ACNC: 3.79 UIU/ML (ref 0.45–4.5)
UROBILINOGEN UR STRIP-ACNC: <2 MG/DL
WBC # BLD AUTO: 5.82 THOUSAND/UL (ref 4.31–10.16)
WBC #/AREA URNS AUTO: ABNORMAL /HPF

## 2024-01-24 PROCEDURE — 85025 COMPLETE CBC W/AUTO DIFF WBC: CPT

## 2024-01-24 PROCEDURE — 84443 ASSAY THYROID STIM HORMONE: CPT

## 2024-01-24 PROCEDURE — 81001 URINALYSIS AUTO W/SCOPE: CPT

## 2024-01-24 PROCEDURE — 36415 COLL VENOUS BLD VENIPUNCTURE: CPT

## 2024-01-24 PROCEDURE — 80053 COMPREHEN METABOLIC PANEL: CPT

## 2024-01-24 PROCEDURE — 84481 FREE ASSAY (FT-3): CPT

## 2024-01-24 PROCEDURE — 82105 ALPHA-FETOPROTEIN SERUM: CPT

## 2024-01-26 ENCOUNTER — HOSPITAL ENCOUNTER (OUTPATIENT)
Dept: INFUSION CENTER | Facility: CLINIC | Age: 59
End: 2024-01-26
Payer: COMMERCIAL

## 2024-01-26 VITALS
RESPIRATION RATE: 18 BRPM | HEIGHT: 69 IN | SYSTOLIC BLOOD PRESSURE: 132 MMHG | TEMPERATURE: 97.2 F | OXYGEN SATURATION: 98 % | BODY MASS INDEX: 29.77 KG/M2 | WEIGHT: 201 LBS | DIASTOLIC BLOOD PRESSURE: 78 MMHG | HEART RATE: 72 BPM

## 2024-01-26 DIAGNOSIS — C22.0 HEPATOCELLULAR CARCINOMA (HCC): Primary | ICD-10-CM

## 2024-01-26 PROCEDURE — 96417 CHEMO IV INFUS EACH ADDL SEQ: CPT

## 2024-01-26 PROCEDURE — 96413 CHEMO IV INFUSION 1 HR: CPT

## 2024-01-26 RX ORDER — SODIUM CHLORIDE 9 MG/ML
20 INJECTION, SOLUTION INTRAVENOUS ONCE
Status: COMPLETED | OUTPATIENT
Start: 2024-01-26 | End: 2024-01-26

## 2024-01-26 RX ADMIN — SODIUM CHLORIDE 20 ML/HR: 0.9 INJECTION, SOLUTION INTRAVENOUS at 12:47

## 2024-01-26 RX ADMIN — ATEZOLIZUMAB 1200 MG: 1200 INJECTION, SOLUTION INTRAVENOUS at 13:39

## 2024-01-26 RX ADMIN — BEVACIZUMAB-AWWB 1300 MG: 400 INJECTION, SOLUTION INTRAVENOUS at 13:07

## 2024-01-26 NOTE — PROGRESS NOTES
Patient to infusion for D1C11 Mvasi Tecentriq.  He offers no complaints.  Labs and BP within parameters for treatment today.  Call bell within reach.

## 2024-01-26 NOTE — PROGRESS NOTES
Pt tolerated treatment well without any adverse reactions. Aware of next appointment 2/15 @ 230pm. Declines AVS.

## 2024-01-30 ENCOUNTER — OFFICE VISIT (OUTPATIENT)
Dept: HEMATOLOGY ONCOLOGY | Facility: CLINIC | Age: 59
End: 2024-01-30
Payer: COMMERCIAL

## 2024-01-30 VITALS
RESPIRATION RATE: 17 BRPM | BODY MASS INDEX: 29.33 KG/M2 | HEIGHT: 69 IN | WEIGHT: 198 LBS | OXYGEN SATURATION: 98 % | TEMPERATURE: 97.4 F | DIASTOLIC BLOOD PRESSURE: 74 MMHG | HEART RATE: 72 BPM | SYSTOLIC BLOOD PRESSURE: 138 MMHG

## 2024-01-30 DIAGNOSIS — K74.60 CIRRHOSIS OF LIVER WITHOUT ASCITES, UNSPECIFIED HEPATIC CIRRHOSIS TYPE (HCC): ICD-10-CM

## 2024-01-30 DIAGNOSIS — B18.2 CHRONIC HEPATITIS C WITHOUT HEPATIC COMA (HCC): Primary | ICD-10-CM

## 2024-01-30 DIAGNOSIS — C22.0 HEPATOCELLULAR CARCINOMA (HCC): ICD-10-CM

## 2024-01-30 PROCEDURE — 99214 OFFICE O/P EST MOD 30 MIN: CPT | Performed by: INTERNAL MEDICINE

## 2024-01-30 NOTE — PROGRESS NOTES
Bassem Roberts  1965  [facility] HEMATOLOGY ONCOLOGY SPECIALISTS DARIOPAULINA [facility] [id number]    DISCUSSION/SUMMARY:      58-year-old male with a somewhat complicated prior GI/liver history.  Patient was previously diagnosed with hepatocellular carcinoma, elevated alpha-fetoprotein level.  Patient underwent Y90 treatment in November 2022.  Alpha-fetoprotein level came down nicely.  Unfortunately the tumor marker recently began to rise.  The May 16, 2023 CAT scan of the chest demonstrated enlarging pulmonary nodules.  More recent MRI of the abdomen demonstrated worsening gastrohepatic and portacaval adenopathy (although the liver lesions were unchanged).  Alpha-fetoprotein was found to be significantly elevated.    Patient was recently presented at the GI tumor board.  The consensus was to begin systemic treatment.    NCCN guidelines 1/20/2023 states that for patients with hepatocellular carcinoma, good performance, Child Myers class A, category 1 treatment includes atezolizumab and bevacizumab or tremelimumab + durvalumab (also category 1).    Regimen  Atezolizumab 1200 mg IV day 1  Bevacizumab 15 mg/kilogram IV day 1  Cycle length = 21 days  Goal = prolongation of life, improvement of quality of life    Mr. Roberts continues to feel well and clinically there are no concerning findings.  Alpha-fetoprotein continues to decrease, recent blood work was good/acceptable. The most recent CAT scan of the chest and MRI of the abdomen/pelvis demonstrated smaller lesions or complete resolution of lesions, no evidence of progression.  This is obviously good.  Patient will continue with the atezolizumab and bevacizumab.    Etiology for the body aches is not clear, possibly related to the treatments, possibly related to patient's physically demanding work.  No evidence of progression.  Patient will continue to monitor.      Patient will also follow-up with GI as  directed.    Patient is to return in 3 months.  Patient knows to call the hematology/oncology office if there are any other questions or concerns.    Carefully review your medication list and verify that the list is accurate and up-to-date. Please call the hematology/oncology office if there are medications missing from the list, medications on the list that you are not currently taking or if there is a dosage or instruction that is different from how you're taking that medication.    Patient goals and areas of care: Continue with the atezolizumab and bevacizumab  Barriers to care: None  Patient is able to self-care  ______________________________________________________________________________________    Chief Complaint   Patient presents with    Follow-up    Hepatocellular carcinoma on treatment     Oncology History   Hepatocellular carcinoma (HCC)   2022 Initial Diagnosis    Hepatocellular carcinoma (HCC)     2/8/2022 Biopsy    [facility] DOSHI US guided liver biopsy: right liver lobe:  Poorly differentiated HCC with patchy necrosis       7/13/2022 -  Cancer Staged    Staging form: Liver (Excluding Intrahepatic Bile Ducts), AJCC 8th Edition  - Clinical stage from 7/13/2022: Stage IVB (rcT3, cN1, pM1) - Signed by Taye Lundberg MD on 1/30/2024  Stage prefix: Recurrence  Histologic grade (G): G2  Histologic grading system: 4 grade system       6/29/2023 -  Chemotherapy    alteplase (CATHFLO), 2 mg, Intracatheter, Every 1 Minute as needed, 11 of 13 cycles  atezolizumab (TECENTRIQ) IVPB, 1,200 mg, Intravenous, Once, 11 of 13 cycles  Administration: 1,200 mg (6/29/2023), 1,200 mg (7/20/2023), 1,200 mg (8/10/2023), 1,200 mg (8/31/2023), 1,200 mg (9/21/2023), 1,200 mg (10/10/2023), 1,200 mg (11/2/2023), 1,200 mg (11/24/2023), 1,200 mg (12/14/2023), 1,200 mg (1/4/2024), 1,200 mg (1/26/2024)  bevacizumab-awwb (MVASI) IVPB, 1,345 mg (100 % of original dose 15 mg/kg), Intravenous, Once, 11 of 13 cycles  Dose  modification: 15 mg/kg (original dose 15 mg/kg, Cycle 1), 15 mg/kg (original dose 15 mg/kg, Cycle 2), 15 mg/kg (original dose 15 mg/kg, Cycle 3)  Administration: 1,300 mg (6/29/2023), 1,300 mg (7/20/2023), 1,300 mg (8/10/2023), 1,300 mg (8/31/2023), 1,300 mg (9/21/2023), 1,300 mg (10/10/2023), 1,300 mg (11/2/2023), 1,300 mg (11/24/2023), 1,300 mg (12/14/2023), 1,300 mg (1/4/2024), 1,300 mg (1/26/2024)       History of Present Illness: 58-year-old male with history of cirrhosis secondary to chronic hepatitis C, history of alcohol abuse, previously diagnosed with multifocal hepatocellular carcinoma.  Patient underwent TARE on November 22, 2022.  Follow-up scans demonstrated concerning enlarging pulmonary lesions.  Alpha-fetoprotein level has also been rising.  Patient was recently presented at the GI tumor board and the plan was systemic treatment.  Patient has completed 11 cycles of atezolizumab and bevacizumab.    Mr. Roberts states feeling quite well, baseline.  Body aches are minimal, patient continues to work full-time as a .  No fevers or signs of infection.  Appetite is good, weight is stable.  No other GI or  problems.  Patient has a pending appoint with his PCP to catch up on routine health maintenance and medical care.    Review of Systems   Constitutional: Negative.    HENT: Negative.     Eyes: Negative.    Respiratory: Negative.     Cardiovascular: Negative.    Gastrointestinal: Negative.    Endocrine: Negative.    Genitourinary: Negative.    Musculoskeletal:  Positive for arthralgias.   Skin: Negative.    Allergic/Immunologic: Negative.    Neurological: Negative.    Hematological: Negative.    Psychiatric/Behavioral:  The patient is nervous/anxious.    All other systems reviewed and are negative.    Patient Active Problem List   Diagnosis    Hepatocellular carcinoma (HCC)    Essential hypertension    Chronic hepatitis C without hepatic coma (HCC)    Cirrhosis of liver (HCC)    ED (erectile  dysfunction)    Colon polyp    Subclinical hypothyroidism    Hypercholesterolemia     Past Medical History:   Diagnosis Date    Hypertension     Liver cancer (HCC)      Past Surgical History:   Procedure Laterality Date    HERNIA REPAIR      IR Y-90 PRE-ANGIO/EMBO W/ LUNG SCAN  2022    IR Y-90 RADIOEMBOLIZATION  2022     Family History   Problem Relation Age of Onset    Cancer Mother      Social History     Socioeconomic History    Marital status: /Civil Union     Spouse name: Not on file    Number of children: Not on file    Years of education: Not on file    Highest education level: Not on file   Occupational History    Not on file   Tobacco Use    Smoking status: Former     Current packs/day: 0.00     Average packs/day: 1 pack/day for 30.0 years (30.0 ttl pk-yrs)     Types: Cigarettes     Start date:      Quit date:      Years since quittin.0    Smokeless tobacco: Current     Types: Chew    Tobacco comments:     nicotine pouch (on)   Vaping Use    Vaping status: Never Used   Substance and Sexual Activity    Alcohol use: Not Currently    Drug use: Yes     Types: Marijuana     Comment: medical marijuana    Sexual activity: Yes     Partners: Female   Other Topics Concern    Not on file   Social History Narrative    Not on file     Social Determinants of Health     Financial Resource Strain: Not on file   Food Insecurity: Not on file   Transportation Needs: Not on file   Physical Activity: Not on file   Stress: Not on file   Social Connections: Not on file   Intimate Partner Violence: Not on file   Housing Stability: Not on file       Current Outpatient Medications:     amLODIPine (NORVASC) 2.5 mg tablet, Take 1 tablet (2.5 mg total) by mouth daily, Disp: 90 tablet, Rfl: 2    lisinopril-hydrochlorothiazide (PRINZIDE,ZESTORETIC) 20-25 MG per tablet, Take 1 tablet by mouth daily, Disp: 90 tablet, Rfl: 2    No Known Allergies    Vitals:    24 0929   BP: 138/74   Pulse: 72   Resp: 17    Temp: (!) 97.4 °F (36.3 °C)   SpO2: 98%       Physical Exam  Constitutional:       Appearance: He is well-developed.      Comments: Well-nourished male, no respiratory distress, no signs of pain   HENT:      Head: Normocephalic and atraumatic.      Right Ear: External ear normal.      Left Ear: External ear normal.   Eyes:      Conjunctiva/sclera: Conjunctivae normal.      Pupils: Pupils are equal, round, and reactive to light.   Cardiovascular:      Rate and Rhythm: Normal rate and regular rhythm.      Heart sounds: Normal heart sounds.   Pulmonary:      Effort: Pulmonary effort is normal.      Breath sounds: Normal breath sounds.      Comments: Clear bilaterally  Abdominal:      General: Bowel sounds are normal.      Palpations: Abdomen is soft.      Comments: + Bowel sounds, nontender, soft, no paraspinal megaly, no guarding   Musculoskeletal:         General: Normal range of motion.      Cervical back: Normal range of motion and neck supple.      Comments: No swelling in the digits or hands, good range of motion in upper and lower extremities, equal bilaterally   Skin:     General: Skin is warm.      Comments: Good color, warm, moist, no petechiae or ecchymoses   Neurological:      Mental Status: He is alert and oriented to person, place, and time.      Deep Tendon Reflexes: Reflexes are normal and symmetric.   Psychiatric:         Behavior: Behavior normal.         Thought Content: Thought content normal.         Judgment: Judgment normal.     Extremities: No lower extreme edema bilaterally, no cords, pulses are 1+  Lymphatics: None within the neck, supraclavicular region, axilla bilaterally    Labs    1/24/2024 WBC = 5.82 hemoglobin = 15.5 hematocrit = 46.7 platelet = 151 neutrophil = 63% TSH = 3.792 BUN = 12 creatinine = 1.10 calcium within 9.5 LFTs WNL          10/9/2023 WBC = 5.34 hemoglobin = 15.2 hematocrit = 45 platelet = 150 neutrophil = 68% TSH = 5.791 BUN = 18 creatinine = 1.13 calcium = 9.9 LFTs  WNL    Imaging    1/8/2024 CAT scan chest without contrast.  Impression stated many of the pulmonary nodules noted previously have resolved.  These nodules were noted to have shown interval growth or were new on the prior study.  The other stable nodules continue to be stable.    10/25/2023 MRI abdomen with and without contrast     LIVER:  Hepatic surface nodularity consistent with cirrhosis.     Treated observation: 1  Size: 2.3 x 2.3 cm series 11 image 162, 5.5 x 4.8 cm pretreatment category LR M  Location: Segment 8  Enhancement: Treatment-specific expected enhancement pattern.     LI-RADS Category: LR- TR Nonviable.     Treated observation: 2  Size: 2.5 x 2.5 cm series 11 image 284, 4.4 x 4.2 cm, pretreatment category LR 5  Location: Segment 7/8  Enhancement: Treatment-specific expected enhancement pattern.  Very minimal peripheral enhancement decreased compared to the previous exam     LI-RADS Category: LR- TR Nonviable.     Treated observation: 3  Location: Segment 8  Lesion no longer visualized     Treated observation: 4  Location: Segment 6  Lesion no longer visualized.    IMPRESSION:     1. Treated observation 1, and treated observation 2- LR TR nonviable.  2. Observations 3 and 4 are not visualized.  3. Stable upper abdominal adenopathy as described.  Continued follow-up recommended in 3 months interval.    6/7/2023 MRI abdomen with and without contrast    IMPRESSION:     Previously seen hepatic observations now show expected posttreatment perilesional arterial phase enhancement unchanged from 3/6/2023. LI- RADS LR TR nonviable.     No new hepatic observations.     Worsened gastrohepatic and portacaval adenopathy.    5/16/2023 CAT scan chest without contrast    IMPRESSION:     Increase in size of lung nodules measuring up to 6 mm with at least one new lung nodule. Given the patient's history of malignancy, differential includes pulmonary metastasis. Recommend short-term follow-up with CT without contrast  in 3 months.    3/6/2023 MRI abdomen with and without contrast    IMPRESSION:     All of the previously seen observations now show normal posttreatment perilesional arterial phase enhancement or are no longer apparent status post treatment.LR- TR Nonviable.    7/13/2022 MRI abdomen    IMPRESSION:     1.  Cirrhosis.  Multiple right hepatic lobe observations:  - Segment 8, 4.0 x 3.1 cm, LR-M.  - Segment 7/8, 4.3 x 4.6 cm, LR-5.  - Segment 8, 1.6 x 1.6 cm, LR-M.  - Segment 6, 1.1 x 1.2 cm, LR-5.     One of these lesions has reportedly previously been biopsied showing hepatocellular carcinoma, although it is not clear which lesion was sampled.  Although some of the lesions meet criteria for LR-M, all of the lesions may be part of the same process.    Pathology    Case Report   Surgical Pathology Report                         Case: [id number]                                    Authorizing Provider:  Hakeem Swenson MD      Collected:           10/06/2022 [facility]               Ordering Location:     [facility]      Received:            10/06/2022 [facility]                                      Hospital Specialty                                                                                   Laboratory                                                                    Pathologist:           Sade Micahud MD                                                                     Specimen:    Liver, Liver Biopsy (10 slides [id number] [facility], collected 2/8/2022)                Final Diagnosis   OUTSIDE SLIDES FOR REVIEW (10 slides [id number] [facility], collected 2/8/2022):       A. Liver, core needle biopsy:  -   Poorly differentiated carcinoma with patchy necrosis (see comment).  -   Background liver parenchyma with chronic inflammation.       Comment: Submitted immunohistochemical stains show the tumor cells are positive for CK7 and CK19, and are negative for CK20,  HepPar1, , TTF1 and .  Additional stains  were performed by outside institution and are not submitted for review.  Per report, the tumor cells stain positive for glypican 3, AFP, albumin mRNA and claudin 4, and stain negative for arginase, PAX8, GATA3, CDX2, and NKX3.1 and mucicarmine.       The histomorphologic and immunophenotypic features are consistent with poorly differentiated carcinoma.  The clinical history of cirrhosis, AFP and glypican 3 positivity favor hepatocellular carcinoma.  However, lack of staining for HepPar1 and arginase, and expression of claudin 4, CK7 and CK19 suggest a cholangiocarcinoma component.  Strong expression of albumin mRNA can be found in a majority of hepatocellular carcinoma.  However, albumin mRNA can also be positive in a subset of intrahepatic cholangiocarcinoma and adenocarcinoma from other sites.  Therefore, hepatocellular carcinoma with a cholangiocarcinoma component, so-called combined hepatocellular carcinoma-cholangiocarcinoma, cannot be excluded.  There is insufficient background liver parenchyma for a definitive diagnosis of cirrhosis.     Reference: Ti A, Trupti HD, Neelam T, Veena S, Santoyo L, Adonay S, Amy SS, Sebastien VS, Ruth TC, Zack RK, Ca Cameron JM, Zhao Donahue LP, Domenic TT, Jairo RP. Albumin In Situ Hybridization Can Be Positive in Adenocarcinomas and Other Tumors From Diverse Sites. Am J Clin Pathol. 2019 Jul 5;152(2):190-199. doi: 10.1093/ajcp/vkb874. PMID: 37673891.     Interpretation performed at [facility], [address] [place] [address]    Electronically signed by Sade Michaud MD on 10/6/2022 at  3:49 PM

## 2024-02-08 RX ORDER — SODIUM CHLORIDE 9 MG/ML
20 INJECTION, SOLUTION INTRAVENOUS ONCE
OUTPATIENT
Start: 2024-03-07

## 2024-02-12 DIAGNOSIS — C22.0 HEPATOCELLULAR CARCINOMA (HCC): Primary | ICD-10-CM

## 2024-02-14 ENCOUNTER — APPOINTMENT (OUTPATIENT)
Dept: LAB | Facility: CLINIC | Age: 59
End: 2024-02-14
Payer: COMMERCIAL

## 2024-02-14 ENCOUNTER — TELEPHONE (OUTPATIENT)
Dept: HEMATOLOGY ONCOLOGY | Facility: MEDICAL CENTER | Age: 59
End: 2024-02-14

## 2024-02-14 DIAGNOSIS — C22.0 HEPATOCELLULAR CARCINOMA (HCC): ICD-10-CM

## 2024-02-14 LAB
ALBUMIN SERPL BCP-MCNC: 4.7 G/DL (ref 3.5–5)
ALP SERPL-CCNC: 65 U/L (ref 34–104)
ALT SERPL W P-5'-P-CCNC: 17 U/L (ref 7–52)
ANION GAP SERPL CALCULATED.3IONS-SCNC: 8 MMOL/L
AST SERPL W P-5'-P-CCNC: 25 U/L (ref 13–39)
BACTERIA UR QL AUTO: ABNORMAL /HPF
BASOPHILS # BLD AUTO: 0.02 THOUSANDS/ÂΜL (ref 0–0.1)
BASOPHILS NFR BLD AUTO: 0 % (ref 0–1)
BILIRUB SERPL-MCNC: 2.38 MG/DL (ref 0.2–1)
BILIRUB UR QL STRIP: NEGATIVE
BUN SERPL-MCNC: 19 MG/DL (ref 5–25)
CALCIUM SERPL-MCNC: 9.3 MG/DL (ref 8.4–10.2)
CHLORIDE SERPL-SCNC: 98 MMOL/L (ref 96–108)
CLARITY UR: CLEAR
CO2 SERPL-SCNC: 25 MMOL/L (ref 21–32)
COLOR UR: YELLOW
CREAT SERPL-MCNC: 1.39 MG/DL (ref 0.6–1.3)
EOSINOPHIL # BLD AUTO: 0.19 THOUSAND/ÂΜL (ref 0–0.61)
EOSINOPHIL NFR BLD AUTO: 4 % (ref 0–6)
ERYTHROCYTE [DISTWIDTH] IN BLOOD BY AUTOMATED COUNT: 13 % (ref 11.6–15.1)
GFR SERPL CREATININE-BSD FRML MDRD: 55 ML/MIN/1.73SQ M
GLUCOSE SERPL-MCNC: 131 MG/DL (ref 65–140)
GLUCOSE UR STRIP-MCNC: NEGATIVE MG/DL
HCT VFR BLD AUTO: 50.8 % (ref 36.5–49.3)
HGB BLD-MCNC: 17.6 G/DL (ref 12–17)
HGB UR QL STRIP.AUTO: ABNORMAL
HYALINE CASTS #/AREA URNS LPF: ABNORMAL /LPF
IMM GRANULOCYTES # BLD AUTO: 0.02 THOUSAND/UL (ref 0–0.2)
IMM GRANULOCYTES NFR BLD AUTO: 0 % (ref 0–2)
KETONES UR STRIP-MCNC: NEGATIVE MG/DL
LEUKOCYTE ESTERASE UR QL STRIP: NEGATIVE
LYMPHOCYTES # BLD AUTO: 1.07 THOUSANDS/ÂΜL (ref 0.6–4.47)
LYMPHOCYTES NFR BLD AUTO: 20 % (ref 14–44)
MCH RBC QN AUTO: 29.9 PG (ref 26.8–34.3)
MCHC RBC AUTO-ENTMCNC: 34.6 G/DL (ref 31.4–37.4)
MCV RBC AUTO: 86 FL (ref 82–98)
MONOCYTES # BLD AUTO: 0.75 THOUSAND/ÂΜL (ref 0.17–1.22)
MONOCYTES NFR BLD AUTO: 14 % (ref 4–12)
MUCOUS THREADS UR QL AUTO: ABNORMAL
NEUTROPHILS # BLD AUTO: 3.38 THOUSANDS/ÂΜL (ref 1.85–7.62)
NEUTS SEG NFR BLD AUTO: 62 % (ref 43–75)
NITRITE UR QL STRIP: NEGATIVE
NON-SQ EPI CELLS URNS QL MICRO: ABNORMAL /HPF
NRBC BLD AUTO-RTO: 0 /100 WBCS
PH UR STRIP.AUTO: 5.5 [PH]
PLATELET # BLD AUTO: 151 THOUSANDS/UL (ref 149–390)
PLATELET BLD QL SMEAR: ADEQUATE
PMV BLD AUTO: 9.2 FL (ref 8.9–12.7)
POTASSIUM SERPL-SCNC: 4.1 MMOL/L (ref 3.5–5.3)
PROT SERPL-MCNC: 7.9 G/DL (ref 6.4–8.4)
PROT UR STRIP-MCNC: ABNORMAL MG/DL
RBC # BLD AUTO: 5.89 MILLION/UL (ref 3.88–5.62)
RBC #/AREA URNS AUTO: ABNORMAL /HPF
RBC MORPH BLD: NORMAL
SODIUM SERPL-SCNC: 131 MMOL/L (ref 135–147)
SP GR UR STRIP.AUTO: 1.02 (ref 1–1.03)
T3FREE SERPL-MCNC: 3.59 PG/ML (ref 2.5–3.9)
T4 FREE SERPL-MCNC: 0.82 NG/DL (ref 0.61–1.12)
TSH SERPL DL<=0.05 MIU/L-ACNC: 7.76 UIU/ML (ref 0.45–4.5)
UROBILINOGEN UR STRIP-ACNC: 2 MG/DL
WBC # BLD AUTO: 5.43 THOUSAND/UL (ref 4.31–10.16)
WBC #/AREA URNS AUTO: ABNORMAL /HPF

## 2024-02-14 PROCEDURE — 81001 URINALYSIS AUTO W/SCOPE: CPT

## 2024-02-14 PROCEDURE — 85025 COMPLETE CBC W/AUTO DIFF WBC: CPT

## 2024-02-14 PROCEDURE — 84481 FREE ASSAY (FT-3): CPT

## 2024-02-14 PROCEDURE — 84443 ASSAY THYROID STIM HORMONE: CPT

## 2024-02-14 PROCEDURE — 36415 COLL VENOUS BLD VENIPUNCTURE: CPT

## 2024-02-14 PROCEDURE — 80053 COMPREHEN METABOLIC PANEL: CPT

## 2024-02-14 PROCEDURE — 84439 ASSAY OF FREE THYROXINE: CPT

## 2024-02-14 NOTE — TELEPHONE ENCOUNTER
T Bili 2.38 and urine with 2+ protein  D/W Dr Lundberg  Hold treatment C12 atezolizumab/avastin until next scheduled appt on 3/7/2024  Have patient repeat labs one week prior to 3/7/2024 appt  Left voicemail for patient to call my TEAMs number to discuss  [facility] infusion team updated via EPIC

## 2024-02-15 ENCOUNTER — HOSPITAL ENCOUNTER (OUTPATIENT)
Dept: INFUSION CENTER | Facility: CLINIC | Age: 59
Discharge: HOME | End: 2024-02-15

## 2024-03-01 ENCOUNTER — OFFICE VISIT (OUTPATIENT)
Dept: FAMILY MEDICINE CLINIC | Facility: CLINIC | Age: 59
End: 2024-03-01
Payer: COMMERCIAL

## 2024-03-01 VITALS
BODY MASS INDEX: 29.18 KG/M2 | DIASTOLIC BLOOD PRESSURE: 86 MMHG | HEART RATE: 96 BPM | OXYGEN SATURATION: 97 % | RESPIRATION RATE: 16 BRPM | TEMPERATURE: 98 F | SYSTOLIC BLOOD PRESSURE: 136 MMHG | HEIGHT: 69 IN | WEIGHT: 197 LBS

## 2024-03-01 DIAGNOSIS — C22.0 HEPATOCELLULAR CARCINOMA (HCC): ICD-10-CM

## 2024-03-01 DIAGNOSIS — E78.00 HYPERCHOLESTEROLEMIA: ICD-10-CM

## 2024-03-01 DIAGNOSIS — E03.8 SUBCLINICAL HYPOTHYROIDISM: ICD-10-CM

## 2024-03-01 DIAGNOSIS — Z12.11 SCREENING FOR COLON CANCER: ICD-10-CM

## 2024-03-01 DIAGNOSIS — K74.60 CIRRHOSIS OF LIVER WITHOUT ASCITES, UNSPECIFIED HEPATIC CIRRHOSIS TYPE (HCC): ICD-10-CM

## 2024-03-01 DIAGNOSIS — I10 ESSENTIAL HYPERTENSION: Primary | ICD-10-CM

## 2024-03-01 PROCEDURE — 99214 OFFICE O/P EST MOD 30 MIN: CPT | Performed by: FAMILY MEDICINE

## 2024-03-01 NOTE — ASSESSMENT & PLAN NOTE
Continue management per Oncology and next appointment is in April 2024. Gets infusions every 3 weeks.

## 2024-03-01 NOTE — ASSESSMENT & PLAN NOTE
Advised pt to follow a low cholesterol diet and to exercise on a regular basis. Recheck in 4 months.

## 2024-03-01 NOTE — PROGRESS NOTES
Assessment/Plan:         Problem List Items Addressed This Visit        Digestive    Hepatocellular carcinoma (HCC)     Continue management per Oncology and next appointment is in April 2024. Gets infusions every 3 weeks.         Cirrhosis of liver (HCC)     Was seen on imaging but pt not having any sxs or manifestations of it.             Endocrine    Subclinical hypothyroidism     TSH still high but T4 and T3 normal in February 2024. Will continue to monitor.             Cardiovascular and Mediastinum    Essential hypertension - Primary     Blood pressure ok. Continue lisinopril HCT 20/25 daily and amlodipine 2.5 mg qd. Pt advised to continue low Na diet and to exercise on a regular basis.             Other    Hypercholesterolemia     Advised pt to follow a low cholesterol diet and to exercise on a regular basis. Recheck in 4 months.         Other Visit Diagnoses     Screening for colon cancer                Subjective:      Patient ID: Bassem Roberts is a 58 y.o. male.    Patient here for follow-up Hypertension, Hyperlipidemia, Hypothyroidism, HCC, Cirrhosis. Patient doing ok. No chest pain or shortness of breath. No headaches. No abdominal pain. Blood pressure has been ok. Gets infusions every 3 weeks. Has lump left 2nd finger for past few days.         The following portions of the patient's history were reviewed and updated as appropriate:   Past Medical History:  He has a past medical history of Hypertension and Liver cancer (HCC).,  _______________________________________________________________________  Medical Problems:  does not have any pertinent problems on file.,  _______________________________________________________________________  Past Surgical History:   has a past surgical history that includes Hernia repair; IR Y-90 pre-angio/embo w/ lung scan (11/8/2022); and IR Y-90 radioembolization (11/22/2022).,  _______________________________________________________________________  Family  "History:  family history includes Cancer in his mother.,  _______________________________________________________________________  Social History:   reports that he quit smoking about 7 years ago. His smoking use included cigarettes. He started smoking about 37 years ago. He has a 30 pack-year smoking history. His smokeless tobacco use includes chew. He reports that he does not currently use alcohol. He reports current drug use. Drug: Marijuana.,  _______________________________________________________________________  Allergies:  has No Known Allergies..  _______________________________________________________________________  Current Outpatient Medications   Medication Sig Dispense Refill   • amLODIPine (NORVASC) 2.5 mg tablet Take 1 tablet (2.5 mg total) by mouth daily 90 tablet 2   • lisinopril-hydrochlorothiazide (PRINZIDE,ZESTORETIC) 20-25 MG per tablet Take 1 tablet by mouth daily 90 tablet 2     No current facility-administered medications for this visit.     _______________________________________________________________________  Review of Systems   Constitutional:  Negative for fatigue and unexpected weight change.   Respiratory:  Negative for cough and shortness of breath.    Cardiovascular:  Negative for chest pain.   Gastrointestinal:  Negative for abdominal pain, constipation, diarrhea and vomiting.   Musculoskeletal:  Negative for arthralgias.   Neurological:  Negative for dizziness and headaches.   Psychiatric/Behavioral:  Negative for dysphoric mood. The patient is not nervous/anxious.          Objective:  Vitals:    03/01/24 1433 03/01/24 1446   BP: 140/84 136/86   BP Location: Left arm Right arm   Patient Position: Sitting Sitting   Cuff Size: Standard Standard   Pulse: 96    Resp: 16    Temp: 98 °F (36.7 °C)    TempSrc: Tympanic    SpO2: 97%    Weight: 89.4 kg (197 lb)    Height: 5' 9\" (1.753 m)      Body mass index is 29.09 kg/m².     Physical Exam  Vitals and nursing note reviewed. "   Constitutional:       Appearance: Normal appearance. He is well-developed and normal weight.   Neck:      Thyroid: No thyromegaly.   Cardiovascular:      Rate and Rhythm: Normal rate and regular rhythm.      Heart sounds: Normal heart sounds. No murmur heard.  Pulmonary:      Effort: Pulmonary effort is normal. No respiratory distress.      Breath sounds: Normal breath sounds. No wheezing.   Musculoskeletal:      Cervical back: Normal range of motion and neck supple.      Right lower leg: No edema.      Left lower leg: No edema.   Lymphadenopathy:      Cervical: No cervical adenopathy.   Neurological:      Mental Status: He is alert and oriented to person, place, and time.      Cranial Nerves: No cranial nerve deficit.   Psychiatric:         Mood and Affect: Mood normal.         Behavior: Behavior normal.         Thought Content: Thought content normal.         Judgment: Judgment normal.

## 2024-03-01 NOTE — ASSESSMENT & PLAN NOTE
Blood pressure ok. Continue lisinopril HCT 20/25 daily and amlodipine 2.5 mg qd. Pt advised to continue low Na diet and to exercise on a regular basis.

## 2024-03-05 ENCOUNTER — APPOINTMENT (OUTPATIENT)
Dept: LAB | Facility: CLINIC | Age: 59
End: 2024-03-05
Payer: COMMERCIAL

## 2024-03-05 DIAGNOSIS — C22.0 HEPATOCELLULAR CARCINOMA (HCC): ICD-10-CM

## 2024-03-05 LAB
ALBUMIN SERPL BCP-MCNC: 4.3 G/DL (ref 3.5–5)
ALP SERPL-CCNC: 68 U/L (ref 34–104)
ALT SERPL W P-5'-P-CCNC: 19 U/L (ref 7–52)
ANION GAP SERPL CALCULATED.3IONS-SCNC: 8 MMOL/L
AST SERPL W P-5'-P-CCNC: 22 U/L (ref 13–39)
BACTERIA UR QL AUTO: ABNORMAL /HPF
BASOPHILS # BLD AUTO: 0.03 THOUSANDS/ÂΜL (ref 0–0.1)
BASOPHILS NFR BLD AUTO: 1 % (ref 0–1)
BILIRUB SERPL-MCNC: 1.05 MG/DL (ref 0.2–1)
BILIRUB UR QL STRIP: NEGATIVE
BUN SERPL-MCNC: 18 MG/DL (ref 5–25)
CALCIUM SERPL-MCNC: 9.6 MG/DL (ref 8.4–10.2)
CHLORIDE SERPL-SCNC: 101 MMOL/L (ref 96–108)
CLARITY UR: CLEAR
CO2 SERPL-SCNC: 25 MMOL/L (ref 21–32)
COLOR UR: ABNORMAL
CREAT SERPL-MCNC: 1.09 MG/DL (ref 0.6–1.3)
EOSINOPHIL # BLD AUTO: 0.23 THOUSAND/ÂΜL (ref 0–0.61)
EOSINOPHIL NFR BLD AUTO: 5 % (ref 0–6)
ERYTHROCYTE [DISTWIDTH] IN BLOOD BY AUTOMATED COUNT: 13.3 % (ref 11.6–15.1)
GFR SERPL CREATININE-BSD FRML MDRD: 74 ML/MIN/1.73SQ M
GLUCOSE P FAST SERPL-MCNC: 147 MG/DL (ref 65–99)
GLUCOSE UR STRIP-MCNC: NEGATIVE MG/DL
HCT VFR BLD AUTO: 46.3 % (ref 36.5–49.3)
HGB BLD-MCNC: 15.5 G/DL (ref 12–17)
HGB UR QL STRIP.AUTO: NEGATIVE
IMM GRANULOCYTES # BLD AUTO: 0.01 THOUSAND/UL (ref 0–0.2)
IMM GRANULOCYTES NFR BLD AUTO: 0 % (ref 0–2)
KETONES UR STRIP-MCNC: NEGATIVE MG/DL
LEUKOCYTE ESTERASE UR QL STRIP: NEGATIVE
LYMPHOCYTES # BLD AUTO: 1.21 THOUSANDS/ÂΜL (ref 0.6–4.47)
LYMPHOCYTES NFR BLD AUTO: 24 % (ref 14–44)
MCH RBC QN AUTO: 29.5 PG (ref 26.8–34.3)
MCHC RBC AUTO-ENTMCNC: 33.5 G/DL (ref 31.4–37.4)
MCV RBC AUTO: 88 FL (ref 82–98)
MONOCYTES # BLD AUTO: 0.44 THOUSAND/ÂΜL (ref 0.17–1.22)
MONOCYTES NFR BLD AUTO: 9 % (ref 4–12)
MUCOUS THREADS UR QL AUTO: ABNORMAL
NEUTROPHILS # BLD AUTO: 3.19 THOUSANDS/ÂΜL (ref 1.85–7.62)
NEUTS SEG NFR BLD AUTO: 61 % (ref 43–75)
NITRITE UR QL STRIP: NEGATIVE
NON-SQ EPI CELLS URNS QL MICRO: ABNORMAL /HPF
NRBC BLD AUTO-RTO: 0 /100 WBCS
PH UR STRIP.AUTO: 6.5 [PH]
PLATELET # BLD AUTO: 150 THOUSANDS/UL (ref 149–390)
PMV BLD AUTO: 10.1 FL (ref 8.9–12.7)
POTASSIUM SERPL-SCNC: 4.3 MMOL/L (ref 3.5–5.3)
PROT SERPL-MCNC: 7.2 G/DL (ref 6.4–8.4)
PROT UR STRIP-MCNC: ABNORMAL MG/DL
RBC # BLD AUTO: 5.26 MILLION/UL (ref 3.88–5.62)
RBC #/AREA URNS AUTO: ABNORMAL /HPF
SODIUM SERPL-SCNC: 134 MMOL/L (ref 135–147)
SP GR UR STRIP.AUTO: 1.02 (ref 1–1.03)
T3FREE SERPL-MCNC: 3.7 PG/ML (ref 2.5–3.9)
TSH SERPL DL<=0.05 MIU/L-ACNC: 4.2 UIU/ML (ref 0.45–4.5)
UROBILINOGEN UR STRIP-ACNC: <2 MG/DL
WBC # BLD AUTO: 5.11 THOUSAND/UL (ref 4.31–10.16)
WBC #/AREA URNS AUTO: ABNORMAL /HPF

## 2024-03-05 PROCEDURE — 80053 COMPREHEN METABOLIC PANEL: CPT

## 2024-03-05 PROCEDURE — 36415 COLL VENOUS BLD VENIPUNCTURE: CPT

## 2024-03-05 PROCEDURE — 84481 FREE ASSAY (FT-3): CPT

## 2024-03-05 PROCEDURE — 85025 COMPLETE CBC W/AUTO DIFF WBC: CPT

## 2024-03-05 PROCEDURE — 81001 URINALYSIS AUTO W/SCOPE: CPT

## 2024-03-05 PROCEDURE — 84443 ASSAY THYROID STIM HORMONE: CPT

## 2024-03-07 ENCOUNTER — HOSPITAL ENCOUNTER (OUTPATIENT)
Dept: INFUSION CENTER | Facility: CLINIC | Age: 59
Discharge: HOME | End: 2024-03-07
Payer: COMMERCIAL

## 2024-03-07 VITALS
SYSTOLIC BLOOD PRESSURE: 128 MMHG | DIASTOLIC BLOOD PRESSURE: 78 MMHG | OXYGEN SATURATION: 97 % | RESPIRATION RATE: 18 BRPM | WEIGHT: 196.65 LBS | HEIGHT: 69 IN | HEART RATE: 91 BPM | BODY MASS INDEX: 29.13 KG/M2 | TEMPERATURE: 97.2 F

## 2024-03-07 DIAGNOSIS — C22.0 HEPATOCELLULAR CARCINOMA (HCC): Primary | ICD-10-CM

## 2024-03-07 PROCEDURE — 96413 CHEMO IV INFUSION 1 HR: CPT

## 2024-03-07 PROCEDURE — 96417 CHEMO IV INFUS EACH ADDL SEQ: CPT

## 2024-03-07 RX ORDER — SODIUM CHLORIDE 9 MG/ML
20 INJECTION, SOLUTION INTRAVENOUS ONCE
Status: COMPLETED | OUTPATIENT
Start: 2024-03-07 | End: 2024-03-07

## 2024-03-07 RX ADMIN — SODIUM CHLORIDE 20 ML/HR: 0.9 INJECTION, SOLUTION INTRAVENOUS at 15:27

## 2024-03-07 RX ADMIN — ATEZOLIZUMAB 1200 MG: 1200 INJECTION, SOLUTION INTRAVENOUS at 15:59

## 2024-03-07 RX ADMIN — BEVACIZUMAB-AWWB 1300 MG: 400 INJECTION, SOLUTION INTRAVENOUS at 16:33

## 2024-03-07 NOTE — PROGRESS NOTES
Pt tolerated infusion without complaint, IV removed, pt aware of next treatment on 3/28 at 1230, declined AVS

## 2024-03-20 ENCOUNTER — HOSPITAL ENCOUNTER (EMERGENCY)
Facility: HOSPITAL | Age: 59
Discharge: HOME | End: 2024-03-20
Attending: EMERGENCY MEDICINE
Payer: COMMERCIAL

## 2024-03-20 ENCOUNTER — APPOINTMENT (EMERGENCY)
Dept: RADIOLOGY | Facility: HOSPITAL | Age: 59
End: 2024-03-20
Payer: COMMERCIAL

## 2024-03-20 VITALS
TEMPERATURE: 97.5 F | HEART RATE: 71 BPM | SYSTOLIC BLOOD PRESSURE: 175 MMHG | OXYGEN SATURATION: 97 % | RESPIRATION RATE: 18 BRPM | DIASTOLIC BLOOD PRESSURE: 95 MMHG

## 2024-03-20 DIAGNOSIS — M25.562 PAIN AND SWELLING OF LEFT KNEE: ICD-10-CM

## 2024-03-20 DIAGNOSIS — S83.412A SPRAIN OF MEDIAL COLLATERAL LIGAMENT OF LEFT KNEE, INITIAL ENCOUNTER: Primary | ICD-10-CM

## 2024-03-20 DIAGNOSIS — M25.462 PAIN AND SWELLING OF LEFT KNEE: ICD-10-CM

## 2024-03-20 PROCEDURE — 99283 EMERGENCY DEPT VISIT LOW MDM: CPT

## 2024-03-20 PROCEDURE — 73564 X-RAY EXAM KNEE 4 OR MORE: CPT

## 2024-03-20 PROCEDURE — 99284 EMERGENCY DEPT VISIT MOD MDM: CPT | Performed by: EMERGENCY MEDICINE

## 2024-03-20 RX ORDER — LIDOCAINE 50 MG/G
1 PATCH TOPICAL ONCE
Status: DISCONTINUED | OUTPATIENT
Start: 2024-03-20 | End: 2024-03-20 | Stop reason: HOSPADM

## 2024-03-20 RX ORDER — IBUPROFEN 600 MG/1
600 TABLET ORAL EVERY 8 HOURS PRN
Qty: 15 TABLET | Refills: 0 | Status: SHIPPED | OUTPATIENT
Start: 2024-03-20

## 2024-03-20 RX ORDER — LIDOCAINE 50 MG/G
1 PATCH TOPICAL EVERY 24 HOURS
Qty: 10 PATCH | Refills: 0 | Status: SHIPPED | OUTPATIENT
Start: 2024-03-20

## 2024-03-20 RX ADMIN — LIDOCAINE 1 PATCH: 700 PATCH TOPICAL at 15:33

## 2024-03-20 NOTE — DISCHARGE INSTRUCTIONS
Apply lidocaine patch (obtained by prescription or over-the-counter-4%) to the inner aspect of the knee for 12 hours daily.  He may continue taking ibuprofen 600 mg orally every 8 hours to help with inflammation and discomfort.  Use Ace wrap for support.  Rest, elevate and apply ice to the knee frequently throughout the day.    Schedule follow-up appointment with orthopedics for further evaluation/treatment.

## 2024-03-25 NOTE — ED PROVIDER NOTES
History  Chief Complaint   Patient presents with    Knee Pain     Pt presents with left knee pain after twisting leg wrong on the stairs. States he has been taking tylenol and wrapping his knee with no relief.      Patient is a 58-year-old male who presents to the emergency department for evaluation with left knee pain.  On Thursday while descending steps he turned to the right and appreciated immediate pain in the knee upon doing so.  Over the last week he took acetaminophen 975 mg 3 times daily and since yesterday 600 mg 3 times daily only slight relief in pain.  He appreciates swelling which is worsened and has not improved with rest.  He has continued working for at least a few hours each day during which he is on his feet.  This is a less intense than his typical days work.  No old left knee injury.  He did remotely injure the opposite knee.  Medical history additionally significant for hypertension and hepatocellular carcinoma.  He receives infusions for this every 3 weeks.        Prior to Admission Medications   Prescriptions Last Dose Informant Patient Reported? Taking?   amLODIPine (NORVASC) 2.5 mg tablet   No No   Sig: Take 1 tablet (2.5 mg total) by mouth daily   lisinopril-hydrochlorothiazide (PRINZIDE,ZESTORETIC) 20-25 MG per tablet   No No   Sig: Take 1 tablet by mouth daily      Facility-Administered Medications: None       Past Medical History:   Diagnosis Date    Hypertension     Liver cancer (HCC)        Past Surgical History:   Procedure Laterality Date    HERNIA REPAIR      IR Y-90 PRE-ANGIO/EMBO W/ LUNG SCAN  11/8/2022    IR Y-90 RADIOEMBOLIZATION  11/22/2022       Family History   Problem Relation Age of Onset    Cancer Mother      I have reviewed and agree with the history as documented.    E-Cigarette/Vaping    E-Cigarette Use Never User      E-Cigarette/Vaping Substances    Nicotine No     Flavoring No      Social History     Tobacco Use    Smoking status: Former     Current packs/day: 0.00      Average packs/day: 1 pack/day for 30.0 years (30.0 ttl pk-yrs)     Types: Cigarettes     Start date:      Quit date: 2017     Years since quittin.2    Smokeless tobacco: Current     Types: Chew    Tobacco comments:     nicotine pouch (on)   Vaping Use    Vaping status: Never Used   Substance Use Topics    Alcohol use: Not Currently    Drug use: Yes     Types: Marijuana     Comment: medical marijuana       Review of Systems   Constitutional:  Negative for fever.   Skin:  Negative for rash.       Physical Exam  Physical Exam  Vitals and nursing note reviewed.   Constitutional:       Appearance: Normal appearance. He is not ill-appearing.   HENT:      Head: Normocephalic.      Mouth/Throat:      Mouth: Mucous membranes are moist.   Eyes:      General: No scleral icterus.  Cardiovascular:      Rate and Rhythm: Normal rate.   Pulmonary:      Effort: Pulmonary effort is normal. No respiratory distress.   Musculoskeletal:      Comments: +2 bilateral PT pulses.  No swelling of the calves appreciated nor discoloration of this region.  Swelling with mild fluctuance appreciated over the medial aspect of the left knee.  A few fine linear horizontal markings are appreciated over the medial aspect of the knee (suspect from overlying compressive wrap).  Exquisite tenderness in this region without increased warmth.  No tenderness of the patella, lateral or posterior aspect of the knee.  No tenderness of the medial thigh.  He is able to flex and extend the knee against resistance.  Very slight crepitus noted.  (Patient appreciates clicking at home).  I do not appreciate laxity on valgus, varus, anterior posterior drawer testing.  Valgus stressing does exacerbate discomfort.  Good strength with dorsi and plantarflexion.   Skin:     General: Skin is warm and dry.      Findings: No rash.      Comments: No open skin of the knees.   Neurological:      Mental Status: He is alert.         Vital Signs  ED Triage Vitals [24  1343]   Temperature Pulse Respirations Blood Pressure SpO2   97.5 °F (36.4 °C) 71 18 (!) 175/95 97 %      Temp Source Heart Rate Source Patient Position - Orthostatic VS BP Location FiO2 (%)   Axillary Monitor Sitting Right arm --      Pain Score       --           Vitals:    03/20/24 1343   BP: (!) 175/95   Pulse: 71   Patient Position - Orthostatic VS: Sitting         Visual Acuity      ED Medications  Medications - No data to display    Diagnostic Studies  Results Reviewed       None                   XR knee 4+ views left injury   ED Interpretation by Ashlyn Meehan MD (03/20 1456)   No fracture or significant degenerative changes        Final Result by Isaac Segal MD (03/21 0802)      No acute osseous abnormality.            Workstation performed: [id number]                    Procedures  Procedures         ED Course     Differential diagnosis includes but is not limited to sprain-medial collateral ligament most likely, ACL or PCL injury, meniscal tear, traumatic or inflammatory effusion, traumatic bursitis, doubt infectious etiology.  Exam not consistent with DVT/superficial thrombophlebitis.  History not consistent with joint dislocation/relocation/arterial injury.    No acute abnormality nor extensive degenerative change appreciated on x-ray.  Reviewed supportive care-including increased rest, elevation, use of compression/ice and continued NSAIDs and follow-up with orthopedics for further management.                                    Medical Decision Making  Amount and/or Complexity of Data Reviewed  Radiology: ordered and independent interpretation performed.    Risk  Prescription drug management.             Disposition  Final diagnoses:   Sprain of medial collateral ligament of left knee, initial encounter   Pain and swelling of left knee     Time reflects when diagnosis was documented in both MDM as applicable and the Disposition within this note       Time User Action Codes  Description Comment    3/20/2024  3:12 PM Ashlyn Meehan Add [S83.412A] Sprain of medial collateral ligament of left knee, initial encounter     3/20/2024  3:15 PM Ashlyn Meehan Add [M25.462] Knee effusion, left     3/20/2024  3:15 PM Ashlyn Meehan Remove [M25.462] Knee effusion, left     3/20/2024  3:15 PM Ashlyn Meehan Add [M25.562,  M25.462] Pain and swelling of left knee           ED Disposition       ED Disposition   Discharge    Condition   Stable    Date/Time   Wed Mar 20, 2024  3:12 PM    Comment   Bassem Roberts discharge to home/self care.                   Follow-up Information       Follow up With Specialties Details Why Contact Info Additional Information    [facility] [place] [facility] Schedule an appointment as soon as possible for a visit   [address] [id number] [phone] [facility] [place], [place] [address], [address] [place], [address]   [phone]            Discharge Medication List as of 3/20/2024  3:20 PM        START taking these medications    Details   ibuprofen (MOTRIN) 600 mg tablet Take 1 tablet (600 mg total) by mouth every 8 (eight) hours as needed for moderate pain or mild pain, Starting Wed 3/20/2024, Normal      lidocaine (LIDODERM) 5 % Apply 1 patch topically over 12 hours every 24 hours Remove & Discard patch within 12 hours or as directed by MD, Starting Wed 3/20/2024, Normal           CONTINUE these medications which have NOT CHANGED    Details   amLODIPine (NORVASC) 2.5 mg tablet Take 1 tablet (2.5 mg total) by mouth daily, Starting Tue 10/17/2023, Normal      lisinopril-hydrochlorothiazide (PRINZIDE,ZESTORETIC) 20-25 MG per tablet Take 1 tablet by mouth daily, Starting Tue 10/17/2023, Normal                 PDMP Review       None            ED Provider  Electronically Signed by             Ashlyn Chappell  MD Shefali  03/24/24 9630

## 2024-03-27 ENCOUNTER — TELEPHONE (OUTPATIENT)
Dept: HEMATOLOGY ONCOLOGY | Facility: CLINIC | Age: 59
End: 2024-03-27

## 2024-03-27 ENCOUNTER — APPOINTMENT (OUTPATIENT)
Dept: LAB | Facility: CLINIC | Age: 59
End: 2024-03-27
Payer: COMMERCIAL

## 2024-03-27 DIAGNOSIS — C22.0 HEPATOCELLULAR CARCINOMA (HCC): Primary | ICD-10-CM

## 2024-03-27 DIAGNOSIS — C22.0 HEPATOCELLULAR CARCINOMA (HCC): ICD-10-CM

## 2024-03-27 LAB
ALBUMIN SERPL BCP-MCNC: 4.5 G/DL (ref 3.5–5)
ALP SERPL-CCNC: 76 U/L (ref 34–104)
ALT SERPL W P-5'-P-CCNC: 16 U/L (ref 7–52)
ANION GAP SERPL CALCULATED.3IONS-SCNC: 9 MMOL/L (ref 4–13)
AST SERPL W P-5'-P-CCNC: 24 U/L (ref 13–39)
BACTERIA UR QL AUTO: NORMAL /HPF
BASOPHILS # BLD AUTO: 0.04 THOUSANDS/ÂΜL (ref 0–0.1)
BASOPHILS NFR BLD AUTO: 1 % (ref 0–1)
BILIRUB SERPL-MCNC: 0.89 MG/DL (ref 0.2–1)
BILIRUB UR QL STRIP: NEGATIVE
BUN SERPL-MCNC: 26 MG/DL (ref 5–25)
CALCIUM SERPL-MCNC: 9.9 MG/DL (ref 8.4–10.2)
CHLORIDE SERPL-SCNC: 103 MMOL/L (ref 96–108)
CLARITY UR: CLEAR
CO2 SERPL-SCNC: 24 MMOL/L (ref 21–32)
COLOR UR: ABNORMAL
CREAT SERPL-MCNC: 1.2 MG/DL (ref 0.6–1.3)
EOSINOPHIL # BLD AUTO: 1.39 THOUSAND/ÂΜL (ref 0–0.61)
EOSINOPHIL NFR BLD AUTO: 18 % (ref 0–6)
ERYTHROCYTE [DISTWIDTH] IN BLOOD BY AUTOMATED COUNT: 13.3 % (ref 11.6–15.1)
GFR SERPL CREATININE-BSD FRML MDRD: 65 ML/MIN/1.73SQ M
GLUCOSE SERPL-MCNC: 95 MG/DL (ref 65–140)
GLUCOSE UR STRIP-MCNC: NEGATIVE MG/DL
HCT VFR BLD AUTO: 46.4 % (ref 36.5–49.3)
HGB BLD-MCNC: 15.6 G/DL (ref 12–17)
HGB UR QL STRIP.AUTO: ABNORMAL
IMM GRANULOCYTES # BLD AUTO: 0.02 THOUSAND/UL (ref 0–0.2)
IMM GRANULOCYTES NFR BLD AUTO: 0 % (ref 0–2)
KETONES UR STRIP-MCNC: NEGATIVE MG/DL
LEUKOCYTE ESTERASE UR QL STRIP: NEGATIVE
LYMPHOCYTES # BLD AUTO: 1.4 THOUSANDS/ÂΜL (ref 0.6–4.47)
LYMPHOCYTES NFR BLD AUTO: 18 % (ref 14–44)
MCH RBC QN AUTO: 29.1 PG (ref 26.8–34.3)
MCHC RBC AUTO-ENTMCNC: 33.6 G/DL (ref 31.4–37.4)
MCV RBC AUTO: 87 FL (ref 82–98)
MONOCYTES # BLD AUTO: 0.52 THOUSAND/ÂΜL (ref 0.17–1.22)
MONOCYTES NFR BLD AUTO: 7 % (ref 4–12)
NEUTROPHILS # BLD AUTO: 4.22 THOUSANDS/ÂΜL (ref 1.85–7.62)
NEUTS SEG NFR BLD AUTO: 56 % (ref 43–75)
NITRITE UR QL STRIP: NEGATIVE
NON-SQ EPI CELLS URNS QL MICRO: NORMAL /HPF
NRBC BLD AUTO-RTO: 0 /100 WBCS
PH UR STRIP.AUTO: 6 [PH]
PLATELET # BLD AUTO: 159 THOUSANDS/UL (ref 149–390)
PMV BLD AUTO: 9.6 FL (ref 8.9–12.7)
POTASSIUM SERPL-SCNC: 4.4 MMOL/L (ref 3.5–5.3)
PROT SERPL-MCNC: 7.4 G/DL (ref 6.4–8.4)
PROT UR STRIP-MCNC: ABNORMAL MG/DL
RBC # BLD AUTO: 5.36 MILLION/UL (ref 3.88–5.62)
RBC #/AREA URNS AUTO: NORMAL /HPF
SODIUM SERPL-SCNC: 136 MMOL/L (ref 135–147)
SP GR UR STRIP.AUTO: 1.02 (ref 1–1.03)
T3FREE SERPL-MCNC: 3.85 PG/ML (ref 2.5–3.9)
TSH SERPL DL<=0.05 MIU/L-ACNC: 4.64 UIU/ML (ref 0.45–4.5)
UROBILINOGEN UR STRIP-ACNC: 3 MG/DL
WBC # BLD AUTO: 7.59 THOUSAND/UL (ref 4.31–10.16)
WBC #/AREA URNS AUTO: NORMAL /HPF

## 2024-03-27 PROCEDURE — 84443 ASSAY THYROID STIM HORMONE: CPT

## 2024-03-27 PROCEDURE — 84439 ASSAY OF FREE THYROXINE: CPT

## 2024-03-27 PROCEDURE — 36415 COLL VENOUS BLD VENIPUNCTURE: CPT

## 2024-03-27 PROCEDURE — 80053 COMPREHEN METABOLIC PANEL: CPT

## 2024-03-27 PROCEDURE — 85025 COMPLETE CBC W/AUTO DIFF WBC: CPT

## 2024-03-27 PROCEDURE — 81001 URINALYSIS AUTO W/SCOPE: CPT

## 2024-03-27 PROCEDURE — 84481 FREE ASSAY (FT-3): CPT

## 2024-03-27 RX ORDER — SODIUM CHLORIDE 9 MG/ML
20 INJECTION, SOLUTION INTRAVENOUS ONCE
Status: CANCELLED | OUTPATIENT
Start: 2024-03-28

## 2024-03-27 NOTE — TELEPHONE ENCOUNTER
Patient Call    Who are you speaking with? Spouse    If it is not the patient, are they listed on an active communication consent form? Yes   What is the reason for this call? Patient went to [facility] lab this morning to give blood work for his infusion treatment tomorrow and was told their were no orders    Does this require a call back? Yes please call when labs are in chart   If a call back is required, please list best call back number [phone]   If a call back is required, advise that a message will be forwarded to their care team and someone will return their call as soon as possible.   Did you relay this information to the patient? Yes

## 2024-03-28 ENCOUNTER — HOSPITAL ENCOUNTER (OUTPATIENT)
Dept: INFUSION CENTER | Facility: CLINIC | Age: 59
Discharge: HOME | End: 2024-03-28
Payer: COMMERCIAL

## 2024-03-28 VITALS
HEIGHT: 69 IN | TEMPERATURE: 96.8 F | HEART RATE: 88 BPM | WEIGHT: 195 LBS | RESPIRATION RATE: 18 BRPM | DIASTOLIC BLOOD PRESSURE: 84 MMHG | SYSTOLIC BLOOD PRESSURE: 131 MMHG | BODY MASS INDEX: 28.88 KG/M2 | OXYGEN SATURATION: 98 %

## 2024-03-28 DIAGNOSIS — C22.0 HEPATOCELLULAR CARCINOMA (HCC): Primary | ICD-10-CM

## 2024-03-28 LAB — T4 FREE SERPL-MCNC: 0.84 NG/DL (ref 0.61–1.12)

## 2024-03-28 PROCEDURE — 96413 CHEMO IV INFUSION 1 HR: CPT

## 2024-03-28 PROCEDURE — 96417 CHEMO IV INFUS EACH ADDL SEQ: CPT

## 2024-03-28 RX ORDER — SODIUM CHLORIDE 9 MG/ML
20 INJECTION, SOLUTION INTRAVENOUS ONCE
Status: COMPLETED | OUTPATIENT
Start: 2024-03-28 | End: 2024-03-28

## 2024-03-28 RX ADMIN — SODIUM CHLORIDE 20 ML/HR: 0.9 INJECTION, SOLUTION INTRAVENOUS at 12:20

## 2024-03-28 RX ADMIN — ATEZOLIZUMAB 1200 MG: 1200 INJECTION, SOLUTION INTRAVENOUS at 13:30

## 2024-03-28 RX ADMIN — BEVACIZUMAB-AWWB 1300 MG: 400 INJECTION, SOLUTION INTRAVENOUS at 12:57

## 2024-03-28 NOTE — PROGRESS NOTES
Patient tolerated infusion without issue. PIV removed R FA, bleeding controlled, gauze and coban applied. Confirmed next appointment 4/18 at 1400, declined AVS, ambulatory from department.

## 2024-04-02 NOTE — TELEPHONE ENCOUNTER
LVM for patient letting him know that I will be canceling his appt with Dr Alex Bridges on 1/18 due to her not being in the office  I offered him an appt on 1/19  Asked him to call back to reschedule  03-Apr-2024 09:34

## 2024-04-11 RX ORDER — SODIUM CHLORIDE 9 MG/ML
20 INJECTION, SOLUTION INTRAVENOUS ONCE
OUTPATIENT
Start: 2024-04-18

## 2024-04-15 ENCOUNTER — TELEPHONE (OUTPATIENT)
Dept: HEMATOLOGY ONCOLOGY | Facility: MEDICAL CENTER | Age: 59
End: 2024-04-15

## 2024-04-15 DIAGNOSIS — C22.0 HEPATOCELLULAR CARCINOMA (HCC): Primary | ICD-10-CM

## 2024-04-16 ENCOUNTER — APPOINTMENT (OUTPATIENT)
Dept: LAB | Facility: CLINIC | Age: 59
End: 2024-04-16
Payer: COMMERCIAL

## 2024-04-16 DIAGNOSIS — C22.0 HEPATOCELLULAR CARCINOMA (HCC): ICD-10-CM

## 2024-04-16 LAB
AFP-TM SERPL-MCNC: 32.65 NG/ML (ref 0–9)
ALBUMIN SERPL BCP-MCNC: 4.5 G/DL (ref 3.5–5)
ALP SERPL-CCNC: 72 U/L (ref 34–104)
ALT SERPL W P-5'-P-CCNC: 14 U/L (ref 7–52)
ANION GAP SERPL CALCULATED.3IONS-SCNC: 7 MMOL/L (ref 4–13)
AST SERPL W P-5'-P-CCNC: 24 U/L (ref 13–39)
BACTERIA UR QL AUTO: NORMAL /HPF
BASOPHILS # BLD AUTO: 0.03 THOUSANDS/ÂΜL (ref 0–0.1)
BASOPHILS NFR BLD AUTO: 1 % (ref 0–1)
BILIRUB SERPL-MCNC: 1.14 MG/DL (ref 0.2–1)
BILIRUB UR QL STRIP: NEGATIVE
BUN SERPL-MCNC: 19 MG/DL (ref 5–25)
CALCIUM SERPL-MCNC: 9.7 MG/DL (ref 8.4–10.2)
CHLORIDE SERPL-SCNC: 100 MMOL/L (ref 96–108)
CLARITY UR: CLEAR
CO2 SERPL-SCNC: 27 MMOL/L (ref 21–32)
COLOR UR: YELLOW
CREAT SERPL-MCNC: 1.16 MG/DL (ref 0.6–1.3)
EOSINOPHIL # BLD AUTO: 1.01 THOUSAND/ÂΜL (ref 0–0.61)
EOSINOPHIL NFR BLD AUTO: 15 % (ref 0–6)
ERYTHROCYTE [DISTWIDTH] IN BLOOD BY AUTOMATED COUNT: 13.6 % (ref 11.6–15.1)
GFR SERPL CREATININE-BSD FRML MDRD: 68 ML/MIN/1.73SQ M
GLUCOSE SERPL-MCNC: 108 MG/DL (ref 65–140)
GLUCOSE UR STRIP-MCNC: NEGATIVE MG/DL
HCT VFR BLD AUTO: 43.8 % (ref 36.5–49.3)
HGB BLD-MCNC: 15 G/DL (ref 12–17)
HGB UR QL STRIP.AUTO: NEGATIVE
IMM GRANULOCYTES # BLD AUTO: 0.01 THOUSAND/UL (ref 0–0.2)
IMM GRANULOCYTES NFR BLD AUTO: 0 % (ref 0–2)
KETONES UR STRIP-MCNC: NEGATIVE MG/DL
LEUKOCYTE ESTERASE UR QL STRIP: NEGATIVE
LYMPHOCYTES # BLD AUTO: 1 THOUSANDS/ÂΜL (ref 0.6–4.47)
LYMPHOCYTES NFR BLD AUTO: 15 % (ref 14–44)
MCH RBC QN AUTO: 29.5 PG (ref 26.8–34.3)
MCHC RBC AUTO-ENTMCNC: 34.2 G/DL (ref 31.4–37.4)
MCV RBC AUTO: 86 FL (ref 82–98)
MONOCYTES # BLD AUTO: 0.5 THOUSAND/ÂΜL (ref 0.17–1.22)
MONOCYTES NFR BLD AUTO: 8 % (ref 4–12)
NEUTROPHILS # BLD AUTO: 4 THOUSANDS/ÂΜL (ref 1.85–7.62)
NEUTS SEG NFR BLD AUTO: 61 % (ref 43–75)
NITRITE UR QL STRIP: NEGATIVE
NON-SQ EPI CELLS URNS QL MICRO: NORMAL /HPF
NRBC BLD AUTO-RTO: 0 /100 WBCS
PH UR STRIP.AUTO: 5.5 [PH]
PLATELET # BLD AUTO: 156 THOUSANDS/UL (ref 149–390)
PMV BLD AUTO: 9.8 FL (ref 8.9–12.7)
POTASSIUM SERPL-SCNC: 4.1 MMOL/L (ref 3.5–5.3)
PROT SERPL-MCNC: 7.4 G/DL (ref 6.4–8.4)
PROT UR STRIP-MCNC: ABNORMAL MG/DL
RBC # BLD AUTO: 5.08 MILLION/UL (ref 3.88–5.62)
RBC #/AREA URNS AUTO: NORMAL /HPF
SODIUM SERPL-SCNC: 134 MMOL/L (ref 135–147)
SP GR UR STRIP.AUTO: 1.02 (ref 1–1.03)
T3FREE SERPL-MCNC: 3.03 PG/ML (ref 2.5–3.9)
T4 FREE SERPL-MCNC: 0.76 NG/DL (ref 0.61–1.12)
TSH SERPL DL<=0.05 MIU/L-ACNC: 4.86 UIU/ML (ref 0.45–4.5)
UROBILINOGEN UR STRIP-ACNC: <2 MG/DL
WBC # BLD AUTO: 6.55 THOUSAND/UL (ref 4.31–10.16)
WBC #/AREA URNS AUTO: NORMAL /HPF

## 2024-04-16 PROCEDURE — 84443 ASSAY THYROID STIM HORMONE: CPT

## 2024-04-16 PROCEDURE — 81001 URINALYSIS AUTO W/SCOPE: CPT

## 2024-04-16 PROCEDURE — 84481 FREE ASSAY (FT-3): CPT

## 2024-04-16 PROCEDURE — 80053 COMPREHEN METABOLIC PANEL: CPT

## 2024-04-16 PROCEDURE — 84439 ASSAY OF FREE THYROXINE: CPT

## 2024-04-16 PROCEDURE — 85025 COMPLETE CBC W/AUTO DIFF WBC: CPT

## 2024-04-16 PROCEDURE — 82105 ALPHA-FETOPROTEIN SERUM: CPT

## 2024-04-16 PROCEDURE — 36415 COLL VENOUS BLD VENIPUNCTURE: CPT

## 2024-04-18 ENCOUNTER — HOSPITAL ENCOUNTER (OUTPATIENT)
Dept: INFUSION CENTER | Facility: CLINIC | Age: 59
Discharge: HOME | End: 2024-04-18
Payer: COMMERCIAL

## 2024-04-18 VITALS
RESPIRATION RATE: 18 BRPM | DIASTOLIC BLOOD PRESSURE: 74 MMHG | HEART RATE: 84 BPM | WEIGHT: 190.26 LBS | OXYGEN SATURATION: 98 % | BODY MASS INDEX: 28.1 KG/M2 | SYSTOLIC BLOOD PRESSURE: 140 MMHG | TEMPERATURE: 97.9 F

## 2024-04-18 DIAGNOSIS — C22.0 HEPATOCELLULAR CARCINOMA (HCC): Primary | ICD-10-CM

## 2024-04-18 PROCEDURE — 96413 CHEMO IV INFUSION 1 HR: CPT

## 2024-04-18 PROCEDURE — 96417 CHEMO IV INFUS EACH ADDL SEQ: CPT

## 2024-04-18 RX ORDER — SODIUM CHLORIDE 9 MG/ML
20 INJECTION, SOLUTION INTRAVENOUS ONCE
Status: COMPLETED | OUTPATIENT
Start: 2024-04-18 | End: 2024-04-18

## 2024-04-18 RX ADMIN — SODIUM CHLORIDE 20 ML/HR: 0.9 INJECTION, SOLUTION INTRAVENOUS at 14:37

## 2024-04-18 RX ADMIN — BEVACIZUMAB-AWWB 1300 MG: 400 INJECTION, SOLUTION INTRAVENOUS at 15:01

## 2024-04-18 RX ADMIN — ATEZOLIZUMAB 1200 MG: 1200 INJECTION, SOLUTION INTRAVENOUS at 15:39

## 2024-04-18 NOTE — PROGRESS NOTES
Patient here for avastin/atezolizumab, he reports no changes, left knee pain from injury a few weeks ago, x ray negative per patient. First IV attempt RFA with initial success, site infiltrated with flush. 2nd attempt without incident.

## 2024-04-18 NOTE — PROGRESS NOTES
Patient tolerated treatment without incident and was discharged post, no c/o offered. Next cycle 5/9/24, appt confirmed.

## 2024-04-18 NOTE — PATIENT INSTRUCTIONS
April 2024 Sunday Monday Tuesday Wednesday Thursday Friday Saturday        1     2     3     4     5     6                7     8     9     10     11     12     13                14     15     16    LAB WALK IN  12:10 PM   (5 min.)   AN [facility] PHLEB [facility]AIR 1   [facility]     18    INF ONCOLOGY TX-TREATMENT PLAN   2:00 PM   (150 min.)   AN INF CHAIR 2   [facility] 19     20           Cycle 14, Day 1     21     22     23     24     25     26     27                28     29     30    FOLLOW UP PG   3:25 PM   (20 min.)   Taye Lundberg MD   [facility] [place]                                       Treatment Details         4/18/2024 - Cycle 14, Day 1      Chemotherapy: ONCBCN PROVIDER COMMUNICATION, BEVACIZUMAB-AWWB IVPB, ATEZOLIZUMAB IVPB, ONCBCN PROVIDER COMMUNICATION8        May 2024      [place] Monday Tuesday Wednesday Thursday Friday Saturday                  1     2     3     4                5     6     7     8     9    INF ONCOLOGY TX-TREATMENT PLAN  12:00 PM   (150 min.)   AN INF CHAIR 12   [facility] 10     11           Cycle 15, Day 1     12     13     14     15     16     17     18                19     20     21     22     23     24     25                26     27     28     29     30    INF ONCOLOGY TX-TREATMENT PLAN   1:30 PM   (150 min.)   AN INF CHAIR 8   [facility] 31                Cycle 16, Day 1            Treatment Details         5/9/2024 - Cycle 15, Day 1      Chemotherapy: ONCBCN PROVIDER COMMUNICATION, BEVACIZUMAB-AWWB IVPB, ATEZOLIZUMAB IVPB, ONCBCN PROVIDER COMMUNICATION8    5/30/2024 - Cycle 16, Day 1      Chemotherapy: ONCBCN PROVIDER COMMUNICATION, BEVACIZUMAB-AWWB IVPB, ATEZOLIZUMAB IVPB, ONCBCN PROVIDER COMMUNICATION8

## 2024-04-30 ENCOUNTER — OFFICE VISIT (OUTPATIENT)
Dept: HEMATOLOGY ONCOLOGY | Facility: CLINIC | Age: 59
End: 2024-04-30
Payer: COMMERCIAL

## 2024-04-30 VITALS
TEMPERATURE: 98.1 F | HEART RATE: 77 BPM | RESPIRATION RATE: 17 BRPM | DIASTOLIC BLOOD PRESSURE: 82 MMHG | OXYGEN SATURATION: 97 % | WEIGHT: 188 LBS | SYSTOLIC BLOOD PRESSURE: 148 MMHG | HEIGHT: 69 IN | BODY MASS INDEX: 27.85 KG/M2

## 2024-04-30 DIAGNOSIS — C22.0 HEPATOCELLULAR CARCINOMA (HCC): Primary | ICD-10-CM

## 2024-04-30 DIAGNOSIS — M25.562 ACUTE PAIN OF LEFT KNEE: ICD-10-CM

## 2024-04-30 PROCEDURE — 99214 OFFICE O/P EST MOD 30 MIN: CPT | Performed by: INTERNAL MEDICINE

## 2024-04-30 NOTE — PROGRESS NOTES
Bassem Roberts  1965  [facility]AT[facility] [place] [id number]    DISCUSSION/SUMMARY:      59 year-old male with a somewhat complicated prior GI/liver history.  Patient was previously diagnosed with hepatocellular carcinoma, elevated alpha-fetoprotein level.  Patient underwent Y90 treatment in November 2022.  Alpha-fetoprotein level came down nicely.  Unfortunately the tumor marker recently began to rise.  The May 16, 2023 CAT scan of the chest demonstrated enlarging pulmonary nodules.  More recent MRI of the abdomen demonstrated worsening gastrohepatic and portacaval adenopathy (although the liver lesions were unchanged).  Alpha-fetoprotein was found to be significantly elevated.  Patient was previously presented at the GI tumor board.  The consensus was to begin systemic treatment.    NCCN guidelines 1/20/2023 states that for patients with hepatocellular carcinoma, good performance, Child Myers class A, category 1 treatment includes atezolizumab and bevacizumab or tremelimumab + durvalumab (also category 1).    Regimen  Atezolizumab 1200 mg IV day 1  Bevacizumab 15 mg/kilogram IV day 1  Cycle length = 21 days  Goal = prolongation of life, improvement of quality of life    Mr. Roberts has received 14 cycles.  Patient missed 1 cycle but otherwise has received the treatments more or less on time.  From an oncology standpoint patient feels well and clinically there are no concerning findings.  The alpha-fetoprotein is slightly higher than before; other blood work is good/WNL.  Prior CAT scans from January 2024 demonstrated good response to treatment.  The plan is to continue with the atezolizumab and bevacizumab for now.  Patient is to go for repeat CAT scans and alpha-fetoprotein in approximately 2-1/2 months and return here in 3 months.    Patient will also follow-up with GI as directed.    Patient's biggest quality-of-life issue at  this time is the left sided knee pain.  The specifics on the physical therapy are presently not available.  Patient understands that there is a limited amount that I can do from an oncology standpoint but patient has been referred to orthopedic surgery for evaluation.    Armando knows to call the hematology/oncology office if there are any other questions or concerns.    Carefully review your medication list and verify that the list is accurate and up-to-date. Please call the hematology/oncology office if there are medications missing from the list, medications on the list that you are not currently taking or if there is a dosage or instruction that is different from how you're taking that medication.    Patient goals and areas of care: Continue with the atezolizumab and bevacizumab, repeat scans before the next office visit, see orthopedics  Barriers to care: None  Patient is able to self-care  ______________________________________________________________________________________    Chief Complaint   Patient presents with    Follow-up    Metastatic hepatocellular carcinoma     Oncology History   Hepatocellular carcinoma (HCC)   2022 Initial Diagnosis    Hepatocellular carcinoma (HCC)     2/8/2022 Biopsy    [facility] DOSHI US guided liver biopsy: right liver lobe:  Poorly differentiated HCC with patchy necrosis       7/13/2022 -  Cancer Staged    Staging form: Liver (Excluding Intrahepatic Bile Ducts), AJCC 8th Edition  - Clinical stage from 7/13/2022: Stage IVB (rcT3, cN1, pM1) - Signed by Taye Lundberg MD on 1/30/2024  Stage prefix: Recurrence  Histologic grade (G): G2  Histologic grading system: 4 grade system       6/29/2023 -  Chemotherapy    alteplase (CATHFLO), 2 mg, Intracatheter, Every 1 Minute as needed, 14 of 16 cycles  atezolizumab (TECENTRIQ) IVPB, 1,200 mg, Intravenous, Once, 14 of 16 cycles  Administration: 1,200 mg (6/29/2023), 1,200 mg (7/20/2023), 1,200 mg (8/10/2023), 1,200 mg (8/31/2023),  1,200 mg (9/21/2023), 1,200 mg (10/10/2023), 1,200 mg (11/2/2023), 1,200 mg (11/24/2023), 1,200 mg (12/14/2023), 1,200 mg (1/4/2024), 1,200 mg (1/26/2024), 1,200 mg (3/7/2024), 1,200 mg (3/28/2024), 1,200 mg (4/18/2024)  bevacizumab-awwb (MVASI) IVPB, 1,345 mg (100 % of original dose 15 mg/kg), Intravenous, Once, 14 of 16 cycles  Dose modification: 15 mg/kg (original dose 15 mg/kg, Cycle 1), 15 mg/kg (original dose 15 mg/kg, Cycle 2), 15 mg/kg (original dose 15 mg/kg, Cycle 3)  Administration: 1,300 mg (6/29/2023), 1,300 mg (7/20/2023), 1,300 mg (8/10/2023), 1,300 mg (8/31/2023), 1,300 mg (9/21/2023), 1,300 mg (10/10/2023), 1,300 mg (11/2/2023), 1,300 mg (11/24/2023), 1,300 mg (12/14/2023), 1,300 mg (1/4/2024), 1,300 mg (1/26/2024), 1,300 mg (3/7/2024), 1,300 mg (3/28/2024), 1,300 mg (4/18/2024)       History of Present Illness: 59-year-old male with history of cirrhosis secondary to chronic hepatitis C, history of alcohol abuse, previously diagnosed with multifocal hepatocellular carcinoma.  Patient underwent TARE on November 22, 2022.  Follow-up scans demonstrated concerning enlarging pulmonary lesions.  Alpha-fetoprotein level has also been rising.  Patient was recently presented at the GI tumor board and the plan was systemic treatment.  Patient has completed 14 cycles of atezolizumab and bevacizumab.  Patient returns for follow-up.    Mr. Roberts states feeling okay from an oncology standpoint.  Patient tolerates the chemotherapy well.  Appetite is good, weight is stable.  No fevers or signs of infection.  Patient recently twisted his left knee, has been painful for 2 months.  Specifics are not clear, patient was seen in the emergency room and offered physical therapy.  Patient deferred.  Patient has not been seen by orthopedics.  No bruising or bleeding issues.  Patient continues to be active working full-time.    Review of Systems   Constitutional: Negative.    HENT: Negative.     Eyes: Negative.     Respiratory: Negative.     Cardiovascular: Negative.    Gastrointestinal: Negative.    Endocrine: Negative.    Genitourinary: Negative.    Musculoskeletal:  Positive for arthralgias.   Skin: Negative.    Allergic/Immunologic: Negative.    Neurological: Negative.    Hematological: Negative.    Psychiatric/Behavioral:  The patient is nervous/anxious.    All other systems reviewed and are negative.    Patient Active Problem List   Diagnosis    Hepatocellular carcinoma (HCC)    Essential hypertension    Chronic hepatitis C without hepatic coma (HCC)    Cirrhosis of liver (HCC)    ED (erectile dysfunction)    Colon polyp    Subclinical hypothyroidism    Hypercholesterolemia     Past Medical History:   Diagnosis Date    Hypertension     Liver cancer (HCC)      Past Surgical History:   Procedure Laterality Date    HERNIA REPAIR      IR Y-90 PRE-ANGIO/EMBO W/ LUNG SCAN  2022    IR Y-90 RADIOEMBOLIZATION  2022     Family History   Problem Relation Age of Onset    Cancer Mother      Social History     Socioeconomic History    Marital status: /Civil Union     Spouse name: Not on file    Number of children: Not on file    Years of education: Not on file    Highest education level: Not on file   Occupational History    Not on file   Tobacco Use    Smoking status: Former     Current packs/day: 0.00     Average packs/day: 1 pack/day for 30.0 years (30.0 ttl pk-yrs)     Types: Cigarettes     Start date:      Quit date: 2017     Years since quittin.3    Smokeless tobacco: Current     Types: Chew    Tobacco comments:     nicotine pouch (on)   Vaping Use    Vaping status: Never Used   Substance and Sexual Activity    Alcohol use: Not Currently    Drug use: Yes     Types: Marijuana     Comment: medical marijuana    Sexual activity: Yes     Partners: Female   Other Topics Concern    Not on file   Social History Narrative    Not on file     Social Determinants of Health     Financial Resource Strain: Not on  file   Food Insecurity: Not on file   Transportation Needs: Not on file   Physical Activity: Not on file   Stress: Not on file   Social Connections: Not on file   Intimate Partner Violence: Not on file   Housing Stability: Not on file       Current Outpatient Medications:     amLODIPine (NORVASC) 2.5 mg tablet, Take 1 tablet (2.5 mg total) by mouth daily, Disp: 90 tablet, Rfl: 2    ibuprofen (MOTRIN) 600 mg tablet, Take 1 tablet (600 mg total) by mouth every 8 (eight) hours as needed for moderate pain or mild pain, Disp: 15 tablet, Rfl: 0    lidocaine (LIDODERM) 5 %, Apply 1 patch topically over 12 hours every 24 hours Remove & Discard patch within 12 hours or as directed by MD, Disp: 10 patch, Rfl: 0    lisinopril-hydrochlorothiazide (PRINZIDE,ZESTORETIC) 20-25 MG per tablet, Take 1 tablet by mouth daily, Disp: 90 tablet, Rfl: 2    No Known Allergies    Vitals:    04/30/24 1537   BP: 148/82   Pulse: 77   Resp: 17   Temp: 98.1 °F (36.7 °C)   SpO2: 97%     Physical Exam  Constitutional:       Appearance: He is well-developed.      Comments: Well-nourished male, no respiratory distress, no signs of pain   HENT:      Head: Normocephalic and atraumatic.      Right Ear: External ear normal.      Left Ear: External ear normal.   Eyes:      Conjunctiva/sclera: Conjunctivae normal.      Pupils: Pupils are equal, round, and reactive to light.   Cardiovascular:      Rate and Rhythm: Normal rate and regular rhythm.      Heart sounds: Normal heart sounds.   Pulmonary:      Effort: Pulmonary effort is normal.      Breath sounds: Normal breath sounds.      Comments: Clear bilaterally  Abdominal:      General: Bowel sounds are normal.      Palpations: Abdomen is soft.      Comments: + Bowel sounds, nontender, soft, no paraspinal megaly, no guarding   Musculoskeletal:         General: Normal range of motion.      Cervical back: Normal range of motion and neck supple.      Comments: No swelling in the digits or hands, left knee is  swollen, decreased range of motion, tender to the touch   Skin:     General: Skin is warm.      Comments: Good color, warm, moist, no petechiae or ecchymoses   Neurological:      Mental Status: He is alert and oriented to person, place, and time.      Deep Tendon Reflexes: Reflexes are normal and symmetric.   Psychiatric:         Behavior: Behavior normal.         Thought Content: Thought content normal.         Judgment: Judgment normal.     Extremities: No lower extreme edema bilaterally, no cords, pulses are 1+  Lymphatics: None within the neck, supraclavicular region, axilla bilaterally    Labs    4/16/2024 WBC = 6.55 hemoglobin = 15 hematocrit = 44 platelet = 156 neutrophil = 61% TSH = 4.857 BUN = 19 creatinine = 1.16 calcium = 9.7 LFTs WNL total bilirubin = 1.14        1/24/2024 WBC = 5.82 hemoglobin = 15.5 hematocrit = 46.7 platelet = 151 neutrophil = 63% TSH = 3.792 BUN = 12 creatinine = 1.10 calcium within 9.5 LFTs WNL    Imaging    1/8/2024 CAT scan chest without contrast.  Impression stated many of the pulmonary nodules noted previously have resolved.  These nodules were noted to have shown interval growth or were new on the prior study.  The other stable nodules continue to be stable.    10/25/2023 MRI abdomen with and without contrast     LIVER:  Hepatic surface nodularity consistent with cirrhosis.     Treated observation: 1  Size: 2.3 x 2.3 cm series 11 image 162, 5.5 x 4.8 cm pretreatment category LR M  Location: Segment 8  Enhancement: Treatment-specific expected enhancement pattern.     LI-RADS Category: LR- TR Nonviable.     Treated observation: 2  Size: 2.5 x 2.5 cm series 11 image 284, 4.4 x 4.2 cm, pretreatment category LR 5  Location: Segment 7/8  Enhancement: Treatment-specific expected enhancement pattern.  Very minimal peripheral enhancement decreased compared to the previous exam     LI-RADS Category: LR- TR Nonviable.     Treated observation: 3  Location: Segment 8  Lesion no longer  visualized     Treated observation: 4  Location: Segment 6  Lesion no longer visualized.    IMPRESSION:     1. Treated observation 1, and treated observation 2- LR TR nonviable.  2. Observations 3 and 4 are not visualized.  3. Stable upper abdominal adenopathy as described.  Continued follow-up recommended in 3 months interval.    6/7/2023 MRI abdomen with and without contrast    IMPRESSION:     Previously seen hepatic observations now show expected posttreatment perilesional arterial phase enhancement unchanged from 3/6/2023. LI- RADS LR TR nonviable.     No new hepatic observations.     Worsened gastrohepatic and portacaval adenopathy.    5/16/2023 CAT scan chest without contrast    IMPRESSION:     Increase in size of lung nodules measuring up to 6 mm with at least one new lung nodule. Given the patient's history of malignancy, differential includes pulmonary metastasis. Recommend short-term follow-up with CT without contrast in 3 months.    3/6/2023 MRI abdomen with and without contrast    IMPRESSION:     All of the previously seen observations now show normal posttreatment perilesional arterial phase enhancement or are no longer apparent status post treatment.LR- TR Nonviable.    7/13/2022 MRI abdomen    IMPRESSION:     1.  Cirrhosis.  Multiple right hepatic lobe observations:  - Segment 8, 4.0 x 3.1 cm, LR-M.  - Segment 7/8, 4.3 x 4.6 cm, LR-5.  - Segment 8, 1.6 x 1.6 cm, LR-M.  - Segment 6, 1.1 x 1.2 cm, LR-5.     One of these lesions has reportedly previously been biopsied showing hepatocellular carcinoma, although it is not clear which lesion was sampled.  Although some of the lesions meet criteria for LR-M, all of the lesions may be part of the same process.    Pathology    Case Report   Surgical Pathology Report                         Case: [id number]                                    Authorizing Provider:  Hakeem Swenson MD      Collected:           10/06/2022 0829               Ordering Location:      [facility]      Received:            10/06/2022 0829                                      [facility]                                                                                   Laboratory                                                                    Pathologist:           Sade Michaud MD                                                                     Specimen:    Liver, Liver Biopsy (10 slides [id number] [facility], 1928–1983, collected 2/8/2022)                Final Diagnosis   OUTSIDE SLIDES FOR REVIEW (10 slides [id number] [facility], [facility], collected 2/8/2022):       A. Liver, core needle biopsy:  -   Poorly differentiated carcinoma with patchy necrosis (see comment).  -   Background liver parenchyma with chronic inflammation.       Comment: Submitted immunohistochemical stains show the tumor cells are positive for CK7 and CK19, and are negative for CK20,  HepPar1, , TTF1 and .  Additional stains were performed by outside institution and are not submitted for review.  Per report, the tumor cells stain positive for glypican 3, AFP, albumin mRNA and claudin 4, and stain negative for arginase, PAX8, GATA3, CDX2, and NKX3.1 and mucicarmine.       The histomorphologic and immunophenotypic features are consistent with poorly differentiated carcinoma.  The clinical history of cirrhosis, AFP and glypican 3 positivity favor hepatocellular carcinoma.  However, lack of staining for HepPar1 and arginase, and expression of claudin 4, CK7 and CK19 suggest a cholangiocarcinoma component.  Strong expression of albumin mRNA can be found in a majority of hepatocellular carcinoma.  However, albumin mRNA can also be positive in a subset of intrahepatic cholangiocarcinoma and adenocarcinoma from other sites.  Therefore, hepatocellular carcinoma with a cholangiocarcinoma component, so-called combined hepatocellular carcinoma-cholangiocarcinoma, cannot be excluded.  There is insufficient background liver  parenchyma for a definitive diagnosis of cirrhosis.     Reference: Ti A, Trupti HD, Neelam T, Veena S, Santoyo L, Adonay S, Amy SS, Sebastien VS, Ruth TC, Zack RK, Ca Cameron JM, Zhao Donahue LP, Domenic TT, Jairo RP. Albumin In Situ Hybridization Can Be Positive in Adenocarcinomas and Other Tumors From Diverse Sites. Am J Clin Pathol. 2019 Jul 5;152(2):190-199. doi: 10.1093/ajcp/bar750. PMID: 99340181.     Interpretation performed at [facility], [address] [place] [address]    Electronically signed by Sade Michaud MD on 10/6/2022 at  3:49 PM

## 2024-05-02 RX ORDER — SODIUM CHLORIDE 9 MG/ML
20 INJECTION, SOLUTION INTRAVENOUS ONCE
Status: CANCELLED | OUTPATIENT
Start: 2024-05-09

## 2024-05-07 ENCOUNTER — APPOINTMENT (OUTPATIENT)
Dept: LAB | Facility: CLINIC | Age: 59
End: 2024-05-07
Payer: COMMERCIAL

## 2024-05-07 DIAGNOSIS — C22.0 HEPATOCELLULAR CARCINOMA (HCC): ICD-10-CM

## 2024-05-07 DIAGNOSIS — C22.0 HEPATOCELLULAR CARCINOMA (HCC): Primary | ICD-10-CM

## 2024-05-07 LAB
ALBUMIN SERPL BCP-MCNC: 4.2 G/DL (ref 3.5–5)
ALP SERPL-CCNC: 78 U/L (ref 34–104)
ALT SERPL W P-5'-P-CCNC: 18 U/L (ref 7–52)
ANION GAP SERPL CALCULATED.3IONS-SCNC: 6 MMOL/L (ref 4–13)
AST SERPL W P-5'-P-CCNC: 26 U/L (ref 13–39)
BACTERIA UR QL AUTO: ABNORMAL /HPF
BASOPHILS # BLD AUTO: 0.04 THOUSANDS/ÂΜL (ref 0–0.1)
BASOPHILS NFR BLD AUTO: 1 % (ref 0–1)
BILIRUB SERPL-MCNC: 1.1 MG/DL (ref 0.2–1)
BILIRUB UR QL STRIP: NEGATIVE
BUN SERPL-MCNC: 15 MG/DL (ref 5–25)
CALCIUM SERPL-MCNC: 9.3 MG/DL (ref 8.4–10.2)
CHLORIDE SERPL-SCNC: 103 MMOL/L (ref 96–108)
CLARITY UR: CLEAR
CO2 SERPL-SCNC: 26 MMOL/L (ref 21–32)
COLOR UR: YELLOW
CREAT SERPL-MCNC: 0.99 MG/DL (ref 0.6–1.3)
EOSINOPHIL # BLD AUTO: 0.8 THOUSAND/ÂΜL (ref 0–0.61)
EOSINOPHIL NFR BLD AUTO: 16 % (ref 0–6)
ERYTHROCYTE [DISTWIDTH] IN BLOOD BY AUTOMATED COUNT: 13.9 % (ref 11.6–15.1)
GFR SERPL CREATININE-BSD FRML MDRD: 83 ML/MIN/1.73SQ M
GLUCOSE P FAST SERPL-MCNC: 117 MG/DL (ref 65–99)
GLUCOSE UR STRIP-MCNC: NEGATIVE MG/DL
HCT VFR BLD AUTO: 43.4 % (ref 36.5–49.3)
HGB BLD-MCNC: 14.7 G/DL (ref 12–17)
HGB UR QL STRIP.AUTO: NEGATIVE
IMM GRANULOCYTES # BLD AUTO: 0.01 THOUSAND/UL (ref 0–0.2)
IMM GRANULOCYTES NFR BLD AUTO: 0 % (ref 0–2)
KETONES UR STRIP-MCNC: NEGATIVE MG/DL
LEUKOCYTE ESTERASE UR QL STRIP: NEGATIVE
LYMPHOCYTES # BLD AUTO: 1.02 THOUSANDS/ÂΜL (ref 0.6–4.47)
LYMPHOCYTES NFR BLD AUTO: 20 % (ref 14–44)
MCH RBC QN AUTO: 29.7 PG (ref 26.8–34.3)
MCHC RBC AUTO-ENTMCNC: 33.9 G/DL (ref 31.4–37.4)
MCV RBC AUTO: 88 FL (ref 82–98)
MONOCYTES # BLD AUTO: 0.47 THOUSAND/ÂΜL (ref 0.17–1.22)
MONOCYTES NFR BLD AUTO: 9 % (ref 4–12)
MUCOUS THREADS UR QL AUTO: ABNORMAL
NEUTROPHILS # BLD AUTO: 2.76 THOUSANDS/ÂΜL (ref 1.85–7.62)
NEUTS SEG NFR BLD AUTO: 54 % (ref 43–75)
NITRITE UR QL STRIP: NEGATIVE
NON-SQ EPI CELLS URNS QL MICRO: ABNORMAL /HPF
NRBC BLD AUTO-RTO: 0 /100 WBCS
PH UR STRIP.AUTO: 6.5 [PH]
PLATELET # BLD AUTO: 143 THOUSANDS/UL (ref 149–390)
PMV BLD AUTO: 9.8 FL (ref 8.9–12.7)
POTASSIUM SERPL-SCNC: 4.2 MMOL/L (ref 3.5–5.3)
PROT SERPL-MCNC: 6.7 G/DL (ref 6.4–8.4)
PROT UR STRIP-MCNC: ABNORMAL MG/DL
RBC # BLD AUTO: 4.95 MILLION/UL (ref 3.88–5.62)
RBC #/AREA URNS AUTO: ABNORMAL /HPF
SODIUM SERPL-SCNC: 135 MMOL/L (ref 135–147)
SP GR UR STRIP.AUTO: 1.02 (ref 1–1.03)
T3FREE SERPL-MCNC: 3.64 PG/ML (ref 2.5–3.9)
T4 FREE SERPL-MCNC: 0.85 NG/DL (ref 0.61–1.12)
TSH SERPL DL<=0.05 MIU/L-ACNC: 5.24 UIU/ML (ref 0.45–4.5)
UROBILINOGEN UR STRIP-ACNC: <2 MG/DL
WBC # BLD AUTO: 5.1 THOUSAND/UL (ref 4.31–10.16)
WBC #/AREA URNS AUTO: ABNORMAL /HPF

## 2024-05-07 PROCEDURE — 84481 FREE ASSAY (FT-3): CPT

## 2024-05-07 PROCEDURE — 81001 URINALYSIS AUTO W/SCOPE: CPT

## 2024-05-07 PROCEDURE — 84443 ASSAY THYROID STIM HORMONE: CPT

## 2024-05-07 PROCEDURE — 85025 COMPLETE CBC W/AUTO DIFF WBC: CPT

## 2024-05-07 PROCEDURE — 36415 COLL VENOUS BLD VENIPUNCTURE: CPT

## 2024-05-07 PROCEDURE — 80053 COMPREHEN METABOLIC PANEL: CPT

## 2024-05-07 PROCEDURE — 84439 ASSAY OF FREE THYROXINE: CPT

## 2024-05-09 ENCOUNTER — HOSPITAL ENCOUNTER (OUTPATIENT)
Dept: INFUSION CENTER | Facility: CLINIC | Age: 59
Discharge: HOME | End: 2024-05-09
Payer: COMMERCIAL

## 2024-05-09 VITALS
RESPIRATION RATE: 18 BRPM | TEMPERATURE: 97.4 F | DIASTOLIC BLOOD PRESSURE: 84 MMHG | HEIGHT: 69 IN | SYSTOLIC BLOOD PRESSURE: 134 MMHG | HEART RATE: 79 BPM | BODY MASS INDEX: 27.92 KG/M2 | WEIGHT: 188.5 LBS | OXYGEN SATURATION: 98 %

## 2024-05-09 DIAGNOSIS — C22.0 HEPATOCELLULAR CARCINOMA (HCC): Primary | ICD-10-CM

## 2024-05-09 PROCEDURE — 96413 CHEMO IV INFUSION 1 HR: CPT

## 2024-05-09 PROCEDURE — 96417 CHEMO IV INFUS EACH ADDL SEQ: CPT

## 2024-05-09 RX ORDER — SODIUM CHLORIDE 9 MG/ML
20 INJECTION, SOLUTION INTRAVENOUS ONCE
Status: COMPLETED | OUTPATIENT
Start: 2024-05-09 | End: 2024-05-09

## 2024-05-09 RX ADMIN — BEVACIZUMAB-AWWB 1300 MG: 400 INJECTION, SOLUTION INTRAVENOUS at 13:38

## 2024-05-09 RX ADMIN — SODIUM CHLORIDE 20 ML/HR: 0.9 INJECTION, SOLUTION INTRAVENOUS at 13:18

## 2024-05-09 RX ADMIN — ATEZOLIZUMAB 1200 MG: 1200 INJECTION, SOLUTION INTRAVENOUS at 14:12

## 2024-05-09 NOTE — PROGRESS NOTES
Patient tolerated treatment today without complications. Patient confirmed next appointment for 5/30 at 1330. Print out declined.

## 2024-05-17 ENCOUNTER — APPOINTMENT (OUTPATIENT)
Dept: RADIOLOGY | Facility: AMBULARY SURGERY CENTER | Age: 59
End: 2024-05-17
Attending: ORTHOPAEDIC SURGERY
Payer: COMMERCIAL

## 2024-05-17 ENCOUNTER — OFFICE VISIT (OUTPATIENT)
Dept: OBGYN CLINIC | Facility: CLINIC | Age: 59
End: 2024-05-17
Payer: COMMERCIAL

## 2024-05-17 VITALS — BODY MASS INDEX: 27.85 KG/M2 | HEIGHT: 69 IN | WEIGHT: 188 LBS

## 2024-05-17 DIAGNOSIS — M25.562 PAIN AND SWELLING OF LEFT KNEE: ICD-10-CM

## 2024-05-17 DIAGNOSIS — M25.462 PAIN AND SWELLING OF LEFT KNEE: ICD-10-CM

## 2024-05-17 DIAGNOSIS — M25.562 LEFT KNEE PAIN, UNSPECIFIED CHRONICITY: ICD-10-CM

## 2024-05-17 DIAGNOSIS — M25.562 ACUTE PAIN OF LEFT KNEE: ICD-10-CM

## 2024-05-17 DIAGNOSIS — M25.562 LEFT KNEE PAIN, UNSPECIFIED CHRONICITY: Primary | ICD-10-CM

## 2024-05-17 DIAGNOSIS — S83.412A SPRAIN OF MEDIAL COLLATERAL LIGAMENT OF LEFT KNEE, INITIAL ENCOUNTER: ICD-10-CM

## 2024-05-17 PROCEDURE — 73562 X-RAY EXAM OF KNEE 3: CPT

## 2024-05-17 PROCEDURE — 99204 OFFICE O/P NEW MOD 45 MIN: CPT | Performed by: ORTHOPAEDIC SURGERY

## 2024-05-17 NOTE — PROGRESS NOTES
Assessment:  1. Left knee pain, unspecified chronicity  XR knee 3 vw left non injury    MRI knee left wo contrast    T-Rom  Post Op Knee Brace      2. Sprain of medial collateral ligament of left knee, initial encounter  Ambulatory Referral to Orthopedic Surgery    MRI knee left wo contrast    T-Rom  Post Op Knee Brace      3. Pain and swelling of left knee  Ambulatory Referral to Orthopedic Surgery    MRI knee left wo contrast    T-Rom  Post Op Knee Brace      4. Acute pain of left knee  Ambulatory Referral to Orthopedic Surgery        Patient Active Problem List   Diagnosis    Hepatocellular carcinoma (HCC)    Essential hypertension    Chronic hepatitis C without hepatic coma (HCC)    Cirrhosis of liver (HCC)    ED (erectile dysfunction)    Colon polyp    Subclinical hypothyroidism    Hypercholesterolemia    Left knee pain, unspecified chronicity    Sprain of medial collateral ligament of left knee, initial encounter    Pain and swelling of left knee           Plan      59 y.o. male with suspected MCL sprain sustained 3 months ago  Diagnostics reviewed and physical exam performed.  Diagnosis, treatment options and associated risks were discussed with the patient including no treatment, nonsurgical treatment and potential for surgical intervention.  The patient was given the opportunity to ask questions regarding each.  MRI of left knee ordered today to further evaluate MCL sprain  Patient placed in TROM locked in extension to be worn at all times except hygiene  Return for after MRI.          Subjective:     Patient ID: Bassem Roberts 59 y.o. male    HPI    Patient presents today for initial evaluation of left knee pain after an injury sustained approximately 3 months ago.  Bottom of his stairs and twisted his upper body with his knee staying in the same position.  He heard an immediate pop and noted immediate pain and swelling with difficulty ambulating.  He presented to the emergency room 1 month later due to  persistent pain where x-rays were obtained and he was diagnosed with a MCL sprain.  Today he describes 10/10 stabbing pain medially. He works has a  and has not discontinued his work activities. He has not tried oral medication. He has tried multiple braces that caused increased knee swelling.       The following portions of the patient's history were reviewed and updated as appropriate: allergies, current medications, past family history, past social history, past surgical history and problem list.      Objective:    Review of Systems  Pertinent items are noted in HPI.  All other systems were reviewed and are negative.    Past Medical History:   Diagnosis Date    Hypertension     Liver cancer (HCC)        Past Surgical History:   Procedure Laterality Date    HERNIA REPAIR      IR Y-90 PRE-ANGIO/EMBO W/ LUNG SCAN  2022    IR Y-90 RADIOEMBOLIZATION  2022       Family History   Problem Relation Age of Onset    Cancer Mother        Social History     Occupational History    Not on file   Tobacco Use    Smoking status: Former     Current packs/day: 0.00     Average packs/day: 1 pack/day for 30.0 years (30.0 ttl pk-yrs)     Types: Cigarettes     Start date:      Quit date: 2017     Years since quittin.3    Smokeless tobacco: Current     Types: Chew    Tobacco comments:     nicotine pouch (on)   Vaping Use    Vaping status: Never Used   Substance and Sexual Activity    Alcohol use: Not Currently    Drug use: Yes     Types: Marijuana     Comment: medical marijuana    Sexual activity: Yes     Partners: Female         Current Outpatient Medications:     lisinopril-hydrochlorothiazide (PRINZIDE,ZESTORETIC) 20-25 MG per tablet, Take 1 tablet by mouth daily, Disp: 90 tablet, Rfl: 2    amLODIPine (NORVASC) 2.5 mg tablet, Take 1 tablet (2.5 mg total) by mouth daily, Disp: 90 tablet, Rfl: 2    ibuprofen (MOTRIN) 600 mg tablet, Take 1 tablet (600 mg total) by mouth every 8 (eight) hours as needed for  "moderate pain or mild pain, Disp: 15 tablet, Rfl: 0    lidocaine (LIDODERM) 5 %, Apply 1 patch topically over 12 hours every 24 hours Remove & Discard patch within 12 hours or as directed by MD, Disp: 10 patch, Rfl: 0    No Known Allergies    Physical Exam  Ht 5' 9\" (1.753 m)   Wt 85.3 kg (188 lb)   BMI 27.76 kg/m²   Cons: Appears well.  No apparent distress.  Psych: Alert. Oriented x3.  Mood and affect normal.  Eyes: PERRLA, EOMI  Resp: Normal effort.  No audible wheezing or stridor.  CV: Palpable pulse.  No discernable arrhythmia.  No LE edema.  Lymph:  No palpable cervical, axillary, or inguinal lymphadenopathy.  Skin: Warm.  No palpable masses.  No visible lesions.  Neuro: Normal muscle tone.  Normal and symmetric DTR's.    Left Knee Exam     Tenderness   The patient is experiencing tenderness in the medial joint line.    Range of Motion   Extension:  0   Flexion:  110     Tests   Rosendo:  Medial - negative Lateral - negative  Varus: negative Valgus: positive  Lachman:  Anterior - negative      Drawer:  Anterior - negative       Pivot shift: negative  Patellar apprehension: negative    Other   Erythema: absent  Scars: absent  Sensation: normal  Pulse: present  Swelling: mild  Effusion: no effusion present              Procedures   None performed today      I have personally reviewed pertinent films in PACS and my interpretation is no acute osseous abnormalities.      Scribe Attestation      I,:  Alyssa Wylie am acting as a scribe while in the presence of the attending physician.:       I,:  Tay Glez, DO personally performed the services described in this documentation    as scribed in my presence.:                Portions of the record may have been created with voice recognition software.  Occasional wrong word or \"sound a like\" substitutions may have occurred due to the inherent limitations of voice recognition software.  Read the chart carefully and recognize, using context, where substitutions " have occurred.

## 2024-05-24 RX ORDER — SODIUM CHLORIDE 9 MG/ML
20 INJECTION, SOLUTION INTRAVENOUS ONCE
Status: CANCELLED | OUTPATIENT
Start: 2024-05-30

## 2024-05-29 ENCOUNTER — APPOINTMENT (OUTPATIENT)
Dept: LAB | Facility: CLINIC | Age: 59
End: 2024-05-29
Payer: COMMERCIAL

## 2024-05-29 DIAGNOSIS — C22.0 HEPATOCELLULAR CARCINOMA (HCC): ICD-10-CM

## 2024-05-29 DIAGNOSIS — R73.9 ELEVATED BLOOD SUGAR: Primary | ICD-10-CM

## 2024-05-29 LAB
ALBUMIN SERPL BCP-MCNC: 4.3 G/DL (ref 3.5–5)
ALP SERPL-CCNC: 79 U/L (ref 34–104)
ALT SERPL W P-5'-P-CCNC: 19 U/L (ref 7–52)
ANION GAP SERPL CALCULATED.3IONS-SCNC: 7 MMOL/L (ref 4–13)
AST SERPL W P-5'-P-CCNC: 29 U/L (ref 13–39)
BACTERIA UR QL AUTO: ABNORMAL /HPF
BASOPHILS # BLD AUTO: 0.03 THOUSANDS/ÂΜL (ref 0–0.1)
BASOPHILS NFR BLD AUTO: 1 % (ref 0–1)
BILIRUB SERPL-MCNC: 1.11 MG/DL (ref 0.2–1)
BILIRUB UR QL STRIP: NEGATIVE
BUN SERPL-MCNC: 24 MG/DL (ref 5–25)
CALCIUM SERPL-MCNC: 9.4 MG/DL (ref 8.4–10.2)
CHLORIDE SERPL-SCNC: 99 MMOL/L (ref 96–108)
CLARITY UR: CLEAR
CO2 SERPL-SCNC: 26 MMOL/L (ref 21–32)
COLOR UR: ABNORMAL
CREAT SERPL-MCNC: 1.11 MG/DL (ref 0.6–1.3)
EOSINOPHIL # BLD AUTO: 0.58 THOUSAND/ÂΜL (ref 0–0.61)
EOSINOPHIL NFR BLD AUTO: 11 % (ref 0–6)
ERYTHROCYTE [DISTWIDTH] IN BLOOD BY AUTOMATED COUNT: 13.7 % (ref 11.6–15.1)
GFR SERPL CREATININE-BSD FRML MDRD: 72 ML/MIN/1.73SQ M
GLUCOSE P FAST SERPL-MCNC: 131 MG/DL (ref 65–99)
GLUCOSE UR STRIP-MCNC: NEGATIVE MG/DL
HCT VFR BLD AUTO: 43.8 % (ref 36.5–49.3)
HGB BLD-MCNC: 14.8 G/DL (ref 12–17)
HGB UR QL STRIP.AUTO: ABNORMAL
IMM GRANULOCYTES # BLD AUTO: 0.01 THOUSAND/UL (ref 0–0.2)
IMM GRANULOCYTES NFR BLD AUTO: 0 % (ref 0–2)
KETONES UR STRIP-MCNC: NEGATIVE MG/DL
LEUKOCYTE ESTERASE UR QL STRIP: NEGATIVE
LYMPHOCYTES # BLD AUTO: 1.01 THOUSANDS/ÂΜL (ref 0.6–4.47)
LYMPHOCYTES NFR BLD AUTO: 20 % (ref 14–44)
MCH RBC QN AUTO: 29.8 PG (ref 26.8–34.3)
MCHC RBC AUTO-ENTMCNC: 33.8 G/DL (ref 31.4–37.4)
MCV RBC AUTO: 88 FL (ref 82–98)
MONOCYTES # BLD AUTO: 0.54 THOUSAND/ÂΜL (ref 0.17–1.22)
MONOCYTES NFR BLD AUTO: 11 % (ref 4–12)
NEUTROPHILS # BLD AUTO: 2.92 THOUSANDS/ÂΜL (ref 1.85–7.62)
NEUTS SEG NFR BLD AUTO: 57 % (ref 43–75)
NITRITE UR QL STRIP: NEGATIVE
NON-SQ EPI CELLS URNS QL MICRO: ABNORMAL /HPF
NRBC BLD AUTO-RTO: 0 /100 WBCS
PH UR STRIP.AUTO: 6 [PH]
PLATELET # BLD AUTO: 139 THOUSANDS/UL (ref 149–390)
PLATELET BLD QL SMEAR: ABNORMAL
PMV BLD AUTO: 9.5 FL (ref 8.9–12.7)
POTASSIUM SERPL-SCNC: 4.4 MMOL/L (ref 3.5–5.3)
PROT SERPL-MCNC: 6.9 G/DL (ref 6.4–8.4)
PROT UR STRIP-MCNC: ABNORMAL MG/DL
RBC # BLD AUTO: 4.96 MILLION/UL (ref 3.88–5.62)
RBC #/AREA URNS AUTO: ABNORMAL /HPF
RBC MORPH BLD: NORMAL
SODIUM SERPL-SCNC: 132 MMOL/L (ref 135–147)
SP GR UR STRIP.AUTO: 1.02 (ref 1–1.03)
T3FREE SERPL-MCNC: 3.5 PG/ML (ref 2.5–3.9)
T4 FREE SERPL-MCNC: 0.86 NG/DL (ref 0.61–1.12)
TSH SERPL DL<=0.05 MIU/L-ACNC: 5.37 UIU/ML (ref 0.45–4.5)
UROBILINOGEN UR STRIP-ACNC: <2 MG/DL
WBC # BLD AUTO: 5.09 THOUSAND/UL (ref 4.31–10.16)
WBC #/AREA URNS AUTO: ABNORMAL /HPF

## 2024-05-29 PROCEDURE — 80053 COMPREHEN METABOLIC PANEL: CPT

## 2024-05-29 PROCEDURE — 84439 ASSAY OF FREE THYROXINE: CPT

## 2024-05-29 PROCEDURE — 36415 COLL VENOUS BLD VENIPUNCTURE: CPT

## 2024-05-29 PROCEDURE — 85025 COMPLETE CBC W/AUTO DIFF WBC: CPT

## 2024-05-29 PROCEDURE — 84481 FREE ASSAY (FT-3): CPT

## 2024-05-29 PROCEDURE — 84443 ASSAY THYROID STIM HORMONE: CPT

## 2024-05-29 PROCEDURE — 81001 URINALYSIS AUTO W/SCOPE: CPT

## 2024-05-30 ENCOUNTER — TELEPHONE (OUTPATIENT)
Dept: FAMILY MEDICINE CLINIC | Facility: CLINIC | Age: 59
End: 2024-05-30

## 2024-05-30 ENCOUNTER — HOSPITAL ENCOUNTER (OUTPATIENT)
Dept: INFUSION CENTER | Facility: CLINIC | Age: 59
Discharge: HOME | End: 2024-05-30
Payer: COMMERCIAL

## 2024-05-30 VITALS
HEART RATE: 80 BPM | TEMPERATURE: 97.6 F | HEIGHT: 69 IN | RESPIRATION RATE: 18 BRPM | BODY MASS INDEX: 27.03 KG/M2 | SYSTOLIC BLOOD PRESSURE: 140 MMHG | DIASTOLIC BLOOD PRESSURE: 82 MMHG | WEIGHT: 182.5 LBS

## 2024-05-30 DIAGNOSIS — C22.0 HEPATOCELLULAR CARCINOMA (HCC): Primary | ICD-10-CM

## 2024-05-30 PROCEDURE — 96413 CHEMO IV INFUSION 1 HR: CPT

## 2024-05-30 PROCEDURE — 96417 CHEMO IV INFUS EACH ADDL SEQ: CPT

## 2024-05-30 RX ORDER — SODIUM CHLORIDE 9 MG/ML
20 INJECTION, SOLUTION INTRAVENOUS ONCE
Status: COMPLETED | OUTPATIENT
Start: 2024-05-30 | End: 2024-05-30

## 2024-05-30 RX ADMIN — BEVACIZUMAB-AWWB 1300 MG: 400 INJECTION, SOLUTION INTRAVENOUS at 14:10

## 2024-05-30 RX ADMIN — ATEZOLIZUMAB 1200 MG: 1200 INJECTION, SOLUTION INTRAVENOUS at 14:47

## 2024-05-30 RX ADMIN — SODIUM CHLORIDE 20 ML/HR: 0.9 INJECTION, SOLUTION INTRAVENOUS at 13:26

## 2024-05-30 NOTE — TELEPHONE ENCOUNTER
Patient's wife dropped off [place] paperwork, blank forms scanned in and forms placed in Dr. Hernandez's mailbox

## 2024-05-30 NOTE — PROGRESS NOTES
Pt to clinic for mvasi and tecentriq infusion. Pt tolerated without complications. Pt aware Dr Lundberg would like him to get AFP tumor marker drawn. Next appointment June 20 at 2:00   Headache

## 2024-06-14 ENCOUNTER — HOSPITAL ENCOUNTER (OUTPATIENT)
Dept: MRI IMAGING | Facility: HOSPITAL | Age: 59
Discharge: HOME | End: 2024-06-14
Attending: ORTHOPAEDIC SURGERY
Payer: COMMERCIAL

## 2024-06-14 DIAGNOSIS — M25.562 LEFT KNEE PAIN, UNSPECIFIED CHRONICITY: ICD-10-CM

## 2024-06-14 DIAGNOSIS — M25.562 PAIN AND SWELLING OF LEFT KNEE: ICD-10-CM

## 2024-06-14 DIAGNOSIS — S83.412A SPRAIN OF MEDIAL COLLATERAL LIGAMENT OF LEFT KNEE, INITIAL ENCOUNTER: ICD-10-CM

## 2024-06-14 DIAGNOSIS — M25.462 PAIN AND SWELLING OF LEFT KNEE: ICD-10-CM

## 2024-06-14 PROCEDURE — 73721 MRI JNT OF LWR EXTRE W/O DYE: CPT

## 2024-06-14 RX ORDER — SODIUM CHLORIDE 9 MG/ML
20 INJECTION, SOLUTION INTRAVENOUS ONCE
Status: CANCELLED | OUTPATIENT
Start: 2024-06-20

## 2024-06-19 ENCOUNTER — APPOINTMENT (OUTPATIENT)
Dept: LAB | Facility: CLINIC | Age: 59
End: 2024-06-19
Payer: COMMERCIAL

## 2024-06-19 DIAGNOSIS — C22.0 HEPATOCELLULAR CARCINOMA (HCC): ICD-10-CM

## 2024-06-19 LAB
AFP-TM SERPL-MCNC: 85.12 NG/ML (ref 0–9)
ALBUMIN SERPL BCG-MCNC: 4.5 G/DL (ref 3.5–5)
ALP SERPL-CCNC: 74 U/L (ref 34–104)
ALT SERPL W P-5'-P-CCNC: 18 U/L (ref 7–52)
ANION GAP SERPL CALCULATED.3IONS-SCNC: 3 MMOL/L (ref 4–13)
AST SERPL W P-5'-P-CCNC: 24 U/L (ref 13–39)
BACTERIA UR QL AUTO: NORMAL /HPF
BASOPHILS # BLD AUTO: 0.03 THOUSANDS/ÂΜL (ref 0–0.1)
BASOPHILS NFR BLD AUTO: 1 % (ref 0–1)
BILIRUB SERPL-MCNC: 1.03 MG/DL (ref 0.2–1)
BILIRUB UR QL STRIP: NEGATIVE
BUN SERPL-MCNC: 24 MG/DL (ref 5–25)
CALCIUM SERPL-MCNC: 10 MG/DL (ref 8.4–10.2)
CHLORIDE SERPL-SCNC: 102 MMOL/L (ref 96–108)
CLARITY UR: CLEAR
CO2 SERPL-SCNC: 28 MMOL/L (ref 21–32)
COLOR UR: ABNORMAL
CREAT SERPL-MCNC: 1.15 MG/DL (ref 0.6–1.3)
EOSINOPHIL # BLD AUTO: 0.42 THOUSAND/ÂΜL (ref 0–0.61)
EOSINOPHIL NFR BLD AUTO: 8 % (ref 0–6)
ERYTHROCYTE [DISTWIDTH] IN BLOOD BY AUTOMATED COUNT: 13.5 % (ref 11.6–15.1)
GFR SERPL CREATININE-BSD FRML MDRD: 69 ML/MIN/1.73SQ M
GLUCOSE P FAST SERPL-MCNC: 135 MG/DL (ref 65–99)
GLUCOSE UR STRIP-MCNC: NEGATIVE MG/DL
HCT VFR BLD AUTO: 45 % (ref 36.5–49.3)
HGB BLD-MCNC: 15.1 G/DL (ref 12–17)
HGB UR QL STRIP.AUTO: ABNORMAL
IMM GRANULOCYTES # BLD AUTO: 0.01 THOUSAND/UL (ref 0–0.2)
IMM GRANULOCYTES NFR BLD AUTO: 0 % (ref 0–2)
KETONES UR STRIP-MCNC: NEGATIVE MG/DL
LEUKOCYTE ESTERASE UR QL STRIP: NEGATIVE
LYMPHOCYTES # BLD AUTO: 1.28 THOUSANDS/ÂΜL (ref 0.6–4.47)
LYMPHOCYTES NFR BLD AUTO: 24 % (ref 14–44)
MCH RBC QN AUTO: 30.1 PG (ref 26.8–34.3)
MCHC RBC AUTO-ENTMCNC: 33.6 G/DL (ref 31.4–37.4)
MCV RBC AUTO: 90 FL (ref 82–98)
MONOCYTES # BLD AUTO: 0.49 THOUSAND/ÂΜL (ref 0.17–1.22)
MONOCYTES NFR BLD AUTO: 9 % (ref 4–12)
NEUTROPHILS # BLD AUTO: 3.11 THOUSANDS/ÂΜL (ref 1.85–7.62)
NEUTS SEG NFR BLD AUTO: 58 % (ref 43–75)
NITRITE UR QL STRIP: NEGATIVE
NON-SQ EPI CELLS URNS QL MICRO: NORMAL /HPF
NRBC BLD AUTO-RTO: 0 /100 WBCS
PH UR STRIP.AUTO: 6.5 [PH]
PLATELET # BLD AUTO: 152 THOUSANDS/UL (ref 149–390)
PMV BLD AUTO: 9.4 FL (ref 8.9–12.7)
POTASSIUM SERPL-SCNC: 5.6 MMOL/L (ref 3.5–5.3)
PROT SERPL-MCNC: 7.2 G/DL (ref 6.4–8.4)
PROT UR STRIP-MCNC: ABNORMAL MG/DL
RBC # BLD AUTO: 5.02 MILLION/UL (ref 3.88–5.62)
RBC #/AREA URNS AUTO: NORMAL /HPF
SODIUM SERPL-SCNC: 133 MMOL/L (ref 135–147)
SP GR UR STRIP.AUTO: 1.02 (ref 1–1.03)
T3FREE SERPL-MCNC: 3.65 PG/ML (ref 2.5–3.9)
T4 FREE SERPL-MCNC: 0.77 NG/DL (ref 0.61–1.12)
TSH SERPL DL<=0.05 MIU/L-ACNC: 4.64 UIU/ML (ref 0.45–4.5)
UROBILINOGEN UR STRIP-ACNC: <2 MG/DL
WBC # BLD AUTO: 5.34 THOUSAND/UL (ref 4.31–10.16)
WBC #/AREA URNS AUTO: NORMAL /HPF

## 2024-06-19 PROCEDURE — 84439 ASSAY OF FREE THYROXINE: CPT

## 2024-06-19 PROCEDURE — 82105 ALPHA-FETOPROTEIN SERUM: CPT

## 2024-06-19 PROCEDURE — 80053 COMPREHEN METABOLIC PANEL: CPT

## 2024-06-19 PROCEDURE — 36415 COLL VENOUS BLD VENIPUNCTURE: CPT

## 2024-06-19 PROCEDURE — 85025 COMPLETE CBC W/AUTO DIFF WBC: CPT

## 2024-06-19 PROCEDURE — 84443 ASSAY THYROID STIM HORMONE: CPT

## 2024-06-19 PROCEDURE — 81001 URINALYSIS AUTO W/SCOPE: CPT

## 2024-06-19 PROCEDURE — 84481 FREE ASSAY (FT-3): CPT

## 2024-06-20 ENCOUNTER — HOSPITAL ENCOUNTER (OUTPATIENT)
Dept: INFUSION CENTER | Facility: CLINIC | Age: 59
Discharge: HOME | End: 2024-06-20
Payer: COMMERCIAL

## 2024-06-20 VITALS
OXYGEN SATURATION: 99 % | SYSTOLIC BLOOD PRESSURE: 122 MMHG | TEMPERATURE: 98.1 F | DIASTOLIC BLOOD PRESSURE: 70 MMHG | WEIGHT: 182 LBS | HEART RATE: 78 BPM | BODY MASS INDEX: 26.88 KG/M2 | RESPIRATION RATE: 17 BRPM

## 2024-06-20 DIAGNOSIS — C22.0 HEPATOCELLULAR CARCINOMA (HCC): Primary | ICD-10-CM

## 2024-06-20 PROCEDURE — 96413 CHEMO IV INFUSION 1 HR: CPT

## 2024-06-20 PROCEDURE — 96417 CHEMO IV INFUS EACH ADDL SEQ: CPT

## 2024-06-20 RX ORDER — SODIUM CHLORIDE 9 MG/ML
20 INJECTION, SOLUTION INTRAVENOUS ONCE
Status: COMPLETED | OUTPATIENT
Start: 2024-06-20 | End: 2024-06-20

## 2024-06-20 RX ADMIN — ATEZOLIZUMAB 1200 MG: 1200 INJECTION, SOLUTION INTRAVENOUS at 16:26

## 2024-06-20 RX ADMIN — SODIUM CHLORIDE 20 ML/HR: 0.9 INJECTION, SOLUTION INTRAVENOUS at 15:43

## 2024-06-20 RX ADMIN — BEVACIZUMAB-AWWB 1300 MG: 400 INJECTION, SOLUTION INTRAVENOUS at 15:43

## 2024-06-20 NOTE — PROGRESS NOTES
Patient tolerated treatment without incident. Peripheral IV removed. Next appointment confirmed for 7/11/24 at 12pm

## 2024-06-20 NOTE — PROGRESS NOTES
Patient to infusion for treatment with Mvasi and Tecentriq. Patient offers no complaints. VSS. Labs from 6/19/2024 reviewed, potassium elevated at 5.6. Per Aubree Faye in Dr. Lundberg's office, okay to proceed with treatment. Peripheral IV inserted without incident.

## 2024-06-21 ENCOUNTER — OFFICE VISIT (OUTPATIENT)
Dept: OBGYN CLINIC | Facility: CLINIC | Age: 59
End: 2024-06-21
Payer: COMMERCIAL

## 2024-06-21 VITALS
HEIGHT: 69 IN | DIASTOLIC BLOOD PRESSURE: 74 MMHG | SYSTOLIC BLOOD PRESSURE: 135 MMHG | WEIGHT: 182 LBS | BODY MASS INDEX: 26.96 KG/M2 | HEART RATE: 91 BPM

## 2024-06-21 DIAGNOSIS — S83.242A TEAR OF MEDIAL MENISCUS OF LEFT KNEE, CURRENT, UNSPECIFIED TEAR TYPE, INITIAL ENCOUNTER: Primary | ICD-10-CM

## 2024-06-21 PROCEDURE — 99214 OFFICE O/P EST MOD 30 MIN: CPT | Performed by: ORTHOPAEDIC SURGERY

## 2024-06-21 PROCEDURE — 20610 DRAIN/INJ JOINT/BURSA W/O US: CPT

## 2024-06-21 RX ORDER — TRIAMCINOLONE ACETONIDE 40 MG/ML
40 INJECTION, SUSPENSION INTRA-ARTICULAR; INTRAMUSCULAR
Status: COMPLETED | OUTPATIENT
Start: 2024-06-21 | End: 2024-06-21

## 2024-06-21 RX ORDER — BUPIVACAINE HYDROCHLORIDE 2.5 MG/ML
2 INJECTION, SOLUTION INFILTRATION; PERINEURAL
Status: COMPLETED | OUTPATIENT
Start: 2024-06-21 | End: 2024-06-21

## 2024-06-21 RX ADMIN — BUPIVACAINE HYDROCHLORIDE 2 ML: 2.5 INJECTION, SOLUTION INFILTRATION; PERINEURAL at 14:30

## 2024-06-21 RX ADMIN — TRIAMCINOLONE ACETONIDE 40 MG: 40 INJECTION, SUSPENSION INTRA-ARTICULAR; INTRAMUSCULAR at 14:30

## 2024-06-21 NOTE — PROGRESS NOTES
Assessment:  1. Tear of medial meniscus of left knee, current, unspecified tear type, initial encounter          Patient Active Problem List   Diagnosis    Hepatocellular carcinoma (HCC)    Essential hypertension    Chronic hepatitis C without hepatic coma (HCC)    Cirrhosis of liver (HCC)    ED (erectile dysfunction)    Colon polyp    Subclinical hypothyroidism    Hypercholesterolemia    Left knee pain, unspecified chronicity    Sprain of medial collateral ligament of left knee, initial encounter    Pain and swelling of left knee    Tear of medial meniscus of left knee, current       Plan:    59 y.o. male  with medial meniscus tear of the left knee    Discussed MRI findings of the left knee  Discussed underlying pathology of diagnosis  Discussed treatment for meniscus include steroid injection or surgical intervention.  Discussed both treatments in extent.  Patient decided to move forward with steroid injections today and possibly pursue surgical intervention if persistent or increased pain following steroid injection.  Discussed need for oncology clearance prior to surgical intervention. patient expressed understanding.  Patient agreeable.  Discussed steroid injection of the left knee.  Discussed side effects and risks.  Discussed risk for infection as he is immunocompromise secondary to underlying cancers.  Patient expressed understanding.  Patient agreeable.  Injection administered.  Discussed patient may discontinue T ROM as MCL not significant for injury.  Patient to follow-up ending results with steroid injection may possibly consider surgical intervention.    The assessment and plan were formulated by Dr. Glez and I assisted in carrying it out.      Subjective:   Patient ID: Bassem Roberts is a 59 y.o. male .    HPI    Patient presents to the office for follow up of left knee pain.  Patient notes persistent knee pain in the medial aspect of the left knee increased with activities such as steps.  Patient  presents today to follow-up MRI of the left knee.  The following portions of the patient's history were reviewed and updated as appropriate: allergies, current medications, past family history, past social history, past surgical history and problem list.    Social History     Socioeconomic History    Marital status: /Civil Union     Spouse name: Not on file    Number of children: Not on file    Years of education: Not on file    Highest education level: Not on file   Occupational History    Not on file   Tobacco Use    Smoking status: Former     Current packs/day: 0.00     Average packs/day: 1 pack/day for 30.0 years (30.0 ttl pk-yrs)     Types: Cigarettes     Start date:      Quit date:      Years since quittin.4    Smokeless tobacco: Current     Types: Chew    Tobacco comments:     nicotine pouch (on)   Vaping Use    Vaping status: Never Used   Substance and Sexual Activity    Alcohol use: Not Currently    Drug use: Yes     Types: Marijuana     Comment: medical marijuana    Sexual activity: Yes     Partners: Female   Other Topics Concern    Not on file   Social History Narrative    Not on file     Social Determinants of Health     Financial Resource Strain: Not on file   Food Insecurity: Not on file   Transportation Needs: Not on file   Physical Activity: Not on file   Stress: Not on file   Social Connections: Not on file   Intimate Partner Violence: Not on file   Housing Stability: Not on file     Past Medical History:   Diagnosis Date    Hypertension     Liver cancer (HCC)      Past Surgical History:   Procedure Laterality Date    HERNIA REPAIR      IR Y-[facility] PRE-ANGIO/EMBO W/ LUNG SCAN  2022    IR Y-90 RADIOEMBOLIZATION  2022    MASTOIDECTOMY       No Known Allergies  Current Outpatient Medications on File Prior to Visit   Medication Sig Dispense Refill    amLODIPine (NORVASC) 2.5 mg tablet Take 1 tablet (2.5 mg total) by mouth daily 90 tablet 2    ibuprofen (MOTRIN) 600 mg tablet  Take 1 tablet (600 mg total) by mouth every 8 (eight) hours as needed for moderate pain or mild pain 15 tablet 0    lisinopril-hydrochlorothiazide (PRINZIDE,ZESTORETIC) 20-25 MG per tablet Take 1 tablet by mouth daily 90 tablet 2    lidocaine (LIDODERM) 5 % Apply 1 patch topically over 12 hours every 24 hours Remove & Discard patch within 12 hours or as directed by MD (Patient not taking: Reported on 6/21/2024) 10 patch 0     Current Facility-Administered Medications on File Prior to Visit   Medication Dose Route Frequency Provider Last Rate Last Admin    alteplase (CATHFLO) injection 2 mg  2 mg Intracatheter Q1MIN PRN Taye Lundberg MD        [COMPLETED] Atezolizumab (TECENTRIQ) 1,200 mg in sodium chloride 0.9 % 250 mL IVPB  1,200 mg Intravenous Once Taye Lundberg MD   Stopped at 06/20/24 1656    [COMPLETED] bevacizumab-awwb (MVASI) 1,300 mg in sodium chloride 0.9 % 100 mL IVPB  1,300 mg Intravenous Once Taye Lundberg MD   Stopped at 06/20/24 1613    [COMPLETED] sodium chloride 0.9 % infusion  20 mL/hr Intravenous Once Taye Lundberg MD   Stopped at 06/20/24 1657       Review of Systems   Constitutional:  Negative for chills and fever.   HENT:  Negative for ear pain and sore throat.    Eyes:  Negative for pain and visual disturbance.   Respiratory:  Negative for cough and shortness of breath.    Cardiovascular:  Negative for chest pain and palpitations.   Gastrointestinal:  Negative for abdominal pain and vomiting.   Genitourinary:  Negative for dysuria and hematuria.   Musculoskeletal:  Positive for arthralgias. Negative for back pain.   Skin:  Negative for color change and rash.   Neurological:  Negative for seizures and syncope.   All other systems reviewed and are negative.    See HPi    Objective:    Vitals:    06/21/24 1407   BP: 135/74   Pulse: 91       Physical Exam  Vitals and nursing note reviewed.   Constitutional:       General: He is not in acute distress.     Appearance: He is well-developed.   HENT:       Head: Normocephalic and atraumatic.   Eyes:      Conjunctiva/sclera: Conjunctivae normal.   Cardiovascular:      Rate and Rhythm: Normal rate and regular rhythm.      Heart sounds: No murmur heard.  Pulmonary:      Effort: Pulmonary effort is normal. No respiratory distress.      Breath sounds: Normal breath sounds.   Abdominal:      Palpations: Abdomen is soft.      Tenderness: There is no abdominal tenderness.   Musculoskeletal:         General: No swelling.      Cervical back: Neck supple.   Skin:     General: Skin is warm and dry.      Capillary Refill: Capillary refill takes less than 2 seconds.   Neurological:      Mental Status: He is alert.   Psychiatric:         Mood and Affect: Mood normal.         Left Knee Exam     Tenderness   The patient is experiencing tenderness in the medial retinaculum.    Range of Motion   Extension:  normal   Flexion:  normal     Other   Swelling: none            I have personally reviewed pertinent films in PACS and my interpretation is MRI left knee reveals medial meniscus tear.  Mild degenerative changes of the medial tibiofemoral compartment.  Discoid lateral meniscus.    Large joint arthrocentesis: L knee  Universal Protocol:  Consent: Verbal consent obtained.  Risks and benefits: risks, benefits and alternatives were discussed  Consent given by: patient  Patient understanding: patient states understanding of the procedure being performed  Patient identity confirmed: verbally with patient  Supporting Documentation  Indications: pain   Procedure Details  Location: knee - L knee  Needle size: 22 G  Ultrasound guidance: no  Approach: anterolateral  Medications administered: 2 mL bupivacaine 0.25 %; 40 mg triamcinolone acetonide 40 mg/mL    Patient tolerance: patient tolerated the procedure well with no immediate complications  Dressing:  Sterile dressing applied            Portions of the record may have been created with voice recognition software.  Occasional wrong word or  "\"sound a like\" substitutions may have occurred due to the inherent limitations of voice recognition software.  Read the chart carefully and recognize, using context, where substitutions have occurred.   "

## 2024-07-09 RX ORDER — SODIUM CHLORIDE 9 MG/ML
20 INJECTION, SOLUTION INTRAVENOUS ONCE
Status: CANCELLED | OUTPATIENT
Start: 2024-07-11

## 2024-07-10 ENCOUNTER — APPOINTMENT (OUTPATIENT)
Dept: LAB | Facility: CLINIC | Age: 59
End: 2024-07-10
Payer: COMMERCIAL

## 2024-07-10 ENCOUNTER — HOSPITAL ENCOUNTER (OUTPATIENT)
Dept: CT IMAGING | Facility: HOSPITAL | Age: 59
Discharge: HOME | End: 2024-07-10
Attending: INTERNAL MEDICINE
Payer: COMMERCIAL

## 2024-07-10 DIAGNOSIS — R73.9 ELEVATED BLOOD SUGAR: ICD-10-CM

## 2024-07-10 DIAGNOSIS — C22.0 HEPATOCELLULAR CARCINOMA (HCC): ICD-10-CM

## 2024-07-10 LAB
ALBUMIN SERPL BCG-MCNC: 4.7 G/DL (ref 3.5–5)
ALP SERPL-CCNC: 68 U/L (ref 34–104)
ALT SERPL W P-5'-P-CCNC: 21 U/L (ref 7–52)
ANION GAP SERPL CALCULATED.3IONS-SCNC: 8 MMOL/L (ref 4–13)
AST SERPL W P-5'-P-CCNC: 26 U/L (ref 13–39)
BACTERIA UR QL AUTO: ABNORMAL /HPF
BASOPHILS # BLD AUTO: 0.04 THOUSANDS/ÂΜL (ref 0–0.1)
BASOPHILS NFR BLD AUTO: 1 % (ref 0–1)
BILIRUB SERPL-MCNC: 1.34 MG/DL (ref 0.2–1)
BILIRUB UR QL STRIP: NEGATIVE
BUN SERPL-MCNC: 21 MG/DL (ref 5–25)
CALCIUM SERPL-MCNC: 9.8 MG/DL (ref 8.4–10.2)
CHLORIDE SERPL-SCNC: 103 MMOL/L (ref 96–108)
CLARITY UR: CLEAR
CO2 SERPL-SCNC: 24 MMOL/L (ref 21–32)
COLOR UR: YELLOW
CREAT SERPL-MCNC: 1.28 MG/DL (ref 0.6–1.3)
EOSINOPHIL # BLD AUTO: 0.08 THOUSAND/ÂΜL (ref 0–0.61)
EOSINOPHIL NFR BLD AUTO: 1 % (ref 0–6)
ERYTHROCYTE [DISTWIDTH] IN BLOOD BY AUTOMATED COUNT: 13.6 % (ref 11.6–15.1)
EST. AVERAGE GLUCOSE BLD GHB EST-MCNC: 111 MG/DL
GFR SERPL CREATININE-BSD FRML MDRD: 60 ML/MIN/1.73SQ M
GLUCOSE SERPL-MCNC: 93 MG/DL (ref 65–140)
GLUCOSE UR STRIP-MCNC: NEGATIVE MG/DL
HBA1C MFR BLD: 5.5 %
HCT VFR BLD AUTO: 44.3 % (ref 36.5–49.3)
HGB BLD-MCNC: 15.1 G/DL (ref 12–17)
HGB UR QL STRIP.AUTO: NEGATIVE
HYALINE CASTS #/AREA URNS LPF: ABNORMAL /LPF
IMM GRANULOCYTES # BLD AUTO: 0.01 THOUSAND/UL (ref 0–0.2)
IMM GRANULOCYTES NFR BLD AUTO: 0 % (ref 0–2)
KETONES UR STRIP-MCNC: NEGATIVE MG/DL
LEUKOCYTE ESTERASE UR QL STRIP: NEGATIVE
LYMPHOCYTES # BLD AUTO: 1.14 THOUSANDS/ÂΜL (ref 0.6–4.47)
LYMPHOCYTES NFR BLD AUTO: 17 % (ref 14–44)
MCH RBC QN AUTO: 29.9 PG (ref 26.8–34.3)
MCHC RBC AUTO-ENTMCNC: 34.1 G/DL (ref 31.4–37.4)
MCV RBC AUTO: 88 FL (ref 82–98)
MONOCYTES # BLD AUTO: 0.57 THOUSAND/ÂΜL (ref 0.17–1.22)
MONOCYTES NFR BLD AUTO: 9 % (ref 4–12)
MUCOUS THREADS UR QL AUTO: ABNORMAL
NEUTROPHILS # BLD AUTO: 4.88 THOUSANDS/ÂΜL (ref 1.85–7.62)
NEUTS SEG NFR BLD AUTO: 72 % (ref 43–75)
NITRITE UR QL STRIP: NEGATIVE
NON-SQ EPI CELLS URNS QL MICRO: ABNORMAL /HPF
NRBC BLD AUTO-RTO: 0 /100 WBCS
PH UR STRIP.AUTO: 6 [PH]
PLATELET # BLD AUTO: 183 THOUSANDS/UL (ref 149–390)
PMV BLD AUTO: 9.6 FL (ref 8.9–12.7)
POTASSIUM SERPL-SCNC: 4.1 MMOL/L (ref 3.5–5.3)
PROT SERPL-MCNC: 7.7 G/DL (ref 6.4–8.4)
PROT UR STRIP-MCNC: ABNORMAL MG/DL
RBC # BLD AUTO: 5.05 MILLION/UL (ref 3.88–5.62)
RBC #/AREA URNS AUTO: ABNORMAL /HPF
SODIUM SERPL-SCNC: 135 MMOL/L (ref 135–147)
SP GR UR STRIP.AUTO: 1.03 (ref 1–1.03)
T3FREE SERPL-MCNC: 3.83 PG/ML (ref 2.5–3.9)
TSH SERPL DL<=0.05 MIU/L-ACNC: 5.48 UIU/ML (ref 0.45–4.5)
UROBILINOGEN UR STRIP-ACNC: 2 MG/DL
WBC # BLD AUTO: 6.72 THOUSAND/UL (ref 4.31–10.16)
WBC #/AREA URNS AUTO: ABNORMAL /HPF

## 2024-07-10 PROCEDURE — 81001 URINALYSIS AUTO W/SCOPE: CPT

## 2024-07-10 PROCEDURE — 84439 ASSAY OF FREE THYROXINE: CPT

## 2024-07-10 PROCEDURE — 36415 COLL VENOUS BLD VENIPUNCTURE: CPT

## 2024-07-10 PROCEDURE — 74177 CT ABD & PELVIS W/CONTRAST: CPT

## 2024-07-10 PROCEDURE — 80053 COMPREHEN METABOLIC PANEL: CPT

## 2024-07-10 PROCEDURE — 84481 FREE ASSAY (FT-3): CPT

## 2024-07-10 PROCEDURE — 71260 CT THORAX DX C+: CPT

## 2024-07-10 PROCEDURE — 83036 HEMOGLOBIN GLYCOSYLATED A1C: CPT

## 2024-07-10 PROCEDURE — 84443 ASSAY THYROID STIM HORMONE: CPT

## 2024-07-10 PROCEDURE — 85025 COMPLETE CBC W/AUTO DIFF WBC: CPT

## 2024-07-10 RX ADMIN — IOHEXOL 100 ML: 350 INJECTION, SOLUTION INTRAVENOUS at 15:50

## 2024-07-11 ENCOUNTER — HOSPITAL ENCOUNTER (OUTPATIENT)
Dept: INFUSION CENTER | Facility: CLINIC | Age: 59
Discharge: HOME | End: 2024-07-11
Payer: COMMERCIAL

## 2024-07-11 VITALS
WEIGHT: 174 LBS | TEMPERATURE: 97.9 F | RESPIRATION RATE: 18 BRPM | OXYGEN SATURATION: 99 % | HEART RATE: 69 BPM | DIASTOLIC BLOOD PRESSURE: 88 MMHG | BODY MASS INDEX: 25.7 KG/M2 | SYSTOLIC BLOOD PRESSURE: 154 MMHG

## 2024-07-11 DIAGNOSIS — C22.0 HEPATOCELLULAR CARCINOMA (HCC): Primary | ICD-10-CM

## 2024-07-11 LAB — T4 FREE SERPL-MCNC: 0.95 NG/DL (ref 0.61–1.12)

## 2024-07-11 PROCEDURE — 96417 CHEMO IV INFUS EACH ADDL SEQ: CPT

## 2024-07-11 PROCEDURE — 96413 CHEMO IV INFUSION 1 HR: CPT

## 2024-07-11 RX ORDER — SODIUM CHLORIDE 9 MG/ML
20 INJECTION, SOLUTION INTRAVENOUS ONCE
Status: COMPLETED | OUTPATIENT
Start: 2024-07-11 | End: 2024-07-11

## 2024-07-11 RX ADMIN — SODIUM CHLORIDE 20 ML/HR: 0.9 INJECTION, SOLUTION INTRAVENOUS at 12:05

## 2024-07-11 RX ADMIN — BEVACIZUMAB-AWWB 1200 MG: 400 INJECTION, SOLUTION INTRAVENOUS at 13:17

## 2024-07-11 RX ADMIN — ATEZOLIZUMAB 1200 MG: 1200 INJECTION, SOLUTION INTRAVENOUS at 13:57

## 2024-07-11 NOTE — PROGRESS NOTES
Pt tolerated treatment without incident. Confirmed next apt for 8/1/24 @ 11:30am@ [facility] [place].

## 2024-07-19 DIAGNOSIS — I10 ESSENTIAL HYPERTENSION: ICD-10-CM

## 2024-07-19 DIAGNOSIS — Z76.0 ENCOUNTER FOR MEDICATION REFILL: Primary | ICD-10-CM

## 2024-07-19 RX ORDER — AMLODIPINE BESYLATE 2.5 MG/1
2.5 TABLET ORAL DAILY
Qty: 90 TABLET | Refills: 1 | Status: SHIPPED | OUTPATIENT
Start: 2024-07-19

## 2024-07-19 RX ORDER — NAPROXEN 500 MG/1
500 TABLET ORAL 2 TIMES DAILY WITH MEALS
Qty: 30 TABLET | Refills: 2 | Status: SHIPPED | OUTPATIENT
Start: 2024-07-19

## 2024-07-22 ENCOUNTER — TELEPHONE (OUTPATIENT)
Dept: HEMATOLOGY ONCOLOGY | Facility: MEDICAL CENTER | Age: 59
End: 2024-07-22

## 2024-07-22 NOTE — TELEPHONE ENCOUNTER
Appt changed from Dr Lundberg to Dr Puri, same date and time as provider is out of office  Attempted to leave voicemail to inform patient, no voicemail set up

## 2024-07-30 ENCOUNTER — TELEPHONE (OUTPATIENT)
Dept: HEMATOLOGY ONCOLOGY | Facility: CLINIC | Age: 59
End: 2024-07-30

## 2024-07-30 ENCOUNTER — OFFICE VISIT (OUTPATIENT)
Dept: HEMATOLOGY ONCOLOGY | Facility: CLINIC | Age: 59
End: 2024-07-30
Payer: COMMERCIAL

## 2024-07-30 ENCOUNTER — APPOINTMENT (OUTPATIENT)
Dept: LAB | Facility: CLINIC | Age: 59
End: 2024-07-30
Payer: COMMERCIAL

## 2024-07-30 VITALS
WEIGHT: 173 LBS | BODY MASS INDEX: 25.62 KG/M2 | RESPIRATION RATE: 16 BRPM | OXYGEN SATURATION: 98 % | SYSTOLIC BLOOD PRESSURE: 150 MMHG | TEMPERATURE: 97.8 F | DIASTOLIC BLOOD PRESSURE: 84 MMHG | HEIGHT: 69 IN | HEART RATE: 65 BPM

## 2024-07-30 DIAGNOSIS — C22.0 HEPATOCELLULAR CARCINOMA (HCC): ICD-10-CM

## 2024-07-30 DIAGNOSIS — C22.0 HEPATOCELLULAR CARCINOMA (HCC): Primary | ICD-10-CM

## 2024-07-30 LAB
ALBUMIN SERPL BCG-MCNC: 4.5 G/DL (ref 3.5–5)
ALP SERPL-CCNC: 73 U/L (ref 34–104)
ALT SERPL W P-5'-P-CCNC: 26 U/L (ref 7–52)
ANION GAP SERPL CALCULATED.3IONS-SCNC: 7 MMOL/L (ref 4–13)
AST SERPL W P-5'-P-CCNC: 33 U/L (ref 13–39)
BACTERIA UR QL AUTO: ABNORMAL /HPF
BASOPHILS # BLD AUTO: 0.03 THOUSANDS/ÂΜL (ref 0–0.1)
BASOPHILS NFR BLD AUTO: 1 % (ref 0–1)
BILIRUB SERPL-MCNC: 1.13 MG/DL (ref 0.2–1)
BILIRUB UR QL STRIP: NEGATIVE
BUN SERPL-MCNC: 13 MG/DL (ref 5–25)
CALCIUM SERPL-MCNC: 9.6 MG/DL (ref 8.4–10.2)
CHLORIDE SERPL-SCNC: 101 MMOL/L (ref 96–108)
CLARITY UR: CLEAR
CO2 SERPL-SCNC: 28 MMOL/L (ref 21–32)
COLOR UR: YELLOW
CREAT SERPL-MCNC: 1.08 MG/DL (ref 0.6–1.3)
EOSINOPHIL # BLD AUTO: 0.09 THOUSAND/ÂΜL (ref 0–0.61)
EOSINOPHIL NFR BLD AUTO: 1 % (ref 0–6)
ERYTHROCYTE [DISTWIDTH] IN BLOOD BY AUTOMATED COUNT: 13.9 % (ref 11.6–15.1)
GFR SERPL CREATININE-BSD FRML MDRD: 74 ML/MIN/1.73SQ M
GLUCOSE P FAST SERPL-MCNC: 89 MG/DL (ref 65–99)
GLUCOSE UR STRIP-MCNC: NEGATIVE MG/DL
HCT VFR BLD AUTO: 43.4 % (ref 36.5–49.3)
HGB BLD-MCNC: 14.6 G/DL (ref 12–17)
HGB UR QL STRIP.AUTO: NEGATIVE
IMM GRANULOCYTES # BLD AUTO: 0.02 THOUSAND/UL (ref 0–0.2)
IMM GRANULOCYTES NFR BLD AUTO: 0 % (ref 0–2)
KETONES UR STRIP-MCNC: NEGATIVE MG/DL
LEUKOCYTE ESTERASE UR QL STRIP: NEGATIVE
LYMPHOCYTES # BLD AUTO: 1.03 THOUSANDS/ÂΜL (ref 0.6–4.47)
LYMPHOCYTES NFR BLD AUTO: 16 % (ref 14–44)
MCH RBC QN AUTO: 29.9 PG (ref 26.8–34.3)
MCHC RBC AUTO-ENTMCNC: 33.6 G/DL (ref 31.4–37.4)
MCV RBC AUTO: 89 FL (ref 82–98)
MONOCYTES # BLD AUTO: 0.64 THOUSAND/ÂΜL (ref 0.17–1.22)
MONOCYTES NFR BLD AUTO: 10 % (ref 4–12)
NEUTROPHILS # BLD AUTO: 4.54 THOUSANDS/ÂΜL (ref 1.85–7.62)
NEUTS SEG NFR BLD AUTO: 72 % (ref 43–75)
NITRITE UR QL STRIP: NEGATIVE
NON-SQ EPI CELLS URNS QL MICRO: ABNORMAL /HPF
NRBC BLD AUTO-RTO: 0 /100 WBCS
PH UR STRIP.AUTO: 7.5 [PH]
PLATELET # BLD AUTO: 157 THOUSANDS/UL (ref 149–390)
PLATELET BLD QL SMEAR: ADEQUATE
PMV BLD AUTO: 9.5 FL (ref 8.9–12.7)
POTASSIUM SERPL-SCNC: 4.3 MMOL/L (ref 3.5–5.3)
PROT SERPL-MCNC: 7.4 G/DL (ref 6.4–8.4)
PROT UR STRIP-MCNC: ABNORMAL MG/DL
RBC # BLD AUTO: 4.88 MILLION/UL (ref 3.88–5.62)
RBC #/AREA URNS AUTO: ABNORMAL /HPF
RBC MORPH BLD: NORMAL
SODIUM SERPL-SCNC: 136 MMOL/L (ref 135–147)
SP GR UR STRIP.AUTO: 1.02 (ref 1–1.03)
T3FREE SERPL-MCNC: 4.18 PG/ML (ref 2.5–3.9)
T4 FREE SERPL-MCNC: 0.82 NG/DL (ref 0.61–1.12)
TSH SERPL DL<=0.05 MIU/L-ACNC: 9.54 UIU/ML (ref 0.45–4.5)
UROBILINOGEN UR STRIP-ACNC: 2 MG/DL
WBC # BLD AUTO: 6.35 THOUSAND/UL (ref 4.31–10.16)
WBC #/AREA URNS AUTO: ABNORMAL /HPF

## 2024-07-30 PROCEDURE — 84439 ASSAY OF FREE THYROXINE: CPT

## 2024-07-30 PROCEDURE — 85025 COMPLETE CBC W/AUTO DIFF WBC: CPT

## 2024-07-30 PROCEDURE — 36415 COLL VENOUS BLD VENIPUNCTURE: CPT

## 2024-07-30 PROCEDURE — 84481 FREE ASSAY (FT-3): CPT

## 2024-07-30 PROCEDURE — 99214 OFFICE O/P EST MOD 30 MIN: CPT | Performed by: INTERNAL MEDICINE

## 2024-07-30 PROCEDURE — 81001 URINALYSIS AUTO W/SCOPE: CPT

## 2024-07-30 PROCEDURE — 84443 ASSAY THYROID STIM HORMONE: CPT

## 2024-07-30 PROCEDURE — 80053 COMPREHEN METABOLIC PANEL: CPT

## 2024-07-30 RX ORDER — SODIUM CHLORIDE 9 MG/ML
20 INJECTION, SOLUTION INTRAVENOUS ONCE
OUTPATIENT
Start: 2024-10-24

## 2024-07-30 RX ORDER — SODIUM CHLORIDE 9 MG/ML
20 INJECTION, SOLUTION INTRAVENOUS ONCE
OUTPATIENT
Start: 2024-08-22

## 2024-07-30 RX ORDER — SODIUM CHLORIDE 9 MG/ML
20 INJECTION, SOLUTION INTRAVENOUS ONCE
OUTPATIENT
Start: 2024-10-03

## 2024-07-30 RX ORDER — SODIUM CHLORIDE 9 MG/ML
20 INJECTION, SOLUTION INTRAVENOUS ONCE
OUTPATIENT
Start: 2024-09-12

## 2024-07-30 RX ORDER — SODIUM CHLORIDE 9 MG/ML
20 INJECTION, SOLUTION INTRAVENOUS ONCE
Status: CANCELLED | OUTPATIENT
Start: 2024-08-01

## 2024-07-30 NOTE — PROGRESS NOTES
[facility]AT[facility]  [address] [address]  [phone]  [phone]     Date of Visit: 7/30/2024  Name: Bassem Roberts   YOB: 1965         Assessment/Plan  In summary, Bassem Roberts is a 59 y.o. male with HCC.   Patient underwent Y90 treatment in November 2022.  Alpha-fetoprotein level came down nicely.  Unfortunately the tumor marker recently began to rise.  The May 16, 2023 CAT scan of the chest demonstrated enlarging pulmonary nodules.  More recent MRI of the abdomen demonstrated worsening gastrohepatic and portacaval adenopathy (although the liver lesions were unchanged).  Alpha-fetoprotein was found to be significantly elevated.  Patient was previously presented at the GI tumor board.  The consensus was to begin systemic treatment.  Current regimen -      Regimen  Atezolizumab 1200 mg IV day 1  Bevacizumab 15 mg/kilogram IV day 1  Cycle length = 21 days  Goal = prolongation of life, improvement of quality of life     Mr. Roberts has received 18 cycles.  From an oncology standpoint patient feels well and clinically there are no concerning findings.      AFP continues to trend up from 18 in Jan to now 85.  CT chest abdomen and pelvis from July 10 shows stable lung nodules.  Stable segment eight 2.3 x 2.3 cm lesion and another treated lesion measuring 2.4 x 2.4 cm which also remains similar to prior exam.  Enlarged celiac lymph nodes are also similar to prior MRI from October.  Evie hepatis lymph node and portacaval nodes are also stable.  Overall, no concerns for progression.    We will stay the course for now. Continue to trend AFP every other visit.       Return in about 3 months (around 10/30/2024) for Office Visit.     All the patient's questions were answered to their apparent satisfaction.  Patient has been strongly urged to call with any questions or concerns.       Oncology History   Hepatocellular carcinoma (HCC)   2022 Initial Diagnosis     Hepatocellular carcinoma (HCC)     2/8/2022 Biopsy    [facility] Ronald DOSHI US guided liver biopsy: right liver lobe:  Poorly differentiated HCC with patchy necrosis       7/13/2022 -  Cancer Staged    Staging form: Liver (Excluding Intrahepatic Bile Ducts), AJCC 8th Edition  - Clinical stage from 7/13/2022: Stage IVB (rcT3, cN1, pM1) - Signed by Taye Lundberg MD on 1/30/2024  Stage prefix: Recurrence  Histologic grade (G): G2  Histologic grading system: 4 grade system       6/29/2023 -  Chemotherapy    alteplase (CATHFLO), 2 mg, Intracatheter, Every 1 Minute as needed, 18 of 24 cycles  atezolizumab (TECENTRIQ) IVPB, 1,200 mg, Intravenous, Once, 18 of 24 cycles  Administration: 1,200 mg (6/29/2023), 1,200 mg (7/20/2023), 1,200 mg (8/10/2023), 1,200 mg (8/31/2023), 1,200 mg (9/21/2023), 1,200 mg (10/10/2023), 1,200 mg (11/2/2023), 1,200 mg (11/24/2023), 1,200 mg (12/14/2023), 1,200 mg (1/4/2024), 1,200 mg (1/26/2024), 1,200 mg (3/7/2024), 1,200 mg (3/28/2024), 1,200 mg (4/18/2024), 1,200 mg (5/9/2024), 1,200 mg (5/30/2024), 1,200 mg (6/20/2024), 1,200 mg (7/11/2024)  bevacizumab-awwb (MVASI) IVPB, 1,345 mg (100 % of original dose 15 mg/kg), Intravenous, Once, 18 of 24 cycles  Dose modification: 15 mg/kg (original dose 15 mg/kg, Cycle 1), 15 mg/kg (original dose 15 mg/kg, Cycle 2), 15 mg/kg (original dose 15 mg/kg, Cycle 3)  Administration: 1,300 mg (6/29/2023), 1,300 mg (7/20/2023), 1,300 mg (8/10/2023), 1,300 mg (8/31/2023), 1,300 mg (9/21/2023), 1,300 mg (10/10/2023), 1,300 mg (11/2/2023), 1,300 mg (11/24/2023), 1,300 mg (12/14/2023), 1,300 mg (1/4/2024), 1,300 mg (1/26/2024), 1,300 mg (3/7/2024), 1,300 mg (3/28/2024), 1,300 mg (4/18/2024), 1,300 mg (5/9/2024), 1,300 mg (5/30/2024), 1,300 mg (6/20/2024), 1,200 mg (7/11/2024)        Cancer Staging   Hepatocellular carcinoma (HCC)  Staging form: Liver (Excluding Intrahepatic Bile Ducts), AJCC 8th Edition  - Clinical stage from 7/13/2022: Stage IVB (rcT3, cN1, pM1) -  Signed by Taye Lundberg MD on 1/30/2024     Treatment Details   Treatment goal Palliative   Plan Name OP Atezolizumab + Bevacizumab Q 21 Days   Status Active   Start Date 6/29/2023   End Date 11/14/2024 (Planned)   Provider Taye Lundberg MD   Chemotherapy alteplase (CATHFLO), 2 mg, Intracatheter, Every 1 Minute as needed, 18 of 24 cycles    atezolizumab (TECENTRIQ) IVPB, 1,200 mg, Intravenous, Once, 18 of 24 cycles  Administration: 1,200 mg (6/29/2023), 1,200 mg (7/20/2023), 1,200 mg (8/10/2023), 1,200 mg (8/31/2023), 1,200 mg (9/21/2023), 1,200 mg (10/10/2023), 1,200 mg (11/2/2023), 1,200 mg (11/24/2023), 1,200 mg (12/14/2023), 1,200 mg (1/4/2024), 1,200 mg (1/26/2024), 1,200 mg (3/7/2024), 1,200 mg (3/28/2024), 1,200 mg (4/18/2024), 1,200 mg (5/9/2024), 1,200 mg (5/30/2024), 1,200 mg (6/20/2024), 1,200 mg (7/11/2024)    bevacizumab-awwb (MVASI) IVPB, 1,345 mg (100 % of original dose 15 mg/kg), Intravenous, Once, 18 of 24 cycles  Dose modification: 15 mg/kg (original dose 15 mg/kg, Cycle 1), 15 mg/kg (original dose 15 mg/kg, Cycle 2), 15 mg/kg (original dose 15 mg/kg, Cycle 3)  Administration: 1,300 mg (6/29/2023), 1,300 mg (7/20/2023), 1,300 mg (8/10/2023), 1,300 mg (8/31/2023), 1,300 mg (9/21/2023), 1,300 mg (10/10/2023), 1,300 mg (11/2/2023), 1,300 mg (11/24/2023), 1,300 mg (12/14/2023), 1,300 mg (1/4/2024), 1,300 mg (1/26/2024), 1,300 mg (3/7/2024), 1,300 mg (3/28/2024), 1,300 mg (4/18/2024), 1,300 mg (5/9/2024), 1,300 mg (5/30/2024), 1,300 mg (6/20/2024), 1,200 mg (7/11/2024)          HISTORY OF PRESENT ILLNESS:   Bassem Roberts is a 59 y.o. male  who is here for f/u of HCC    Subjective  Patient here today alone  Denies any new complaints    REVIEW OF SYSTEMS:  No fevers or chills.  No unintentional weight loss.  Noticed increased bruising on the avastin  No bleeding  Denies any headaches  Normal appetite.  No nausea or vomiting.  14 point review of systems otherwise  negative      Current Outpatient  Medications:     amLODIPine (NORVASC) 2.5 mg tablet, TAKE 1 TABLET BY MOUTH EVERY DAY, Disp: 90 tablet, Rfl: 1    ibuprofen (MOTRIN) 600 mg tablet, Take 1 tablet (600 mg total) by mouth every 8 (eight) hours as needed for moderate pain or mild pain, Disp: 15 tablet, Rfl: 0    lisinopril-hydrochlorothiazide (PRINZIDE,ZESTORETIC) 20-25 MG per tablet, Take 1 tablet by mouth daily, Disp: 90 tablet, Rfl: 2    naproxen (NAPROSYN) 500 mg tablet, Take 1 tablet (500 mg total) by mouth 2 (two) times a day with meals, Disp: 30 tablet, Rfl: 2    lidocaine (LIDODERM) 5 %, Apply 1 patch topically over 12 hours every 24 hours Remove & Discard patch within 12 hours or as directed by MD (Patient not taking: Reported on 7/30/2024), Disp: 10 patch, Rfl: 0     No Known Allergies     ACTIVE PROBLEMS:  Patient Active Problem List   Diagnosis    Hepatocellular carcinoma (HCC)    Essential hypertension    Chronic hepatitis C without hepatic coma (HCC)    Cirrhosis of liver (HCC)    ED (erectile dysfunction)    Colon polyp    Subclinical hypothyroidism    Hypercholesterolemia    Left knee pain, unspecified chronicity    Sprain of medial collateral ligament of left knee, initial encounter    Pain and swelling of left knee    Tear of medial meniscus of left knee, current          Past Medical History:   Diagnosis Date    Hypertension     Liver cancer (HCC)         Past Surgical History:   Procedure Laterality Date    HERNIA REPAIR      IR Y-90 PRE-ANGIO/EMBO W/ LUNG SCAN  11/08/2022    IR Y-90 RADIOEMBOLIZATION  11/22/2022    MASTOIDECTOMY          Social History     Socioeconomic History    Marital status: /Civil Union     Spouse name: None    Number of children: None    Years of education: None    Highest education level: None   Occupational History    None   Tobacco Use    Smoking status: Former     Current packs/day: 0.00     Average packs/day: 1 pack/day for 30.0 years (30.0 ttl pk-yrs)     Types: Cigarettes     Start date: 1987  "    Quit date: 2017     Years since quittin.5    Smokeless tobacco: Current     Types: Chew    Tobacco comments:     nicotine pouch (on)   Vaping Use    Vaping status: Never Used   Substance and Sexual Activity    Alcohol use: Not Currently    Drug use: Yes     Types: Marijuana     Comment: medical marijuana    Sexual activity: Yes     Partners: Female   Other Topics Concern    None   Social History Narrative    None     Social Determinants of Health     Financial Resource Strain: Not on file   Food Insecurity: Not on file   Transportation Needs: Not on file   Physical Activity: Not on file   Stress: Not on file   Social Connections: Not on file   Intimate Partner Violence: Not on file   Housing Stability: Not on file        Family History   Problem Relation Age of Onset    Cancer Mother         Objective        Physical Exam  ECOG performance status: 0  Blood pressure 150/84, pulse 65, temperature 97.8 °F (36.6 °C), temperature source Temporal, resp. rate 16, height 5' 9\" (1.753 m), weight 78.5 kg (173 lb), SpO2 98%.     General appearance: Not in acute distress, well appearing and well nourished.    Alert and oriented.    Normal breath sounds bilaterally.  Clear to auscultation without any added sounds  Heart sounds are normal.  No murmurs noted.    Abdomen is soft and nontender.  No rebound.  No evidence of ascites.   Extremities without any edema.    No rash  No focal deficits.        I reviewed lab data in the chart as noted above.  Additional labs as noted below    WBC   Date Value Ref Range Status   07/10/2024 6.72 4.31 - 10.16 Thousand/uL Final   2024 5.34 4.31 - 10.16 Thousand/uL Final   2024 5.09 4.31 - 10.16 Thousand/uL Final     Hemoglobin   Date Value Ref Range Status   07/10/2024 15.1 12.0 - 17.0 g/dL Final   2024 15.1 12.0 - 17.0 g/dL Final   2024 14.8 12.0 - 17.0 g/dL Final     Platelets   Date Value Ref Range Status   07/10/2024 183 149 - 390 Thousands/uL Final "   06/19/2024 152 149 - 390 Thousands/uL Final     Comment:     Results verified by repeat   05/29/2024 139 (L) 149 - 390 Thousands/uL Final     Comment:     Results verified by repeat     MCV   Date Value Ref Range Status   07/10/2024 88 82 - 98 fL Final   06/19/2024 90 82 - 98 fL Final   05/29/2024 88 82 - 98 fL Final      Potassium   Date Value Ref Range Status   07/10/2024 4.1 3.5 - 5.3 mmol/L Final   06/19/2024 5.6 (H) 3.5 - 5.3 mmol/L Final   05/29/2024 4.4 3.5 - 5.3 mmol/L Final     Chloride   Date Value Ref Range Status   07/10/2024 103 96 - 108 mmol/L Final   06/19/2024 102 96 - 108 mmol/L Final   05/29/2024 99 96 - 108 mmol/L Final     CO2   Date Value Ref Range Status   07/10/2024 24 21 - 32 mmol/L Final   06/19/2024 28 21 - 32 mmol/L Final   05/29/2024 26 21 - 32 mmol/L Final     BUN   Date Value Ref Range Status   07/10/2024 21 5 - 25 mg/dL Final   06/19/2024 24 5 - 25 mg/dL Final   05/29/2024 24 5 - 25 mg/dL Final     Creatinine   Date Value Ref Range Status   07/10/2024 1.28 0.60 - 1.30 mg/dL Final     Comment:     Standardized to IDMS reference method   06/19/2024 1.15 0.60 - 1.30 mg/dL Final     Comment:     Standardized to IDMS reference method   05/29/2024 1.11 0.60 - 1.30 mg/dL Final     Comment:     Standardized to IDMS reference method     Glucose   Date Value Ref Range Status   07/10/2024 93 65 - 140 mg/dL Final     Comment:     If the patient is fasting, the ADA then defines impaired fasting glucose as > 100 mg/dL and diabetes as > or equal to 123 mg/dL.   04/16/2024 108 65 - 140 mg/dL Final     Comment:     If the patient is fasting, the ADA then defines impaired fasting glucose as > 100 mg/dL and diabetes as > or equal to 123 mg/dL.   03/27/2024 95 65 - 140 mg/dL Final     Comment:     If the patient is fasting, the ADA then defines impaired fasting glucose as > 100 mg/dL and diabetes as > or equal to 123 mg/dL.     Calcium   Date Value Ref Range Status   07/10/2024 9.8 8.4 - 10.2 mg/dL  Final   06/19/2024 10.0 8.4 - 10.2 mg/dL Final   05/29/2024 9.4 8.4 - 10.2 mg/dL Final     Albumin   Date Value Ref Range Status   07/10/2024 4.7 3.5 - 5.0 g/dL Final   06/19/2024 4.5 3.5 - 5.0 g/dL Final   05/29/2024 4.3 3.5 - 5.0 g/dL Final     Total Bilirubin   Date Value Ref Range Status   07/10/2024 1.34 (H) 0.20 - 1.00 mg/dL Final     Comment:     Use of this assay is not recommended for patients undergoing treatment with eltrombopag due to the potential for falsely elevated results.  N-acetyl-p-benzoquinone imine (metabolite of Acetaminophen) will generate erroneously low results in samples for patients that have taken an overdose of Acetaminophen.   06/19/2024 1.03 (H) 0.20 - 1.00 mg/dL Final     Comment:     Use of this assay is not recommended for patients undergoing treatment with eltrombopag due to the potential for falsely elevated results.  N-acetyl-p-benzoquinone imine (metabolite of Acetaminophen) will generate erroneously low results in samples for patients that have taken an overdose of Acetaminophen.   05/29/2024 1.11 (H) 0.20 - 1.00 mg/dL Final     Comment:     Use of this assay is not recommended for patients undergoing treatment with eltrombopag due to the potential for falsely elevated results.  N-acetyl-p-benzoquinone imine (metabolite of Acetaminophen) will generate erroneously low results in samples for patients that have taken an overdose of Acetaminophen.     Alkaline Phosphatase   Date Value Ref Range Status   07/10/2024 68 34 - 104 U/L Final   06/19/2024 74 34 - 104 U/L Final   05/29/2024 79 34 - 104 U/L Final     AST   Date Value Ref Range Status   07/10/2024 26 13 - 39 U/L Final   06/19/2024 24 13 - 39 U/L Final   05/29/2024 29 13 - 39 U/L Final     ALT   Date Value Ref Range Status   07/10/2024 21 7 - 52 U/L Final     Comment:     Specimen collection should occur prior to Sulfasalazine administration due to the potential for falsely depressed results.    06/19/2024 18 7 - 52 U/L  "Final     Comment:     Specimen collection should occur prior to Sulfasalazine administration due to the potential for falsely depressed results.    05/29/2024 19 7 - 52 U/L Final     Comment:     Specimen collection should occur prior to Sulfasalazine administration due to the potential for falsely depressed results.       No results found for: \"LDH\"  No results found for: \"TSH\"  No results found for: \"R5HPCNR\"   Free T4   Date Value Ref Range Status   07/10/2024 0.95 0.61 - 1.12 ng/dL Final     Comment:     Specimens with biotin concentrations > 10 ng/mL can lead to significant (> 10%) positive bias in result.   06/19/2024 0.77 0.61 - 1.12 ng/dL Final     Comment:     Specimens with biotin concentrations > 10 ng/mL can lead to significant (> 10%) positive bias in result.   05/29/2024 0.86 0.61 - 1.12 ng/dL Final     Comment:     Specimens with biotin concentrations > 10 ng/mL can lead to significant (> 10%) positive bias in result.         RECENT IMAGING:  Procedure: CT chest abdomen pelvis w contrast    Result Date: 7/15/2024  Narrative: CT CHEST, ABDOMEN AND PELVIS WITH IV CONTRAST INDICATION: C22.0: Liver cell carcinoma. COMPARISON: CT chest 1/8/2024; MRI abdomen 10/25/2023; CT chest abdomen pelvis 10/4/2022 TECHNIQUE: CT examination of the chest, abdomen and pelvis was performed. Multiplanar 2D reformatted images were created from the source data. This examination, like all CT scans performed in the [facility], was performed utilizing techniques to minimize radiation dose exposure, including the use of iterative reconstruction and automated exposure control. Radiation dose length product (DLP) for this visit: 1259.18 mGy-cm IV Contrast: 100 mL of iohexol (OMNIPAQUE) Enteric Contrast: Administered. FINDINGS: CHEST LUNGS: Stable 2 mm right upper lobe nodule image 11/40, 4 mm right lower lobe nodule image 11/60, 4 mm juxtapleural posterior right lower lobe nodule image 11/69 and 3 mm left basilar " nodule image 11/75. No new or enlarging nodules. No focal consolidation. Mild dependent atelectasis. Central airways are clear. PLEURA: Unremarkable. HEART/GREAT VESSELS: Heart size normal. Coronary artery calcification. No pericardial effusion. No thoracic aortic aneurysm. MEDIASTINUM AND LUCIUS: Unremarkable. CHEST WALL AND LOWER NECK: Unremarkable. ABDOMEN LIVER/BILIARY TREE: Treated lesions at the liver dome are again identified. On image 3/90 in segment 8 there is a stable 2.3 x 2.3 cm lesion and posterior to this on image 3/92 treated lesion measures 2.4 x 2.4 cm which is also similar allowing for differences in technique. No new or enlarging liver lesions. GALLBLADDER: No calcified gallstones. No pericholecystic inflammatory change. SPLEEN: Unremarkable. PANCREAS: Unremarkable. ADRENAL GLANDS: Unremarkable. KIDNEYS/URETERS: No hydronephrosis or urinary tract calculi. Subcentimeter hypoattenuating renal lesion(s), too small to characterize but statistically likely benign, which do not warrant follow-up (Radiology June 2019). STOMACH AND BOWEL: Unremarkable. APPENDIX: Normal. ABDOMINOPELVIC CAVITY: Enlarged celiac axis lymph nodes are similar to the prior MRI allowing for differences in technique measuring approximately 2.2 cm and 2.1 cm in short axis. 1.1 cm skinny hepatis lymph node is stable and stable 1.3 cm portacaval node. No free fluid or free air. VESSELS: Atherosclerosis without abdominal aortic aneurysm. PELVIS REPRODUCTIVE ORGANS: Prostatomegaly with coarse calcification. URINARY BLADDER: Urinary bladder is not well distended, limiting evaluation. Mild diffusely thickened appearance may be due to underdistention. ABDOMINAL WALL/INGUINAL REGIONS: Small fat-containing umbilical hernia. Small fat-containing left inguinal hernia. BONES: No acute fracture or suspicious osseous lesion. Old healed left rib fractures.     Impression: 1. Stable size of treated liver lesions allowing for differences in technique.  "2. Stable upper abdominal adenopathy. Workstation performed: [id number]       Total minutes spent reviewing EMR, seeing patient in clinic visit, documenting visit, placing treatment orders, and communicating with other medical providers: 30    Portions of the record may have been created with voice recognition software.  Occasional wrong word or \"sound a like\" substitutions may have occurred due to the inherent limitations of voice recognition software.  Read the chart carefully and recognize, using context, where substitutions have occurred.    "

## 2024-08-01 ENCOUNTER — HOSPITAL ENCOUNTER (OUTPATIENT)
Dept: INFUSION CENTER | Facility: CLINIC | Age: 59
Discharge: HOME | End: 2024-08-01
Payer: COMMERCIAL

## 2024-08-01 VITALS
SYSTOLIC BLOOD PRESSURE: 142 MMHG | WEIGHT: 174 LBS | HEART RATE: 69 BPM | TEMPERATURE: 98 F | RESPIRATION RATE: 18 BRPM | BODY MASS INDEX: 25.7 KG/M2 | OXYGEN SATURATION: 96 % | DIASTOLIC BLOOD PRESSURE: 80 MMHG

## 2024-08-01 DIAGNOSIS — C22.0 HEPATOCELLULAR CARCINOMA (HCC): Primary | ICD-10-CM

## 2024-08-01 PROCEDURE — 96413 CHEMO IV INFUSION 1 HR: CPT

## 2024-08-01 PROCEDURE — 96417 CHEMO IV INFUS EACH ADDL SEQ: CPT

## 2024-08-01 RX ORDER — SODIUM CHLORIDE 9 MG/ML
20 INJECTION, SOLUTION INTRAVENOUS ONCE
Status: COMPLETED | OUTPATIENT
Start: 2024-08-01 | End: 2024-08-01

## 2024-08-01 RX ADMIN — SODIUM CHLORIDE 20 ML/HR: 0.9 INJECTION, SOLUTION INTRAVENOUS at 16:04

## 2024-08-01 RX ADMIN — BEVACIZUMAB-AWWB 1200 MG: 400 INJECTION, SOLUTION INTRAVENOUS at 16:07

## 2024-08-01 RX ADMIN — ATEZOLIZUMAB 1200 MG: 1200 INJECTION, SOLUTION INTRAVENOUS at 16:48

## 2024-08-01 NOTE — PROGRESS NOTES
Pt tolerated tx well with no complaints at this time. Next appt confirmed for 8/22 at 1530 at [place]. AVS provided.

## 2024-08-20 ENCOUNTER — TELEPHONE (OUTPATIENT)
Dept: HEMATOLOGY ONCOLOGY | Facility: CLINIC | Age: 59
End: 2024-08-20

## 2024-08-20 ENCOUNTER — APPOINTMENT (OUTPATIENT)
Dept: LAB | Facility: CLINIC | Age: 59
End: 2024-08-20
Payer: COMMERCIAL

## 2024-08-20 ENCOUNTER — TELEPHONE (OUTPATIENT)
Age: 59
End: 2024-08-20

## 2024-08-20 DIAGNOSIS — C22.0 HEPATOCELLULAR CARCINOMA (HCC): ICD-10-CM

## 2024-08-20 LAB
AFP-TM SERPL-MCNC: 182.3 NG/ML (ref 0–9)
ALBUMIN SERPL BCG-MCNC: 4.4 G/DL (ref 3.5–5)
ALP SERPL-CCNC: 92 U/L (ref 34–104)
ALT SERPL W P-5'-P-CCNC: 27 U/L (ref 7–52)
ANION GAP SERPL CALCULATED.3IONS-SCNC: 7 MMOL/L (ref 4–13)
AST SERPL W P-5'-P-CCNC: 37 U/L (ref 13–39)
BACTERIA UR QL AUTO: ABNORMAL /HPF
BASOPHILS # BLD AUTO: 0.03 THOUSANDS/ÂΜL (ref 0–0.1)
BASOPHILS NFR BLD AUTO: 1 % (ref 0–1)
BILIRUB SERPL-MCNC: 1.32 MG/DL (ref 0.2–1)
BILIRUB UR QL STRIP: NEGATIVE
BUN SERPL-MCNC: 15 MG/DL (ref 5–25)
CALCIUM SERPL-MCNC: 9.6 MG/DL (ref 8.4–10.2)
CHLORIDE SERPL-SCNC: 102 MMOL/L (ref 96–108)
CLARITY UR: CLEAR
CO2 SERPL-SCNC: 28 MMOL/L (ref 21–32)
COLOR UR: YELLOW
CREAT SERPL-MCNC: 1.02 MG/DL (ref 0.6–1.3)
EOSINOPHIL # BLD AUTO: 0.13 THOUSAND/ÂΜL (ref 0–0.61)
EOSINOPHIL NFR BLD AUTO: 2 % (ref 0–6)
ERYTHROCYTE [DISTWIDTH] IN BLOOD BY AUTOMATED COUNT: 14 % (ref 11.6–15.1)
GFR SERPL CREATININE-BSD FRML MDRD: 80 ML/MIN/1.73SQ M
GLUCOSE SERPL-MCNC: 108 MG/DL (ref 65–140)
GLUCOSE UR STRIP-MCNC: NEGATIVE MG/DL
HCT VFR BLD AUTO: 43.9 % (ref 36.5–49.3)
HGB BLD-MCNC: 15.3 G/DL (ref 12–17)
HGB UR QL STRIP.AUTO: ABNORMAL
HYALINE CASTS #/AREA URNS LPF: ABNORMAL /LPF
IMM GRANULOCYTES # BLD AUTO: 0.01 THOUSAND/UL (ref 0–0.2)
IMM GRANULOCYTES NFR BLD AUTO: 0 % (ref 0–2)
KETONES UR STRIP-MCNC: NEGATIVE MG/DL
LEUKOCYTE ESTERASE UR QL STRIP: NEGATIVE
LYMPHOCYTES # BLD AUTO: 0.91 THOUSANDS/ÂΜL (ref 0.6–4.47)
LYMPHOCYTES NFR BLD AUTO: 15 % (ref 14–44)
MCH RBC QN AUTO: 30.7 PG (ref 26.8–34.3)
MCHC RBC AUTO-ENTMCNC: 34.9 G/DL (ref 31.4–37.4)
MCV RBC AUTO: 88 FL (ref 82–98)
MONOCYTES # BLD AUTO: 0.56 THOUSAND/ÂΜL (ref 0.17–1.22)
MONOCYTES NFR BLD AUTO: 9 % (ref 4–12)
MUCOUS THREADS UR QL AUTO: ABNORMAL
NEUTROPHILS # BLD AUTO: 4.46 THOUSANDS/ÂΜL (ref 1.85–7.62)
NEUTS SEG NFR BLD AUTO: 73 % (ref 43–75)
NITRITE UR QL STRIP: NEGATIVE
NON-SQ EPI CELLS URNS QL MICRO: ABNORMAL /HPF
NRBC BLD AUTO-RTO: 0 /100 WBCS
PH UR STRIP.AUTO: 6 [PH]
PLATELET # BLD AUTO: 150 THOUSANDS/UL (ref 149–390)
PMV BLD AUTO: 9.8 FL (ref 8.9–12.7)
POTASSIUM SERPL-SCNC: 3.9 MMOL/L (ref 3.5–5.3)
PROT SERPL-MCNC: 7.3 G/DL (ref 6.4–8.4)
PROT UR STRIP-MCNC: ABNORMAL MG/DL
RBC # BLD AUTO: 4.98 MILLION/UL (ref 3.88–5.62)
RBC #/AREA URNS AUTO: ABNORMAL /HPF
SODIUM SERPL-SCNC: 137 MMOL/L (ref 135–147)
SP GR UR STRIP.AUTO: 1.03 (ref 1–1.03)
T3FREE SERPL-MCNC: 3.95 PG/ML (ref 2.5–3.9)
T4 FREE SERPL-MCNC: 0.76 NG/DL (ref 0.61–1.12)
TSH SERPL DL<=0.05 MIU/L-ACNC: 5.45 UIU/ML (ref 0.45–4.5)
UROBILINOGEN UR STRIP-ACNC: <2 MG/DL
WBC # BLD AUTO: 6.1 THOUSAND/UL (ref 4.31–10.16)
WBC #/AREA URNS AUTO: ABNORMAL /HPF

## 2024-08-20 PROCEDURE — 82105 ALPHA-FETOPROTEIN SERUM: CPT

## 2024-08-20 PROCEDURE — 36415 COLL VENOUS BLD VENIPUNCTURE: CPT

## 2024-08-20 PROCEDURE — 84481 FREE ASSAY (FT-3): CPT

## 2024-08-20 PROCEDURE — 84439 ASSAY OF FREE THYROXINE: CPT

## 2024-08-20 PROCEDURE — 80053 COMPREHEN METABOLIC PANEL: CPT

## 2024-08-20 PROCEDURE — 81001 URINALYSIS AUTO W/SCOPE: CPT

## 2024-08-20 PROCEDURE — 84443 ASSAY THYROID STIM HORMONE: CPT

## 2024-08-20 PROCEDURE — 85025 COMPLETE CBC W/AUTO DIFF WBC: CPT

## 2024-08-20 NOTE — TELEPHONE ENCOUNTER
Called number listed on the pts profile , however the father answered. I retrieved medical communication consent , father was not listed therefore information was not released. I did schedule pt for Khushi at the end of October in  location.

## 2024-08-22 ENCOUNTER — HOSPITAL ENCOUNTER (OUTPATIENT)
Dept: INFUSION CENTER | Facility: CLINIC | Age: 59
Discharge: HOME | End: 2024-08-22
Payer: COMMERCIAL

## 2024-08-22 ENCOUNTER — TELEPHONE (OUTPATIENT)
Age: 59
End: 2024-08-22

## 2024-08-22 VITALS
HEART RATE: 85 BPM | SYSTOLIC BLOOD PRESSURE: 125 MMHG | OXYGEN SATURATION: 96 % | HEIGHT: 69 IN | DIASTOLIC BLOOD PRESSURE: 84 MMHG | WEIGHT: 173.5 LBS | TEMPERATURE: 97 F | BODY MASS INDEX: 25.7 KG/M2

## 2024-08-22 DIAGNOSIS — C22.0 HEPATOCELLULAR CARCINOMA (HCC): Primary | ICD-10-CM

## 2024-08-22 DIAGNOSIS — I10 ESSENTIAL HYPERTENSION: ICD-10-CM

## 2024-08-22 PROCEDURE — 96413 CHEMO IV INFUSION 1 HR: CPT

## 2024-08-22 RX ORDER — LISINOPRIL AND HYDROCHLOROTHIAZIDE 20; 25 MG/1; MG/1
1 TABLET ORAL DAILY
Qty: 90 TABLET | Refills: 1 | Status: SHIPPED | OUTPATIENT
Start: 2024-08-22

## 2024-08-22 RX ORDER — SODIUM CHLORIDE 9 MG/ML
20 INJECTION, SOLUTION INTRAVENOUS ONCE
Status: COMPLETED | OUTPATIENT
Start: 2024-08-22 | End: 2024-08-22

## 2024-08-22 RX ADMIN — SODIUM CHLORIDE 20 ML/HR: 0.9 INJECTION, SOLUTION INTRAVENOUS at 16:05

## 2024-08-22 RX ADMIN — ATEZOLIZUMAB 1200 MG: 1200 INJECTION, SOLUTION INTRAVENOUS at 16:10

## 2024-08-22 NOTE — PROGRESS NOTES
Patient tolerated treatment without incident. Next appt confirmed for 9/12 at 15:00 at [place]. Patient declined AVS.

## 2024-08-22 NOTE — PROGRESS NOTES
[facility] Mvasi and Tecentriq. Patient offers no complaints at this time. Labs reviewed from 8/20. Urine protein resulted 200 (+2)- Radha MCNALLY RN made aware. Per Radha ALEXANDRE, Mvasi to be held today due to urine protein result per Dr. Puri. Patient's follow up appt will be moved earlier than currently scheduled (10/29) and CT scan will be ordered per Radha ALEXANDRE. Schedulers to reach out to patient to make these appts. Patient verbalized understanding of change in treatment for today, change in follow up appts, and CT. PIV placed in R arm with positive blood return.

## 2024-08-22 NOTE — TELEPHONE ENCOUNTER
Received message from Camille at [facility]. Patient's urine protein is +2. He is here for avastin and tecentriq. Discussed with Dr. Puri and we are going to hold the avastin for this treatment.     Also Dr. Puri would like to see him earlier in October, instead of 10/29. He will need a CT scan done prior to this appointment. I will have our schedulers reach out to him to get the FU and CT scheduled. Camille made aware.

## 2024-08-27 ENCOUNTER — OFFICE VISIT (OUTPATIENT)
Dept: FAMILY MEDICINE CLINIC | Facility: CLINIC | Age: 59
End: 2024-08-27
Payer: COMMERCIAL

## 2024-08-27 VITALS
OXYGEN SATURATION: 95 % | RESPIRATION RATE: 16 BRPM | SYSTOLIC BLOOD PRESSURE: 132 MMHG | WEIGHT: 173 LBS | HEART RATE: 80 BPM | BODY MASS INDEX: 25.62 KG/M2 | HEIGHT: 69 IN | DIASTOLIC BLOOD PRESSURE: 70 MMHG

## 2024-08-27 DIAGNOSIS — I10 ESSENTIAL HYPERTENSION: Primary | ICD-10-CM

## 2024-08-27 DIAGNOSIS — E78.00 HYPERCHOLESTEROLEMIA: ICD-10-CM

## 2024-08-27 DIAGNOSIS — K74.60 CIRRHOSIS OF LIVER WITHOUT ASCITES, UNSPECIFIED HEPATIC CIRRHOSIS TYPE (HCC): ICD-10-CM

## 2024-08-27 DIAGNOSIS — C22.0 HEPATOCELLULAR CARCINOMA (HCC): ICD-10-CM

## 2024-08-27 DIAGNOSIS — E03.8 SUBCLINICAL HYPOTHYROIDISM: ICD-10-CM

## 2024-08-27 DIAGNOSIS — S83.412A SPRAIN OF MEDIAL COLLATERAL LIGAMENT OF LEFT KNEE, INITIAL ENCOUNTER: ICD-10-CM

## 2024-08-27 PROCEDURE — 99214 OFFICE O/P EST MOD 30 MIN: CPT | Performed by: FAMILY MEDICINE

## 2024-08-27 RX ORDER — LIDOCAINE 50 MG/G
1 PATCH TOPICAL EVERY 24 HOURS
Qty: 30 PATCH | Refills: 3 | Status: SHIPPED | OUTPATIENT
Start: 2024-08-27

## 2024-08-27 NOTE — ASSESSMENT & PLAN NOTE
AFP has been increasing and was 182.30 in August 2024. Continue management per Oncology. Gets infusions every 3 weeks. Patient for CT on 9/30 and may need to change regimen.

## 2024-08-27 NOTE — PROGRESS NOTES
Ambulatory Visit  Name: Bassem Roberts      : 1965      MRN: [id number]  Encounter Provider: Josue Hernandez MD  Encounter Date: 2024   Encounter department: [facility] MEDICINE    Assessment & Plan   1. Essential hypertension  Assessment & Plan:  Blood pressure good. Continue lisinopril HCT 20/25 daily and amlodipine 2.5 mg qd. Pt advised to continue low Na diet and to exercise on a regular basis.   2. Hypercholesterolemia  Assessment & Plan:  Recheck lipids. Advised pt to follow a low cholesterol diet and to exercise on a regular basis.   Orders:  -     Lipid panel; Future  3. Subclinical hypothyroidism  Assessment & Plan:  TSH 5.445 and Free T4 0.76 in 2024. Will continue to monitor.   4. Hepatocellular carcinoma (HCC)  Assessment & Plan:  AFP has been increasing and was 182.30 in 2024. Continue management per Oncology. Gets infusions every 3 weeks. Patient for CT on  and may need to change regimen.   5. Cirrhosis of liver without ascites, unspecified hepatic cirrhosis type (HCC)  Assessment & Plan:  Was seen on imaging but pt not having any sxs or manifestations of it. LFTs normal in 2024.   6. Sprain of medial collateral ligament of left knee, initial encounter  -     lidocaine (LIDODERM) 5 %; Apply 1 patch topically over 12 hours every 24 hours Remove & Discard patch within 12 hours or as directed by MD      Depression Screening and Follow-up Plan: Patient was screened for depression during today's encounter. They screened negative with a PHQ-2 score of 0.        History of Present Illness     Patient here for follow-up Hypertension, Hyperlipidemia, HCC, Cirrhosis, Subclinical Hypothyroidism. Patient doing ok. No chest pain or shortness of breath. No headaches. No abdominal pain. Blood pressure has been good. AFP has increased and patient going for CT on . May need to change his regimen. Still has left knee pain and can't fix now due to chemotherapy. No  other complaints.       Review of Systems   Constitutional:  Negative for fatigue and unexpected weight change.   Respiratory:  Negative for cough and shortness of breath.    Cardiovascular:  Negative for chest pain.   Gastrointestinal:  Negative for abdominal pain, constipation, diarrhea and vomiting.   Musculoskeletal:  Positive for arthralgias.   Neurological:  Negative for dizziness and headaches.   Psychiatric/Behavioral:  Negative for dysphoric mood. The patient is not nervous/anxious.      Past Medical History:   Diagnosis Date   • Hypertension    • Liver cancer (HCC)      Past Surgical History:   Procedure Laterality Date   • HERNIA REPAIR     • IR Y-90 PRE-ANGIO/EMBO W/ LUNG SCAN  2022   • IR Y-90 RADIOEMBOLIZATION  2022   • MASTOIDECTOMY       Family History   Problem Relation Age of Onset   • Cancer Mother      Social History     Tobacco Use   • Smoking status: Former     Current packs/day: 0.00     Average packs/day: 1 pack/day for 30.0 years (30.0 ttl pk-yrs)     Types: Cigarettes     Start date:      Quit date:      Years since quittin.6   • Smokeless tobacco: Current   • Tobacco comments:     nicotine pouch (on)   Vaping Use   • Vaping status: Never Used   Substance and Sexual Activity   • Alcohol use: Yes   • Drug use: Yes     Types: Marijuana     Comment: medical marijuana   • Sexual activity: Yes     Partners: Female     Current Outpatient Medications on File Prior to Visit   Medication Sig   • amLODIPine (NORVASC) 2.5 mg tablet TAKE 1 TABLET BY MOUTH EVERY DAY   • ibuprofen (MOTRIN) 600 mg tablet Take 1 tablet (600 mg total) by mouth every 8 (eight) hours as needed for moderate pain or mild pain   • lisinopril-hydrochlorothiazide (PRINZIDE,ZESTORETIC) 20-25 MG per tablet TAKE 1 TABLET BY MOUTH EVERY DAY   • naproxen (NAPROSYN) 500 mg tablet Take 1 tablet (500 mg total) by mouth 2 (two) times a day with meals   • [DISCONTINUED] lidocaine (LIDODERM) 5 % Apply 1 patch  "topically over 12 hours every 24 hours Remove & Discard patch within 12 hours or as directed by MD (Patient not taking: Reported on 7/30/2024)     No Known Allergies    There is no immunization history on file for this patient.  Objective     /70 (BP Location: Left arm, Patient Position: Sitting, Cuff Size: Standard)   Pulse 80   Resp 16   Ht 5' 9\" (1.753 m)   Wt 78.5 kg (173 lb)   SpO2 95%   BMI 25.55 kg/m²     Physical Exam  Vitals and nursing note reviewed.   Constitutional:       Appearance: Normal appearance. He is normal weight.   Neck:      Vascular: No carotid bruit.   Cardiovascular:      Rate and Rhythm: Normal rate and regular rhythm.      Heart sounds: Normal heart sounds. No murmur heard.  Pulmonary:      Effort: Pulmonary effort is normal.      Breath sounds: Normal breath sounds. No wheezing.   Musculoskeletal:      Cervical back: Normal range of motion and neck supple. No muscular tenderness.      Right lower leg: No edema.      Left lower leg: No edema.   Lymphadenopathy:      Cervical: No cervical adenopathy.   Neurological:      Mental Status: He is alert.   Psychiatric:         Mood and Affect: Mood normal.         Behavior: Behavior normal.         Thought Content: Thought content normal.         Judgment: Judgment normal.         "

## 2024-08-27 NOTE — ASSESSMENT & PLAN NOTE
Blood pressure good. Continue lisinopril HCT 20/25 daily and amlodipine 2.5 mg qd. Pt advised to continue low Na diet and to exercise on a regular basis.

## 2024-08-27 NOTE — ASSESSMENT & PLAN NOTE
Was seen on imaging but pt not having any sxs or manifestations of it. LFTs normal in August 2024.

## 2024-09-09 ENCOUNTER — APPOINTMENT (OUTPATIENT)
Dept: LAB | Facility: CLINIC | Age: 59
End: 2024-09-09
Payer: COMMERCIAL

## 2024-09-09 DIAGNOSIS — E78.00 HYPERCHOLESTEROLEMIA: ICD-10-CM

## 2024-09-09 DIAGNOSIS — C22.0 HEPATOCELLULAR CARCINOMA (HCC): ICD-10-CM

## 2024-09-09 LAB
ALBUMIN SERPL BCG-MCNC: 4.3 G/DL (ref 3.5–5)
ALP SERPL-CCNC: 93 U/L (ref 34–104)
ALT SERPL W P-5'-P-CCNC: 21 U/L (ref 7–52)
ANION GAP SERPL CALCULATED.3IONS-SCNC: 7 MMOL/L (ref 4–13)
AST SERPL W P-5'-P-CCNC: 29 U/L (ref 13–39)
BACTERIA UR QL AUTO: ABNORMAL /HPF
BASOPHILS # BLD AUTO: 0.03 THOUSANDS/ÂΜL (ref 0–0.1)
BASOPHILS NFR BLD AUTO: 1 % (ref 0–1)
BILIRUB SERPL-MCNC: 1.32 MG/DL (ref 0.2–1)
BILIRUB UR QL STRIP: NEGATIVE
BUN SERPL-MCNC: 16 MG/DL (ref 5–25)
CALCIUM SERPL-MCNC: 8.8 MG/DL (ref 8.4–10.2)
CHLORIDE SERPL-SCNC: 102 MMOL/L (ref 96–108)
CLARITY UR: CLEAR
CO2 SERPL-SCNC: 26 MMOL/L (ref 21–32)
COLOR UR: YELLOW
CREAT SERPL-MCNC: 1.06 MG/DL (ref 0.6–1.3)
EOSINOPHIL # BLD AUTO: 0.05 THOUSAND/ÂΜL (ref 0–0.61)
EOSINOPHIL NFR BLD AUTO: 1 % (ref 0–6)
ERYTHROCYTE [DISTWIDTH] IN BLOOD BY AUTOMATED COUNT: 13.9 % (ref 11.6–15.1)
GFR SERPL CREATININE-BSD FRML MDRD: 76 ML/MIN/1.73SQ M
GLUCOSE SERPL-MCNC: 92 MG/DL (ref 65–140)
GLUCOSE UR STRIP-MCNC: NEGATIVE MG/DL
HCT VFR BLD AUTO: 43.4 % (ref 36.5–49.3)
HGB BLD-MCNC: 14.9 G/DL (ref 12–17)
HGB UR QL STRIP.AUTO: ABNORMAL
IMM GRANULOCYTES # BLD AUTO: 0.02 THOUSAND/UL (ref 0–0.2)
IMM GRANULOCYTES NFR BLD AUTO: 0 % (ref 0–2)
KETONES UR STRIP-MCNC: NEGATIVE MG/DL
LEUKOCYTE ESTERASE UR QL STRIP: NEGATIVE
LYMPHOCYTES # BLD AUTO: 1.08 THOUSANDS/ÂΜL (ref 0.6–4.47)
LYMPHOCYTES NFR BLD AUTO: 18 % (ref 14–44)
MCH RBC QN AUTO: 30.7 PG (ref 26.8–34.3)
MCHC RBC AUTO-ENTMCNC: 34.3 G/DL (ref 31.4–37.4)
MCV RBC AUTO: 89 FL (ref 82–98)
MONOCYTES # BLD AUTO: 0.62 THOUSAND/ÂΜL (ref 0.17–1.22)
MONOCYTES NFR BLD AUTO: 10 % (ref 4–12)
NEUTROPHILS # BLD AUTO: 4.38 THOUSANDS/ÂΜL (ref 1.85–7.62)
NEUTS SEG NFR BLD AUTO: 70 % (ref 43–75)
NITRITE UR QL STRIP: NEGATIVE
NON-SQ EPI CELLS URNS QL MICRO: ABNORMAL /HPF
NRBC BLD AUTO-RTO: 0 /100 WBCS
PH UR STRIP.AUTO: 7 [PH]
PLATELET # BLD AUTO: 155 THOUSANDS/UL (ref 149–390)
PMV BLD AUTO: 9.3 FL (ref 8.9–12.7)
POTASSIUM SERPL-SCNC: 4.2 MMOL/L (ref 3.5–5.3)
PROT SERPL-MCNC: 6.6 G/DL (ref 6.4–8.4)
PROT UR STRIP-MCNC: ABNORMAL MG/DL
RBC # BLD AUTO: 4.86 MILLION/UL (ref 3.88–5.62)
RBC #/AREA URNS AUTO: ABNORMAL /HPF
SODIUM SERPL-SCNC: 135 MMOL/L (ref 135–147)
SP GR UR STRIP.AUTO: 1.02 (ref 1–1.03)
T3FREE SERPL-MCNC: 3.32 PG/ML (ref 2.5–3.9)
TSH SERPL DL<=0.05 MIU/L-ACNC: 4.15 UIU/ML (ref 0.45–4.5)
UROBILINOGEN UR STRIP-ACNC: 3 MG/DL
WBC # BLD AUTO: 6.18 THOUSAND/UL (ref 4.31–10.16)
WBC #/AREA URNS AUTO: ABNORMAL /HPF

## 2024-09-09 PROCEDURE — 81001 URINALYSIS AUTO W/SCOPE: CPT

## 2024-09-09 PROCEDURE — 84443 ASSAY THYROID STIM HORMONE: CPT

## 2024-09-09 PROCEDURE — 36415 COLL VENOUS BLD VENIPUNCTURE: CPT

## 2024-09-09 PROCEDURE — 85025 COMPLETE CBC W/AUTO DIFF WBC: CPT

## 2024-09-09 PROCEDURE — 80053 COMPREHEN METABOLIC PANEL: CPT

## 2024-09-09 PROCEDURE — 84481 FREE ASSAY (FT-3): CPT

## 2024-09-12 ENCOUNTER — TELEPHONE (OUTPATIENT)
Age: 59
End: 2024-09-12

## 2024-09-12 ENCOUNTER — APPOINTMENT (OUTPATIENT)
Dept: LAB | Facility: CLINIC | Age: 59
End: 2024-09-12
Payer: COMMERCIAL

## 2024-09-12 ENCOUNTER — HOSPITAL ENCOUNTER (OUTPATIENT)
Dept: INFUSION CENTER | Facility: CLINIC | Age: 59
Discharge: HOME | End: 2024-09-12
Payer: COMMERCIAL

## 2024-09-12 ENCOUNTER — TELEPHONE (OUTPATIENT)
Dept: INFUSION CENTER | Facility: CLINIC | Age: 59
End: 2024-09-12

## 2024-09-12 VITALS
DIASTOLIC BLOOD PRESSURE: 84 MMHG | SYSTOLIC BLOOD PRESSURE: 150 MMHG | HEART RATE: 91 BPM | OXYGEN SATURATION: 96 % | RESPIRATION RATE: 18 BRPM | TEMPERATURE: 98.2 F

## 2024-09-12 DIAGNOSIS — C22.0 HEPATOCELLULAR CARCINOMA (HCC): Primary | ICD-10-CM

## 2024-09-12 DIAGNOSIS — R80.9 PROTEINURIA, UNSPECIFIED TYPE: Primary | ICD-10-CM

## 2024-09-12 DIAGNOSIS — R80.9 PROTEINURIA, UNSPECIFIED TYPE: ICD-10-CM

## 2024-09-12 PROCEDURE — 96413 CHEMO IV INFUSION 1 HR: CPT

## 2024-09-12 RX ORDER — SODIUM CHLORIDE 9 MG/ML
20 INJECTION, SOLUTION INTRAVENOUS ONCE
Status: COMPLETED | OUTPATIENT
Start: 2024-09-12 | End: 2024-09-12

## 2024-09-12 RX ADMIN — SODIUM CHLORIDE 20 ML/HR: 0.9 INJECTION, SOLUTION INTRAVENOUS at 15:39

## 2024-09-12 RX ADMIN — ATEZOLIZUMAB 1200 MG: 1200 INJECTION, SOLUTION INTRAVENOUS at 16:01

## 2024-09-12 NOTE — TELEPHONE ENCOUNTER
Called patient to let him know we are holding avastin for this cycle because of the protein in his urine. He will still get tecentriq today. 24 hour urine test ordered. He will go to his lab today to  the supplies and I let him know we would reach out to him once we get results.

## 2024-09-12 NOTE — PROGRESS NOTES
Patient arrives for D1 C21 Tecentriq today. Labs reviewed from 9/9/24 and are within parameters for treatment today with exception of +2 protein in urine. MVASI held due to +2 protein in urine, 24h urine ordered, patient did collect supplies from lab today. PIV placed, patient tolerated well. Patient resting on recliner chair, call bell within reach.

## 2024-09-12 NOTE — PROGRESS NOTES
Pt tolerated tecentriq treatment well. Aware of next appt 10/3 at 1530. AVS declined. Pt has mychart.

## 2024-09-12 NOTE — TELEPHONE ENCOUNTER
TIME OUT: Spoke with Shakira ALEXANDRE in Dr Puri's office - MVASI to be HELD today due to urine protein. Patient will still receive Tecentriq. SHAKIRA ALEXANDRE will call patient to tell him to  24h urine supplies for 24h urine clearance test.

## 2024-09-30 ENCOUNTER — HOSPITAL ENCOUNTER (OUTPATIENT)
Dept: CT IMAGING | Facility: HOSPITAL | Age: 59
Discharge: HOME | End: 2024-09-30
Attending: INTERNAL MEDICINE
Payer: COMMERCIAL

## 2024-09-30 ENCOUNTER — APPOINTMENT (OUTPATIENT)
Dept: LAB | Facility: CLINIC | Age: 59
End: 2024-09-30
Payer: COMMERCIAL

## 2024-09-30 DIAGNOSIS — C22.0 HEPATOCELLULAR CARCINOMA (HCC): ICD-10-CM

## 2024-09-30 LAB
AFP-TM SERPL-MCNC: 392.19 NG/ML (ref 0–9)
ALBUMIN SERPL BCG-MCNC: 4.4 G/DL (ref 3.5–5)
ALBUMIN SERPL BCG-MCNC: 4.4 G/DL (ref 3.5–5)
ALP SERPL-CCNC: 81 U/L (ref 34–104)
ALP SERPL-CCNC: 81 U/L (ref 34–104)
ALT SERPL W P-5'-P-CCNC: 23 U/L (ref 7–52)
ALT SERPL W P-5'-P-CCNC: 24 U/L (ref 7–52)
ANION GAP SERPL CALCULATED.3IONS-SCNC: 7 MMOL/L (ref 4–13)
ANION GAP SERPL CALCULATED.3IONS-SCNC: 7 MMOL/L (ref 4–13)
AST SERPL W P-5'-P-CCNC: 24 U/L (ref 13–39)
AST SERPL W P-5'-P-CCNC: 24 U/L (ref 13–39)
BACTERIA UR QL AUTO: ABNORMAL /HPF
BASOPHILS # BLD AUTO: 0.03 THOUSANDS/ÂΜL (ref 0–0.1)
BASOPHILS # BLD AUTO: 0.04 THOUSANDS/ÂΜL (ref 0–0.1)
BASOPHILS NFR BLD AUTO: 1 % (ref 0–1)
BASOPHILS NFR BLD AUTO: 1 % (ref 0–1)
BILIRUB SERPL-MCNC: 0.87 MG/DL (ref 0.2–1)
BILIRUB SERPL-MCNC: 0.87 MG/DL (ref 0.2–1)
BILIRUB UR QL STRIP: NEGATIVE
BUN SERPL-MCNC: 17 MG/DL (ref 5–25)
BUN SERPL-MCNC: 17 MG/DL (ref 5–25)
CALCIUM SERPL-MCNC: 9.2 MG/DL (ref 8.4–10.2)
CALCIUM SERPL-MCNC: 9.3 MG/DL (ref 8.4–10.2)
CHLORIDE SERPL-SCNC: 101 MMOL/L (ref 96–108)
CHLORIDE SERPL-SCNC: 101 MMOL/L (ref 96–108)
CLARITY UR: CLEAR
CO2 SERPL-SCNC: 27 MMOL/L (ref 21–32)
CO2 SERPL-SCNC: 28 MMOL/L (ref 21–32)
COLOR UR: YELLOW
CREAT SERPL-MCNC: 1.03 MG/DL (ref 0.6–1.3)
CREAT SERPL-MCNC: 1.03 MG/DL (ref 0.6–1.3)
EOSINOPHIL # BLD AUTO: 0.11 THOUSAND/ÂΜL (ref 0–0.61)
EOSINOPHIL # BLD AUTO: 0.11 THOUSAND/ÂΜL (ref 0–0.61)
EOSINOPHIL NFR BLD AUTO: 2 % (ref 0–6)
EOSINOPHIL NFR BLD AUTO: 2 % (ref 0–6)
ERYTHROCYTE [DISTWIDTH] IN BLOOD BY AUTOMATED COUNT: 13.4 % (ref 11.6–15.1)
ERYTHROCYTE [DISTWIDTH] IN BLOOD BY AUTOMATED COUNT: 13.5 % (ref 11.6–15.1)
GFR SERPL CREATININE-BSD FRML MDRD: 79 ML/MIN/1.73SQ M
GFR SERPL CREATININE-BSD FRML MDRD: 79 ML/MIN/1.73SQ M
GLUCOSE SERPL-MCNC: 83 MG/DL (ref 65–140)
GLUCOSE SERPL-MCNC: 84 MG/DL (ref 65–140)
GLUCOSE UR STRIP-MCNC: NEGATIVE MG/DL
HCT VFR BLD AUTO: 41.9 % (ref 36.5–49.3)
HCT VFR BLD AUTO: 42.4 % (ref 36.5–49.3)
HGB BLD-MCNC: 14 G/DL (ref 12–17)
HGB BLD-MCNC: 14.1 G/DL (ref 12–17)
HGB UR QL STRIP.AUTO: ABNORMAL
HYALINE CASTS #/AREA URNS LPF: ABNORMAL /LPF
IMM GRANULOCYTES # BLD AUTO: 0.01 THOUSAND/UL (ref 0–0.2)
IMM GRANULOCYTES # BLD AUTO: 0.02 THOUSAND/UL (ref 0–0.2)
IMM GRANULOCYTES NFR BLD AUTO: 0 % (ref 0–2)
IMM GRANULOCYTES NFR BLD AUTO: 0 % (ref 0–2)
KETONES UR STRIP-MCNC: NEGATIVE MG/DL
LEUKOCYTE ESTERASE UR QL STRIP: NEGATIVE
LYMPHOCYTES # BLD AUTO: 0.98 THOUSANDS/ÂΜL (ref 0.6–4.47)
LYMPHOCYTES # BLD AUTO: 1 THOUSANDS/ÂΜL (ref 0.6–4.47)
LYMPHOCYTES NFR BLD AUTO: 15 % (ref 14–44)
LYMPHOCYTES NFR BLD AUTO: 16 % (ref 14–44)
MCH RBC QN AUTO: 30.6 PG (ref 26.8–34.3)
MCH RBC QN AUTO: 30.8 PG (ref 26.8–34.3)
MCHC RBC AUTO-ENTMCNC: 33.3 G/DL (ref 31.4–37.4)
MCHC RBC AUTO-ENTMCNC: 33.4 G/DL (ref 31.4–37.4)
MCV RBC AUTO: 92 FL (ref 82–98)
MCV RBC AUTO: 92 FL (ref 82–98)
MONOCYTES # BLD AUTO: 0.74 THOUSAND/ÂΜL (ref 0.17–1.22)
MONOCYTES # BLD AUTO: 0.75 THOUSAND/ÂΜL (ref 0.17–1.22)
MONOCYTES NFR BLD AUTO: 11 % (ref 4–12)
MONOCYTES NFR BLD AUTO: 12 % (ref 4–12)
MUCOUS THREADS UR QL AUTO: ABNORMAL
NEUTROPHILS # BLD AUTO: 4.36 THOUSANDS/ÂΜL (ref 1.85–7.62)
NEUTROPHILS # BLD AUTO: 4.67 THOUSANDS/ÂΜL (ref 1.85–7.62)
NEUTS SEG NFR BLD AUTO: 69 % (ref 43–75)
NEUTS SEG NFR BLD AUTO: 71 % (ref 43–75)
NITRITE UR QL STRIP: NEGATIVE
NON-SQ EPI CELLS URNS QL MICRO: ABNORMAL /HPF
NRBC BLD AUTO-RTO: 0 /100 WBCS
NRBC BLD AUTO-RTO: 0 /100 WBCS
PH UR STRIP.AUTO: 6 [PH]
PLATELET # BLD AUTO: 183 THOUSANDS/UL (ref 149–390)
PLATELET # BLD AUTO: 186 THOUSANDS/UL (ref 149–390)
PMV BLD AUTO: 9.6 FL (ref 8.9–12.7)
PMV BLD AUTO: 9.6 FL (ref 8.9–12.7)
POTASSIUM SERPL-SCNC: 4 MMOL/L (ref 3.5–5.3)
POTASSIUM SERPL-SCNC: 4.2 MMOL/L (ref 3.5–5.3)
PROT 24H UR-MCNC: 777 MG/24 HRS (ref 40–150)
PROT SERPL-MCNC: 7.2 G/DL (ref 6.4–8.4)
PROT SERPL-MCNC: 7.3 G/DL (ref 6.4–8.4)
PROT UR STRIP-MCNC: ABNORMAL MG/DL
RBC # BLD AUTO: 4.55 MILLION/UL (ref 3.88–5.62)
RBC # BLD AUTO: 4.61 MILLION/UL (ref 3.88–5.62)
RBC #/AREA URNS AUTO: ABNORMAL /HPF
SODIUM SERPL-SCNC: 135 MMOL/L (ref 135–147)
SODIUM SERPL-SCNC: 136 MMOL/L (ref 135–147)
SP GR UR STRIP.AUTO: 1.03 (ref 1–1.03)
SPECIMEN VOL UR: 1500 ML
T3FREE SERPL-MCNC: 2.92 PG/ML (ref 2.5–3.9)
T3FREE SERPL-MCNC: 3.76 PG/ML (ref 2.5–3.9)
T4 FREE SERPL-MCNC: 0.83 NG/DL (ref 0.61–1.12)
T4 FREE SERPL-MCNC: 0.88 NG/DL (ref 0.61–1.12)
TSH SERPL DL<=0.05 MIU/L-ACNC: 6.93 UIU/ML (ref 0.45–4.5)
TSH SERPL DL<=0.05 MIU/L-ACNC: 6.96 UIU/ML (ref 0.45–4.5)
UROBILINOGEN UR STRIP-ACNC: 2 MG/DL
WBC # BLD AUTO: 6.24 THOUSAND/UL (ref 4.31–10.16)
WBC # BLD AUTO: 6.58 THOUSAND/UL (ref 4.31–10.16)
WBC #/AREA URNS AUTO: ABNORMAL /HPF

## 2024-09-30 PROCEDURE — 85025 COMPLETE CBC W/AUTO DIFF WBC: CPT

## 2024-09-30 PROCEDURE — 71260 CT THORAX DX C+: CPT

## 2024-09-30 PROCEDURE — 84439 ASSAY OF FREE THYROXINE: CPT

## 2024-09-30 PROCEDURE — 36415 COLL VENOUS BLD VENIPUNCTURE: CPT

## 2024-09-30 PROCEDURE — 82105 ALPHA-FETOPROTEIN SERUM: CPT

## 2024-09-30 PROCEDURE — 84443 ASSAY THYROID STIM HORMONE: CPT

## 2024-09-30 PROCEDURE — 81001 URINALYSIS AUTO W/SCOPE: CPT

## 2024-09-30 PROCEDURE — 84481 FREE ASSAY (FT-3): CPT

## 2024-09-30 PROCEDURE — 80053 COMPREHEN METABOLIC PANEL: CPT

## 2024-09-30 PROCEDURE — 74177 CT ABD & PELVIS W/CONTRAST: CPT

## 2024-09-30 PROCEDURE — 84156 ASSAY OF PROTEIN URINE: CPT

## 2024-09-30 RX ADMIN — IOHEXOL 100 ML: 350 INJECTION, SOLUTION INTRAVENOUS at 15:59

## 2024-10-03 ENCOUNTER — TELEPHONE (OUTPATIENT)
Dept: HEMATOLOGY ONCOLOGY | Facility: MEDICAL CENTER | Age: 59
End: 2024-10-03

## 2024-10-03 ENCOUNTER — HOSPITAL ENCOUNTER (OUTPATIENT)
Dept: INFUSION CENTER | Facility: CLINIC | Age: 59
Discharge: HOME | End: 2024-10-03

## 2024-10-03 NOTE — TELEPHONE ENCOUNTER
"----- Message from Luh MIXON sent at 10/3/2024 10:03 AM EDT -----  Good morning - pt called [facility] this morning to ask if he is still supposed to come to his tx this afternoon at 330. Pt stated \"tumors are getting bigger.\" Pt would like a call back from the provider.    Thanks, Breanna    CT scan results discussed with Dr Puri  Treatment to be cancelled   F/U moved to Dr Quiles on 10/15/2024  Patient aware of above  "

## 2024-10-15 ENCOUNTER — OFFICE VISIT (OUTPATIENT)
Dept: HEMATOLOGY ONCOLOGY | Facility: CLINIC | Age: 59
End: 2024-10-15
Payer: COMMERCIAL

## 2024-10-15 VITALS
HEIGHT: 69 IN | TEMPERATURE: 98.5 F | BODY MASS INDEX: 27.18 KG/M2 | WEIGHT: 183.5 LBS | DIASTOLIC BLOOD PRESSURE: 74 MMHG | HEART RATE: 80 BPM | OXYGEN SATURATION: 98 % | SYSTOLIC BLOOD PRESSURE: 150 MMHG

## 2024-10-15 DIAGNOSIS — C22.0 HEPATOCELLULAR CARCINOMA (HCC): Primary | ICD-10-CM

## 2024-10-15 DIAGNOSIS — R80.9 PROTEINURIA, UNSPECIFIED TYPE: ICD-10-CM

## 2024-10-15 PROCEDURE — 99214 OFFICE O/P EST MOD 30 MIN: CPT | Performed by: STUDENT IN AN ORGANIZED HEALTH CARE EDUCATION/TRAINING PROGRAM

## 2024-10-15 NOTE — PROGRESS NOTES
[facility] [place]  [address] [facility]  [place] [address]  [phone]  [phone]     Date of Visit: 10/15/2024  Name: Bassem Roberts   YOB: 1965       Assessment/Plan  In summary, Bassem Roberts is a 59 y.o. male with HCC.   Patient underwent Y90 treatment in November 2022.  Alpha-fetoprotein level came down nicely.  Unfortunately the tumor marker recently began to rise.  The May 16, 2023 CAT scan of the chest demonstrated enlarging pulmonary nodules.  More recent MRI of the abdomen demonstrated worsening gastrohepatic and portacaval adenopathy (although the liver lesions were unchanged).  Alpha-fetoprotein was found to be significantly elevated.  Patient was previously presented at the GI tumor board.  The consensus was to begin systemic treatment.  Current regimen -      Regimen  Atezolizumab 1200 mg IV day 1  Bevacizumab 15 mg/kilogram IV day 1  Cycle length = 21 days  Goal = prolongation of life, improvement of quality of life     From an oncology standpoint patient feels well but unfortunately, he has evidence of progression as evidenced by his CT findings as well as progressively increasing AFP (see below)    AFP has continued to increasingly rise now up to 392 as of 9/30/2024    CT C/A/P from 9/30/24 showed   1. Stable treated lesions in hepatic segment 8, LR TR nonviable. No new hepatic observations.   2. Redemonstrated upper abdominal lymphadenopathy suspicious for metastasis. Several of the nodes have enlarged since 7/10/2024.   3. Multiple 4 mm and smaller solid pulmonary nodules remain stable since 6/21/2022.  Based on current Fleischner Society 2017 Guidelines on incidental pulmonary nodule, patients with a known malignancy are at increased risk of metastasis and should receive followup CT at intervals appropriate for the type of cancer and its risk of   pulmonary metastases.    We had an extensive discussion today in regards to his progression. The  patient was under the impression he has been off treatment since July 2024. Per review of notes, it seems that he has been off of Avastin since 8/1/24 as he was found to have extensive Proteinuria. He has been receiving Tecentriq. Any further treatment was held since 10/3/24 due to progression.     I explained that per NCCN and UpToDate guidelines, the next options available are either treatment with Cabozantinib 60mg daily or Regorafinib 160 mg once daily for the first 21 days of a 28-day cycle. The other option was to refer him to [facility] to see a HCC specialist for any clinical trials available. However, the patient declined the referral as he is not interested in learning and/or participating in clinical trials and would like to proceed with second line treatment instead. He prefers to start Cabozantinib.     Side effects were discussed extensively which included (but not limited to) the risk of HTN, Proteinuria, low blood counts, increased risk of infections, etc.     Treatment prescription sent to nurse navigator. He will need repeat labs, specifically, repeat UA to assess for protein in the urine. He will have his repeat labs done prior to starting his medication. I will call him once I see that his proteinuria has resolved so that he can start his new medication. He is in agreement. Plan to see him in about 6 weeks to see how he is doing on the new medication. Plan to re scan him about 6 -8 weeks after starting Cabozantinib.         Return in about 6 weeks (around 11/26/2024) for Office Visit, Labs - See Treatment Plan, Care Coordinator, Financial Counselor.     All the patient's questions were answered to their apparent satisfaction.  Patient has been strongly urged to call with any questions or concerns.       Oncology History   Hepatocellular carcinoma (HCC)   2022 Initial Diagnosis    Hepatocellular carcinoma (HCC)     2/8/2022 Biopsy    [facility] Ronald DOSHI US guided liver biopsy: right liver  lobe:  Poorly differentiated HCC with patchy necrosis       7/13/2022 -  Cancer Staged    Staging form: Liver (Excluding Intrahepatic Bile Ducts), AJCC 8th Edition  - Clinical stage from 7/13/2022: Stage IVB (rcT3, cN1, pM1) - Signed by Taye Lundberg MD on 1/30/2024  Stage prefix: Recurrence  Histologic grade (G): G2  Histologic grading system: 4 grade system       6/29/2023 - 9/12/2024 Chemotherapy    alteplase (CATHFLO), 2 mg, Intracatheter, Every 1 Minute as needed, 21 of 24 cycles  atezolizumab (TECENTRIQ) IVPB, 1,200 mg, Intravenous, Once, 21 of 24 cycles  Administration: 1,200 mg (6/29/2023), 1,200 mg (7/20/2023), 1,200 mg (8/10/2023), 1,200 mg (8/31/2023), 1,200 mg (9/21/2023), 1,200 mg (10/10/2023), 1,200 mg (11/2/2023), 1,200 mg (11/24/2023), 1,200 mg (12/14/2023), 1,200 mg (1/4/2024), 1,200 mg (1/26/2024), 1,200 mg (3/7/2024), 1,200 mg (3/28/2024), 1,200 mg (4/18/2024), 1,200 mg (5/9/2024), 1,200 mg (5/30/2024), 1,200 mg (6/20/2024), 1,200 mg (7/11/2024), 1,200 mg (8/1/2024), 1,200 mg (8/22/2024), 1,200 mg (9/12/2024)  bevacizumab-awwb (MVASI) IVPB, 1,345 mg (100 % of original dose 15 mg/kg), Intravenous, Once, 19 of 22 cycles  Dose modification: 15 mg/kg (original dose 15 mg/kg, Cycle 1), 15 mg/kg (original dose 15 mg/kg, Cycle 2), 15 mg/kg (original dose 15 mg/kg, Cycle 3)  Administration: 1,300 mg (6/29/2023), 1,300 mg (7/20/2023), 1,300 mg (8/10/2023), 1,300 mg (8/31/2023), 1,300 mg (9/21/2023), 1,300 mg (10/10/2023), 1,300 mg (11/2/2023), 1,300 mg (11/24/2023), 1,300 mg (12/14/2023), 1,300 mg (1/4/2024), 1,300 mg (1/26/2024), 1,300 mg (3/7/2024), 1,300 mg (3/28/2024), 1,300 mg (4/18/2024), 1,300 mg (5/9/2024), 1,300 mg (5/30/2024), 1,300 mg (6/20/2024), 1,200 mg (7/11/2024), 1,200 mg (8/1/2024)        Cancer Staging   Hepatocellular carcinoma (HCC)  Staging form: Liver (Excluding Intrahepatic Bile Ducts), AJCC 8th Edition  - Clinical stage from 7/13/2022: Stage IVB (rcT3, cN1, pM1) - Signed by Taye GROSSMAN  MD Toño on 1/30/2024     Treatment Details   Treatment goal Palliative   Plan Name OP Atezolizumab + Bevacizumab Q 21 Days   Status Inactive   Start Date 6/29/2023   End Date 9/12/2024   Provider Taye Lundberg MD   Chemotherapy alteplase (CATHFLO), 2 mg, Intracatheter, Every 1 Minute as needed, 21 of 24 cycles    atezolizumab (TECENTRIQ) IVPB, 1,200 mg, Intravenous, Once, 21 of 24 cycles  Administration: 1,200 mg (6/29/2023), 1,200 mg (7/20/2023), 1,200 mg (8/10/2023), 1,200 mg (8/31/2023), 1,200 mg (9/21/2023), 1,200 mg (10/10/2023), 1,200 mg (11/2/2023), 1,200 mg (11/24/2023), 1,200 mg (12/14/2023), 1,200 mg (1/4/2024), 1,200 mg (1/26/2024), 1,200 mg (3/7/2024), 1,200 mg (3/28/2024), 1,200 mg (4/18/2024), 1,200 mg (5/9/2024), 1,200 mg (5/30/2024), 1,200 mg (6/20/2024), 1,200 mg (7/11/2024), 1,200 mg (8/1/2024), 1,200 mg (8/22/2024), 1,200 mg (9/12/2024)    bevacizumab-awwb (MVASI) IVPB, 1,345 mg (100 % of original dose 15 mg/kg), Intravenous, Once, 19 of 22 cycles  Dose modification: 15 mg/kg (original dose 15 mg/kg, Cycle 1), 15 mg/kg (original dose 15 mg/kg, Cycle 2), 15 mg/kg (original dose 15 mg/kg, Cycle 3)  Administration: 1,300 mg (6/29/2023), 1,300 mg (7/20/2023), 1,300 mg (8/10/2023), 1,300 mg (8/31/2023), 1,300 mg (9/21/2023), 1,300 mg (10/10/2023), 1,300 mg (11/2/2023), 1,300 mg (11/24/2023), 1,300 mg (12/14/2023), 1,300 mg (1/4/2024), 1,300 mg (1/26/2024), 1,300 mg (3/7/2024), 1,300 mg (3/28/2024), 1,300 mg (4/18/2024), 1,300 mg (5/9/2024), 1,300 mg (5/30/2024), 1,300 mg (6/20/2024), 1,200 mg (7/11/2024), 1,200 mg (8/1/2024)          HISTORY OF PRESENT ILLNESS:   Bassem Roberts is a 59 y.o. male  who is here for f/u of HCC    Subjective  Patient here today with his wife Edwige  Denies any new complaints  We discussed the results of his CT as well as AFP.     REVIEW OF SYSTEMS:  No fevers or chills.  No unintentional weight loss.  Noticed increased bruising on the avastin  No bleeding  Denies any  headaches  Normal appetite.  No nausea or vomiting.  14 point review of systems otherwise  negative      Current Outpatient Medications:     amLODIPine (NORVASC) 2.5 mg tablet, TAKE 1 TABLET BY MOUTH EVERY DAY, Disp: 90 tablet, Rfl: 1    ibuprofen (MOTRIN) 600 mg tablet, Take 1 tablet (600 mg total) by mouth every 8 (eight) hours as needed for moderate pain or mild pain, Disp: 15 tablet, Rfl: 0    lidocaine (LIDODERM) 5 %, Apply 1 patch topically over 12 hours every 24 hours Remove & Discard patch within 12 hours or as directed by MD, Disp: 30 patch, Rfl: 3    lisinopril-hydrochlorothiazide (PRINZIDE,ZESTORETIC) 20-25 MG per tablet, TAKE 1 TABLET BY MOUTH EVERY DAY, Disp: 90 tablet, Rfl: 1    naproxen (NAPROSYN) 500 mg tablet, Take 1 tablet (500 mg total) by mouth 2 (two) times a day with meals, Disp: 30 tablet, Rfl: 2     No Known Allergies     ACTIVE PROBLEMS:  Patient Active Problem List   Diagnosis    Hepatocellular carcinoma (HCC)    Essential hypertension    Chronic hepatitis C without hepatic coma (HCC)    Cirrhosis of liver (HCC)    ED (erectile dysfunction)    Colon polyp    Subclinical hypothyroidism    Hypercholesterolemia    Left knee pain, unspecified chronicity    Sprain of medial collateral ligament of left knee, initial encounter    Pain and swelling of left knee    Tear of medial meniscus of left knee, current    Proteinuria          Past Medical History:   Diagnosis Date    Hypertension     Liver cancer (HCC)         Past Surgical History:   Procedure Laterality Date    HERNIA REPAIR      IR Y-90 PRE-ANGIO/EMBO W/ LUNG SCAN  11/08/2022    IR Y-90 RADIOEMBOLIZATION  11/22/2022    MASTOIDECTOMY          Social History     Socioeconomic History    Marital status: /Civil Union     Spouse name: None    Number of children: None    Years of education: None    Highest education level: None   Occupational History    None   Tobacco Use    Smoking status: Former     Current packs/day: 0.00     Average  "packs/day: 1 pack/day for 30.0 years (30.0 ttl pk-yrs)     Types: Cigarettes     Start date:      Quit date: 2017     Years since quittin.7    Smokeless tobacco: Current    Tobacco comments:     nicotine pouch (on)   Vaping Use    Vaping status: Never Used   Substance and Sexual Activity    Alcohol use: Yes    Drug use: Yes     Types: Marijuana     Comment: medical marijuana    Sexual activity: Yes     Partners: Female   Other Topics Concern    None   Social History Narrative    None     Social Determinants of Health     Financial Resource Strain: Not on file   Food Insecurity: Not on file   Transportation Needs: Not on file   Physical Activity: Not on file   Stress: Not on file   Social Connections: Not on file   Intimate Partner Violence: Not on file   Housing Stability: Not on file        Family History   Problem Relation Age of Onset    Cancer Mother         Objective        Physical Exam  ECOG performance status: 0  Blood pressure 150/74, pulse 80, temperature 98.5 °F (36.9 °C), height 5' 9\" (1.753 m), weight 83.2 kg (183 lb 8 oz), SpO2 98%.     General appearance: Not in acute distress, well appearing and well nourished.    Alert and oriented.    Normal breath sounds bilaterally.  Clear to auscultation without any added sounds  Heart sounds are normal.  No murmurs noted.    Abdomen is soft and nontender.  No rebound.  No evidence of ascites.   Extremities without any edema.    No rash  No focal deficits.        I reviewed lab data in the chart as noted above.  Additional labs as noted below    WBC   Date Value Ref Range Status   2024 6.58 4.31 - 10.16 Thousand/uL Final   2024 6.24 4.31 - 10.16 Thousand/uL Final   2024 6.18 4.31 - 10.16 Thousand/uL Final     Hemoglobin   Date Value Ref Range Status   2024 14.0 12.0 - 17.0 g/dL Final   2024 14.1 12.0 - 17.0 g/dL Final   2024 14.9 12.0 - 17.0 g/dL Final     Platelets   Date Value Ref Range Status   2024 186 149 - " 390 Thousands/uL Final   09/30/2024 183 149 - 390 Thousands/uL Final   09/09/2024 155 149 - 390 Thousands/uL Final     MCV   Date Value Ref Range Status   09/30/2024 92 82 - 98 fL Final   09/30/2024 92 82 - 98 fL Final   09/09/2024 89 82 - 98 fL Final      Potassium   Date Value Ref Range Status   09/30/2024 4.0 3.5 - 5.3 mmol/L Final   09/30/2024 4.2 3.5 - 5.3 mmol/L Final   09/09/2024 4.2 3.5 - 5.3 mmol/L Final     Chloride   Date Value Ref Range Status   09/30/2024 101 96 - 108 mmol/L Final   09/30/2024 101 96 - 108 mmol/L Final   09/09/2024 102 96 - 108 mmol/L Final     CO2   Date Value Ref Range Status   09/30/2024 27 21 - 32 mmol/L Final   09/30/2024 28 21 - 32 mmol/L Final   09/09/2024 26 21 - 32 mmol/L Final     BUN   Date Value Ref Range Status   09/30/2024 17 5 - 25 mg/dL Final   09/30/2024 17 5 - 25 mg/dL Final   09/09/2024 16 5 - 25 mg/dL Final     Creatinine   Date Value Ref Range Status   09/30/2024 1.03 0.60 - 1.30 mg/dL Final     Comment:     Standardized to IDMS reference method   09/30/2024 1.03 0.60 - 1.30 mg/dL Final     Comment:     Standardized to IDMS reference method   09/09/2024 1.06 0.60 - 1.30 mg/dL Final     Comment:     Standardized to IDMS reference method     Glucose   Date Value Ref Range Status   09/30/2024 84 65 - 140 mg/dL Final     Comment:     If the patient is fasting, the ADA then defines impaired fasting glucose as > 100 mg/dL and diabetes as > or equal to 123 mg/dL.   09/30/2024 83 65 - 140 mg/dL Final     Comment:     If the patient is fasting, the ADA then defines impaired fasting glucose as > 100 mg/dL and diabetes as > or equal to 123 mg/dL.   09/09/2024 92 65 - 140 mg/dL Final     Comment:     If the patient is fasting, the ADA then defines impaired fasting glucose as > 100 mg/dL and diabetes as > or equal to 123 mg/dL.     Calcium   Date Value Ref Range Status   09/30/2024 9.3 8.4 - 10.2 mg/dL Final   09/30/2024 9.2 8.4 - 10.2 mg/dL Final   09/09/2024 8.8 8.4 - 10.2  mg/dL Final     Albumin   Date Value Ref Range Status   09/30/2024 4.4 3.5 - 5.0 g/dL Final   09/30/2024 4.4 3.5 - 5.0 g/dL Final   09/09/2024 4.3 3.5 - 5.0 g/dL Final     Total Bilirubin   Date Value Ref Range Status   09/30/2024 0.87 0.20 - 1.00 mg/dL Final     Comment:     Use of this assay is not recommended for patients undergoing treatment with eltrombopag due to the potential for falsely elevated results.  N-acetyl-p-benzoquinone imine (metabolite of Acetaminophen) will generate erroneously low results in samples for patients that have taken an overdose of Acetaminophen.   09/30/2024 0.87 0.20 - 1.00 mg/dL Final     Comment:     Use of this assay is not recommended for patients undergoing treatment with eltrombopag due to the potential for falsely elevated results.  N-acetyl-p-benzoquinone imine (metabolite of Acetaminophen) will generate erroneously low results in samples for patients that have taken an overdose of Acetaminophen.   09/09/2024 1.32 (H) 0.20 - 1.00 mg/dL Final     Comment:     Use of this assay is not recommended for patients undergoing treatment with eltrombopag due to the potential for falsely elevated results.  N-acetyl-p-benzoquinone imine (metabolite of Acetaminophen) will generate erroneously low results in samples for patients that have taken an overdose of Acetaminophen.     Alkaline Phosphatase   Date Value Ref Range Status   09/30/2024 81 34 - 104 U/L Final   09/30/2024 81 34 - 104 U/L Final   09/09/2024 93 34 - 104 U/L Final     AST   Date Value Ref Range Status   09/30/2024 24 13 - 39 U/L Final   09/30/2024 24 13 - 39 U/L Final   09/09/2024 29 13 - 39 U/L Final     ALT   Date Value Ref Range Status   09/30/2024 23 7 - 52 U/L Final     Comment:     Specimen collection should occur prior to Sulfasalazine administration due to the potential for falsely depressed results.    09/30/2024 24 7 - 52 U/L Final     Comment:     Specimen collection should occur prior to Sulfasalazine  "administration due to the potential for falsely depressed results.    09/09/2024 21 7 - 52 U/L Final     Comment:     Specimen collection should occur prior to Sulfasalazine administration due to the potential for falsely depressed results.       No results found for: \"LDH\"  No results found for: \"TSH\"  No results found for: \"D4MNHIN\"   Free T4   Date Value Ref Range Status   09/30/2024 0.88 0.61 - 1.12 ng/dL Final     Comment:     Specimens with biotin concentrations > 10 ng/mL can lead to significant (> 10%) positive bias in result.   09/30/2024 0.83 0.61 - 1.12 ng/dL Final     Comment:     Specimens with biotin concentrations > 10 ng/mL can lead to significant (> 10%) positive bias in result.   08/20/2024 0.76 0.61 - 1.12 ng/dL Final     Comment:     Specimens with biotin concentrations > 10 ng/mL can lead to significant (> 10%) positive bias in result.         RECENT IMAGING:  Procedure: CT chest abdomen pelvis w contrast    Result Date: 10/2/2024  Narrative: CT CHEST, ABDOMEN AND PELVIS WITH IV CONTRAST INDICATION: C22.0: Liver cell carcinoma. COMPARISON: CT chest 7 pelvis 7/10/2024 TECHNIQUE: CT examination of the chest, abdomen and pelvis was performed. Multiplanar 2D reformatted images were created from the source data. This examination, like all CT scans performed in the [facility], was performed utilizing techniques to minimize radiation dose exposure, including the use of iterative reconstruction and automated exposure control. Radiation dose length product (DLP) for this visit: 1204 mGy-cm IV Contrast: 100 mL of iohexol (OMNIPAQUE) Enteric Contrast: Not administered. FINDINGS: CHEST LUNGS: Pulmonary nodules remain stable since 6/21/2022 as follows: - Right lower lobe 4 mm solid nodule (series 9 image 140). - Right lower lobe juxtapleural solid nodule measuring 4 mm (series 9 image 153). - Right middle lobe 2 mm solid nodule (series 9 image 149). - Right upper lobe 2 mm solid nodule " (series 9 image 89). - Left lower lobe 3 mm solid nodule (series 9 image 164). Mild upper lobe predominant paraseptal emphysema. Small amount of mucus within the trachea. PLEURA: Unremarkable. HEART/GREAT VESSELS: Normal heart size. Marked coronary artery calcifications. No thoracic aortic aneurysm. MEDIASTINUM AND LUCIUS: Unremarkable. CHEST WALL AND LOWER NECK: Unremarkable. ABDOMEN LIVER/BILIARY TREE: Cirrhosis. Treated lesion: 1 Location: Segment 8. Pretreatment category: LR-M Type of most recent treatment: Transarterial radioembolization (TARE) with SIR-Spheres. (Radiation treatment.) Date of most recent treatment: 11/22/2022 Size of treatment zone: 2.7 x 2.1 cm. Series 3 image 101. Unchanged. Masslike enhancement: None. LR-TR category: LR-TR Nonviable. Management recommendation: Continue monitoring in approximately 3 months. Treated lesion: 2 Location: Segment 8. Pretreatment category: LR-5. Type of most recent treatment: Transarterial radioembolization (TARE) with SIR-Spheres. (Radiation treatment.) Date of most recent treatment: 11/22/2022 Size of treatment zone: 2.3 x 2.0 cm. Series 3 image 102. Unchanged. Masslike enhancement: None. LR-TR category: LR-TR Nonviable. Management recommendation: Continue monitoring in approximately 3 months. No new hepatic observations GALLBLADDER: No calcified gallstones. No pericholecystic inflammatory change. SPLEEN: Unremarkable. PANCREAS: Unremarkable. ADRENAL GLANDS: Unremarkable. KIDNEYS/URETERS: No hydronephrosis or urinary tract calculi. Subcentimeter hypoattenuating renal lesion(s), too small to characterize but statistically likely benign, which do not warrant follow-up (Radiology June 2019). STOMACH AND BOWEL: Unremarkable. APPENDIX: No findings to suggest appendicitis. ABDOMINOPELVIC CAVITY: Redemonstrated upper abdominal lymphadenopathy suspicious for metastasis. Several of the nodes have enlarged since 7/10/2024, for example: - Gastrocolic ligament lymph node  "measuring 2.6 x 2.4 cm (series 2 image 33), previously 2.4 x 2.2 cm. - Portacaval lymph node measuring 2.2 x 2.2 cm (series 2 image 51), previously 1.6 x 1.1 cm. - Heterogeneous centrally hypodense necrotic appearing paracaval node measuring 2.1 x 1.8 cm (series 2 image 59), previously 1.7 x 1.2 cm. VESSELS: Atherosclerosis without abdominal aortic aneurysm. PELVIS REPRODUCTIVE ORGANS: Unremarkable for patient's age. URINARY BLADDER: Unremarkable. ABDOMINAL WALL/INGUINAL REGIONS: Small fat-containing left inguinal hernia. Small fat-containing umbilical hernia. BONES: No acute fracture or suspicious osseous lesion. Spinal degenerative changes.     Impression: 1. Stable treated lesions in hepatic segment 8, LR TR nonviable. No new hepatic observations. 2. Redemonstrated upper abdominal lymphadenopathy suspicious for metastasis. Several of the nodes have enlarged since 7/10/2024. 3. Multiple 4 mm and smaller solid pulmonary nodules remain stable since 6/21/2022. Based on current Fleischner Society 2017 Guidelines on incidental pulmonary nodule, patients with a known malignancy are at increased risk of metastasis and should receive followup CT at intervals appropriate for the type of cancer and its risk of pulmonary metastases. The study was marked in EPIC for significant notification. Workstation performed: [id number]       Total minutes spent reviewing EMR, seeing patient in clinic visit, documenting visit, placing treatment orders, and communicating with other medical providers: 30    Portions of the record may have been created with voice recognition software.  Occasional wrong word or \"sound a like\" substitutions may have occurred due to the inherent limitations of voice recognition software.  Read the chart carefully and recognize, using context, where substitutions have occurred.    "

## 2024-10-16 DIAGNOSIS — C22.0 HEPATOCELLULAR CARCINOMA (HCC): Primary | ICD-10-CM

## 2024-10-16 NOTE — TELEPHONE ENCOUNTER
"Kyle [place], I am trying to sign off on the prescription but it keeps giving me this pop up: \"You are not authorized to send electronic prior authorization requests.\" Just wanted to see if that was going to be an issue before proceeding.   Thanks,   Dr. Quiles "

## 2024-10-16 NOTE — TELEPHONE ENCOUNTER
Kyle Figueredo, It's still giving me the same prompt. Not sure why it's doing that. I would go ahead and proceed with the verbal order.     Thanks!  Dr. Quiles

## 2024-10-17 ENCOUNTER — TELEPHONE (OUTPATIENT)
Age: 59
End: 2024-10-17

## 2024-10-17 NOTE — TELEPHONE ENCOUNTER
"Spoke with patient who received a call that his prescription for Cabozantinib is being shipped to his pharmacy and he can pick it up on 10/23. He wanted to know updates on the additional medication that is 3 weeks on, 1 week off. I clarified for him that per Dr. Quiles's office visit note on 10/15, \" the next options available are either treatment with Cabozantinib 60mg daily or Regorafinib 160 mg once daily for the first 21 days of a 28-day cycle\" so it was one or the other but not both and he chose to go with Cabozantinib.   I also informed him to have his labs completed any time from now to 10/23, so they are completed prior to picking up medication. He verbalized understanding and in agreement with plan   "

## 2024-10-22 NOTE — TELEPHONE ENCOUNTER
Okay thanks. I had asked the patient that before he starts, he needs to get some blood work and urine testing done to make sure his proteinuria resolved prior to starting.

## 2024-10-22 NOTE — TELEPHONE ENCOUNTER
Great thank you! Eventually, when I get the results, we can call him back and let him know to start. I'll let you know. Thank you!

## 2024-10-23 NOTE — TELEPHONE ENCOUNTER
Patient received letter in the mail stating that insurance will not cover his Cabozantinib. He states letter was dated 10/16. He states he will call the insurance company as well to see if authorization has been obtained but would appreciate if our office could let him know if medication is approved. He plans to have blood work drawn tomorrow but does not have his medication yet.

## 2024-10-23 NOTE — TELEPHONE ENCOUNTER
Spoke with pt and confirmed this is covered with $20 copay as his responsibility. Per [facility] it is scheduled for delivery today. Pt states he will go for labs tomorrow morning.     He is also requesting a letter stating what he is being treated for and the treatment plan, so that his parole fees can be reduced. He would like addressed to [facility] [place] and have it emailed to his wife [email or web address]

## 2024-10-24 ENCOUNTER — TELEPHONE (OUTPATIENT)
Dept: HEMATOLOGY ONCOLOGY | Facility: CLINIC | Age: 59
End: 2024-10-24

## 2024-10-24 ENCOUNTER — APPOINTMENT (OUTPATIENT)
Dept: LAB | Facility: CLINIC | Age: 59
End: 2024-10-24
Payer: COMMERCIAL

## 2024-10-24 DIAGNOSIS — R80.9 PROTEINURIA, UNSPECIFIED TYPE: ICD-10-CM

## 2024-10-24 DIAGNOSIS — C22.0 HEPATOCELLULAR CARCINOMA (HCC): ICD-10-CM

## 2024-10-24 LAB
AFP-TM SERPL-MCNC: 785.71 NG/ML (ref 0–9)
ALBUMIN SERPL BCG-MCNC: 4 G/DL (ref 3.5–5)
ALP SERPL-CCNC: 91 U/L (ref 34–104)
ALT SERPL W P-5'-P-CCNC: 26 U/L (ref 7–52)
ANION GAP SERPL CALCULATED.3IONS-SCNC: 4 MMOL/L (ref 4–13)
AST SERPL W P-5'-P-CCNC: 24 U/L (ref 13–39)
BACTERIA UR QL AUTO: ABNORMAL /HPF
BASOPHILS # BLD AUTO: 0.02 THOUSANDS/ΜL (ref 0–0.1)
BASOPHILS NFR BLD AUTO: 0 % (ref 0–1)
BILIRUB SERPL-MCNC: 0.68 MG/DL (ref 0.2–1)
BILIRUB UR QL STRIP: NEGATIVE
BUN SERPL-MCNC: 16 MG/DL (ref 5–25)
CALCIUM SERPL-MCNC: 9.1 MG/DL (ref 8.4–10.2)
CHLORIDE SERPL-SCNC: 103 MMOL/L (ref 96–108)
CLARITY UR: CLEAR
CO2 SERPL-SCNC: 29 MMOL/L (ref 21–32)
COLOR UR: YELLOW
CREAT SERPL-MCNC: 0.93 MG/DL (ref 0.6–1.3)
EOSINOPHIL # BLD AUTO: 0.2 THOUSAND/ΜL (ref 0–0.61)
EOSINOPHIL NFR BLD AUTO: 3 % (ref 0–6)
ERYTHROCYTE [DISTWIDTH] IN BLOOD BY AUTOMATED COUNT: 13.8 % (ref 11.6–15.1)
GFR SERPL CREATININE-BSD FRML MDRD: 89 ML/MIN/1.73SQ M
GLUCOSE P FAST SERPL-MCNC: 108 MG/DL (ref 65–99)
GLUCOSE UR STRIP-MCNC: NEGATIVE MG/DL
HCT VFR BLD AUTO: 43.2 % (ref 36.5–49.3)
HGB BLD-MCNC: 14.3 G/DL (ref 12–17)
HGB UR QL STRIP.AUTO: ABNORMAL
HYALINE CASTS #/AREA URNS LPF: ABNORMAL /LPF
IMM GRANULOCYTES # BLD AUTO: 0.02 THOUSAND/UL (ref 0–0.2)
IMM GRANULOCYTES NFR BLD AUTO: 0 % (ref 0–2)
KETONES UR STRIP-MCNC: NEGATIVE MG/DL
LEUKOCYTE ESTERASE UR QL STRIP: NEGATIVE
LYMPHOCYTES # BLD AUTO: 1.02 THOUSANDS/ΜL (ref 0.6–4.47)
LYMPHOCYTES NFR BLD AUTO: 14 % (ref 14–44)
MCH RBC QN AUTO: 30.6 PG (ref 26.8–34.3)
MCHC RBC AUTO-ENTMCNC: 33.1 G/DL (ref 31.4–37.4)
MCV RBC AUTO: 92 FL (ref 82–98)
MONOCYTES # BLD AUTO: 0.7 THOUSAND/ΜL (ref 0.17–1.22)
MONOCYTES NFR BLD AUTO: 10 % (ref 4–12)
MUCOUS THREADS UR QL AUTO: ABNORMAL
NEUTROPHILS # BLD AUTO: 5.29 THOUSANDS/ΜL (ref 1.85–7.62)
NEUTS SEG NFR BLD AUTO: 73 % (ref 43–75)
NITRITE UR QL STRIP: NEGATIVE
NON-SQ EPI CELLS URNS QL MICRO: ABNORMAL /HPF
NRBC BLD AUTO-RTO: 0 /100 WBCS
PH UR STRIP.AUTO: 6.5 [PH]
PLATELET # BLD AUTO: 143 THOUSANDS/UL (ref 149–390)
PMV BLD AUTO: 9.4 FL (ref 8.9–12.7)
POTASSIUM SERPL-SCNC: 5 MMOL/L (ref 3.5–5.3)
PROT SERPL-MCNC: 6.8 G/DL (ref 6.4–8.4)
PROT UR STRIP-MCNC: ABNORMAL MG/DL
RBC # BLD AUTO: 4.68 MILLION/UL (ref 3.88–5.62)
RBC #/AREA URNS AUTO: ABNORMAL /HPF
SODIUM SERPL-SCNC: 136 MMOL/L (ref 135–147)
SP GR UR STRIP.AUTO: 1.02 (ref 1–1.03)
UROBILINOGEN UR STRIP-ACNC: <2 MG/DL
WBC # BLD AUTO: 7.25 THOUSAND/UL (ref 4.31–10.16)
WBC #/AREA URNS AUTO: ABNORMAL /HPF

## 2024-10-24 PROCEDURE — 36415 COLL VENOUS BLD VENIPUNCTURE: CPT

## 2024-10-24 PROCEDURE — 85025 COMPLETE CBC W/AUTO DIFF WBC: CPT

## 2024-10-24 PROCEDURE — 81001 URINALYSIS AUTO W/SCOPE: CPT

## 2024-10-24 PROCEDURE — 80053 COMPREHEN METABOLIC PANEL: CPT

## 2024-10-24 PROCEDURE — 82105 ALPHA-FETOPROTEIN SERUM: CPT

## 2024-10-24 NOTE — TELEPHONE ENCOUNTER
Call received from patient. Questioning when he is supposed to start his medications now that his labs are in and resulted. Requesting call back.

## 2024-10-24 NOTE — TELEPHONE ENCOUNTER
Patient calling back, he is asking for a call back so he knows how to take his medication. He stated he has been waiting since 2:00 pm and just does not want to miss a call. He would like to be informed today if at all possible. Best # [phone]

## 2024-10-24 NOTE — TELEPHONE ENCOUNTER
As per Dr. Puri, patient can start medication.  Called pt to make him aware.  He was appreciative.

## 2024-11-18 ENCOUNTER — NURSE TRIAGE (OUTPATIENT)
Age: 59
End: 2024-11-18

## 2024-11-18 DIAGNOSIS — C22.0 HEPATOCELLULAR CARCINOMA (HCC): Primary | ICD-10-CM

## 2024-11-18 RX ORDER — METHYLPREDNISOLONE 4 MG/1
TABLET ORAL
Qty: 1 EACH | Refills: 0 | Status: SHIPPED | OUTPATIENT
Start: 2024-11-18

## 2024-11-18 NOTE — TELEPHONE ENCOUNTER
"DESCRIPTION (describe complaint in short phrase) pt reports having painful , red pimple like rash (with noted white spot) , size of the pimples varies, rash located on joints of b/l hands and cease of hands as well as the heels of b/l feet and bunions . Pt reports 10/10 pain, affecting him to touch anything because it feels like pins and needles or to put shoes on. Pt also reports still having about 3 episodes of diarrhea yesterday, believes imodium is helping. No diarrhea yet today but stomach feels queezy and has some nausea.     SEVERITY (mild, moderate, severe) rash - severe    ONSET (of THIS SPECIFIC complaint) last Thursday / Friday     CAUSE (what does the patient think is causing this) unsure believes its due to medication , cabozantinib. Pt reports starting this medication around 10/23    MEDICATIONS (any changes in meds or doses?, Take any meds for relief?) imodium for diarrhea. Has not try anything for the rash     OTHER SYMPTOMS (yes or no- if no, just write that. If yes, write the symptoms c/o) denies fevers.     Pt has a follow up appt on 11/22. Protocol suggest for pt to get seen in the office today, no available appts. Please advise.        Reason for Disposition   [1] SEVERE pain or large blister AND [2] caller wants doctor to drain blister    Additional Information   Blisters on other parts of the body (besides hands and feet)    Answer Assessment - Initial Assessment Questions  1. APPEARANCE of BLISTER: \"What does it look like?\"      Feet and hand really dry thick skin, painful, pimple like bumps on b/l hands and b/l hand, and groin area (unable to really see rash description on that area)   2. SIZE: \"How large is the blister?\" (e.g., inches, cm or compare to coins)      Varies   3. LOCATION: \"Where are the blisters located?\"       B/l hands on the joints  , b/l feet on the sides of bunion and heels. And groin area   4. WHEN: \"When did the blister happen?\"      Last Thursday   5. CAUSE: \"What do " "you think caused the blister?\"      Unsure but believes it due to medication cabozantinib   6. PAIN: \"Does it hurt?\" If Yes, ask: \"How bad is the pain?\"  (Scale 0-10; or none, mild, moderate, severe)      Fingers and bunions  10/10  7. OTHER SYMPTOMS: \"Do you have any other symptoms?\" (e.g., fever)      Pt reports unable to touch anything , feels like pins and needles when he touches something    Protocols used: Blister - Foot and Hand-Adult-    "

## 2024-11-18 NOTE — TELEPHONE ENCOUNTER
Returned call to patient, he states that the blisters are on his hands, feet and in his groin area. Very painful, he cannot touch anything or walk.      He already took todays dose of cabozantinib.  Advised to hold medication at this time.       Reviewed with Dr. Goddard, pt will be prescribed a medrol dose cierra to start today, and he will be seen in the office tomorrow afternoon.  Advised patient that if pain becomes too severe prior to appt, he can report to ED.  He was agreeable to all.

## 2024-11-19 ENCOUNTER — OFFICE VISIT (OUTPATIENT)
Dept: HEMATOLOGY ONCOLOGY | Facility: CLINIC | Age: 59
End: 2024-11-19
Payer: COMMERCIAL

## 2024-11-19 VITALS
HEART RATE: 79 BPM | BODY MASS INDEX: 26 KG/M2 | OXYGEN SATURATION: 95 % | WEIGHT: 175.5 LBS | HEIGHT: 69 IN | TEMPERATURE: 97.4 F | SYSTOLIC BLOOD PRESSURE: 140 MMHG | DIASTOLIC BLOOD PRESSURE: 80 MMHG | RESPIRATION RATE: 18 BRPM

## 2024-11-19 DIAGNOSIS — C22.0 HEPATOCELLULAR CARCINOMA (HCC): Primary | ICD-10-CM

## 2024-11-19 PROCEDURE — 99214 OFFICE O/P EST MOD 30 MIN: CPT | Performed by: INTERNAL MEDICINE

## 2024-11-19 NOTE — ASSESSMENT & PLAN NOTE
59 y.o. male with metastatic HCC, initially diagnosed on February 2, 2022.  The patient has distant metastatic disease to intra-abdominal lymph nodes and probable lung metastases.  The patient has received several treatments for his metastatic HCC including Ytrium 90 intrahepatic arterial embolization treatment in November 2022.  With this treatment serum Alpha-fetoprotein level came down nicely.  From from June 29, 2023 to August 1, 2024, the patient received frontline systemic therapy with atezolizumab plus bevacizumab for metastatic HCC.  After August 1, 2024 bevacizumab was discontinued due to proteinuria.  The patient receives single agent atezolizumab until September 12, 2024.  Frontline systemic therapy with atezolizumab plus bevacizumab was discontinued due to radiographic progression of metastatic disease as well as serum AFP rise.  On October 15, 2024, he initiated second line systemic targeted therapy with oral anti-VEGFR cabozantinib 40 mg per mouth daily.    Interim assessment: The patient initiated second line palliative systemic targeted therapy with the anti-VEGFR cabozantinib 40 mg per mouth daily on October 15, 2024.  Initially, he tolerated cabozantinib well however over the past several days he has developed moderate to severe hand-foot syndrome caused by cabozantinib, characterized by erythema, irritation, pain, blisters, thickening and dryness of palms and soles as well as severe erythematous skin rash and superficial ulceration of the skin folds in both groin areas and the scrotum.     Plan:  Hold cabozantinib for several days.  Patient to apply skin lotion several times a day to affected areas including soles, palms, skin folds in the groin region, and scrotum  Oral Medrol Dosepak  No indication for oral antibiotic treatment  Evaluation of serum AFP on 6 November 27, 2024  Return to medical oncology clinic for history and physical exam and for re initiation of cabozantinib at a dose of 20 mg  per mouth daily on November 29, 2024

## 2024-11-19 NOTE — PROGRESS NOTES
Name: Bassem Roberts      : 1965      MRN: [id number]  Encounter Provider: Josephine Goddard MD  Encounter Date: 2024   Encounter department: [facility] [place]  :  Assessment & Plan  Hepatocellular carcinoma (HCC)  59 y.o. male with metastatic HCC, initially diagnosed on 2022.  The patient has distant metastatic disease to intra-abdominal lymph nodes and probable lung metastases.  The patient has received several treatments for his metastatic HCC including Ytrium 90 intrahepatic arterial embolization treatment in 2022.  With this treatment serum Alpha-fetoprotein level came down nicely.  From from 2023 to 2024, the patient received frontline systemic therapy with atezolizumab plus bevacizumab for metastatic HCC.  After 2024 bevacizumab was discontinued due to proteinuria.  The patient receives single agent atezolizumab until 2024.  Frontline systemic therapy with atezolizumab plus bevacizumab was discontinued due to radiographic progression of metastatic disease as well as serum AFP rise.  On October 15, 2024, he initiated second line systemic targeted therapy with oral anti-VEGFR cabozantinib 40 mg per mouth daily.    Interim assessment: The patient initiated second line palliative systemic targeted therapy with the anti-VEGFR cabozantinib 40 mg per mouth daily on October 15, 2024.  Initially, he tolerated cabozantinib well however over the past several days he has developed moderate to severe hand-foot syndrome caused by cabozantinib, characterized by erythema, irritation, pain, blisters, thickening and dryness of palms and soles as well as severe erythematous skin rash and superficial ulceration of the skin folds in both groin areas and the scrotum.     Plan:  Hold cabozantinib for several days.  Patient to apply skin lotion several times a day to affected areas including soles, palms, skin folds in the  groin region, and scrotum  Oral Medrol Dosepak  No indication for oral antibiotic treatment  Evaluation of serum AFP on 6 November 27, 2024  Return to medical oncology clinic for history and physical exam and for re initiation of cabozantinib at a dose of 20 mg per mouth daily on November 29, 2024       Patient understands and agrees with my management recommendations and plan of care. I answered questions to his satisfaction.      History of Present Illness     HPI  Bassem Roberts is a 59 y.o. male with metastatic HCC, initially diagnosed on February 2, 2022. The patient has distant metastatic disease to intra-abdominal lymph nodes and probable lung metastases.  The patient has received several treatments for his metastatic HCC including Ytrium 90 intrahepatic arterial embolization treatment in November 2022.  With this treatment serum Alpha-fetoprotein level came down nicely.  From from June 29, 2023 to August 1, 2024, the patient received frontline systemic therapy with atezolizumab plus bevacizumab for metastatic HCC.  After August 1, 2024 bevacizumab was discontinued due to proteinuria.  The patient receives single agent atezolizumab until September 12, 2024.  Frontline systemic therapy with atezolizumab plus bevacizumab was discontinued due to radiographic progression of metastatic disease as well as serum AFP rise.  On October 15, 2024, he initiated second line systemic targeted therapy with oral anti-VEGFR cabozantinib 40 mg per mouth daily.    Interim History: The patient initiated second line palliative systemic targeted therapy with the anti-VEGFR cabozantinib 40 mg per mouth daily on October 15, 2024.  Initially, he tolerated cabozantinib well however over the past several days he has developed severe hand-foot syndrome characterized by erythema, irritation, pain, blisters, thickening and dryness of palms and soles as well as severe erythematous skin rash and superficial ulceration of the skin folds in  both groin areas and the scrotum.  He denies recent history of mouth sores, sore throat, diarrhea, or other manifestations suggestive of mucositis.  The patient denies fever, chills, night sweats, or other symptoms suggestive of active infection.  Otherwise, he denies new systemic, cardiorespiratory, gastrointestinal, genitourinary, musculoskeletal, or neurologic symptoms.      Oncology History   Hepatocellular carcinoma (HCC)   2022 Initial Diagnosis    Hepatocellular carcinoma (HCC)     2/8/2022 Biopsy    [facility] US guided liver biopsy: right liver lobe:  Poorly differentiated HCC with patchy necrosis       7/13/2022 -  Cancer Staged    Staging form: Liver (Excluding Intrahepatic Bile Ducts), AJCC 8th Edition  - Clinical stage from 7/13/2022: Stage IVB (rcT3, cN1, pM1) - Signed by Taye Lundberg MD on 1/30/2024  Stage prefix: Recurrence  Histologic grade (G): G2  Histologic grading system: 4 grade system       6/29/2023 - 9/12/2024 Chemotherapy    alteplase (CATHFLO), 2 mg, Intracatheter, Every 1 Minute as needed, 21 of 24 cycles  atezolizumab (TECENTRIQ) IVPB, 1,200 mg, Intravenous, Once, 21 of 24 cycles  Administration: 1,200 mg (6/29/2023), 1,200 mg (7/20/2023), 1,200 mg (8/10/2023), 1,200 mg (8/31/2023), 1,200 mg (9/21/2023), 1,200 mg (10/10/2023), 1,200 mg (11/2/2023), 1,200 mg (11/24/2023), 1,200 mg (12/14/2023), 1,200 mg (1/4/2024), 1,200 mg (1/26/2024), 1,200 mg (3/7/2024), 1,200 mg (3/28/2024), 1,200 mg (4/18/2024), 1,200 mg (5/9/2024), 1,200 mg (5/30/2024), 1,200 mg (6/20/2024), 1,200 mg (7/11/2024), 1,200 mg (8/1/2024), 1,200 mg (8/22/2024), 1,200 mg (9/12/2024)  bevacizumab-awwb (MVASI) IVPB, 1,345 mg (100 % of original dose 15 mg/kg), Intravenous, Once, 19 of 22 cycles  Dose modification: 15 mg/kg (original dose 15 mg/kg, Cycle 1), 15 mg/kg (original dose 15 mg/kg, Cycle 2), 15 mg/kg (original dose 15 mg/kg, Cycle 3)  Administration: 1,300 mg (6/29/2023), 1,300 mg (7/20/2023), 1,300 mg  (8/10/2023), 1,300 mg (8/31/2023), 1,300 mg (9/21/2023), 1,300 mg (10/10/2023), 1,300 mg (11/2/2023), 1,300 mg (11/24/2023), 1,300 mg (12/14/2023), 1,300 mg (1/4/2024), 1,300 mg (1/26/2024), 1,300 mg (3/7/2024), 1,300 mg (3/28/2024), 1,300 mg (4/18/2024), 1,300 mg (5/9/2024), 1,300 mg (5/30/2024), 1,300 mg (6/20/2024), 1,200 mg (7/11/2024), 1,200 mg (8/1/2024)               Review of Systems   Constitutional:  Negative for activity change, appetite change, chills, diaphoresis, fatigue, fever and unexpected weight change.   HENT:  Negative for congestion, dental problem, ear discharge, ear pain, facial swelling, hearing loss, mouth sores, nosebleeds, postnasal drip, rhinorrhea, sinus pain, sneezing, sore throat, tinnitus, trouble swallowing and voice change.    Eyes:  Negative for photophobia, pain, discharge, redness, itching and visual disturbance.   Respiratory:  Negative for apnea, cough, choking, chest tightness, shortness of breath, wheezing and stridor.    Cardiovascular:  Negative for chest pain, palpitations and leg swelling.   Gastrointestinal:  Negative for abdominal distention, abdominal pain, anal bleeding, blood in stool, constipation, diarrhea, nausea, rectal pain and vomiting.   Endocrine: Negative for cold intolerance and heat intolerance.   Genitourinary:  Negative for decreased urine volume, difficulty urinating, dysuria, enuresis, flank pain, frequency, hematuria and urgency.   Musculoskeletal:  Negative for arthralgias, back pain, gait problem, joint swelling, myalgias, neck pain and neck stiffness.   Skin:  Positive for rash. Negative for color change, pallor and wound.        Severe skin rash involving palms and soles   Neurological:  Negative for dizziness, tremors, seizures, syncope, facial asymmetry, speech difficulty, weakness, light-headedness, numbness and headaches.   Hematological:  Negative for adenopathy. Does not bruise/bleed easily.   Psychiatric/Behavioral:  Negative for behavioral  "problems, confusion, dysphoric mood and sleep disturbance. The patient is not nervous/anxious.           Objective   /80 (BP Location: Left arm, Patient Position: Sitting, Cuff Size: Adult)   Pulse 79   Temp (!) 97.4 °F (36.3 °C) (Temporal)   Resp 18   Ht 5' 9\" (1.753 m)   Wt 79.6 kg (175 lb 8 oz)   SpO2 95%   BMI 25.92 kg/m²      Physical Exam  Vitals reviewed.   Constitutional:       General: He is not in acute distress.     Appearance: Normal appearance. He is normal weight. He is not toxic-appearing.      Comments:  male well-developed, well-nourished without respiratory distress   HENT:      Head: Normocephalic and atraumatic.      Nose: Nose normal. No congestion.      Mouth/Throat:      Mouth: Mucous membranes are moist.      Pharynx: Oropharynx is clear. No oropharyngeal exudate or posterior oropharyngeal erythema.   Eyes:      General: No scleral icterus.     Extraocular Movements: Extraocular movements intact.      Conjunctiva/sclera: Conjunctivae normal.      Pupils: Pupils are equal, round, and reactive to light.   Cardiovascular:      Rate and Rhythm: Normal rate and regular rhythm.      Pulses: Normal pulses.      Heart sounds: Normal heart sounds. No murmur heard.     No friction rub. No gallop.   Pulmonary:      Effort: Pulmonary effort is normal. No respiratory distress.      Breath sounds: Normal breath sounds. No stridor. No wheezing, rhonchi or rales.   Chest:      Chest wall: No tenderness.   Abdominal:      General: Bowel sounds are normal. There is no distension.      Palpations: Abdomen is soft. There is no mass.      Tenderness: There is no abdominal tenderness. There is no right CVA tenderness, left CVA tenderness, guarding or rebound.      Hernia: No hernia is present.   Musculoskeletal:         General: No swelling, tenderness or deformity. Normal range of motion.      Cervical back: Normal range of motion and neck supple. No rigidity or tenderness.      Right lower " leg: No edema.      Left lower leg: No edema.   Lymphadenopathy:      Cervical: No cervical adenopathy.   Skin:     General: Skin is warm and dry.      Capillary Refill: Capillary refill takes less than 2 seconds.      Coloration: Skin is not jaundiced or pale.      Findings: No bruising, erythema, lesion or rash.      Comments: Thickening, erythema, blister formation, dryness, and scaling of the skin involving palms and soles, as well as digital falls of the groin area and scrotum.  Superficial ulceration present.  Findings consistent with moderate to severe palmar plantar erythrodysesthesia (hand-foot syndrome)   Neurological:      General: No focal deficit present.      Mental Status: He is alert and oriented to person, place, and time. Mental status is at baseline.      Cranial Nerves: No cranial nerve deficit.      Sensory: No sensory deficit.      Motor: No weakness.      Coordination: Coordination normal.      Gait: Gait normal.      Deep Tendon Reflexes: Reflexes normal.   Psychiatric:         Mood and Affect: Mood normal.         Behavior: Behavior normal.         Thought Content: Thought content normal.

## 2024-11-27 ENCOUNTER — RESULTS FOLLOW-UP (OUTPATIENT)
Dept: FAMILY MEDICINE CLINIC | Facility: CLINIC | Age: 59
End: 2024-11-27

## 2024-11-27 ENCOUNTER — APPOINTMENT (OUTPATIENT)
Dept: LAB | Facility: CLINIC | Age: 59
End: 2024-11-27
Payer: COMMERCIAL

## 2024-11-27 DIAGNOSIS — C22.0 HEPATOCELLULAR CARCINOMA (HCC): ICD-10-CM

## 2024-11-27 DIAGNOSIS — E78.00 HIGH CHOLESTEROL: Primary | ICD-10-CM

## 2024-11-27 LAB
AFP-TM SERPL-MCNC: 155.34 NG/ML (ref 0–9)
ALBUMIN SERPL BCG-MCNC: 3.4 G/DL (ref 3.5–5)
ALP SERPL-CCNC: 100 U/L (ref 34–104)
ALT SERPL W P-5'-P-CCNC: 22 U/L (ref 7–52)
ANION GAP SERPL CALCULATED.3IONS-SCNC: 3 MMOL/L (ref 4–13)
AST SERPL W P-5'-P-CCNC: 28 U/L (ref 13–39)
BASOPHILS # BLD AUTO: 0.02 THOUSANDS/ΜL (ref 0–0.1)
BASOPHILS NFR BLD AUTO: 0 % (ref 0–1)
BILIRUB SERPL-MCNC: 1.48 MG/DL (ref 0.2–1)
BUN SERPL-MCNC: 15 MG/DL (ref 5–25)
CALCIUM ALBUM COR SERPL-MCNC: 9 MG/DL (ref 8.3–10.1)
CALCIUM SERPL-MCNC: 8.5 MG/DL (ref 8.4–10.2)
CHLORIDE SERPL-SCNC: 104 MMOL/L (ref 96–108)
CHOLEST SERPL-MCNC: 268 MG/DL (ref ?–200)
CO2 SERPL-SCNC: 31 MMOL/L (ref 21–32)
CREAT SERPL-MCNC: 0.97 MG/DL (ref 0.6–1.3)
EOSINOPHIL # BLD AUTO: 0.24 THOUSAND/ΜL (ref 0–0.61)
EOSINOPHIL NFR BLD AUTO: 5 % (ref 0–6)
ERYTHROCYTE [DISTWIDTH] IN BLOOD BY AUTOMATED COUNT: 14.5 % (ref 11.6–15.1)
GFR SERPL CREATININE-BSD FRML MDRD: 85 ML/MIN/1.73SQ M
GLUCOSE P FAST SERPL-MCNC: 111 MG/DL (ref 65–99)
HCT VFR BLD AUTO: 43.4 % (ref 36.5–49.3)
HDLC SERPL-MCNC: 70 MG/DL
HGB BLD-MCNC: 14.9 G/DL (ref 12–17)
IMM GRANULOCYTES # BLD AUTO: 0.01 THOUSAND/UL (ref 0–0.2)
IMM GRANULOCYTES NFR BLD AUTO: 0 % (ref 0–2)
LDLC SERPL CALC-MCNC: 164 MG/DL (ref 0–100)
LYMPHOCYTES # BLD AUTO: 0.88 THOUSANDS/ΜL (ref 0.6–4.47)
LYMPHOCYTES NFR BLD AUTO: 17 % (ref 14–44)
MCH RBC QN AUTO: 30.3 PG (ref 26.8–34.3)
MCHC RBC AUTO-ENTMCNC: 34.3 G/DL (ref 31.4–37.4)
MCV RBC AUTO: 88 FL (ref 82–98)
MONOCYTES # BLD AUTO: 0.38 THOUSAND/ΜL (ref 0.17–1.22)
MONOCYTES NFR BLD AUTO: 7 % (ref 4–12)
NEUTROPHILS # BLD AUTO: 3.6 THOUSANDS/ΜL (ref 1.85–7.62)
NEUTS SEG NFR BLD AUTO: 71 % (ref 43–75)
NONHDLC SERPL-MCNC: 198 MG/DL
NRBC BLD AUTO-RTO: 0 /100 WBCS
PLATELET # BLD AUTO: 118 THOUSANDS/UL (ref 149–390)
PMV BLD AUTO: 9.5 FL (ref 8.9–12.7)
POTASSIUM SERPL-SCNC: 4.9 MMOL/L (ref 3.5–5.3)
PROT SERPL-MCNC: 5.8 G/DL (ref 6.4–8.4)
RBC # BLD AUTO: 4.92 MILLION/UL (ref 3.88–5.62)
SODIUM SERPL-SCNC: 138 MMOL/L (ref 135–147)
TRIGL SERPL-MCNC: 169 MG/DL (ref ?–150)
WBC # BLD AUTO: 5.13 THOUSAND/UL (ref 4.31–10.16)

## 2024-11-27 PROCEDURE — 82105 ALPHA-FETOPROTEIN SERUM: CPT

## 2024-11-27 PROCEDURE — 80053 COMPREHEN METABOLIC PANEL: CPT

## 2024-11-27 PROCEDURE — 36415 COLL VENOUS BLD VENIPUNCTURE: CPT

## 2024-11-27 PROCEDURE — 85025 COMPLETE CBC W/AUTO DIFF WBC: CPT

## 2024-11-27 NOTE — RESULT ENCOUNTER NOTE
Pt said he wants to wait until next appt to redraw labs because his cancer medication he is on he stated is messing up his blood work and he does not want to take anymore medication at this moment

## 2024-11-28 NOTE — ASSESSMENT & PLAN NOTE
59 y.o. male with metastatic HCC, initially diagnosed on February 2, 2022.  The patient has distant metastatic disease to intra-abdominal lymph nodes and probable lung metastases.  The patient has received several treatments for his metastatic HCC including Ytrium 90 intrahepatic arterial embolization treatment in November 2022.  With this treatment serum Alpha-fetoprotein level came down nicely.  From from June 29, 2023 to August 1, 2024, the patient received frontline systemic therapy with atezolizumab plus bevacizumab for metastatic HCC.  After August 1, 2024 bevacizumab was discontinued due to proteinuria.  The patient receives single agent atezolizumab until September 12, 2024.  Frontline systemic therapy with atezolizumab plus bevacizumab was discontinued due to radiographic progression of metastatic disease as well as serum AFP rise.  On October 15, 2024, he initiated second line systemic targeted therapy with oral anti-VEGFR cabozantinib 40 mg per mouth daily. Initially, he tolerated cabozantinib well, however over the past several days in November 2024, he developed moderate to severe hand-foot syndrome caused by cabozantinib, characterized by erythema, irritation, pain, blisters, thickening and dryness of palms and soles as well as severe erythematous skin rash and superficial ulceration of the skin folds in both groin areas and the scrotum. On November 19, 2024 I advised him to stop cabazantinib.    Interim assessment: After stopping cabozantinib on November 19, 2024, the patient has noticed significant improvement of his hand-foot syndrome.  He is ready to reinitiate cabozantinib at a dose of 20 mg per mouth daily.  Mr. Roberts has clinically significant benefit from cabozantinib.  He has a marked reduction of his serum AFP, highly suggestive of metastatic HCC response to cabozantinib     Plan:  Reinitiate cabozantinib 20 mg per mouth daily.  Close follow-up for assessment of tolerability and safety of  cabozantinib, specifically close evaluation hand-foot syndrome.  Continue topical therapy with skin lotion several times a day to affected areas including soles, palms, skin folds in the groin region, and scrotum  No indication for oral antibiotic treatment  Periodic evaluation of serum AFP   Contrast-enhanced ET scan of the chest, abdomen, and pelvis, as well as serum AFP the third week of January 2025  Return to medical oncology clinic for history and physical exam and to review results of contrast-enhanced cross-sectional imaging studies of the chest, abdomen, and pelvis, and serum AFP the last week of January 2025     Patient understands and agrees with my management recommendations and plan of care. I answered questions to his satisfaction.       Orders:    CT chest w contrast; Future    CT abdomen pelvis w contrast; Future

## 2024-11-28 NOTE — PROGRESS NOTES
Name: Bassem Roberts      : 1965      MRN: [id number]  Encounter Provider: Josephine Goddard MD  Encounter Date: 2024   Encounter department: [facility]  :  Assessment & Plan  Hepatocellular carcinoma (HCC)  59 y.o. male with metastatic HCC, initially diagnosed on 2022.  The patient has distant metastatic disease to intra-abdominal lymph nodes and probable lung metastases.  The patient has received several treatments for his metastatic HCC including Ytrium 90 intrahepatic arterial embolization treatment in 2022.  With this treatment serum Alpha-fetoprotein level came down nicely.  From from 2023 to 2024, the patient received frontline systemic therapy with atezolizumab plus bevacizumab for metastatic HCC.  After 2024 bevacizumab was discontinued due to proteinuria.  The patient receives single agent atezolizumab until 2024.  Frontline systemic therapy with atezolizumab plus bevacizumab was discontinued due to radiographic progression of metastatic disease as well as serum AFP rise.  On October 15, 2024, he initiated second line systemic targeted therapy with oral anti-VEGFR cabozantinib 40 mg per mouth daily. Initially, he tolerated cabozantinib well, however over the past several days in 2024, he developed moderate to severe hand-foot syndrome caused by cabozantinib, characterized by erythema, irritation, pain, blisters, thickening and dryness of palms and soles as well as severe erythematous skin rash and superficial ulceration of the skin folds in both groin areas and the scrotum. On 2024 I advised him to stop cabazantinib.    Interim assessment: After stopping cabozantinib on 2024, the patient has noticed significant improvement of his hand-foot syndrome.  He is ready to reinitiate cabozantinib at a dose of 20 mg per mouth daily.  Mr. Roberts has clinically  significant benefit from cabozantinib.  He has a marked reduction of his serum AFP, highly suggestive of metastatic HCC response to cabozantinib     Plan:  Reinitiate cabozantinib 20 mg per mouth daily.  Close follow-up for assessment of tolerability and safety of cabozantinib, specifically close evaluation hand-foot syndrome.  Continue topical therapy with skin lotion several times a day to affected areas including soles, palms, skin folds in the groin region, and scrotum  No indication for oral antibiotic treatment  Periodic evaluation of serum AFP   Contrast-enhanced ET scan of the chest, abdomen, and pelvis, as well as serum AFP the third week of January 2025  Return to medical oncology clinic for history and physical exam and to review results of contrast-enhanced cross-sectional imaging studies of the chest, abdomen, and pelvis, and serum AFP the last week of January 2025     Patient understands and agrees with my management recommendations and plan of care. I answered questions to his satisfaction.       Orders:    CT chest w contrast; Future    CT abdomen pelvis w contrast; Future    Other specified hypothyroidism  I agree with thyroid gland replacement therapy with levothyroxine to normalize the serum TSH to be prescribed by the patient's PCP.      Patient understands and agrees with my management recommendations and plan of care. I answered questions to his satisfaction.             History of Present Illness     HPI  Bassem Roberts is a 59 y.o. male with metastatic HCC, initially diagnosed on February 2, 2022.  The patient has distant metastatic disease to intra-abdominal lymph nodes and probable lung metastases.  The patient has received several treatments for his metastatic HCC including Ytrium 90 intrahepatic arterial embolization treatment in November 2022.  With this treatment serum Alpha-fetoprotein level came down nicely.  From from June 29, 2023 to August 1, 2024, the patient received frontline  systemic therapy with atezolizumab plus bevacizumab for metastatic HCC.  After August 1, 2024 bevacizumab was discontinued due to proteinuria.  The patient receives single agent atezolizumab until September 12, 2024.  Frontline systemic therapy with atezolizumab plus bevacizumab was discontinued due to radiographic progression of metastatic disease as well as serum AFP rise.  On October 15, 2024, he initiated second line systemic targeted therapy with oral anti-VEGFR cabozantinib 40 mg per mouth daily. Initially, he tolerated cabozantinib well, however over the past several days in November 2024, he developed moderate to severe hand-foot syndrome caused by cabozantinib, characterized by erythema, irritation, pain, blisters, thickening and dryness of palms and soles as well as severe erythematous skin rash and superficial ulceration of the skin folds in both groin areas and the scrotum. On November 19, 2024 I advised him to stop cabazantinib.    Interim history: After stopping cabozantinib on November 19, 2024, the patient has noticed significant improvement of his hand-foot syndrome.  He is ready to reinitiate cabozantinib at a dose of 20 mg per mouth daily.  Mr. Roberts has clinically significant benefit from cabozantinib.  He has a marked reduction of his serum AFP, highly suggestive of metastatic HCC response to cabozantinib.  Of cabozantinib, the patient refers improvement of skin erythema, irritation, pain, blisters, thickening and dryness of palms and soles as well as improvement of erythematous skin rash and superficial ulceration of the skin folds in both groin areas and the scrotum.  He denies fever, chills, severe weakness, malaise, or other symptoms suggestive of active infection.           Review of Systems   Constitutional:  Negative for activity change, appetite change, chills, diaphoresis, fatigue, fever and unexpected weight change.   HENT:  Negative for congestion, dental problem, ear discharge, ear  pain, facial swelling, hearing loss, mouth sores, nosebleeds, postnasal drip, rhinorrhea, sinus pain, sneezing, sore throat, tinnitus, trouble swallowing and voice change.    Eyes:  Negative for photophobia, pain, discharge, redness, itching and visual disturbance.   Respiratory:  Negative for apnea, cough, choking, chest tightness, shortness of breath, wheezing and stridor.    Cardiovascular:  Negative for chest pain, palpitations and leg swelling.   Gastrointestinal:  Negative for abdominal distention, abdominal pain, anal bleeding, blood in stool, constipation, diarrhea, nausea, rectal pain and vomiting.   Endocrine: Negative for cold intolerance and heat intolerance.   Genitourinary:  Negative for decreased urine volume, difficulty urinating, dysuria, enuresis, flank pain, frequency, hematuria and urgency.   Musculoskeletal:  Negative for arthralgias, back pain, gait problem, joint swelling, myalgias, neck pain and neck stiffness.   Skin:  Negative for color change, pallor, rash and wound.   Neurological:  Negative for dizziness, tremors, seizures, syncope, facial asymmetry, speech difficulty, weakness, light-headedness, numbness and headaches.   Hematological:  Negative for adenopathy. Does not bruise/bleed easily.   Psychiatric/Behavioral:  Negative for behavioral problems, confusion, dysphoric mood and sleep disturbance. The patient is not nervous/anxious.           Objective   There were no vitals taken for this visit.     Physical Exam  Vitals reviewed.   Constitutional:       General: He is not in acute distress.     Appearance: Normal appearance. He is normal weight. He is not toxic-appearing.   HENT:      Head: Normocephalic and atraumatic.      Nose: Nose normal. No congestion.      Mouth/Throat:      Mouth: Mucous membranes are moist.      Pharynx: Oropharynx is clear. No oropharyngeal exudate or posterior oropharyngeal erythema.   Eyes:      General: No scleral icterus.     Extraocular Movements:  Extraocular movements intact.      Conjunctiva/sclera: Conjunctivae normal.      Pupils: Pupils are equal, round, and reactive to light.   Cardiovascular:      Rate and Rhythm: Normal rate and regular rhythm.      Pulses: Normal pulses.      Heart sounds: Normal heart sounds. No murmur heard.     No friction rub. No gallop.   Pulmonary:      Effort: Pulmonary effort is normal. No respiratory distress.      Breath sounds: Normal breath sounds. No stridor. No wheezing, rhonchi or rales.   Chest:      Chest wall: No tenderness.   Abdominal:      General: Bowel sounds are normal. There is no distension.      Palpations: Abdomen is soft. There is no mass.      Tenderness: There is no abdominal tenderness. There is no right CVA tenderness, left CVA tenderness, guarding or rebound.      Hernia: No hernia is present.   Musculoskeletal:         General: No swelling, tenderness or deformity. Normal range of motion.      Cervical back: Normal range of motion and neck supple. No rigidity or tenderness.      Right lower leg: No edema.      Left lower leg: No edema.   Lymphadenopathy:      Cervical: No cervical adenopathy.   Skin:     General: Skin is warm and dry.      Capillary Refill: Capillary refill takes less than 2 seconds.      Coloration: Skin is not jaundiced or pale.      Findings: No bruising, erythema, lesion or rash.   Neurological:      General: No focal deficit present.      Mental Status: He is alert and oriented to person, place, and time. Mental status is at baseline.      Cranial Nerves: No cranial nerve deficit.      Sensory: No sensory deficit.      Motor: No weakness.      Coordination: Coordination normal.      Gait: Gait normal.      Deep Tendon Reflexes: Reflexes normal.   Psychiatric:         Mood and Affect: Mood normal.         Behavior: Behavior normal.         Thought Content: Thought content normal.           Component      Latest Ref Rng 9/21/2022 1/21/2023 4/22/2023 6/24/2023 8/29/2023    AFP-TUMOR MARKER      0.00 - 9.00 ng/mL 4,961.3 (H)  574.5 (H)  1,912.6 (H)  8,227.11 (H)  1,892.23 (H)      Component      Latest Ref Rng 11/1/2023 1/24/2024 4/16/2024 6/19/2024 8/20/2024 9/30/2024   AFP-TUMOR MARKER      0.00 - 9.00 ng/mL 33.51 (H)  18.93 (H)  32.65 (H)  85.12 (H)  182.30 (H)  392.19 (H)      Component      Latest Ref Rng 10/24/2024 11/27/2024   AFP-TUMOR MARKER      0.00 - 9.00 ng/mL 785.71 (H)  155.34 (H)

## 2024-11-29 ENCOUNTER — TELEPHONE (OUTPATIENT)
Dept: HEMATOLOGY ONCOLOGY | Facility: CLINIC | Age: 59
End: 2024-11-29

## 2024-11-29 ENCOUNTER — OFFICE VISIT (OUTPATIENT)
Dept: HEMATOLOGY ONCOLOGY | Facility: CLINIC | Age: 59
End: 2024-11-29
Payer: COMMERCIAL

## 2024-11-29 VITALS
DIASTOLIC BLOOD PRESSURE: 78 MMHG | WEIGHT: 186 LBS | OXYGEN SATURATION: 98 % | HEART RATE: 84 BPM | RESPIRATION RATE: 18 BRPM | HEIGHT: 69 IN | TEMPERATURE: 97.2 F | SYSTOLIC BLOOD PRESSURE: 140 MMHG | BODY MASS INDEX: 27.55 KG/M2

## 2024-11-29 DIAGNOSIS — C22.0 HEPATOCELLULAR CARCINOMA (HCC): Primary | ICD-10-CM

## 2024-11-29 DIAGNOSIS — E03.8 OTHER SPECIFIED HYPOTHYROIDISM: ICD-10-CM

## 2024-11-29 PROCEDURE — 99213 OFFICE O/P EST LOW 20 MIN: CPT | Performed by: INTERNAL MEDICINE

## 2024-12-17 ENCOUNTER — APPOINTMENT (OUTPATIENT)
Dept: LAB | Facility: CLINIC | Age: 59
End: 2024-12-17
Payer: COMMERCIAL

## 2024-12-17 DIAGNOSIS — C22.0 HEPATOCELLULAR CARCINOMA (HCC): ICD-10-CM

## 2024-12-17 LAB
AFP-TM SERPL-MCNC: 594.88 NG/ML (ref 0–9)
ALBUMIN SERPL BCG-MCNC: 3.8 G/DL (ref 3.5–5)
ALP SERPL-CCNC: 75 U/L (ref 34–104)
ALT SERPL W P-5'-P-CCNC: 22 U/L (ref 7–52)
ANION GAP SERPL CALCULATED.3IONS-SCNC: 8 MMOL/L (ref 4–13)
AST SERPL W P-5'-P-CCNC: 32 U/L (ref 13–39)
BASOPHILS # BLD AUTO: 0.02 THOUSANDS/ÂΜL (ref 0–0.1)
BASOPHILS NFR BLD AUTO: 0 % (ref 0–1)
BILIRUB SERPL-MCNC: 0.91 MG/DL (ref 0.2–1)
BUN SERPL-MCNC: 16 MG/DL (ref 5–25)
CALCIUM SERPL-MCNC: 8.7 MG/DL (ref 8.4–10.2)
CHLORIDE SERPL-SCNC: 102 MMOL/L (ref 96–108)
CO2 SERPL-SCNC: 26 MMOL/L (ref 21–32)
CREAT SERPL-MCNC: 1.02 MG/DL (ref 0.6–1.3)
EOSINOPHIL # BLD AUTO: 0.16 THOUSAND/ÂΜL (ref 0–0.61)
EOSINOPHIL NFR BLD AUTO: 3 % (ref 0–6)
ERYTHROCYTE [DISTWIDTH] IN BLOOD BY AUTOMATED COUNT: 15.2 % (ref 11.6–15.1)
GFR SERPL CREATININE-BSD FRML MDRD: 80 ML/MIN/1.73SQ M
GLUCOSE SERPL-MCNC: 118 MG/DL (ref 65–140)
HCT VFR BLD AUTO: 40.4 % (ref 36.5–49.3)
HGB BLD-MCNC: 14.1 G/DL (ref 12–17)
IMM GRANULOCYTES # BLD AUTO: 0 THOUSAND/UL (ref 0–0.2)
IMM GRANULOCYTES NFR BLD AUTO: 0 % (ref 0–2)
LYMPHOCYTES # BLD AUTO: 1.37 THOUSANDS/ÂΜL (ref 0.6–4.47)
LYMPHOCYTES NFR BLD AUTO: 28 % (ref 14–44)
MCH RBC QN AUTO: 30.9 PG (ref 26.8–34.3)
MCHC RBC AUTO-ENTMCNC: 34.9 G/DL (ref 31.4–37.4)
MCV RBC AUTO: 89 FL (ref 82–98)
MONOCYTES # BLD AUTO: 0.42 THOUSAND/ÂΜL (ref 0.17–1.22)
MONOCYTES NFR BLD AUTO: 8 % (ref 4–12)
NEUTROPHILS # BLD AUTO: 3.02 THOUSANDS/ÂΜL (ref 1.85–7.62)
NEUTS SEG NFR BLD AUTO: 61 % (ref 43–75)
NRBC BLD AUTO-RTO: 0 /100 WBCS
PLATELET # BLD AUTO: 163 THOUSANDS/UL (ref 149–390)
PMV BLD AUTO: 9.6 FL (ref 8.9–12.7)
POTASSIUM SERPL-SCNC: 3.3 MMOL/L (ref 3.5–5.3)
PROT SERPL-MCNC: 6.2 G/DL (ref 6.4–8.4)
RBC # BLD AUTO: 4.56 MILLION/UL (ref 3.88–5.62)
SODIUM SERPL-SCNC: 136 MMOL/L (ref 135–147)
WBC # BLD AUTO: 4.99 THOUSAND/UL (ref 4.31–10.16)

## 2024-12-17 PROCEDURE — 82105 ALPHA-FETOPROTEIN SERUM: CPT

## 2024-12-17 PROCEDURE — 85025 COMPLETE CBC W/AUTO DIFF WBC: CPT

## 2024-12-17 PROCEDURE — 36415 COLL VENOUS BLD VENIPUNCTURE: CPT

## 2024-12-17 PROCEDURE — 80053 COMPREHEN METABOLIC PANEL: CPT

## 2024-12-18 ENCOUNTER — RESULTS FOLLOW-UP (OUTPATIENT)
Dept: HEMATOLOGY ONCOLOGY | Facility: CLINIC | Age: 59
End: 2024-12-18

## 2024-12-18 DIAGNOSIS — C22.0 HEPATOCELLULAR CARCINOMA (HCC): Primary | ICD-10-CM

## 2024-12-18 NOTE — TELEPHONE ENCOUNTER
Call placed to Aaliyah, patients spouse, and left message. OK to continue same dose of 20 mg daily for now. Repeat labs added for prior to patients appointment. Stated to call back with any other questions.     ----- Message from Josephine Goddard MD sent at 12/18/2024 11:31 AM EST -----  That is reasonable. He can continue with current dose with no changes for now. Also, remind him that cabozantinib has a good/potent anti-liver cancer activity (including at the dose he is currently taking) and that we will continue to monitor the status of his liver cancer closely  ----- Message -----  From: Micah Sullivan RN  Sent: 12/18/2024  10:48 AM EST  To: Josephine Goddadr MD    Please see below, you wanted patient to increase dose, but patient is still having hand soreness and reluctant to increase dose. Please advise! Thanks!  ----- Message -----  From: Micah Sullivan RN  Sent: 12/18/2024  10:47 AM EST  To: Micah Sullivan RN      ----- Message -----  From: Taylor Palacios RN  Sent: 12/18/2024  10:46 AM EST  To: [facility]ematology On[facility]    ----- Message from Taylor Palacios RN sent at 12/18/2024 10:46 AM EST -----  Please review, thanks!

## 2024-12-18 NOTE — RESULT ENCOUNTER NOTE
Spoke with patient. Relayed information from Jennifer ALEXANDRE and Dr. Goddard. He verbalized an understanding and will continue at the same dose and have labs before his follow up appointment.

## 2024-12-18 NOTE — TELEPHONE ENCOUNTER
"Spoke with Bassem and relayed Dr. Goddard's recommendations: \"to increase the cabozantinib dose from 20 mg PO daily to 20 mg PO every other day and 40 mg PO every other day. The patient needs to keep us updated about how his hand foot syndrome evolves with the increased dose of cabozantinib\"    Patient stated his fingers are already sore while taking 20mg daily and he is reluctant to increase cabozantinib dose because he does not want experience the blisters or skin peeling again. BETTIE Obrien made aware. I let him know we will review with provider and someone will give him a call back.    Best callback number: [phone], pt stated he will keep his phone on for the rest of the day  "

## 2024-12-18 NOTE — TELEPHONE ENCOUNTER
Patient returned call to Micah Sullivan.  Tried to contact office & C[facility] with no answer.  Please call the patient back as earliest convenience

## 2024-12-18 NOTE — TELEPHONE ENCOUNTER
Multiple calls to home phone attempted, unable to leave voicemail. Called spouses mobile number listed on communication consent.    Left brief message to call us back regarding patients lab results and medication adjustments. Pt to start taking cabozantinib 20 mg PO every other day and 40 mg PO every other day alternating doses. Pt should monitor for previous symptoms of hand foot syndrome. Please let them know information in this message when they return call. Thanks!    ----- Message from Josephine Goddard MD sent at 12/18/2024  7:59 AM EST -----  Good morning team,  Let's call the patient. My recommendation is to increase the cabozantinib dose from 20 mg PO daily to 20 mg PO every other day and 40 mg PO every other day. The patient needs to keep us updated about how his hand foot syndrome evolves with the increased dose of cabozantinib. Thank you.  ----- Message -----  From: Lab, Background User  Sent: 12/17/2024   4:12 PM EST  To: Josephine Goddard MD

## 2024-12-23 ENCOUNTER — HOSPITAL ENCOUNTER (OUTPATIENT)
Dept: CT IMAGING | Facility: HOSPITAL | Age: 59
Discharge: HOME | End: 2024-12-23
Attending: INTERNAL MEDICINE
Payer: COMMERCIAL

## 2024-12-23 DIAGNOSIS — C22.0 HEPATOCELLULAR CARCINOMA (HCC): ICD-10-CM

## 2024-12-23 PROCEDURE — 71260 CT THORAX DX C+: CPT

## 2024-12-23 RX ADMIN — IOHEXOL 60 ML: 350 INJECTION, SOLUTION INTRAVENOUS at 16:40

## 2024-12-30 ENCOUNTER — TELEPHONE (OUTPATIENT)
Dept: OTHER | Facility: HOSPITAL | Age: 59
End: 2024-12-30

## 2024-12-30 DIAGNOSIS — C22.0 HEPATOCELLULAR CARCINOMA (HCC): ICD-10-CM

## 2024-12-30 NOTE — TELEPHONE ENCOUNTER
Pt called for a med refill but he was at work and did not know the name of the medication.  He will check it and call back.

## 2025-01-15 ENCOUNTER — APPOINTMENT (OUTPATIENT)
Dept: LAB | Facility: CLINIC | Age: 60
End: 2025-01-15
Payer: COMMERCIAL

## 2025-01-15 ENCOUNTER — HOSPITAL ENCOUNTER (OUTPATIENT)
Dept: CT IMAGING | Facility: HOSPITAL | Age: 60
Discharge: HOME | End: 2025-01-15
Attending: INTERNAL MEDICINE
Payer: COMMERCIAL

## 2025-01-15 DIAGNOSIS — C22.0 HEPATOCELLULAR CARCINOMA (HCC): ICD-10-CM

## 2025-01-15 LAB
ALBUMIN SERPL BCG-MCNC: 3.8 G/DL (ref 3.5–5)
ALP SERPL-CCNC: 93 U/L (ref 34–104)
ALT SERPL W P-5'-P-CCNC: 25 U/L (ref 7–52)
ANION GAP SERPL CALCULATED.3IONS-SCNC: 5 MMOL/L (ref 4–13)
AST SERPL W P-5'-P-CCNC: 30 U/L (ref 13–39)
BASOPHILS # BLD AUTO: 0.05 THOUSANDS/ΜL (ref 0–0.1)
BASOPHILS NFR BLD AUTO: 1 % (ref 0–1)
BILIRUB SERPL-MCNC: 0.92 MG/DL (ref 0.2–1)
BUN SERPL-MCNC: 13 MG/DL (ref 5–25)
CALCIUM SERPL-MCNC: 9.5 MG/DL (ref 8.4–10.2)
CHLORIDE SERPL-SCNC: 100 MMOL/L (ref 96–108)
CO2 SERPL-SCNC: 32 MMOL/L (ref 21–32)
CREAT SERPL-MCNC: 1.13 MG/DL (ref 0.6–1.3)
EOSINOPHIL # BLD AUTO: 0.34 THOUSAND/ΜL (ref 0–0.61)
EOSINOPHIL NFR BLD AUTO: 7 % (ref 0–6)
ERYTHROCYTE [DISTWIDTH] IN BLOOD BY AUTOMATED COUNT: 15.5 % (ref 11.6–15.1)
GFR SERPL CREATININE-BSD FRML MDRD: 70 ML/MIN/1.73SQ M
GLUCOSE SERPL-MCNC: 80 MG/DL (ref 65–140)
HCT VFR BLD AUTO: 43.9 % (ref 36.5–49.3)
HGB BLD-MCNC: 15.1 G/DL (ref 12–17)
IMM GRANULOCYTES # BLD AUTO: 0.01 THOUSAND/UL (ref 0–0.2)
IMM GRANULOCYTES NFR BLD AUTO: 0 % (ref 0–2)
LYMPHOCYTES # BLD AUTO: 1.57 THOUSANDS/ΜL (ref 0.6–4.47)
LYMPHOCYTES NFR BLD AUTO: 31 % (ref 14–44)
MCH RBC QN AUTO: 30.5 PG (ref 26.8–34.3)
MCHC RBC AUTO-ENTMCNC: 34.4 G/DL (ref 31.4–37.4)
MCV RBC AUTO: 89 FL (ref 82–98)
MONOCYTES # BLD AUTO: 0.48 THOUSAND/ΜL (ref 0.17–1.22)
MONOCYTES NFR BLD AUTO: 9 % (ref 4–12)
NEUTROPHILS # BLD AUTO: 2.7 THOUSANDS/ΜL (ref 1.85–7.62)
NEUTS SEG NFR BLD AUTO: 52 % (ref 43–75)
NRBC BLD AUTO-RTO: 0 /100 WBCS
PLATELET # BLD AUTO: 157 THOUSANDS/UL (ref 149–390)
PMV BLD AUTO: 9.2 FL (ref 8.9–12.7)
POTASSIUM SERPL-SCNC: 4.4 MMOL/L (ref 3.5–5.3)
PROT SERPL-MCNC: 6.4 G/DL (ref 6.4–8.4)
RBC # BLD AUTO: 4.95 MILLION/UL (ref 3.88–5.62)
SODIUM SERPL-SCNC: 137 MMOL/L (ref 135–147)
WBC # BLD AUTO: 5.15 THOUSAND/UL (ref 4.31–10.16)

## 2025-01-15 PROCEDURE — 80053 COMPREHEN METABOLIC PANEL: CPT

## 2025-01-15 PROCEDURE — 74177 CT ABD & PELVIS W/CONTRAST: CPT

## 2025-01-15 PROCEDURE — 85025 COMPLETE CBC W/AUTO DIFF WBC: CPT

## 2025-01-15 PROCEDURE — 82105 ALPHA-FETOPROTEIN SERUM: CPT

## 2025-01-15 PROCEDURE — 36415 COLL VENOUS BLD VENIPUNCTURE: CPT

## 2025-01-15 RX ADMIN — IOHEXOL 100 ML: 350 INJECTION, SOLUTION INTRAVENOUS at 16:19

## 2025-01-16 LAB — AFP-TM SERPL-MCNC: 732.33 NG/ML (ref 0–9)

## 2025-01-18 DIAGNOSIS — I10 ESSENTIAL HYPERTENSION: ICD-10-CM

## 2025-01-19 RX ORDER — AMLODIPINE BESYLATE 2.5 MG/1
2.5 TABLET ORAL DAILY
Qty: 90 TABLET | Refills: 1 | Status: SHIPPED | OUTPATIENT
Start: 2025-01-19

## 2025-01-21 PROBLEM — Z00.00 ENCOUNTER FOR ANNUAL WELLNESS VISIT: Status: ACTIVE | Noted: 2025-01-21

## 2025-01-23 ENCOUNTER — OFFICE VISIT (OUTPATIENT)
Dept: HEMATOLOGY ONCOLOGY | Facility: CLINIC | Age: 60
End: 2025-01-23
Payer: COMMERCIAL

## 2025-01-23 VITALS
SYSTOLIC BLOOD PRESSURE: 130 MMHG | TEMPERATURE: 97.9 F | BODY MASS INDEX: 27.85 KG/M2 | HEIGHT: 69 IN | RESPIRATION RATE: 18 BRPM | DIASTOLIC BLOOD PRESSURE: 80 MMHG | OXYGEN SATURATION: 98 % | WEIGHT: 188 LBS | HEART RATE: 82 BPM

## 2025-01-23 DIAGNOSIS — C22.0 HEPATOCELLULAR CARCINOMA (HCC): Primary | ICD-10-CM

## 2025-01-23 PROCEDURE — 99213 OFFICE O/P EST LOW 20 MIN: CPT | Performed by: INTERNAL MEDICINE

## 2025-01-23 NOTE — ASSESSMENT & PLAN NOTE
59 y.o. male with metastatic HCC, initially diagnosed on February 2, 2022.  The patient has distant metastatic disease to intra-abdominal lymph nodes and probable lung metastases.  The patient has received several treatments for his metastatic HCC including Ytrium 90 intrahepatic arterial embolization treatment in November 2022.  With this treatment serum Alpha-fetoprotein level came down nicely.  From from June 29, 2023 to August 1, 2024, the patient received frontline systemic therapy with atezolizumab plus bevacizumab for metastatic HCC.  After August 1, 2024 bevacizumab was discontinued due to proteinuria.  The patient receives single agent atezolizumab until September 12, 2024.  Frontline systemic therapy with atezolizumab plus bevacizumab was discontinued due to radiographic progression of metastatic disease as well as serum AFP rise.  On October 15, 2024, he initiated second line systemic targeted therapy with oral anti-VEGFR cabozantinib 40 mg per mouth daily. Initially, he tolerated cabozantinib well, however over the past several days in November 2024, he developed moderate to severe hand-foot syndrome caused by cabozantinib, characterized by erythema, irritation, pain, blisters, thickening and dryness of palms and soles as well as severe erythematous skin rash and superficial ulceration of the skin folds in both groin areas and the scrotum. On November 19, 2024 I advised him to stop cabozantinib. After stopping cabozantinib on November 19, 2024, the patient noticed significant improvement of his hand-foot syndrome.      Interim Assessment: On December 2025, the patient re-initiated cabozantinib 20 mg PO daily. Mr. Roberts has clinically significant benefit from cabozantinib.  On January 15, 2025, contrast enhanced CT scan of the abdomen and pelvis showed stable HCC. Specifically, there was liver cirrhosis. There were no new or enlarging masses. There were stable treated lesions in hepatic segment 8. There  was stable upper abdominal lymphadenopathy suspicious for metastases.  Overall, Mr. Roberts has stable metastatic HCC on cabozantinib 20 mg per mouth daily.  He is tolerating treatment well without treatment related adverse events or serious adverse events.      Plan:  Continue cabozantinib 20 mg per mouth daily.  Close follow-up for assessment of tolerability and safety of cabozantinib, specifically close evaluation hand-foot syndrome.  Continue topical therapy with skin lotion several times a day to affected areas including soles, palms, skin folds in the groin region, and scrotum  Periodic evaluation of serum AFP   Periodic contrast-enhanced CT scan of the chest, abdomen, and pelvis, as well as serum AFP  Return to medical oncology clinic for history and physical exam and to assess safety and tolerability of cabozantinib in early April 2025     Patient understands and agrees with my management recommendations and plan of care. I answered questions to his satisfaction.

## 2025-01-23 NOTE — PROGRESS NOTES
Name: Bassem Roberts      : 1965      MRN: [id number]  Encounter Provider: Josephine Goddard MD  Encounter Date: 2025   Encounter department: [facility]  :  Assessment & Plan  Hepatocellular carcinoma (HCC)  59 y.o. male with metastatic HCC, initially diagnosed on 2022.  The patient has distant metastatic disease to intra-abdominal lymph nodes and probable lung metastases.  The patient has received several treatments for his metastatic HCC including Ytrium 90 intrahepatic arterial embolization treatment in 2022.  With this treatment serum Alpha-fetoprotein level came down nicely.  From from 2023 to 2024, the patient received frontline systemic therapy with atezolizumab plus bevacizumab for metastatic HCC.  After 2024 bevacizumab was discontinued due to proteinuria.  The patient receives single agent atezolizumab until 2024.  Frontline systemic therapy with atezolizumab plus bevacizumab was discontinued due to radiographic progression of metastatic disease as well as serum AFP rise.  On October 15, 2024, he initiated second line systemic targeted therapy with oral anti-VEGFR cabozantinib 40 mg per mouth daily. Initially, he tolerated cabozantinib well, however over the past several days in 2024, he developed moderate to severe hand-foot syndrome caused by cabozantinib, characterized by erythema, irritation, pain, blisters, thickening and dryness of palms and soles as well as severe erythematous skin rash and superficial ulceration of the skin folds in both groin areas and the scrotum. On 2024 I advised him to stop cabozantinib. After stopping cabozantinib on 2024, the patient noticed significant improvement of his hand-foot syndrome.      Interim Assessment: On 2025, the patient re-initiated cabozantinib 20 mg PO daily. Mr. Roberts has clinically significant  benefit from cabozantinib.  On January 15, 2025, contrast enhanced CT scan of the abdomen and pelvis showed stable HCC. Specifically, there was liver cirrhosis. There were no new or enlarging masses. There were stable treated lesions in hepatic segment 8. There was stable upper abdominal lymphadenopathy suspicious for metastases.  Overall, Mr. Roberts has stable metastatic HCC on cabozantinib 20 mg per mouth daily.  He is tolerating treatment well without treatment related adverse events or serious adverse events.      Plan:  Continue cabozantinib 20 mg per mouth daily.  Close follow-up for assessment of tolerability and safety of cabozantinib, specifically close evaluation hand-foot syndrome.  Continue topical therapy with skin lotion several times a day to affected areas including soles, palms, skin folds in the groin region, and scrotum  Periodic evaluation of serum AFP   Periodic contrast-enhanced CT scan of the chest, abdomen, and pelvis, as well as serum AFP  Return to medical oncology clinic for history and physical exam and to assess safety and tolerability of cabozantinib in early April 2025     Patient understands and agrees with my management recommendations and plan of care. I answered questions to his satisfaction.              History of Present Illness   No chief complaint on file.  59 y.o. male with metastatic HCC, initially diagnosed on February 2, 2022.  The patient has distant metastatic disease to intra-abdominal lymph nodes and probable lung metastases.  The patient has received several treatments for his metastatic HCC including Ytrium 90 intrahepatic arterial embolization treatment in November 2022.  With this treatment serum Alpha-fetoprotein level came down nicely.  From from June 29, 2023 to August 1, 2024, the patient received frontline systemic therapy with atezolizumab plus bevacizumab for metastatic HCC.  After August 1, 2024 bevacizumab was discontinued due to proteinuria.  The patient  receives single agent atezolizumab until September 12, 2024.  Frontline systemic therapy with atezolizumab plus bevacizumab was discontinued due to radiographic progression of metastatic disease as well as serum AFP rise.  On October 15, 2024, he initiated second line systemic targeted therapy with oral anti-VEGFR cabozantinib 40 mg per mouth daily. Initially, he tolerated cabozantinib well, however over the past several days in November 2024, he developed moderate to severe hand-foot syndrome caused by cabozantinib, characterized by erythema, irritation, pain, blisters, thickening and dryness of palms and soles as well as severe erythematous skin rash and superficial ulceration of the skin folds in both groin areas and the scrotum. On November 19, 2024 I advised him to stop cabazantinib.     After stopping cabozantinib on November 19, 2024, the patient has noticed significant improvement of his hand-foot syndrome.  He is ready to reinitiate cabozantinib at a dose of 20 mg per mouth daily.  Mr. Roberts has clinically significant benefit from cabozantinib.  He has a marked reduction of his serum AFP, highly suggestive of metastatic HCC response to cabozantinib.  Off cabozantinib, the patient noticed improvement of skin erythema, irritation, pain, blisters, thickening and dryness of palms and soles as well as improvement of erythematous skin rash and superficial ulceration of the skin folds in both groin areas and the scrotum.    Interim History: The patient has stable metastatic HCC.  Currently, he takes cabozantinib 20 mg per mouth daily.  He is tolerating treatment well without serious adverse events.  He has mild irritation in palms and soles with some skin thickening but no blisters or ulceration.  He denies new systemic, cardiorespiratory, gastrointestinal, genitourinary, musculoskeletal, or neurologic symptoms.    Oncology History   Cancer Staging   Hepatocellular carcinoma (HCC)  Staging form: Liver (Excluding  Intrahepatic Bile Ducts), AJCC 8th Edition  - Clinical stage from 7/13/2022: Stage IVB (rcT3, cN1, pM1) - Signed by Taye Lundberg MD on 1/30/2024  Stage prefix: Recurrence  Histologic grade (G): G2  Histologic grading system: 4 grade system  Oncology History   Hepatocellular carcinoma (HCC)   2022 Initial Diagnosis    Hepatocellular carcinoma (HCC)     2/8/2022 Biopsy    [facility] DOSHI US guided liver biopsy: right liver lobe:  Poorly differentiated HCC with patchy necrosis       7/13/2022 -  Cancer Staged    Staging form: Liver (Excluding Intrahepatic Bile Ducts), AJCC 8th Edition  - Clinical stage from 7/13/2022: Stage IVB (rcT3, cN1, pM1) - Signed by Taye Lundberg MD on 1/30/2024  Stage prefix: Recurrence  Histologic grade (G): G2  Histologic grading system: 4 grade system       6/29/2023 - 9/12/2024 Chemotherapy    alteplase (CATHFLO), 2 mg, Intracatheter, Every 1 Minute as needed, 21 of 24 cycles  atezolizumab (TECENTRIQ) IVPB, 1,200 mg, Intravenous, Once, 21 of 24 cycles  Administration: 1,200 mg (6/29/2023), 1,200 mg (7/20/2023), 1,200 mg (8/10/2023), 1,200 mg (8/31/2023), 1,200 mg (9/21/2023), 1,200 mg (10/10/2023), 1,200 mg (11/2/2023), 1,200 mg (11/24/2023), 1,200 mg (12/14/2023), 1,200 mg (1/4/2024), 1,200 mg (1/26/2024), 1,200 mg (3/7/2024), 1,200 mg (3/28/2024), 1,200 mg (4/18/2024), 1,200 mg (5/9/2024), 1,200 mg (5/30/2024), 1,200 mg (6/20/2024), 1,200 mg (7/11/2024), 1,200 mg (8/1/2024), 1,200 mg (8/22/2024), 1,200 mg (9/12/2024)  bevacizumab-awwb (MVASI) IVPB, 1,345 mg (100 % of original dose 15 mg/kg), Intravenous, Once, 19 of 22 cycles  Dose modification: 15 mg/kg (original dose 15 mg/kg, Cycle 1), 15 mg/kg (original dose 15 mg/kg, Cycle 2), 15 mg/kg (original dose 15 mg/kg, Cycle 3)  Administration: 1,300 mg (6/29/2023), 1,300 mg (7/20/2023), 1,300 mg (8/10/2023), 1,300 mg (8/31/2023), 1,300 mg (9/21/2023), 1,300 mg (10/10/2023), 1,300 mg (11/2/2023), 1,300 mg (11/24/2023), 1,300 mg  (12/14/2023), 1,300 mg (1/4/2024), 1,300 mg (1/26/2024), 1,300 mg (3/7/2024), 1,300 mg (3/28/2024), 1,300 mg (4/18/2024), 1,300 mg (5/9/2024), 1,300 mg (5/30/2024), 1,300 mg (6/20/2024), 1,200 mg (7/11/2024), 1,200 mg (8/1/2024)        Pertinent Medical History   01/23/25:   Past Medical History:   Diagnosis Date    Hypertension     Liver cancer (HCC)        Review of Systems   Constitutional:  Negative for activity change, appetite change, chills, diaphoresis, fatigue, fever and unexpected weight change.   HENT:  Negative for congestion, dental problem, ear discharge, ear pain, facial swelling, hearing loss, mouth sores, nosebleeds, postnasal drip, rhinorrhea, sinus pain, sneezing, sore throat, tinnitus, trouble swallowing and voice change.    Eyes:  Negative for photophobia, pain, discharge, redness, itching and visual disturbance.   Respiratory:  Negative for apnea, cough, choking, chest tightness, shortness of breath, wheezing and stridor.    Cardiovascular:  Negative for chest pain, palpitations and leg swelling.   Gastrointestinal:  Negative for abdominal distention, abdominal pain, anal bleeding, blood in stool, constipation, diarrhea, nausea, rectal pain and vomiting.   Endocrine: Negative for cold intolerance and heat intolerance.   Genitourinary:  Negative for decreased urine volume, difficulty urinating, dysuria, enuresis, flank pain, frequency, hematuria and urgency.   Musculoskeletal:  Negative for arthralgias, back pain, gait problem, joint swelling, myalgias, neck pain and neck stiffness.   Skin:  Negative for color change, pallor, rash and wound.        Mild thickening of the skin of the palms and soles   Neurological:  Negative for dizziness, tremors, seizures, syncope, facial asymmetry, speech difficulty, weakness, light-headedness, numbness and headaches.   Hematological:  Negative for adenopathy. Does not bruise/bleed easily.   Psychiatric/Behavioral:  Negative for behavioral problems, confusion,  dysphoric mood and sleep disturbance. The patient is not nervous/anxious.          Objective   There were no vitals taken for this visit.    Pain Screening:     ECOG   0  Physical Exam  Constitutional:       Comments:  male without acute respiratory distress   HENT:      Head: Normocephalic and atraumatic.      Nose: Nose normal.      Mouth/Throat:      Mouth: Mucous membranes are moist.      Pharynx: Oropharynx is clear.   Eyes:      Extraocular Movements: Extraocular movements intact.      Conjunctiva/sclera: Conjunctivae normal.      Pupils: Pupils are equal, round, and reactive to light.      Comments: Scleral icterus   Neck:      Comments: No cervical, supraclavicular, axillary, epitrochlear, or inguinal lymphadenopathy.   Cardiovascular:      Rate and Rhythm: Normal rate and regular rhythm.      Pulses: Normal pulses.      Heart sounds: Normal heart sounds.   Pulmonary:      Effort: Pulmonary effort is normal.      Breath sounds: Normal breath sounds.   Abdominal:      General: Bowel sounds are normal.      Palpations: Abdomen is soft.      Comments: Abdomen soft, nontender, nonpainful.  No hepatomegaly.  No splenomegaly.  No rebound tenderness.  No lateral or shifting dullness.  Bowel sounds present, normal.    Musculoskeletal:         General: Normal range of motion.      Cervical back: Normal range of motion and neck supple.   Skin:     General: Skin is warm and dry.      Capillary Refill: Capillary refill takes less than 2 seconds.   Neurological:      General: No focal deficit present.      Mental Status: He is alert and oriented to person, place, and time. Mental status is at baseline.   Psychiatric:         Mood and Affect: Mood normal.         Behavior: Behavior normal.         Thought Content: Thought content normal.         Judgment: Judgment normal.     Labs: I have reviewed the following labs:  Lab Results   Component Value Date/Time    WBC 5.15 01/15/2025 03:39 PM    RBC 4.95 01/15/2025  03:39 PM    Hemoglobin 15.1 01/15/2025 03:39 PM    Hematocrit 43.9 01/15/2025 03:39 PM    MCV 89 01/15/2025 03:39 PM    MCH 30.5 01/15/2025 03:39 PM    RDW 15.5 (H) 01/15/2025 03:39 PM    Platelets 157 01/15/2025 03:39 PM    Segmented % 52 01/15/2025 03:39 PM    Lymphocytes % 31 01/15/2025 03:39 PM    Monocytes % 9 01/15/2025 03:39 PM    Eosinophils Relative 7 (H) 01/15/2025 03:39 PM    Basophils Relative 1 01/15/2025 03:39 PM    Immature Grans % 0 01/15/2025 03:39 PM    Absolute Neutrophils 2.70 01/15/2025 03:39 PM     Lab Results   Component Value Date/Time    Potassium 4.4 01/15/2025 03:39 PM    Chloride 100 01/15/2025 03:39 PM    CO2 32 01/15/2025 03:39 PM    BUN 13 01/15/2025 03:39 PM    Creatinine 1.13 01/15/2025 03:39 PM    Glucose, Fasting 111 (H) 11/27/2024 07:11 AM    Calcium 9.5 01/15/2025 03:39 PM    Corrected Calcium 9.0 11/27/2024 07:11 AM    AST 30 01/15/2025 03:39 PM    ALT 25 01/15/2025 03:39 PM    Alkaline Phosphatase 93 01/15/2025 03:39 PM    Total Protein 6.4 01/15/2025 03:39 PM    Albumin 3.8 01/15/2025 03:39 PM    Total Bilirubin 0.92 01/15/2025 03:39 PM    eGFR 70 01/15/2025 03:39 PM     SERUM AFP LEVELS:    Component      Latest Ref Rng 9/21/2022 1/21/2023 4/22/2023 6/24/2023 8/29/2023   AFP-TUMOR MARKER      0.00 - 9.00 ng/mL 4,961.3 (H)  574.5 (H)  1,912.6 (H)  8,227.11 (H)  1,892.23 (H)      Component      Latest Ref Rng 11/1/20232023 1/24/2024 4/16/2024 6/19/2024 8/20/2024 9/30/2024   AFP-TUMOR MARKER      0.00 - 9.00 ng/mL 33.51 (H)  18.93 (H)  32.65 (H)  85.12 (H)  182.30 (H)  392.19 (H)      Component      Latest Ref Rng 10/24/2024 11/27/2024 12/17/2024 1/15/2025   AFP-TUMOR MARKER      0.00 - 9.00 ng/mL 785.71 (H)  155.34 (H)  594.88 (H)  732.33 (H)

## 2025-01-30 ENCOUNTER — TELEPHONE (OUTPATIENT)
Age: 60
End: 2025-01-30

## 2025-01-30 DIAGNOSIS — M62.838 MUSCLE SPASM: Primary | ICD-10-CM

## 2025-01-30 RX ORDER — METHOCARBAMOL 500 MG/1
500 TABLET, FILM COATED ORAL 3 TIMES DAILY PRN
Qty: 60 TABLET | Refills: 1 | Status: SHIPPED | OUTPATIENT
Start: 2025-01-30

## 2025-01-30 NOTE — TELEPHONE ENCOUNTER
Patient called stating that he is having body cramping all over while taking his Cabozantinib. He states he mentioned to Dr. Goddard at his last office visit and was offered a medication to help which he declined at that time but that he would like to try it now.    He is currently taking Cabozantinib 20 mg PO daily. The cramping started about 2 weeks ago. He reports his legs hurt the worst. He feels his muscles cramp 10/10 discomfort making it difficult to move. It is not 24/7 but about 3 times an hour and they last for about 1-2 minutes.    I asked about hydration status and he states he drinks a lot of tea all day. I advised to increase de-caffeinated fluid intake throughout the day. I advised that Dr. Goddard would review his symptoms and someone would call back to advise if medication can be prescribed to his pharmacy ([facility] in [place]).    He reports also that he has one small dry spot on his finger which is not bad at this point. Encouraged moisturizers.

## 2025-01-30 NOTE — TELEPHONE ENCOUNTER
Returned call to patient re: muscle cramps.  Made aware prescription for Methocarbamol will be sent to his pharmacy.  Reviewed instructions and that Dr Goddard advised taking it at bedtime.  Stated understanding.  No further questions at this time

## 2025-02-20 PROBLEM — Z00.00 ENCOUNTER FOR ANNUAL WELLNESS VISIT: Status: RESOLVED | Noted: 2025-01-21 | Resolved: 2025-02-20

## 2025-02-21 DIAGNOSIS — I10 ESSENTIAL HYPERTENSION: ICD-10-CM

## 2025-02-21 RX ORDER — LISINOPRIL AND HYDROCHLOROTHIAZIDE 20; 25 MG/1; MG/1
1 TABLET ORAL DAILY
Qty: 90 TABLET | Refills: 1 | Status: SHIPPED | OUTPATIENT
Start: 2025-02-21

## 2025-03-25 ENCOUNTER — TELEPHONE (OUTPATIENT)
Dept: HEMATOLOGY ONCOLOGY | Facility: CLINIC | Age: 60
End: 2025-03-25

## 2025-03-25 NOTE — TELEPHONE ENCOUNTER
Informed Patient of Upcoming appt on thurs 3/27 with Dr. Goddard, Patient has labs that needs done prior to Visit.    [place] # was given for any question, comment or concerns.

## 2025-03-26 ENCOUNTER — APPOINTMENT (OUTPATIENT)
Dept: LAB | Facility: CLINIC | Age: 60
End: 2025-03-26
Payer: COMMERCIAL

## 2025-03-26 DIAGNOSIS — C22.0 HEPATOCELLULAR CARCINOMA (HCC): ICD-10-CM

## 2025-03-26 LAB
AFP-TM SERPL-MCNC: 2322.1 NG/ML (ref 0–9)
ALBUMIN SERPL BCG-MCNC: 3.8 G/DL (ref 3.5–5)
ALP SERPL-CCNC: 64 U/L (ref 34–104)
ALT SERPL W P-5'-P-CCNC: 33 U/L (ref 7–52)
ANION GAP SERPL CALCULATED.3IONS-SCNC: 8 MMOL/L (ref 4–13)
AST SERPL W P-5'-P-CCNC: 48 U/L (ref 13–39)
BASOPHILS # BLD AUTO: 0.01 THOUSANDS/ÂΜL (ref 0–0.1)
BASOPHILS NFR BLD AUTO: 0 % (ref 0–1)
BILIRUB SERPL-MCNC: 0.92 MG/DL (ref 0.2–1)
BUN SERPL-MCNC: 18 MG/DL (ref 5–25)
CALCIUM SERPL-MCNC: 8.9 MG/DL (ref 8.4–10.2)
CHLORIDE SERPL-SCNC: 101 MMOL/L (ref 96–108)
CO2 SERPL-SCNC: 25 MMOL/L (ref 21–32)
CREAT SERPL-MCNC: 0.99 MG/DL (ref 0.6–1.3)
EOSINOPHIL # BLD AUTO: 0.13 THOUSAND/ÂΜL (ref 0–0.61)
EOSINOPHIL NFR BLD AUTO: 3 % (ref 0–6)
ERYTHROCYTE [DISTWIDTH] IN BLOOD BY AUTOMATED COUNT: 14.1 % (ref 11.6–15.1)
GFR SERPL CREATININE-BSD FRML MDRD: 82 ML/MIN/1.73SQ M
GLUCOSE SERPL-MCNC: 127 MG/DL (ref 65–140)
HCT VFR BLD AUTO: 39.6 % (ref 36.5–49.3)
HGB BLD-MCNC: 13.8 G/DL (ref 12–17)
IMM GRANULOCYTES # BLD AUTO: 0.01 THOUSAND/UL (ref 0–0.2)
IMM GRANULOCYTES NFR BLD AUTO: 0 % (ref 0–2)
LYMPHOCYTES # BLD AUTO: 1.03 THOUSANDS/ÂΜL (ref 0.6–4.47)
LYMPHOCYTES NFR BLD AUTO: 23 % (ref 14–44)
MCH RBC QN AUTO: 31.9 PG (ref 26.8–34.3)
MCHC RBC AUTO-ENTMCNC: 34.8 G/DL (ref 31.4–37.4)
MCV RBC AUTO: 92 FL (ref 82–98)
MONOCYTES # BLD AUTO: 0.41 THOUSAND/ÂΜL (ref 0.17–1.22)
MONOCYTES NFR BLD AUTO: 9 % (ref 4–12)
NEUTROPHILS # BLD AUTO: 2.94 THOUSANDS/ÂΜL (ref 1.85–7.62)
NEUTS SEG NFR BLD AUTO: 65 % (ref 43–75)
NRBC BLD AUTO-RTO: 0 /100 WBCS
PLATELET # BLD AUTO: 151 THOUSANDS/UL (ref 149–390)
PMV BLD AUTO: 9.5 FL (ref 8.9–12.7)
POTASSIUM SERPL-SCNC: 4 MMOL/L (ref 3.5–5.3)
PROT SERPL-MCNC: 6.1 G/DL (ref 6.4–8.4)
RBC # BLD AUTO: 4.32 MILLION/UL (ref 3.88–5.62)
SODIUM SERPL-SCNC: 134 MMOL/L (ref 135–147)
T4 FREE SERPL-MCNC: 0.85 NG/DL (ref 0.61–1.12)
TSH SERPL DL<=0.05 MIU/L-ACNC: 9.76 UIU/ML (ref 0.45–4.5)
WBC # BLD AUTO: 4.53 THOUSAND/UL (ref 4.31–10.16)

## 2025-03-26 PROCEDURE — 80053 COMPREHEN METABOLIC PANEL: CPT

## 2025-03-26 PROCEDURE — 84443 ASSAY THYROID STIM HORMONE: CPT

## 2025-03-26 PROCEDURE — 85025 COMPLETE CBC W/AUTO DIFF WBC: CPT

## 2025-03-26 PROCEDURE — 36415 COLL VENOUS BLD VENIPUNCTURE: CPT

## 2025-03-26 PROCEDURE — 84439 ASSAY OF FREE THYROXINE: CPT

## 2025-03-26 PROCEDURE — 82105 ALPHA-FETOPROTEIN SERUM: CPT

## 2025-03-27 ENCOUNTER — OFFICE VISIT (OUTPATIENT)
Dept: HEMATOLOGY ONCOLOGY | Facility: CLINIC | Age: 60
End: 2025-03-27
Payer: COMMERCIAL

## 2025-03-27 VITALS
WEIGHT: 187 LBS | TEMPERATURE: 97.2 F | OXYGEN SATURATION: 98 % | RESPIRATION RATE: 18 BRPM | DIASTOLIC BLOOD PRESSURE: 80 MMHG | HEART RATE: 76 BPM | SYSTOLIC BLOOD PRESSURE: 150 MMHG | BODY MASS INDEX: 27.7 KG/M2 | HEIGHT: 69 IN

## 2025-03-27 DIAGNOSIS — E03.8 OTHER SPECIFIED HYPOTHYROIDISM: Primary | ICD-10-CM

## 2025-03-27 DIAGNOSIS — C22.0 HEPATOCELLULAR CARCINOMA (HCC): Primary | ICD-10-CM

## 2025-03-27 PROCEDURE — 99213 OFFICE O/P EST LOW 20 MIN: CPT | Performed by: INTERNAL MEDICINE

## 2025-03-27 RX ORDER — LEVOTHYROXINE SODIUM 25 UG/1
25 TABLET ORAL DAILY
Qty: 30 TABLET | Refills: 10 | Status: SHIPPED | OUTPATIENT
Start: 2025-03-27

## 2025-03-27 NOTE — PROGRESS NOTES
Name: Bassem Roberts      : 1965      MRN: [id number]  Encounter Provider: Josephine Goddard MD  Encounter Date: 3/27/2025   Encounter department: [facility]  :  Assessment & Plan  Hepatocellular carcinoma (HCC)  60 y.o. male with metastatic HCC, initially diagnosed on 2022.  The patient has distant metastatic disease to intra-abdominal lymph nodes and probable lung metastases.  The patient has received several treatments for his metastatic HCC including Ytrium 90 intrahepatic arterial embolization treatment in 2022.  With this treatment serum Alpha-fetoprotein level came down nicely.  From from 2023 to 2024, the patient received frontline systemic therapy with atezolizumab plus bevacizumab for metastatic HCC.  After 2024 bevacizumab was discontinued due to proteinuria.  The patient receives single agent atezolizumab until 2024.  Frontline systemic therapy with atezolizumab plus bevacizumab was discontinued due to radiographic progression of metastatic disease as well as serum AFP rise.  On October 15, 2024, he initiated second line systemic targeted therapy with oral anti-VEGFR cabozantinib 40 mg per mouth daily. Initially, he tolerated cabozantinib well, however over the past several days in 2024, he developed moderate to severe hand-foot syndrome caused by cabozantinib, characterized by erythema, irritation, pain, blisters, thickening and dryness of palms and soles as well as severe erythematous skin rash and superficial ulceration of the skin folds in both groin areas and the scrotum. On 2024 I advised him to stop cabozantinib. After stopping cabozantinib on 2024, the patient noticed significant improvement of his hand-foot syndrome.       On 2025, the patient re-initiated cabozantinib 20 mg PO daily. Mr. Roberts has clinically significant benefit from  cabozantinib.  On January 15, 2025, contrast enhanced CT scan of the abdomen and pelvis showed stable HCC. Specifically, there was liver cirrhosis. There were no new or enlarging masses. There were stable treated lesions in hepatic segment 8. There was stable upper abdominal lymphadenopathy suspicious for metastases.      Interim Assessment: At the present time, Mr. Roberts takes cabozantinib 20 mg per mouth daily.  Treatment is associated with moderate hand-foot syndrome characterized by irritation and skin thickening of palms and soles.  Otherwise, he is tolerating dose reduced cabozantinib well.  Of note, the patient has serum AFP rise, suggestive of progression of multifocal, unresectable hepatocellular carcinoma.  Today he does not have new complaints.     Plan:  For now, continue cabozantinib 20 mg per mouth daily.  Close monitoring of cabozantinib associated toxicity  Continue topical therapy with skin lotion several times a day to affected areas including soles, palms, skin folds in the groin region, and scrotum, as needed  Periodic evaluation of serum AFP   Contrast-enhanced CT scan of the chest, abdomen, and pelvis within the next 2 weeks   Return to medical oncology clinic for history and physical exam, to assess safety and tolerability of cabozantinib, and to evaluate results of contrast-enhanced CT scan of the chest, abdomen and pelvis on April 24, 2025     Mr. Roberts understands and agrees with my management recommendations and plan of care. I answered questions to his satisfaction.      History of Present Illness   Chief Complaint   Patient presents with    Follow-up   60 y.o. male with metastatic HCC, initially diagnosed on February 2, 2022.  The patient has distant metastatic disease to intra-abdominal lymph nodes and probable lung metastases.  The patient has received several treatments for his metastatic HCC including Ytrium 90 intrahepatic arterial embolization treatment in November 2022.  With  this treatment serum Alpha-fetoprotein level came down nicely.  From from June 29, 2023 to August 1, 2024, the patient received frontline systemic therapy with atezolizumab plus bevacizumab for metastatic HCC.  After August 1, 2024 bevacizumab was discontinued due to proteinuria.  The patient receives single agent atezolizumab until September 12, 2024.  Frontline systemic therapy with atezolizumab plus bevacizumab was discontinued due to radiographic progression of metastatic disease as well as serum AFP rise.  On October 15, 2024, he initiated second line systemic targeted therapy with oral anti-VEGFR cabozantinib 40 mg per mouth daily. Initially, he tolerated cabozantinib well, however over the past several days in November 2024, he developed moderate to severe hand-foot syndrome caused by cabozantinib, characterized by erythema, irritation, pain, blisters, thickening and dryness of palms and soles as well as severe erythematous skin rash and superficial ulceration of the skin folds in both groin areas and the scrotum. On November 19, 2024 I advised him to stop cabozantinib. After stopping cabozantinib on November 19, 2024, the patient noticed significant improvement of his hand-foot syndrome.       On December 2025, the patient re-initiated cabozantinib 20 mg PO daily. Mr. Roberts has clinically significant benefit from cabozantinib.  On January 15, 2025, contrast enhanced CT scan of the abdomen and pelvis showed stable HCC. Specifically, there was liver cirrhosis. There were no new or enlarging masses. There were stable treated lesions in hepatic segment 8. There was stable upper abdominal lymphadenopathy suspicious for metastases.      Interim History: At the present time, Mr. Roberts takes cabozantinib 20 mg per mouth daily.  Treatment is associated with moderate hand-foot syndrome characterized by irritation and skin thickening of palms and soles.  Otherwise, he is tolerating dose reduced cabozantinib well.   Of note, the patient has serum AFP rise.  Today he does not have new complaints.  He denies new gastrointestinal symptoms such as odynophagia, dysphagia, hematemesis, melena, hematochezia, abdominal pain, abdominal distention, changes in bowel habits, or jaundice.  He denies new systemic, cardiorespiratory, genitourinary, musculoskeletal, or neurologic symptoms.    Oncology History   Cancer Staging   Hepatocellular carcinoma (HCC)  Staging form: Liver (Excluding Intrahepatic Bile Ducts), AJCC 8th Edition  - Clinical stage from 7/13/2022: Stage IVB (rcT3, cN1, pM1) - Signed by Taye Lundberg MD on 1/30/2024  Stage prefix: Recurrence  Histologic grade (G): G2  Histologic grading system: 4 grade system  Oncology History   Hepatocellular carcinoma (HCC)   2022 Initial Diagnosis    Hepatocellular carcinoma (HCC)     2/8/2022 Biopsy    [facility] DOSHI US guided liver biopsy: right liver lobe:  Poorly differentiated HCC with patchy necrosis       7/13/2022 -  Cancer Staged    Staging form: Liver (Excluding Intrahepatic Bile Ducts), AJCC 8th Edition  - Clinical stage from 7/13/2022: Stage IVB (rcT3, cN1, pM1) - Signed by Taye Lundberg MD on 1/30/2024  Stage prefix: Recurrence  Histologic grade (G): G2  Histologic grading system: 4 grade system       6/29/2023 - 9/12/2024 Chemotherapy    alteplase (CATHFLO), 2 mg, Intracatheter, Every 1 Minute as needed, 21 of 24 cycles  atezolizumab (TECENTRIQ) IVPB, 1,200 mg, Intravenous, Once, 21 of 24 cycles  Administration: 1,200 mg (6/29/2023), 1,200 mg (7/20/2023), 1,200 mg (8/10/2023), 1,200 mg (8/31/2023), 1,200 mg (9/21/2023), 1,200 mg (10/10/2023), 1,200 mg (11/2/2023), 1,200 mg (11/24/2023), 1,200 mg (12/14/2023), 1,200 mg (1/4/2024), 1,200 mg (1/26/2024), 1,200 mg (3/7/2024), 1,200 mg (3/28/2024), 1,200 mg (4/18/2024), 1,200 mg (5/9/2024), 1,200 mg (5/30/2024), 1,200 mg (6/20/2024), 1,200 mg (7/11/2024), 1,200 mg (8/1/2024), 1,200 mg (8/22/2024), 1,200 mg  (9/12/2024)  bevacizumab-awwb (MVASI) IVPB, 1,345 mg (100 % of original dose 15 mg/kg), Intravenous, Once, 19 of 22 cycles  Dose modification: 15 mg/kg (original dose 15 mg/kg, Cycle 1), 15 mg/kg (original dose 15 mg/kg, Cycle 2), 15 mg/kg (original dose 15 mg/kg, Cycle 3)  Administration: 1,300 mg (6/29/2023), 1,300 mg (7/20/2023), 1,300 mg (8/10/2023), 1,300 mg (8/31/2023), 1,300 mg (9/21/2023), 1,300 mg (10/10/2023), 1,300 mg (11/2/2023), 1,300 mg (11/24/2023), 1,300 mg (12/14/2023), 1,300 mg (1/4/2024), 1,300 mg (1/26/2024), 1,300 mg (3/7/2024), 1,300 mg (3/28/2024), 1,300 mg (4/18/2024), 1,300 mg (5/9/2024), 1,300 mg (5/30/2024), 1,300 mg (6/20/2024), 1,200 mg (7/11/2024), 1,200 mg (8/1/2024)        Pertinent Medical History   Past Medical History:   Diagnosis Date    Hypertension     Liver cancer (HCC)             Review of Systems   Constitutional:  Negative for activity change, appetite change, chills, diaphoresis, fatigue, fever and unexpected weight change.   HENT:  Negative for congestion, dental problem, ear discharge, ear pain, facial swelling, hearing loss, mouth sores, nosebleeds, postnasal drip, rhinorrhea, sinus pain, sneezing, sore throat, tinnitus, trouble swallowing and voice change.    Eyes:  Negative for photophobia, pain, discharge, redness, itching and visual disturbance.   Respiratory:  Negative for apnea, cough, choking, chest tightness, shortness of breath, wheezing and stridor.    Cardiovascular:  Negative for chest pain, palpitations and leg swelling.   Gastrointestinal:  Negative for abdominal distention, abdominal pain, anal bleeding, blood in stool, constipation, diarrhea, nausea, rectal pain and vomiting.   Endocrine: Negative for cold intolerance and heat intolerance.   Genitourinary:  Negative for decreased urine volume, difficulty urinating, dysuria, enuresis, flank pain, frequency, hematuria and urgency.   Musculoskeletal:  Negative for arthralgias, back pain, gait problem, joint  "swelling, myalgias, neck pain and neck stiffness.   Skin:  Negative for color change, pallor, rash and wound.        Thickening, irritation, discomfort of skin in palms and soles   Neurological:  Negative for dizziness, tremors, seizures, syncope, facial asymmetry, speech difficulty, weakness, light-headedness, numbness and headaches.   Hematological:  Negative for adenopathy. Does not bruise/bleed easily.   Psychiatric/Behavioral:  Negative for behavioral problems, confusion, dysphoric mood and sleep disturbance. The patient is not nervous/anxious.      Objective   /80 (BP Location: Left arm, Patient Position: Sitting, Cuff Size: Adult)   Pulse 76   Temp (!) 97.2 °F (36.2 °C) (Temporal)   Resp 18   Ht 5' 9\" (1.753 m)   Wt 84.8 kg (187 lb)   SpO2 98%   BMI 27.62 kg/m²     Pain Screening:  Pain Score: 0-No pain  ECOG   0  Physical Exam  Constitutional:       Comments: Male patient without acute respiratory distress   HENT:      Head: Normocephalic and atraumatic.      Nose: Nose normal.      Mouth/Throat:      Mouth: Mucous membranes are moist.      Pharynx: Oropharynx is clear.      Comments: No lesions or ulceration the oral mucosa  Eyes:      Extraocular Movements: Extraocular movements intact.      Conjunctiva/sclera: Conjunctivae normal.      Pupils: Pupils are equal, round, and reactive to light.      Comments: No scleral icterus   Neck:      Comments: No cervical, supraclavicular, axillary, epitrochlear, or inguinal lymphadenopathy.   Cardiovascular:      Rate and Rhythm: Normal rate and regular rhythm.      Pulses: Normal pulses.      Heart sounds: Normal heart sounds.   Pulmonary:      Effort: Pulmonary effort is normal.      Breath sounds: Normal breath sounds.   Abdominal:      General: Bowel sounds are normal.      Palpations: Abdomen is soft.      Comments: Abdomen soft, nontender, nonpainful.  No hepatomegaly.  No splenomegaly.  No rebound tenderness.  No lateral or shifting dullness.  " Bowel sounds present, normal.    Musculoskeletal:         General: Normal range of motion.      Cervical back: Normal range of motion and neck supple.   Skin:     General: Skin is warm and dry.      Capillary Refill: Capillary refill takes less than 2 seconds.   Neurological:      General: No focal deficit present.      Mental Status: He is alert and oriented to person, place, and time. Mental status is at baseline.      Comments: No asterixis   Psychiatric:         Mood and Affect: Mood normal.         Behavior: Behavior normal.         Thought Content: Thought content normal.         Judgment: Judgment normal.         Labs: I have reviewed the following labs:  Lab Results   Component Value Date/Time    WBC 4.53 03/26/2025 03:13 PM    RBC 4.32 03/26/2025 03:13 PM    Hemoglobin 13.8 03/26/2025 03:13 PM    Hematocrit 39.6 03/26/2025 03:13 PM    MCV 92 03/26/2025 03:13 PM    MCH 31.9 03/26/2025 03:13 PM    RDW 14.1 03/26/2025 03:13 PM    Platelets 151 03/26/2025 03:13 PM    Segmented % 65 03/26/2025 03:13 PM    Lymphocytes % 23 03/26/2025 03:13 PM    Monocytes % 9 03/26/2025 03:13 PM    Eosinophils Relative 3 03/26/2025 03:13 PM    Basophils Relative 0 03/26/2025 03:13 PM    Immature Grans % 0 03/26/2025 03:13 PM    Absolute Neutrophils 2.94 03/26/2025 03:13 PM     Lab Results   Component Value Date/Time    Potassium 4.0 03/26/2025 03:13 PM    Chloride 101 03/26/2025 03:13 PM    CO2 25 03/26/2025 03:13 PM    BUN 18 03/26/2025 03:13 PM    Creatinine 0.99 03/26/2025 03:13 PM    Glucose, Fasting 111 (H) 11/27/2024 07:11 AM    Calcium 8.9 03/26/2025 03:13 PM    Corrected Calcium 9.0 11/27/2024 07:11 AM    AST 48 (H) 03/26/2025 03:13 PM    ALT 33 03/26/2025 03:13 PM    Alkaline Phosphatase 64 03/26/2025 03:13 PM    Total Protein 6.1 (L) 03/26/2025 03:13 PM    Albumin 3.8 03/26/2025 03:13 PM    Total Bilirubin 0.92 03/26/2025 03:13 PM    eGFR 82 03/26/2025 03:13 PM       SERUM AFP LEVELS:    Component      Latest Ref Rng  9/21/2022 1/21/2023 4/22/2023 6/24/2023 8/29/2023   AFP-TUMOR MARKER      0.00 - 9.00 ng/mL 4,961.3 (H)  574.5 (H)  1,912.6 (H)  8,227.11 (H)  1,892.23 (H)      Component      Latest Ref Rng 11/1/2023 1/24/2024 4/16/2024 6/19/2024 8/20/2024 9/30/2024   AFP-TUMOR MARKER      0.00 - 9.00 ng/mL 33.51 (H)  18.93 (H)  32.65 (H)  85.12 (H)  182.30 (H)  392.19 (H)      Component      Latest Ref Rng 10/24/2024 11/27/2024 12/17/2024 1/15/2025 3/26/2025   AFP-TUMOR MARKER      0.00 - 9.00 ng/mL 785.71 (H)  155.34 (H)  594.88 (H)  732.33 (H)  2,322.10 (H)

## 2025-03-27 NOTE — ASSESSMENT & PLAN NOTE
60 y.o. male with metastatic HCC, initially diagnosed on February 2, 2022.  The patient has distant metastatic disease to intra-abdominal lymph nodes and probable lung metastases.  The patient has received several treatments for his metastatic HCC including Ytrium 90 intrahepatic arterial embolization treatment in November 2022.  With this treatment serum Alpha-fetoprotein level came down nicely.  From from June 29, 2023 to August 1, 2024, the patient received frontline systemic therapy with atezolizumab plus bevacizumab for metastatic HCC.  After August 1, 2024 bevacizumab was discontinued due to proteinuria.  The patient receives single agent atezolizumab until September 12, 2024.  Frontline systemic therapy with atezolizumab plus bevacizumab was discontinued due to radiographic progression of metastatic disease as well as serum AFP rise.  On October 15, 2024, he initiated second line systemic targeted therapy with oral anti-VEGFR cabozantinib 40 mg per mouth daily. Initially, he tolerated cabozantinib well, however over the past several days in November 2024, he developed moderate to severe hand-foot syndrome caused by cabozantinib, characterized by erythema, irritation, pain, blisters, thickening and dryness of palms and soles as well as severe erythematous skin rash and superficial ulceration of the skin folds in both groin areas and the scrotum. On November 19, 2024 I advised him to stop cabozantinib. After stopping cabozantinib on November 19, 2024, the patient noticed significant improvement of his hand-foot syndrome.       On December 2025, the patient re-initiated cabozantinib 20 mg PO daily. Mr. Roberts has clinically significant benefit from cabozantinib.  On January 15, 2025, contrast enhanced CT scan of the abdomen and pelvis showed stable HCC. Specifically, there was liver cirrhosis. There were no new or enlarging masses. There were stable treated lesions in hepatic segment 8. There was stable upper  abdominal lymphadenopathy suspicious for metastases.      Interim Assessment: At the present time, Mr. Roberts takes cabozantinib 20 mg per mouth daily.  Treatment is associated with moderate hand-foot syndrome characterized by irritation and skin thickening of palms and soles.  Otherwise, he is tolerating dose reduced cabozantinib well.  Of note, the patient has serum AFP rise, suggestive of progression of multifocal, unresectable hepatocellular carcinoma.  Today he does not have new complaints.     Plan:  For now, continue cabozantinib 20 mg per mouth daily.  Close monitoring of cabozantinib associated toxicity  Continue topical therapy with skin lotion several times a day to affected areas including soles, palms, skin folds in the groin region, and scrotum, as needed  Periodic evaluation of serum AFP   Contrast-enhanced CT scan of the chest, abdomen, and pelvis within the next 2 weeks   Return to medical oncology clinic for history and physical exam, to assess safety and tolerability of cabozantinib, and to evaluate results of contrast-enhanced CT scan of the chest, abdomen and pelvis on April 24, 2025     Mr. Roberts understands and agrees with my management recommendations and plan of care. I answered questions to his satisfaction.

## 2025-03-31 ENCOUNTER — TELEPHONE (OUTPATIENT)
Dept: HEMATOLOGY ONCOLOGY | Facility: CLINIC | Age: 60
End: 2025-03-31

## 2025-03-31 DIAGNOSIS — C22.0 HEPATOCELLULAR CARCINOMA (HCC): ICD-10-CM

## 2025-03-31 NOTE — TELEPHONE ENCOUNTER
Received message that patient was contacted to schedule 4 week follow up with Dr Goddard.  Pt also needs refill unable to tell caller medicaiton.    Returned call to patient.  Reviewed patient should start levothyroxine due to thyroid lab work.  Pt needs refill on cabozantinib.  Made aware no availabilty at [facility] with Dr Goddard in time frame requested and will have to review for appropriate time frame.  Stated understanding.  No further questions at this time.

## 2025-04-14 ENCOUNTER — HOSPITAL ENCOUNTER (OUTPATIENT)
Dept: CT IMAGING | Facility: HOSPITAL | Age: 60
Discharge: HOME | End: 2025-04-14
Attending: INTERNAL MEDICINE
Payer: COMMERCIAL

## 2025-04-14 DIAGNOSIS — C22.0 HEPATOCELLULAR CARCINOMA (HCC): ICD-10-CM

## 2025-04-14 PROCEDURE — 74177 CT ABD & PELVIS W/CONTRAST: CPT

## 2025-04-14 PROCEDURE — 71260 CT THORAX DX C+: CPT

## 2025-04-14 RX ADMIN — IOHEXOL 100 ML: 350 INJECTION, SOLUTION INTRAVENOUS at 15:58

## 2025-04-27 DIAGNOSIS — E03.8 OTHER SPECIFIED HYPOTHYROIDISM: ICD-10-CM

## 2025-04-28 RX ORDER — LEVOTHYROXINE SODIUM 25 UG/1
25 TABLET ORAL DAILY
Qty: 90 TABLET | Refills: 4 | Status: SHIPPED | OUTPATIENT
Start: 2025-04-28

## 2025-05-01 ENCOUNTER — DOCUMENTATION (OUTPATIENT)
Dept: HEMATOLOGY ONCOLOGY | Facility: CLINIC | Age: 60
End: 2025-05-01

## 2025-05-01 ENCOUNTER — TELEPHONE (OUTPATIENT)
Dept: HEMATOLOGY ONCOLOGY | Facility: CLINIC | Age: 60
End: 2025-05-01

## 2025-05-01 ENCOUNTER — OFFICE VISIT (OUTPATIENT)
Dept: HEMATOLOGY ONCOLOGY | Facility: CLINIC | Age: 60
End: 2025-05-01
Payer: COMMERCIAL

## 2025-05-01 VITALS
DIASTOLIC BLOOD PRESSURE: 82 MMHG | RESPIRATION RATE: 18 BRPM | HEART RATE: 71 BPM | HEIGHT: 69 IN | WEIGHT: 188 LBS | TEMPERATURE: 96.7 F | BODY MASS INDEX: 27.85 KG/M2 | OXYGEN SATURATION: 99 % | SYSTOLIC BLOOD PRESSURE: 130 MMHG

## 2025-05-01 DIAGNOSIS — C22.0 HEPATOCELLULAR CARCINOMA (HCC): Primary | ICD-10-CM

## 2025-05-01 PROCEDURE — 99214 OFFICE O/P EST MOD 30 MIN: CPT | Performed by: INTERNAL MEDICINE

## 2025-05-01 RX ORDER — SODIUM CHLORIDE 9 MG/ML
20 INJECTION, SOLUTION INTRAVENOUS ONCE
OUTPATIENT
Start: 2025-05-09

## 2025-05-01 RX ORDER — SODIUM CHLORIDE 9 MG/ML
20 INJECTION, SOLUTION INTRAVENOUS ONCE
OUTPATIENT
Start: 2025-06-20

## 2025-05-01 RX ORDER — SODIUM CHLORIDE 9 MG/ML
20 INJECTION, SOLUTION INTRAVENOUS ONCE
OUTPATIENT
Start: 2025-05-30

## 2025-05-01 NOTE — PROGRESS NOTES
Name: Bassem Roberts      : 1965      MRN: [id number]  Encounter Provider: Josephine Goddard MD  Encounter Date: 2025   Encounter department: [facility]  :  Assessment & Plan  Hepatocellular carcinoma (HCC)  60 y.o. male with metastatic HCC, initially diagnosed on 2022.  The patient has distant metastatic disease to intra-abdominal lymph nodes and probable lung metastases.  The patient has received several treatments for his metastatic HCC including Ytrium 90 intrahepatic arterial embolization treatment in 2022.  With this treatment serum Alpha-fetoprotein level came down nicely.  From from 2023 to 2024, the patient received frontline systemic therapy with atezolizumab plus bevacizumab for metastatic HCC.  After 2024 bevacizumab was discontinued due to proteinuria.  The patient receives single agent atezolizumab until 2024.  Frontline systemic therapy with atezolizumab plus bevacizumab was discontinued due to radiographic progression of metastatic disease as well as serum AFP rise.  On October 15, 2024, he initiated second line systemic targeted therapy with oral anti-VEGFR cabozantinib 40 mg per mouth daily. Initially, he tolerated cabozantinib well, however over the past several days in 2024, he developed moderate to severe hand-foot syndrome caused by cabozantinib, characterized by erythema, irritation, pain, blisters, thickening and dryness of palms and soles as well as severe erythematous skin rash and superficial ulceration of the skin folds in both groin areas and the scrotum. On 2024 I advised him to stop cabozantinib. After stopping cabozantinib on 2024, the patient noticed significant improvement of his hand-foot syndrome.  On 2025, the patient re-initiated cabozantinib 20 mg PO daily. Mr. Roberts has clinically significant benefit from cabozantinib.  On  January 15, 2025, contrast enhanced CT scan of the abdomen and pelvis showed stable HCC. Specifically, there was liver cirrhosis. There were no new or enlarging masses. There were stable treated lesions in hepatic segment 8. There was stable upper abdominal lymphadenopathy suspicious for metastases.       Interim Assessment: Mr. Roberts takes cabozantinib 20 mg per mouth daily.  Treatment is associated with moderate hand-foot syndrome characterized by irritation and skin thickening of palms and soles.  Otherwise, he is tolerating dose reduced cabozantinib well.  Of note, the patient has serum AFP rise, suggestive of progression of multifocal, unresectable hepatocellular carcinoma.  Today he does not have new complaints.  On April 14, 2025, contrast-enhanced CT scan of the chest, abdomen and pelvis showed radiographic progression of metastatic disease.  Specifically, there was progression of metastatic lymphadenopathy in the retroperitoneum and portacaval region. Stable hepatic lesions and stable scattered pulmonary nodules measuring up to 4 mm in size were identified. No new suspicious nodules were seen.    Today, I presented detailed clinical information about potential clinical benefits and risks of third line dual systemic immunotherapy with nivolumab plus ipilimumab for metastatic HCC.  Clinical benefits of dual immunotherapy (nivolumab+ipilimumab) include modest prolongation of overall survival, modest prolongation of progression free survival, and improvement of symptoms.  I described potential immune mediated adverse events associated with dual immunotherapy.  Clinical benefits of nivolumab+ipilimumab outweigh any potential treatment related immune-mediated toxicity.      Plan:  Initiate ipilimumab 3 mg/Kg IV  plus nivolumab 1 mg/Kg IV every 3 weeks X 4 cycles, followed by maintenance nivolumab 240 mg IV every 2 weeks for up to 2 years. Duration of immunotherapy is until progression of metastatic HCC,  development of unmanageable treatment related adverse events, patient's preference, or up to 2 years  Discontinue cabozantinib  Periodic evaluation of serum AFP as well as contrast-enhanced CT scan of the chest, abdomen and pelvis  Return to medical oncology clinic for history and physical exam and to assess safety and tolerability of nivolumab plus ipilimumab in early June 2025     Mr. Roberts understands and agrees with my management recommendations and plan of care. I answered questions to his satisfaction.  The time spent on this outpatient follow-up consultation was 30 minutes         History of Present Illness   No chief complaint on file.  60 y.o. male with metastatic HCC, initially diagnosed on February 2, 2022.  The patient has distant metastatic disease to intra-abdominal lymph nodes and probable lung metastases.  The patient has received several treatments for his metastatic HCC including Ytrium 90 intrahepatic arterial embolization treatment in November 2022.  With this treatment serum Alpha-fetoprotein level came down nicely.  From from June 29, 2023 to August 1, 2024, the patient received frontline systemic therapy with atezolizumab plus bevacizumab for metastatic HCC.  After August 1, 2024 bevacizumab was discontinued due to proteinuria.  The patient receives single agent atezolizumab until September 12, 2024.  Frontline systemic therapy with atezolizumab plus bevacizumab was discontinued due to radiographic progression of metastatic disease as well as serum AFP rise.  On October 15, 2024, he initiated second line systemic targeted therapy with oral anti-VEGFR cabozantinib 40 mg per mouth daily. Initially, he tolerated cabozantinib well, however over the past several days in November 2024, he developed moderate to severe hand-foot syndrome caused by cabozantinib, characterized by erythema, irritation, pain, blisters, thickening and dryness of palms and soles as well as severe erythematous skin rash  and superficial ulceration of the skin folds in both groin areas and the scrotum. On November 19, 2024 I advised him to stop cabozantinib. After stopping cabozantinib on November 19, 2024, the patient noticed significant improvement of his hand-foot syndrome.  On December 2025, the patient re-initiated cabozantinib 20 mg PO daily. Mr. Roberts has clinically significant benefit from cabozantinib.  On January 15, 2025, contrast enhanced CT scan of the abdomen and pelvis showed stable HCC. Specifically, there was liver cirrhosis. There were no new or enlarging masses. There were stable treated lesions in hepatic segment 8. There was stable upper abdominal lymphadenopathy suspicious for metastases.       Interim Assessment: Mr. Roberts takes cabozantinib 20 mg per mouth daily.  Treatment is associated with moderate hand-foot syndrome characterized by irritation and skin thickening of palms and soles.  Otherwise, he is tolerating dose reduced cabozantinib well.  Of note, the patient has serum AFP rise, suggestive of progression of multifocal, unresectable hepatocellular carcinoma.  Today he does not have new complaints.  On April 14, 2025, contrast-enhanced CT scan of the chest, abdomen and pelvis showed radiographic progression of metastatic disease.  Specifically, there was progression of metastatic lymphadenopathy in the retroperitoneum and portacaval region. Stable hepatic lesions and stable scattered pulmonary nodules measuring up to 4 mm in size were identified. No new suspicious nodules were seen.  Today, the patient does not have new complaints.  He refers unchanged moderate to severe palmar-plantar erythrodysesthesia caused by cabozantinib.  He denies new systemic, gastrointestinal, cardiorespiratory, genitourinary, muscle skeletal, neurologic symptoms.  He does not have pain.    Oncology History   Cancer Staging   Hepatocellular carcinoma (HCC)  Staging form: Liver (Excluding Intrahepatic Bile Ducts), AJCC 8th  Edition  - Clinical stage from 7/13/2022: Stage IVB (rcT3, cN1, pM1) - Signed by Taye Lundberg MD on 1/30/2024  Stage prefix: Recurrence  Histologic grade (G): G2  Histologic grading system: 4 grade system  Oncology History   Hepatocellular carcinoma (HCC)   2022 Initial Diagnosis    Hepatocellular carcinoma (HCC)     2/8/2022 Biopsy    [facility] DOSHI US guided liver biopsy: right liver lobe:  Poorly differentiated HCC with patchy necrosis       7/13/2022 -  Cancer Staged    Staging form: Liver (Excluding Intrahepatic Bile Ducts), AJCC 8th Edition  - Clinical stage from 7/13/2022: Stage IVB (rcT3, cN1, pM1) - Signed by Taye Lundberg MD on 1/30/2024  Stage prefix: Recurrence  Histologic grade (G): G2  Histologic grading system: 4 grade system       6/29/2023 - 9/12/2024 Chemotherapy    alteplase (CATHFLO), 2 mg, Intracatheter, Every 1 Minute as needed, 21 of 24 cycles  atezolizumab (TECENTRIQ) IVPB, 1,200 mg, Intravenous, Once, 21 of 24 cycles  Administration: 1,200 mg (6/29/2023), 1,200 mg (7/20/2023), 1,200 mg (8/10/2023), 1,200 mg (8/31/2023), 1,200 mg (9/21/2023), 1,200 mg (10/10/2023), 1,200 mg (11/2/2023), 1,200 mg (11/24/2023), 1,200 mg (12/14/2023), 1,200 mg (1/4/2024), 1,200 mg (1/26/2024), 1,200 mg (3/7/2024), 1,200 mg (3/28/2024), 1,200 mg (4/18/2024), 1,200 mg (5/9/2024), 1,200 mg (5/30/2024), 1,200 mg (6/20/2024), 1,200 mg (7/11/2024), 1,200 mg (8/1/2024), 1,200 mg (8/22/2024), 1,200 mg (9/12/2024)  bevacizumab-awwb (MVASI) IVPB, 1,345 mg (100 % of original dose 15 mg/kg), Intravenous, Once, 19 of 22 cycles  Dose modification: 15 mg/kg (original dose 15 mg/kg, Cycle 1), 15 mg/kg (original dose 15 mg/kg, Cycle 2), 15 mg/kg (original dose 15 mg/kg, Cycle 3)  Administration: 1,300 mg (6/29/2023), 1,300 mg (7/20/2023), 1,300 mg (8/10/2023), 1,300 mg (8/31/2023), 1,300 mg (9/21/2023), 1,300 mg (10/10/2023), 1,300 mg (11/2/2023), 1,300 mg (11/24/2023), 1,300 mg (12/14/2023), 1,300 mg (1/4/2024), 1,300  mg (1/26/2024), 1,300 mg (3/7/2024), 1,300 mg (3/28/2024), 1,300 mg (4/18/2024), 1,300 mg (5/9/2024), 1,300 mg (5/30/2024), 1,300 mg (6/20/2024), 1,200 mg (7/11/2024), 1,200 mg (8/1/2024)        Pertinent Medical History     Past Medical History:   Diagnosis Date    Hypertension     Liver cancer (HCC)               Review of Systems   Constitutional:  Positive for fatigue. Negative for activity change, appetite change, chills, diaphoresis, fever and unexpected weight change.   HENT:  Negative for congestion, dental problem, ear discharge, ear pain, facial swelling, hearing loss, mouth sores, nosebleeds, postnasal drip, rhinorrhea, sinus pain, sneezing, sore throat, tinnitus, trouble swallowing and voice change.    Eyes:  Negative for photophobia, pain, discharge, redness, itching and visual disturbance.   Respiratory:  Negative for apnea, cough, choking, chest tightness, shortness of breath, wheezing and stridor.    Cardiovascular:  Negative for chest pain, palpitations and leg swelling.   Gastrointestinal:  Negative for abdominal distention, abdominal pain, anal bleeding, blood in stool, constipation, diarrhea, nausea, rectal pain and vomiting.   Endocrine: Negative for cold intolerance and heat intolerance.   Genitourinary:  Negative for decreased urine volume, difficulty urinating, dysuria, enuresis, flank pain, frequency, hematuria and urgency.   Musculoskeletal:  Negative for arthralgias, back pain, gait problem, joint swelling, myalgias, neck pain and neck stiffness.   Skin:  Negative for color change, pallor, rash and wound.        Irritation of palms and soles consistent with hand-foot syndrome caused by cabozantinib   Neurological:  Negative for dizziness, tremors, seizures, syncope, facial asymmetry, speech difficulty, weakness, light-headedness, numbness and headaches.   Hematological:  Negative for adenopathy. Does not bruise/bleed easily.   Psychiatric/Behavioral:  Negative for behavioral problems,  confusion, dysphoric mood and sleep disturbance. The patient is not nervous/anxious.          Objective   There were no vitals taken for this visit.    Pain Screening:       ECOG   0    Physical Exam  Constitutional:       Comments: Palpation without acute respiratory distress   HENT:      Head: Normocephalic and atraumatic.      Nose: Nose normal.      Mouth/Throat:      Mouth: Mucous membranes are moist.      Pharynx: Oropharynx is clear.      Comments: No lesions in the oral or nasal mucosa  Eyes:      Extraocular Movements: Extraocular movements intact.      Conjunctiva/sclera: Conjunctivae normal.      Pupils: Pupils are equal, round, and reactive to light.      Comments: No scleral icterus   Neck:      Comments: No cervical, supraclavicular, axillary, epitrochlear, or inguinal lymphadenopathy.   Cardiovascular:      Rate and Rhythm: Normal rate and regular rhythm.      Pulses: Normal pulses.      Heart sounds: Normal heart sounds.   Pulmonary:      Effort: Pulmonary effort is normal.      Breath sounds: Normal breath sounds.   Abdominal:      General: Bowel sounds are normal.      Palpations: Abdomen is soft.      Comments: Abdomen soft, nontender, nonpainful.  No hepatomegaly.  No splenomegaly.  No rebound tenderness.  No lateral or shifting dullness.  Bowel sounds present, normal.    Musculoskeletal:         General: Normal range of motion.      Cervical back: Normal range of motion and neck supple.   Skin:     General: Skin is warm and dry.      Capillary Refill: Capillary refill takes less than 2 seconds.   Neurological:      General: No focal deficit present.      Mental Status: He is alert and oriented to person, place, and time. Mental status is at baseline.   Psychiatric:         Mood and Affect: Mood normal.         Behavior: Behavior normal.         Thought Content: Thought content normal.         Judgment: Judgment normal.     Labs: I have reviewed the following labs:  Lab Results   Component Value  Date/Time    WBC 4.53 03/26/2025 03:13 PM    RBC 4.32 03/26/2025 03:13 PM    Hemoglobin 13.8 03/26/2025 03:13 PM    Hematocrit 39.6 03/26/2025 03:13 PM    MCV 92 03/26/2025 03:13 PM    MCH 31.9 03/26/2025 03:13 PM    RDW 14.1 03/26/2025 03:13 PM    Platelets 151 03/26/2025 03:13 PM    Segmented % 65 03/26/2025 03:13 PM    Lymphocytes % 23 03/26/2025 03:13 PM    Monocytes % 9 03/26/2025 03:13 PM    Eosinophils Relative 3 03/26/2025 03:13 PM    Basophils Relative 0 03/26/2025 03:13 PM    Immature Grans % 0 03/26/2025 03:13 PM    Absolute Neutrophils 2.94 03/26/2025 03:13 PM     Lab Results   Component Value Date/Time    Potassium 4.0 03/26/2025 03:13 PM    Chloride 101 03/26/2025 03:13 PM    CO2 25 03/26/2025 03:13 PM    BUN 18 03/26/2025 03:13 PM    Creatinine 0.99 03/26/2025 03:13 PM    Glucose, Fasting 111 (H) 11/27/2024 07:11 AM    Calcium 8.9 03/26/2025 03:13 PM    Corrected Calcium 9.0 11/27/2024 07:11 AM    AST 48 (H) 03/26/2025 03:13 PM    ALT 33 03/26/2025 03:13 PM    Alkaline Phosphatase 64 03/26/2025 03:13 PM    Total Protein 6.1 (L) 03/26/2025 03:13 PM    Albumin 3.8 03/26/2025 03:13 PM    Total Bilirubin 0.92 03/26/2025 03:13 PM    eGFR 82 03/26/2025 03:13 PM       Serum AFP levels:    Component      Latest Ref Rng 9/21/2022 1/21/2023 4/22/2023 6/24/2023 8/29/2023   AFP-TUMOR MARKER      0.00 - 9.00 ng/mL 4,961.3 (H)  574.5 (H)  1,912.6 (H)  8,227.11 (H)  1,892.23 (H)      Component      Latest Ref Rn 11/1/2023 1/24/2024 4/16/2024 6/19/2024 8/20/2024 9/30/2024   AFP-TUMOR MARKER      0.00 - 9.00 ng/mL 33.51 (H)  18.93 (H)  32.65 (H)  85.12 (H)  182.30 (H)  392.19 (H)      Component      Latest Ref Rn 10/24/2024 11/27/2024 12/17/2024 1/15/2025 3/26/2025   AFP-TUMOR MARKER      0.00 - 9.00 ng/mL 785.71 (H)  155.34 (H)  594.88 (H)  732.33 (H)  2,322.10 (H)

## 2025-05-01 NOTE — ASSESSMENT & PLAN NOTE
60 y.o. male with metastatic HCC, initially diagnosed on February 2, 2022.  The patient has distant metastatic disease to intra-abdominal lymph nodes and probable lung metastases.  The patient has received several treatments for his metastatic HCC including Ytrium 90 intrahepatic arterial embolization treatment in November 2022.  With this treatment serum Alpha-fetoprotein level came down nicely.  From from June 29, 2023 to August 1, 2024, the patient received frontline systemic therapy with atezolizumab plus bevacizumab for metastatic HCC.  After August 1, 2024 bevacizumab was discontinued due to proteinuria.  The patient receives single agent atezolizumab until September 12, 2024.  Frontline systemic therapy with atezolizumab plus bevacizumab was discontinued due to radiographic progression of metastatic disease as well as serum AFP rise.  On October 15, 2024, he initiated second line systemic targeted therapy with oral anti-VEGFR cabozantinib 40 mg per mouth daily. Initially, he tolerated cabozantinib well, however over the past several days in November 2024, he developed moderate to severe hand-foot syndrome caused by cabozantinib, characterized by erythema, irritation, pain, blisters, thickening and dryness of palms and soles as well as severe erythematous skin rash and superficial ulceration of the skin folds in both groin areas and the scrotum. On November 19, 2024 I advised him to stop cabozantinib. After stopping cabozantinib on November 19, 2024, the patient noticed significant improvement of his hand-foot syndrome.  On December 2025, the patient re-initiated cabozantinib 20 mg PO daily. Mr. Roberts has clinically significant benefit from cabozantinib.  On January 15, 2025, contrast enhanced CT scan of the abdomen and pelvis showed stable HCC. Specifically, there was liver cirrhosis. There were no new or enlarging masses. There were stable treated lesions in hepatic segment 8. There was stable upper  abdominal lymphadenopathy suspicious for metastases.       Interim Assessment: Mr. Roberts takes cabozantinib 20 mg per mouth daily.  Treatment is associated with moderate hand-foot syndrome characterized by irritation and skin thickening of palms and soles.  Otherwise, he is tolerating dose reduced cabozantinib well.  Of note, the patient has serum AFP rise, suggestive of progression of multifocal, unresectable hepatocellular carcinoma.  Today he does not have new complaints.  On April 14, 2025, contrast-enhanced CT scan of the chest, abdomen and pelvis showed radiographic progression of metastatic disease.  Specifically, there was progression of metastatic lymphadenopathy in the retroperitoneum and portacaval region. Stable hepatic lesions and stable scattered pulmonary nodules measuring up to 4 mm in size were identified. No new suspicious nodules were seen.    Today, I presented detailed clinical information about potential clinical benefits and risks of third line dual systemic immunotherapy with nivolumab plus ipilimumab for metastatic HCC.  Clinical benefits of dual immunotherapy (nivolumab+ipilimumab) include modest prolongation of overall survival, modest prolongation of progression free survival, and improvement of symptoms.  I described potential immune mediated adverse events associated with dual immunotherapy.  Clinical benefits of nivolumab+ipilimumab outweigh any potential treatment related immune-mediated toxicity.      Plan:  Initiate ipilimumab 3 mg/Kg IV  plus nivolumab 1 mg/Kg IV every 3 weeks X 4 cycles, followed by maintenance nivolumab 240 mg IV every 2 weeks for up to 2 years. Duration of immunotherapy is until progression of metastatic HCC, development of unmanageable treatment related adverse events, patient's preference, or up to 2 years  Discontinue cabozantinib  Periodic evaluation of serum AFP as well as contrast-enhanced CT scan of the chest, abdomen and pelvis  Return to medical  oncology clinic for history and physical exam and to assess safety and tolerability of nivolumab plus ipilimumab in early June 2025     Mr. Roberts understands and agrees with my management recommendations and plan of care. I answered questions to his satisfaction.  The time spent on this outpatient follow-up consultation was 30 minutes        no

## 2025-05-01 NOTE — PROGRESS NOTES
Oncology Teach:   Review of Plan:  Treatment Plan Reviewed, Diagnosis Reviewed and Treatment Schedule Reviewed    Review of Pre-Treatment Needs:  Lab work frequency reviewed    PICC or Port Placement Needs:  Not needed    Expectations at First Appointment Reviewed:  Yes    Patient Medication Education Reviewed:  Yes    Review when to call office vs. present to ED:  Yes    Provided Oncology Access Center Number:  Yes    Assessment of patient understanding:  Pt demonstrates understanding    Chemo consent obtained?:  Yes

## 2025-05-08 ENCOUNTER — APPOINTMENT (OUTPATIENT)
Dept: LAB | Facility: CLINIC | Age: 60
End: 2025-05-08
Attending: INTERNAL MEDICINE
Payer: COMMERCIAL

## 2025-05-08 DIAGNOSIS — C22.0 HEPATOCELLULAR CARCINOMA (HCC): ICD-10-CM

## 2025-05-08 LAB
ALBUMIN SERPL BCG-MCNC: 3.6 G/DL (ref 3.5–5)
ALP SERPL-CCNC: 73 U/L (ref 34–104)
ALT SERPL W P-5'-P-CCNC: 19 U/L (ref 7–52)
ANION GAP SERPL CALCULATED.3IONS-SCNC: 4 MMOL/L (ref 4–13)
AST SERPL W P-5'-P-CCNC: 28 U/L (ref 13–39)
BASOPHILS # BLD AUTO: 0.02 THOUSANDS/ÂΜL (ref 0–0.1)
BASOPHILS NFR BLD AUTO: 1 % (ref 0–1)
BILIRUB SERPL-MCNC: 1.12 MG/DL (ref 0.2–1)
BUN SERPL-MCNC: 15 MG/DL (ref 5–25)
CALCIUM SERPL-MCNC: 9.1 MG/DL (ref 8.4–10.2)
CHLORIDE SERPL-SCNC: 101 MMOL/L (ref 96–108)
CO2 SERPL-SCNC: 31 MMOL/L (ref 21–32)
CREAT SERPL-MCNC: 0.98 MG/DL (ref 0.6–1.3)
EOSINOPHIL # BLD AUTO: 0.11 THOUSAND/ÂΜL (ref 0–0.61)
EOSINOPHIL NFR BLD AUTO: 3 % (ref 0–6)
ERYTHROCYTE [DISTWIDTH] IN BLOOD BY AUTOMATED COUNT: 13.8 % (ref 11.6–15.1)
GFR SERPL CREATININE-BSD FRML MDRD: 83 ML/MIN/1.73SQ M
GLUCOSE P FAST SERPL-MCNC: 130 MG/DL (ref 65–99)
HCT VFR BLD AUTO: 40.5 % (ref 36.5–49.3)
HGB BLD-MCNC: 13.8 G/DL (ref 12–17)
IMM GRANULOCYTES # BLD AUTO: 0.01 THOUSAND/UL (ref 0–0.2)
IMM GRANULOCYTES NFR BLD AUTO: 0 % (ref 0–2)
LYMPHOCYTES # BLD AUTO: 0.86 THOUSANDS/ÂΜL (ref 0.6–4.47)
LYMPHOCYTES NFR BLD AUTO: 19 % (ref 14–44)
MCH RBC QN AUTO: 31.7 PG (ref 26.8–34.3)
MCHC RBC AUTO-ENTMCNC: 34.1 G/DL (ref 31.4–37.4)
MCV RBC AUTO: 93 FL (ref 82–98)
MONOCYTES # BLD AUTO: 0.44 THOUSAND/ÂΜL (ref 0.17–1.22)
MONOCYTES NFR BLD AUTO: 10 % (ref 4–12)
NEUTROPHILS # BLD AUTO: 3 THOUSANDS/ÂΜL (ref 1.85–7.62)
NEUTS SEG NFR BLD AUTO: 67 % (ref 43–75)
NRBC BLD AUTO-RTO: 0 /100 WBCS
PLATELET # BLD AUTO: 145 THOUSANDS/UL (ref 149–390)
PMV BLD AUTO: 9.4 FL (ref 8.9–12.7)
POTASSIUM SERPL-SCNC: 4.9 MMOL/L (ref 3.5–5.3)
PROT SERPL-MCNC: 6.1 G/DL (ref 6.4–8.4)
RBC # BLD AUTO: 4.35 MILLION/UL (ref 3.88–5.62)
SODIUM SERPL-SCNC: 136 MMOL/L (ref 135–147)
T3FREE SERPL-MCNC: 3.05 PG/ML (ref 2.5–3.9)
T4 FREE SERPL-MCNC: 1.08 NG/DL (ref 0.61–1.12)
TSH SERPL DL<=0.05 MIU/L-ACNC: 5.56 UIU/ML (ref 0.45–4.5)
WBC # BLD AUTO: 4.44 THOUSAND/UL (ref 4.31–10.16)

## 2025-05-08 PROCEDURE — 84481 FREE ASSAY (FT-3): CPT

## 2025-05-08 PROCEDURE — 84443 ASSAY THYROID STIM HORMONE: CPT

## 2025-05-08 PROCEDURE — 36415 COLL VENOUS BLD VENIPUNCTURE: CPT

## 2025-05-08 PROCEDURE — 80053 COMPREHEN METABOLIC PANEL: CPT

## 2025-05-08 PROCEDURE — 85025 COMPLETE CBC W/AUTO DIFF WBC: CPT

## 2025-05-08 PROCEDURE — 84439 ASSAY OF FREE THYROXINE: CPT

## 2025-05-09 ENCOUNTER — HOSPITAL ENCOUNTER (OUTPATIENT)
Dept: INFUSION CENTER | Facility: CLINIC | Age: 60
End: 2025-05-09
Attending: INTERNAL MEDICINE
Payer: COMMERCIAL

## 2025-05-09 VITALS
TEMPERATURE: 98.1 F | HEIGHT: 69 IN | SYSTOLIC BLOOD PRESSURE: 126 MMHG | DIASTOLIC BLOOD PRESSURE: 79 MMHG | BODY MASS INDEX: 28.14 KG/M2 | OXYGEN SATURATION: 98 % | WEIGHT: 190 LBS | HEART RATE: 77 BPM

## 2025-05-09 DIAGNOSIS — C22.0 HEPATOCELLULAR CARCINOMA (HCC): Primary | ICD-10-CM

## 2025-05-09 PROCEDURE — 96417 CHEMO IV INFUS EACH ADDL SEQ: CPT

## 2025-05-09 PROCEDURE — 96413 CHEMO IV INFUSION 1 HR: CPT

## 2025-05-09 RX ORDER — SODIUM CHLORIDE 9 MG/ML
20 INJECTION, SOLUTION INTRAVENOUS ONCE
Status: COMPLETED | OUTPATIENT
Start: 2025-05-09 | End: 2025-05-09

## 2025-05-09 RX ADMIN — SODIUM CHLORIDE 20 ML/HR: 0.9 INJECTION, SOLUTION INTRAVENOUS at 15:10

## 2025-05-09 RX ADMIN — SODIUM CHLORIDE 250 MG: 900 INJECTION, SOLUTION INTRAVENOUS at 16:08

## 2025-05-09 RX ADMIN — SODIUM CHLORIDE 85.3 MG: 9 INJECTION, SOLUTION INTRAVENOUS at 15:15

## 2025-05-09 NOTE — PLAN OF CARE
Problem: Knowledge Deficit  Goal: Patient/family/caregiver demonstrates understanding of disease process, treatment plan, medications, and discharge instructions  Description: Complete learning assessment and assess knowledge base.Interventions:- Provide teaching at level of understanding- Provide teaching via preferred learning methods  Outcome: Progressing      [address] 
[phone] Patient: Kitty Mcintyre 
MRN: [id number] :1965 Visit Information Date & Time Provider Department Dept. Phone Encounter #  
 2018  4:00 PM Batsheva Brock MD [facility] [phone] Upcoming Health Maintenance Date Due Hepatitis C Screening 1965 Pneumococcal 19-64 Medium Risk (1 of 1 - PPSV23) 1984 DTaP/Tdap/Td series (1 - Tdap) 1986 FOBT Q 1 YEAR AGE 50-75 2015 Influenza Age 5 to Adult 2018 Allergies as of 2018  Review Complete On: 2018 By: Batsheva Brock MD  
  
 Severity Noted Reaction Type Reactions Amoxicillin High 2015    Anaphylaxis Pcn [Penicillins]    Anaphylaxis As a child Current Immunizations  Never Reviewed No immunizations on file. Not reviewed this visit Vitals BP Pulse Temp Resp Height(growth percentile) Weight(growth percentile) 112/80 (BP 1 Location: Right arm, BP Patient Position: Sitting) 70 98 °F (36.7 °C) (Oral) 14 6' 2\" (1.88 m) 241 lb 6.4 oz (109.5 kg) SpO2 BMI Smoking Status 98% 30.99 kg/m2 Current Every Day Smoker Vitals History BMI and BSA Data Body Mass Index Body Surface Area 30.99 kg/m 2 2.39 m 2 Preferred Pharmacy Pharmacy Name Phone [facility] [address] [facility] [phone] Your Updated Medication List  
  
   
This list is accurate as of 18  4:38 PM.  Always use your most recent med list. amLODIPine 10 mg tablet Commonly known as:  [place] Take 1 Tab by mouth daily. dexamethasone 4 mg tablet Commonly known as:  DECADRON  
1 tablet by mouth twice a day  
  
 hydroCHLOROthiazide 25 mg tablet Commonly known as:  HYDRODIURIL Take 0.5 Tabs by mouth daily. indomethacin 50 mg capsule Commonly known as:  INDOCIN  
TAKE ONE CAPSULE BY MOUTH THREE TIMES DAILY AS NEEDED FOR  GOUT  
  
 lisinopril 20 mg tablet Commonly known as:  PRINIVIL, ZESTRIL  
TAKE ONE TABLET BY MOUTH ONCE DAILY Introducing [facility]! [facility] introduces DriveHQ patient portal. Now you can access parts of your medical record, email your doctor's office, and request medication refills online. 1. In your internet browser, go to [email or web address] 2. Click on the First Time User? Click Here link in the Sign In box. You will see the New Member Sign Up page. 3. Enter your DriveHQ Access Code exactly as it appears below. You will not need to use this code after youve completed the sign-up process. If you do not sign up before the expiration date, you must request a new code. · DriveHQ Access Code: [email or web address] Expires: 8/30/2018  3:32 PM 
 
4. Enter the last four digits of your Social Security Number (xxxx) and Date of Birth (mm/dd/yyyy) as indicated and click Submit. You will be taken to the next sign-up page. 5. Create a DriveHQ ID. This will be your DriveHQ login ID and cannot be changed, so think of one that is secure and easy to remember. 6. Create a DriveHQ password. You can change your password at any time. 7. Enter your Password Reset Question and Answer. This can be used at a later time if you forget your password. 8. Enter your e-mail address. You will receive e-mail notification when new information is available in [address]. 9. Click Sign Up. You can now view and download portions of your medical record. 10. Click the Download Summary menu link to download a portable copy of your medical information. If you have questions, please visit the Frequently Asked Questions section of the DriveHQ website. Remember, DriveHQ is NOT to be used for urgent needs. For medical emergencies, dial 911. Now available from your iPhone and Android! Please provide this summary of care documentation to your next provider. Your primary care clinician is listed as Ab Fuller. Kaila Ward. If you have any questions after today's visit, please call [phone].

## 2025-05-09 NOTE — PROGRESS NOTES
[facility] for the treatment of Opdivo / [place]. He offers no concerns at this time. Labs from 05/08/2025 reviewed and within parameters for treatment. PIV placed in his Left forearm with good blood return. Patient is resting comfortably in the chair, call bell within reach.

## 2025-05-09 NOTE — PROGRESS NOTES
Patient tolerated treatment today without issue. PIV removed intact, coban wrap in place. Patient aware of next appt on 5/29 at 1430, AVS calendar printed and provided.

## 2025-05-12 ENCOUNTER — HOSPITAL ENCOUNTER (EMERGENCY)
Facility: HOSPITAL | Age: 60
Discharge: HOME | End: 2025-05-12
Attending: EMERGENCY MEDICINE
Payer: COMMERCIAL

## 2025-05-12 ENCOUNTER — APPOINTMENT (EMERGENCY)
Dept: CT IMAGING | Facility: HOSPITAL | Age: 60
End: 2025-05-12
Payer: COMMERCIAL

## 2025-05-12 ENCOUNTER — NURSE TRIAGE (OUTPATIENT)
Age: 60
End: 2025-05-12

## 2025-05-12 VITALS
SYSTOLIC BLOOD PRESSURE: 158 MMHG | OXYGEN SATURATION: 96 % | DIASTOLIC BLOOD PRESSURE: 92 MMHG | BODY MASS INDEX: 28.14 KG/M2 | WEIGHT: 190 LBS | RESPIRATION RATE: 20 BRPM | TEMPERATURE: 98.9 F | HEART RATE: 73 BPM | HEIGHT: 69 IN

## 2025-05-12 DIAGNOSIS — C22.0 HEPATOCELLULAR CARCINOMA (HCC): Primary | ICD-10-CM

## 2025-05-12 DIAGNOSIS — R10.9 ABDOMINAL PAIN, UNSPECIFIED ABDOMINAL LOCATION: Primary | ICD-10-CM

## 2025-05-12 DIAGNOSIS — R10.2 SUPRAPUBIC PAIN: Primary | ICD-10-CM

## 2025-05-12 DIAGNOSIS — N30.90 CYSTITIS: ICD-10-CM

## 2025-05-12 LAB
ALBUMIN SERPL BCG-MCNC: 3.7 G/DL (ref 3.5–5)
ALP SERPL-CCNC: 74 U/L (ref 34–104)
ALT SERPL W P-5'-P-CCNC: 13 U/L (ref 7–52)
ANION GAP SERPL CALCULATED.3IONS-SCNC: 9 MMOL/L (ref 4–13)
AST SERPL W P-5'-P-CCNC: 22 U/L (ref 13–39)
BACTERIA UR QL AUTO: ABNORMAL /HPF
BASOPHILS # BLD AUTO: 0.01 THOUSANDS/ÂΜL (ref 0–0.1)
BASOPHILS NFR BLD AUTO: 0 % (ref 0–1)
BILIRUB SERPL-MCNC: 1.61 MG/DL (ref 0.2–1)
BILIRUB UR QL STRIP: NEGATIVE
BUN SERPL-MCNC: 15 MG/DL (ref 5–25)
CALCIUM SERPL-MCNC: 8.9 MG/DL (ref 8.4–10.2)
CHLORIDE SERPL-SCNC: 100 MMOL/L (ref 96–108)
CLARITY UR: CLEAR
CO2 SERPL-SCNC: 27 MMOL/L (ref 21–32)
COLOR UR: YELLOW
CREAT SERPL-MCNC: 0.92 MG/DL (ref 0.6–1.3)
EOSINOPHIL # BLD AUTO: 0.07 THOUSAND/ÂΜL (ref 0–0.61)
EOSINOPHIL NFR BLD AUTO: 2 % (ref 0–6)
ERYTHROCYTE [DISTWIDTH] IN BLOOD BY AUTOMATED COUNT: 13.3 % (ref 11.6–15.1)
GFR SERPL CREATININE-BSD FRML MDRD: 90 ML/MIN/1.73SQ M
GLUCOSE SERPL-MCNC: 90 MG/DL (ref 65–140)
GLUCOSE UR STRIP-MCNC: NEGATIVE MG/DL
HCT VFR BLD AUTO: 40.4 % (ref 36.5–49.3)
HGB BLD-MCNC: 14 G/DL (ref 12–17)
HGB UR QL STRIP.AUTO: ABNORMAL
HYALINE CASTS #/AREA URNS LPF: ABNORMAL /LPF
IMM GRANULOCYTES # BLD AUTO: 0.01 THOUSAND/UL (ref 0–0.2)
IMM GRANULOCYTES NFR BLD AUTO: 0 % (ref 0–2)
KETONES UR STRIP-MCNC: ABNORMAL MG/DL
LEUKOCYTE ESTERASE UR QL STRIP: NEGATIVE
LIPASE SERPL-CCNC: 11 U/L (ref 11–82)
LYMPHOCYTES # BLD AUTO: 0.58 THOUSANDS/ÂΜL (ref 0.6–4.47)
LYMPHOCYTES NFR BLD AUTO: 13 % (ref 14–44)
MCH RBC QN AUTO: 32 PG (ref 26.8–34.3)
MCHC RBC AUTO-ENTMCNC: 34.7 G/DL (ref 31.4–37.4)
MCV RBC AUTO: 92 FL (ref 82–98)
MONOCYTES # BLD AUTO: 0.67 THOUSAND/ÂΜL (ref 0.17–1.22)
MONOCYTES NFR BLD AUTO: 15 % (ref 4–12)
MUCOUS THREADS UR QL AUTO: ABNORMAL
NEUTROPHILS # BLD AUTO: 3.12 THOUSANDS/ÂΜL (ref 1.85–7.62)
NEUTS SEG NFR BLD AUTO: 70 % (ref 43–75)
NITRITE UR QL STRIP: NEGATIVE
NON-SQ EPI CELLS URNS QL MICRO: ABNORMAL /HPF
NRBC BLD AUTO-RTO: 0 /100 WBCS
PH UR STRIP.AUTO: 6 [PH]
PLATELET # BLD AUTO: 155 THOUSANDS/UL (ref 149–390)
PMV BLD AUTO: 8.8 FL (ref 8.9–12.7)
POTASSIUM SERPL-SCNC: 4.2 MMOL/L (ref 3.5–5.3)
PROT SERPL-MCNC: 6.4 G/DL (ref 6.4–8.4)
PROT UR STRIP-MCNC: ABNORMAL MG/DL
RBC # BLD AUTO: 4.38 MILLION/UL (ref 3.88–5.62)
RBC #/AREA URNS AUTO: ABNORMAL /HPF
SODIUM SERPL-SCNC: 136 MMOL/L (ref 135–147)
SP GR UR STRIP.AUTO: 1.02 (ref 1–1.03)
UROBILINOGEN UR STRIP-ACNC: 2 MG/DL
WBC # BLD AUTO: 4.46 THOUSAND/UL (ref 4.31–10.16)
WBC #/AREA URNS AUTO: ABNORMAL /HPF

## 2025-05-12 PROCEDURE — 85025 COMPLETE CBC W/AUTO DIFF WBC: CPT

## 2025-05-12 PROCEDURE — 96375 TX/PRO/DX INJ NEW DRUG ADDON: CPT

## 2025-05-12 PROCEDURE — 96374 THER/PROPH/DIAG INJ IV PUSH: CPT

## 2025-05-12 PROCEDURE — 36415 COLL VENOUS BLD VENIPUNCTURE: CPT

## 2025-05-12 PROCEDURE — 99285 EMERGENCY DEPT VISIT HI MDM: CPT

## 2025-05-12 PROCEDURE — 80053 COMPREHEN METABOLIC PANEL: CPT

## 2025-05-12 PROCEDURE — 96361 HYDRATE IV INFUSION ADD-ON: CPT

## 2025-05-12 PROCEDURE — 99284 EMERGENCY DEPT VISIT MOD MDM: CPT

## 2025-05-12 PROCEDURE — 81001 URINALYSIS AUTO W/SCOPE: CPT

## 2025-05-12 PROCEDURE — 74177 CT ABD & PELVIS W/CONTRAST: CPT

## 2025-05-12 PROCEDURE — 96376 TX/PRO/DX INJ SAME DRUG ADON: CPT

## 2025-05-12 PROCEDURE — 83690 ASSAY OF LIPASE: CPT

## 2025-05-12 RX ORDER — HYDROMORPHONE HCL/PF 1 MG/ML
0.5 SYRINGE (ML) INJECTION ONCE
Refills: 0 | Status: COMPLETED | OUTPATIENT
Start: 2025-05-12 | End: 2025-05-12

## 2025-05-12 RX ORDER — HYDROMORPHONE HCL/PF 1 MG/ML
0.5 SYRINGE (ML) INJECTION ONCE
Status: COMPLETED | OUTPATIENT
Start: 2025-05-12 | End: 2025-05-12

## 2025-05-12 RX ORDER — ONDANSETRON 2 MG/ML
4 INJECTION INTRAMUSCULAR; INTRAVENOUS ONCE
Status: COMPLETED | OUTPATIENT
Start: 2025-05-12 | End: 2025-05-12

## 2025-05-12 RX ORDER — CEFPODOXIME PROXETIL 200 MG/1
200 TABLET, FILM COATED ORAL 2 TIMES DAILY WITH MEALS
Status: DISCONTINUED | OUTPATIENT
Start: 2025-05-12 | End: 2025-05-12

## 2025-05-12 RX ORDER — CEFPODOXIME PROXETIL 200 MG/1
200 TABLET, FILM COATED ORAL ONCE
Status: COMPLETED | OUTPATIENT
Start: 2025-05-12 | End: 2025-05-12

## 2025-05-12 RX ORDER — METHYLPREDNISOLONE 4 MG/1
TABLET ORAL
Qty: 21 TABLET | Refills: 0 | Status: SHIPPED | OUTPATIENT
Start: 2025-05-12 | End: 2025-05-18

## 2025-05-12 RX ORDER — CEFPODOXIME PROXETIL 200 MG/1
200 TABLET, FILM COATED ORAL 2 TIMES DAILY
Qty: 13 TABLET | Refills: 0 | Status: SHIPPED | OUTPATIENT
Start: 2025-05-12 | End: 2025-05-19

## 2025-05-12 RX ADMIN — SODIUM CHLORIDE 500 ML: 0.9 INJECTION, SOLUTION INTRAVENOUS at 09:46

## 2025-05-12 RX ADMIN — HYDROMORPHONE HYDROCHLORIDE 0.5 MG: 1 INJECTION, SOLUTION INTRAMUSCULAR; INTRAVENOUS; SUBCUTANEOUS at 11:47

## 2025-05-12 RX ADMIN — ONDANSETRON 4 MG: 2 INJECTION INTRAMUSCULAR; INTRAVENOUS at 09:45

## 2025-05-12 RX ADMIN — HYDROMORPHONE HYDROCHLORIDE 0.5 MG: 1 INJECTION, SOLUTION INTRAMUSCULAR; INTRAVENOUS; SUBCUTANEOUS at 09:45

## 2025-05-12 RX ADMIN — IOHEXOL 100 ML: 350 INJECTION, SOLUTION INTRAVENOUS at 10:21

## 2025-05-12 RX ADMIN — CEFPODOXIME PROXETIL 200 MG: 200 TABLET, FILM COATED ORAL at 12:28

## 2025-05-12 NOTE — ED ATTENDING ATTESTATION
5/12/2025  I, Vianca Stapleton MD, saw and evaluated the patient. I have discussed the patient with the resident/non-physician practitioner and agree with the resident's/non-physician practitioner's findings, Plan of Care, and MDM as documented in the resident's/non-physician practitioner's note, except where noted. All available labs and Radiology studies were reviewed.  I was present for key portions of any procedure(s) performed by the resident/non-physician practitioner and I was immediately available to provide assistance.       At this point I agree with the current assessment done in the Emergency Department.  I have conducted an independent evaluation of this patient a history and physical is as follows:    60-year-old male with history of hepatocellular carcinoma with intra-abdominal lymph nodes and probable lung mets, just started a new chemotherapy regimen 4 days ago.  Presents for evaluation of 3 days of lower abdominal discomfort that is been progressively worsening.  Waxes and wanes in intensity.  This morning also developed dysuria and urinary urgency.  He does feel like he is able to fully empty his bladder.  Reports nausea without vomiting.  Last bowel movement was yesterday, described as small but otherwise normal in caliber.  No blood in his stool or black tarry stools.  Reports feeling as if he needs to pass gas but is unable to this morning.  Reports frequent belching.  No history of abdominal surgeries.  No flank pain or pain that radiates to his chest, testicles, or penis.  Denies fevers or chills.    Physical Exam  Vitals and nursing note reviewed.   Constitutional:       General: He is not in acute distress.     Appearance: Normal appearance. He is well-developed. He is not ill-appearing, toxic-appearing or diaphoretic.   HENT:      Head: Normocephalic and atraumatic.      Right Ear: External ear normal.      Left Ear: External ear normal.      Nose: Nose normal.   Eyes:       Conjunctiva/sclera: Conjunctivae normal.   Cardiovascular:      Rate and Rhythm: Normal rate and regular rhythm.      Pulses: Normal pulses.      Heart sounds: Normal heart sounds. No murmur heard.     No friction rub. No gallop.   Pulmonary:      Effort: Pulmonary effort is normal. No respiratory distress.      Breath sounds: Normal breath sounds. No wheezing or rales.   Abdominal:      General: Bowel sounds are normal. There is no distension.      Palpations: Abdomen is soft.      Tenderness: There is abdominal tenderness (TTP across the lower abdomen, worse in the suprapubic region). There is no right CVA tenderness, left CVA tenderness or guarding.   Musculoskeletal:         General: No deformity. Normal range of motion.      Cervical back: Normal range of motion and neck supple.      Right lower leg: No edema.      Left lower leg: No edema.   Skin:     General: Skin is warm and dry.   Neurological:      General: No focal deficit present.      Mental Status: He is alert and oriented to person, place, and time.      Motor: No abnormal muscle tone.   Psychiatric:         Mood and Affect: Mood normal.           ED Course  ED Course as of 05/12/25 1711   Mon May 12, 2025   0955 Patient is tender to palpation across the lower abdomen on exam.  Differential includes partial small bowel obstruction, colitis, diverticulitis, kidney stone, UTI.   1145 CT scan of the abdomen pelvis with no acute abnormalities to account for the patient's clinical presentation.  Patient was incidentally found to have incidental worsening of his retroperitoneal lymphadenopathy consistent with disease progression which was discussed with him prior to discharge.  UA with microscopic blood, occasional bacteria.  Given presence of urinary symptoms with suprapubic tenderness, will treat with course of cefpodoxime for suspected hemorrhagic cystitis.  Discussed need for repeat UA with PCP in 1 week, if hematuria persist would need follow-up with  urology.  Return precautions discussed.         Critical Care Time  Procedures

## 2025-05-12 NOTE — TELEPHONE ENCOUNTER
Review of chart shoes medrol dose cierra pending ADRIENNE Chun's signature. I reached out to on-call provider Dr Smallwood to review and sign if appropriate.

## 2025-05-12 NOTE — TELEPHONE ENCOUNTER
Patient calling in states he went to pharmacy and medication is still not available for pickup. Please call him at [phone], thank you!    Attempt made to contact [facility] with no available agent

## 2025-05-12 NOTE — TELEPHONE ENCOUNTER
"Returned call to patent.  He states that the ED gave him antibiotics, but he is still having pain and the pain medication they gave in the ED worked well.  Is very upset and concerned about this happening with every treatment.  Explained that Dr Goddard is out of the office and would have to review with covering  provider.  Patient is very upset and \"wants something that will work\"    "

## 2025-05-12 NOTE — TELEPHONE ENCOUNTER
Patient called after his ED visit, he was discharged home on antibiotics. He continues to have the abdominal pain and is requesting pain medication. Patient also questioning if he will continue to have urinary infections while on treatment, as he is reluctant to continue if this will be a frequent occurrence.

## 2025-05-12 NOTE — TELEPHONE ENCOUNTER
"FOLLOW UP: ED    REASON FOR CONVERSATION: Abdominal Pain    SYMPTOMS: Abdominal pain, urinary urgency    OTHER: Possible fever    DISPOSITION: Go to ED/C Now (Or to Office with PCP Approval)    Received a phone call from patient.  Patient had first cycle of Nivolumab + Ipilimumab on 5/9.  Since X2 days ago, patient c/o an achy abdominal pain and rates pain 8/10.  Patient stated that he feels like he needs to pass gas but doesn't.  Patient had small bowel movement yesterday.  Patient denies any N/V.  Patient is able to eat and drink.  Patient also c/o urinary urgency, to the point that he gets the urge to urinate and if he doesn't go right away, he is incontinent of urine.  Patient also c/o feeling hot all over.  Patient unable to check temperature.  Advised patient to go to ED.  Patient stated that he has someone who can take him and will be going to the [facility].        Reason for Disposition   Patient sounds very sick or weak to the triager    Answer Assessment - Initial Assessment Questions  1. LOCATION: \"Where does it hurt?\"       Lower mid abdomen    2. RADIATION: \"Does the pain shoot anywhere else?\" (e.g., chest, back)      No, denies.    3. ONSET: \"When did the pain begin?\" (Minutes, hours or days ago)       X2 days ago    4. SUDDEN: \"Gradual or sudden onset?\"      Sudden    5. PATTERN \"Does the pain come and go, or is it constant?\"      Constant    6. SEVERITY: \"How bad is the pain?\"  (e.g., Scale 1-10; mild, moderate, or severe)      Patient rates pain 8/10.    7. RECURRENT SYMPTOM: \"Have you ever had this type of stomach pain before?\" If Yes, ask: \"When was the last time?\" and \"What happened that time?\"       No    8. CAUSE: \"What do you think is causing the stomach pain?\"      Unsure    9. RELIEVING/AGGRAVATING FACTORS: \"What makes it better or worse?\" (e.g., antacids, bending or twisting motion, bowel movement)      Nothing    10. OTHER SYMPTOMS: \"Do you have any other symptoms?\" (e.g., back " pain, diarrhea, fever, urination pain, vomiting)        Urinary urgency, possible fever    Protocols used: Abdominal Pain - Male-Adult-[place]

## 2025-05-12 NOTE — TELEPHONE ENCOUNTER
Spoke with Dr Chun, will start patient on Medrol Dosepak.  Called patient and made aware of same.  Will make Dr Goddard aware .

## 2025-05-12 NOTE — ED PROVIDER NOTES
"Time reflects when diagnosis was documented in both MDM as applicable and the Disposition within this note       Time User Action Codes Description Comment    5/12/2025 11:48 AM Latonya Beltran Add [R10.2] Suprapubic pain     5/12/2025 11:48 AM Latonya Beltran Add [N30.90] Cystitis           ED Disposition       ED Disposition   Discharge    Condition   Stable    Date/Time   Mon May 12, 2025 11:48 AM    Comment   Bassem Roberts discharge to home/self care.                   Assessment & Plan       Medical Decision Making  Patient is a 60-year-old male presenting for evaluation of abdominal pain and increased urinary urgency.  Upon examination, patient is well-appearing and does not appear in acute distress.  Vital signs are stable and he is afebrile.  Normal heart and lung sounds.  Abdominal tenderness noted over the suprapubic region without any other tenderness in the abdomen, flank, or back.  No CVA tenderness.    Differentials include but are not limited to: Urinary tract infection, cystitis, diverticulitis, pancreatitis, and cancer.    Blood work obtained which was unremarkable.  Bilirubin is slightly elevated at 1.61 compared to previous value of 1.12. UA was negative for UTI but did show 10-20 RBC.    Imaging:  CT abdomen/pelvis: \"No identifiable acute abnormality to account for the patient's clinical presentation. There is a small left inguinal hernia, containing a portion of the sigmoid colonic wall (West's type hernia). This has not significantly changed since the prior study. Interval worsening of the retroperitoneal lymphadenopathy, consistent with disease progression.\"    Results were discussed with patient.  Discussed with patient back, although at this time he is testing negative for urinary tract infection, will treat him with cefpodoxime for treatment of a complicated cystitis given his presentation and hematuria.  First dose was given in the ED and prescription was sent to the pharmacy.  " "Advised at this time that he should continue with extra strength Tylenol for pain and refrain from NSAIDs due to his bleeding, I am otherwise directed by his oncologist.  Advised patient that he should reach out to his oncologist today to discuss further management of his chemotherapy treatments as well as any persistent pain.  Strict ED return precautions were reviewed.  Patient verbalizes understanding of discharge instructions and follow-up care at this time.    Amount and/or Complexity of Data Reviewed  Labs: ordered. Decision-making details documented in ED Course.     Details: Reviewed   Radiology: ordered.     Details: Reviewed     Risk  Prescription drug management.        ED Course as of 05/13/25 0948   Mon May 12, 2025   0958 Comprehensive metabolic panel(!)  No electrolyte imbalance. Bilirubin slightly elevated compared to previous.    0959 CBC and differential(!)  No leukocytosis. Hemoglobin stable.   0959 Lipase  Normal lipse.   0959 Urine Microscopic(!)  10-20 RBC, negative UTI   1150 CT abdomen pelvis with contrast  \"No identifiable acute abnormality to account for the patient's clinical presentation. There is a small left inguinal hernia, containing a portion of the sigmoid colonic wall (West's type hernia). This has not significantly changed since the prior study. Interval worsening of the retroperitoneal lymphadenopathy, consistent with disease progression.\"         Medications   ondansetron (ZOFRAN) injection 4 mg (4 mg Intravenous Given 5/12/25 0945)   HYDROmorphone (DILAUDID) injection 0.5 mg (0.5 mg Intravenous Given 5/12/25 0945)   sodium chloride 0.9 % bolus 500 mL (0 mL Intravenous Stopped 5/12/25 1023)   iohexol (OMNIPAQUE) 350 MG/ML injection (MULTI-DOSE) 100 mL (100 mL Intravenous Given 5/12/25 1021)   HYDROmorphone (DILAUDID) injection 0.5 mg (0.5 mg Intravenous Given 5/12/25 1147)   cefpodoxime (VANTIN) tablet 200 mg (200 mg Oral Given 5/12/25 1228)       ED Risk Strat Scores    "                 No data recorded        SBIRT 22yo+      Flowsheet Row Most Recent Value   Initial Alcohol Screen: US AUDIT-C     1. How often do you have a drink containing alcohol? 0 Filed at: 2025   2. How many drinks containing alcohol do you have on a typical day you are drinking?  0 Filed at: [id number]   3a. Male UNDER 65: How often do you have five or more drinks on one occasion? 0 Filed at: [id number]   3b. FEMALE Any Age, or MALE 65+: How often do you have 4 or more drinks on one occassion? 0 Filed at: 2025   Audit-C Score 0 Filed at: 2025   ALPHONSO: How many times in the past year have you...    Used an illegal drug or used a prescription medication for non-medical reasons? Never Filed at: 2025                            History of Present Illness       Chief Complaint   Patient presents with    Abdominal Pain     Pt reports RLQ abdominal pain since Saturday. Increased urinary urgency. Hx liver cancer, started new chemo saturday       Past Medical History:   Diagnosis Date    Hypertension     Liver cancer (HCC)       Past Surgical History:   Procedure Laterality Date    HERNIA REPAIR      IR Y-90 PRE-ANGIO/EMBO W/ LUNG SCAN  2022    IR Y-90 RADIOEMBOLIZATION  2022    MASTOIDECTOMY        Family History   Problem Relation Age of Onset    Cancer Mother       Social History     Tobacco Use    Smoking status: Former     Current packs/day: 0.00     Average packs/day: 1 pack/day for 30.0 years (30.0 ttl pk-yrs)     Types: Cigarettes     Start date:      Quit date: 2017     Years since quittin.3    Smokeless tobacco: Current    Tobacco comments:     nicotine pouch (on)   Vaping Use    Vaping status: Never Used   Substance Use Topics    Alcohol use: Yes    Drug use: Yes     Types: Marijuana     Comment: medical marijuana      E-Cigarette/Vaping    E-Cigarette Use Never User       E-Cigarette/Vaping Substances    Nicotine No     THC No     CBD  No     Flavoring No     Other No     Unknown No       I have reviewed and agree with the history as documented.     Patient is a 60-year-old male with a past medical history including hypertension, hepatocellular carcinoma, liver cirrhosis, chronic hepatitis C, hypercholesterolemia, and subclinical hypothyroidism. Presents today for evaluation of abdominal pain. States that he began with a sudden onset of lower abdominal pain 2 days ago when he first woke up. Since then, pain has been constant and has seemed to worsen. The pain is located over the suprapubic region and does not radiate into the groin, upper abdomen, flank, or back. He is also experiencing some increased urinary urgency and frequency. Feels like the pain is slightly relieved when he applies pressure to the middle of his lower abdomen. He does report a new cancer treatment as of 3 days ago, switching from a chemo pill to a chemo infusion, Opdivo / Yervoy, and is not sure if that is the cause of his pain. He did note that he felt like it was hard to pass gas and did take some Pepto with some relief. No pain medications were taken PTA.      Abdominal Pain  Associated symptoms: dysuria and nausea    Associated symptoms: no chest pain, no chills, no fever, no hematuria, no shortness of breath and no vomiting        Review of Systems   Constitutional:  Negative for chills and fever.   Respiratory:  Negative for shortness of breath.    Cardiovascular:  Negative for chest pain.   Gastrointestinal:  Positive for abdominal pain and nausea. Negative for vomiting.   Genitourinary:  Positive for dysuria, frequency and urgency. Negative for difficulty urinating, flank pain and hematuria.   Musculoskeletal:  Negative for back pain.   All other systems reviewed and are negative.          Objective       ED Triage Vitals   Temperature Pulse Blood Pressure Respirations SpO2 Patient Position - Orthostatic VS   05/12/25 0912 05/12/25 0912 05/12/25 0912 05/12/25 0912  05/12/25 0912 05/12/25 0912   98.9 °F (37.2 °C) 74 (!) 180/86 19 99 % Sitting      Temp Source Heart Rate Source BP Location FiO2 (%) Pain Score    05/12/25 0912 05/12/25 0912 05/12/25 0912 -- 05/12/25 0945    Oral Monitor Right arm  8      Vitals      Date and Time Temp Pulse SpO2 Resp BP Pain Score FACES Pain Rating User   05/12/25 1149 -- 73 96 % 20 158/92 -- -- AP   05/12/25 1147 -- -- -- -- -- 8 -- AP   05/12/25 1016 -- -- -- -- -- 1 -- AP   05/12/25 0945 -- -- -- -- -- 8 -- AP   05/12/25 0912 98.9 °F (37.2 °C) 74 99 % 19 180/86 -- -- KK            Physical Exam  Vitals and nursing note reviewed.   Constitutional:       Appearance: Normal appearance.   Cardiovascular:      Rate and Rhythm: Normal rate.      Heart sounds: Normal heart sounds.   Pulmonary:      Effort: Pulmonary effort is normal.      Breath sounds: Normal breath sounds.   Abdominal:      General: Abdomen is flat. Bowel sounds are normal.      Palpations: Abdomen is soft.      Tenderness: There is abdominal tenderness in the suprapubic area. There is no right CVA tenderness or left CVA tenderness.       Musculoskeletal:         General: Normal range of motion.   Skin:     General: Skin is warm and dry.      Capillary Refill: Capillary refill takes less than 2 seconds.   Neurological:      General: No focal deficit present.      Mental Status: He is alert.   Psychiatric:         Mood and Affect: Mood normal.         Behavior: Behavior normal.         Results Reviewed       Procedure Component Value Units Date/Time    Urine Microscopic [[id number]]  (Abnormal) Collected: 05/12/25 0928    Lab Status: Final result Specimen: Urine, Other Updated: 05/12/25 0953     RBC, UA 10-20 /hpf      WBC, UA 1-2 /hpf      Epithelial Cells None Seen /hpf      Bacteria, UA Occasional /hpf      MUCUS THREADS Occasional     Hyaline Casts, UA 10-25 /lpf     Comprehensive metabolic panel [[id number]]  (Abnormal) Collected: 05/12/25 0925    Lab Status: Final result  Specimen: Blood from Arm, Right Updated: 05/12/25 0953     Sodium 136 mmol/L      Potassium 4.2 mmol/L      Chloride 100 mmol/L      CO2 27 mmol/L      ANION GAP 9 mmol/L      BUN 15 mg/dL      Creatinine 0.92 mg/dL      Glucose 90 mg/dL      Calcium 8.9 mg/dL      AST 22 U/L      ALT 13 U/L      Alkaline Phosphatase 74 U/L      Total Protein 6.4 g/dL      Albumin 3.7 g/dL      Total Bilirubin 1.61 mg/dL      eGFR 90 ml/min/1.73sq m     Narrative:      National Kid[employer] Disease Foundation guidelines for Chronic Kidney Disease (CKD):     Stage 1 with normal or high GFR (GFR > 90 mL/min/1.73 square meters)    Stage 2 Mild CKD (GFR = 60-89 mL/min/1.73 square meters)    Stage 3A Moderate CKD (GFR = 45-59 mL/min/1.73 square meters)    Stage 3B Moderate CKD (GFR = 30-44 mL/min/1.73 square meters)    Stage 4 Severe CKD (GFR = 15-29 mL/min/1.73 square meters)    Stage 5 End Stage CKD (GFR <15 mL/min/1.73 square meters)  Note: GFR calculation is accurate only with a steady state creatinine    Lipase [[id number]]  (Normal) Collected: 05/12/25 0925    Lab Status: Final result Specimen: Blood from Arm, Right Updated: 05/12/25 0953     Lipase 11 u/L     UA w Reflex to Microscopic w Reflex to Culture [[id number]]  (Abnormal) Collected: 05/12/25 0928    Lab Status: Final result Specimen: Urine, Other Updated: 05/12/25 0940     Color, UA Yellow     Clarity, UA Clear     Specific Gravity, UA 1.023     pH, UA 6.0     Leukocytes, UA Negative     Nitrite, UA Negative     Protein,  (3+) mg/dl      Glucose, UA Negative mg/dl      Ketones, UA 40 (2+) mg/dl      Urobilinogen, UA 2.0 mg/dl      Bilirubin, UA Negative     Occult Blood, UA Large    CBC and differential [[id number]]  (Abnormal) Collected: 05/12/25 0925    Lab Status: Final result Specimen: Blood from Arm, Right Updated: 05/12/25 0936     WBC 4.46 Thousand/uL      RBC 4.38 Million/uL      Hemoglobin 14.0 g/dL      Hematocrit 40.4 %      MCV 92 fL      MCH 32.0 pg      MCHC  "34.7 g/dL      RDW 13.3 %      MPV 8.8 fL      Platelets 155 Thousands/uL      nRBC 0 /100 WBCs      Segmented % 70 %      Immature Grans % 0 %      Lymphocytes % 13 %      Monocytes % 15 %      Eosinophils Relative 2 %      Basophils Relative 0 %      Absolute Neutrophils 3.12 Thousands/µL      Absolute Immature Grans 0.01 Thousand/uL      Absolute Lymphocytes 0.58 Thousands/µL      Absolute Monocytes 0.67 Thousand/µL      Eosinophils Absolute 0.07 Thousand/µL      Basophils Absolute 0.01 Thousands/µL             CT abdomen pelvis with contrast   ED Interpretation by KARINA Montiel (05/13 0948)   CT showing: \"No identifiable acute abnormality to account for the patient's clinical presentation. There is a small left inguinal hernia, containing a portion of the sigmoid colonic wall (West's type hernia). This has not significantly changed since the prior study. Interval worsening of the retroperitoneal lymphadenopathy, consistent with disease progression.\"        Final Interpretation by Malcom Julio MD (05/12 1051)      1.  No identifiable acute abnormality to account for the patient's clinical presentation. There is a small left inguinal hernia, containing a portion of the sigmoid colonic wall (West's type hernia). This has not significantly changed since the prior    study.   2.  Interval worsening of the retroperitoneal lymphadenopathy, consistent with disease progression.   3.  Please refer to the report body for description of other incidental, chronic and/or benign findings.         Workstation performed: [id number]             Procedures    ED Medication and Procedure Management   Prior to Admission Medications   Prescriptions Last Dose Informant Patient Reported? Taking?   amLODIPine (NORVASC) 2.5 mg tablet   No No   Sig: TAKE 1 TABLET BY MOUTH EVERY DAY   levothyroxine 25 mcg tablet   No No   Sig: TAKE 1 TABLET BY MOUTH EVERY DAY   lisinopril-hydrochlorothiazide (PRINZIDE,ZESTORETIC) " 20-25 MG per tablet   No No   Sig: TAKE 1 TABLET BY MOUTH EVERY DAY   methocarbamol (ROBAXIN) 500 mg tablet   No No   Sig: Take 1 tablet (500 mg total) by mouth 3 (three) times a day as needed for muscle spasms   Patient not taking: Reported on 5/1/2025      Facility-Administered Medications: None     Discharge Medication List as of 5/12/2025 12:00 PM        START taking these medications    Details   cefpodoxime (VANTIN) 200 mg tablet Take 1 tablet (200 mg total) by mouth 2 (two) times a day for 7 days, Starting Mon 5/12/2025, Until Mon 5/19/2025, Normal           CONTINUE these medications which have NOT CHANGED    Details   amLODIPine (NORVASC) 2.5 mg tablet TAKE 1 TABLET BY MOUTH EVERY DAY, Starting Sun 1/19/2025, Normal      levothyroxine 25 mcg tablet TAKE 1 TABLET BY MOUTH EVERY DAY, Starting Mon 4/28/2025, Normal      lisinopril-hydrochlorothiazide (PRINZIDE,ZESTORETIC) 20-25 MG per tablet TAKE 1 TABLET BY MOUTH EVERY DAY, Starting Fri 2/21/2025, Normal      methocarbamol (ROBAXIN) 500 mg tablet Take 1 tablet (500 mg total) by mouth 3 (three) times a day as needed for muscle spasms, Starting Thu 1/30/2025, Normal           No discharge procedures on file.  ED SEPSIS DOCUMENTATION   Time reflects when diagnosis was documented in both MDM as applicable and the Disposition within this note       Time User Action Codes Description Comment    5/12/2025 11:48 AM Latonya Beltran [R10.2] Suprapubic pain     5/12/2025 11:48 AM Latonya Beltran [N30.90] Cystitis                  KARINA Montiel  05/13/25 0946       KARINA Montiel  05/13/25 0949

## 2025-05-13 RX ORDER — METHYLPREDNISOLONE 4 MG/1
TABLET ORAL
Qty: 1 EACH | Refills: 0 | OUTPATIENT
Start: 2025-05-13

## 2025-05-21 NOTE — TELEPHONE ENCOUNTER
Bassem was seen in the office today, and  Cabozantinib will be dose reduced to 20 mg.     Patient is aware we are working on getting a new rx for him.  
Email sent to [facility] for clarification   
Per [facility] the 20 mg dose [facility]. I spoke with Bassem and he was not aware of this, but will [facility] later today.  
Pt calling in to inform the office that [facility] never received the new script with the dose reduce of his chemo med from 60 mg to 20 mg.   
Improved

## 2025-05-28 ENCOUNTER — RESULTS FOLLOW-UP (OUTPATIENT)
Dept: FAMILY MEDICINE CLINIC | Facility: CLINIC | Age: 60
End: 2025-05-28

## 2025-05-28 ENCOUNTER — APPOINTMENT (OUTPATIENT)
Dept: LAB | Facility: CLINIC | Age: 60
End: 2025-05-28
Attending: INTERNAL MEDICINE
Payer: COMMERCIAL

## 2025-05-28 DIAGNOSIS — C22.0 HEPATOCELLULAR CARCINOMA (HCC): ICD-10-CM

## 2025-05-28 LAB
ALBUMIN SERPL BCG-MCNC: 3.8 G/DL (ref 3.5–5)
ALP SERPL-CCNC: 79 U/L (ref 34–104)
ALT SERPL W P-5'-P-CCNC: 17 U/L (ref 7–52)
ANION GAP SERPL CALCULATED.3IONS-SCNC: 6 MMOL/L (ref 4–13)
AST SERPL W P-5'-P-CCNC: 24 U/L (ref 13–39)
BASOPHILS # BLD AUTO: 0.03 THOUSANDS/ÂΜL (ref 0–0.1)
BASOPHILS NFR BLD AUTO: 1 % (ref 0–1)
BILIRUB SERPL-MCNC: 0.82 MG/DL (ref 0.2–1)
BUN SERPL-MCNC: 19 MG/DL (ref 5–25)
CALCIUM SERPL-MCNC: 9.1 MG/DL (ref 8.4–10.2)
CHLORIDE SERPL-SCNC: 101 MMOL/L (ref 96–108)
CO2 SERPL-SCNC: 25 MMOL/L (ref 21–32)
CREAT SERPL-MCNC: 0.93 MG/DL (ref 0.6–1.3)
EOSINOPHIL # BLD AUTO: 0.09 THOUSAND/ÂΜL (ref 0–0.61)
EOSINOPHIL NFR BLD AUTO: 2 % (ref 0–6)
ERYTHROCYTE [DISTWIDTH] IN BLOOD BY AUTOMATED COUNT: 13 % (ref 11.6–15.1)
GFR SERPL CREATININE-BSD FRML MDRD: 88 ML/MIN/1.73SQ M
GLUCOSE SERPL-MCNC: 105 MG/DL (ref 65–140)
HCT VFR BLD AUTO: 36.4 % (ref 36.5–49.3)
HGB BLD-MCNC: 12.4 G/DL (ref 12–17)
IMM GRANULOCYTES # BLD AUTO: 0.03 THOUSAND/UL (ref 0–0.2)
IMM GRANULOCYTES NFR BLD AUTO: 1 % (ref 0–2)
LYMPHOCYTES # BLD AUTO: 1.06 THOUSANDS/ÂΜL (ref 0.6–4.47)
LYMPHOCYTES NFR BLD AUTO: 19 % (ref 14–44)
MCH RBC QN AUTO: 31.2 PG (ref 26.8–34.3)
MCHC RBC AUTO-ENTMCNC: 34.1 G/DL (ref 31.4–37.4)
MCV RBC AUTO: 92 FL (ref 82–98)
MONOCYTES # BLD AUTO: 0.6 THOUSAND/ÂΜL (ref 0.17–1.22)
MONOCYTES NFR BLD AUTO: 11 % (ref 4–12)
NEUTROPHILS # BLD AUTO: 3.66 THOUSANDS/ÂΜL (ref 1.85–7.62)
NEUTS SEG NFR BLD AUTO: 66 % (ref 43–75)
NRBC BLD AUTO-RTO: 0 /100 WBCS
PLATELET # BLD AUTO: 166 THOUSANDS/UL (ref 149–390)
PMV BLD AUTO: 9.5 FL (ref 8.9–12.7)
POTASSIUM SERPL-SCNC: 4.2 MMOL/L (ref 3.5–5.3)
PROT SERPL-MCNC: 6.8 G/DL (ref 6.4–8.4)
RBC # BLD AUTO: 3.97 MILLION/UL (ref 3.88–5.62)
SODIUM SERPL-SCNC: 132 MMOL/L (ref 135–147)
T3FREE SERPL-MCNC: 2.97 PG/ML (ref 2.5–3.9)
T4 FREE SERPL-MCNC: 1.04 NG/DL (ref 0.61–1.12)
TSH SERPL DL<=0.05 MIU/L-ACNC: 6.29 UIU/ML (ref 0.45–4.5)
WBC # BLD AUTO: 5.47 THOUSAND/UL (ref 4.31–10.16)

## 2025-05-28 PROCEDURE — 84443 ASSAY THYROID STIM HORMONE: CPT

## 2025-05-28 PROCEDURE — 80053 COMPREHEN METABOLIC PANEL: CPT

## 2025-05-28 PROCEDURE — 36415 COLL VENOUS BLD VENIPUNCTURE: CPT

## 2025-05-28 PROCEDURE — 84481 FREE ASSAY (FT-3): CPT

## 2025-05-28 PROCEDURE — 85025 COMPLETE CBC W/AUTO DIFF WBC: CPT

## 2025-05-28 PROCEDURE — 84439 ASSAY OF FREE THYROXINE: CPT

## 2025-05-29 ENCOUNTER — HOSPITAL ENCOUNTER (OUTPATIENT)
Dept: INFUSION CENTER | Facility: CLINIC | Age: 60
Discharge: HOME | End: 2025-05-29
Attending: INTERNAL MEDICINE
Payer: COMMERCIAL

## 2025-05-29 VITALS
WEIGHT: 182 LBS | HEIGHT: 69 IN | DIASTOLIC BLOOD PRESSURE: 82 MMHG | BODY MASS INDEX: 26.96 KG/M2 | HEART RATE: 84 BPM | RESPIRATION RATE: 18 BRPM | SYSTOLIC BLOOD PRESSURE: 121 MMHG | OXYGEN SATURATION: 99 % | TEMPERATURE: 98.3 F

## 2025-05-29 DIAGNOSIS — C22.0 HEPATOCELLULAR CARCINOMA (HCC): Primary | ICD-10-CM

## 2025-05-29 DIAGNOSIS — E03.2 HYPOTHYROIDISM DUE TO MEDICATION: ICD-10-CM

## 2025-05-29 PROCEDURE — 96413 CHEMO IV INFUSION 1 HR: CPT

## 2025-05-29 PROCEDURE — 96417 CHEMO IV INFUS EACH ADDL SEQ: CPT

## 2025-05-29 RX ORDER — LEVOTHYROXINE SODIUM 50 UG/1
50 TABLET ORAL DAILY
Qty: 30 TABLET | Refills: 1 | Status: SHIPPED | OUTPATIENT
Start: 2025-05-29

## 2025-05-29 RX ORDER — SODIUM CHLORIDE 9 MG/ML
20 INJECTION, SOLUTION INTRAVENOUS ONCE
Status: COMPLETED | OUTPATIENT
Start: 2025-05-29 | End: 2025-05-29

## 2025-05-29 RX ADMIN — SODIUM CHLORIDE 85.3 MG: 9 INJECTION, SOLUTION INTRAVENOUS at 15:29

## 2025-05-29 RX ADMIN — SODIUM CHLORIDE 20 ML/HR: 9 INJECTION, SOLUTION INTRAVENOUS at 15:23

## 2025-05-29 RX ADMIN — SODIUM CHLORIDE 250 MG: 900 INJECTION, SOLUTION INTRAVENOUS at 16:15

## 2025-05-29 NOTE — PROGRESS NOTES
Patient tolerated treatment well without adverse reaction. Removed PIV, intact, coban in place. Next appt confirmed for 6/19 @ 1430. Patient declined AVS

## 2025-05-29 NOTE — TELEPHONE ENCOUNTER
----- Message from Josephine Goddard MD sent at 5/28/2025 12:10 PM EDT -----  Kyle Mancera,  Please, call the patient.  He needs to increase his levothyroxine dose from 25 mcg per mouth daily to 50 mcg per mouth daily to achieve better control of his hypothyroidism.  Thank you,  Josephine  ----- Message -----  From: Lab, Background User  Sent: 5/28/2025  11:13 AM EDT  To: Josephine Goddard MD

## 2025-05-29 NOTE — TELEPHONE ENCOUNTER
Call placed to patient regarding lab results.  Made aware of dose increase of levothryoxine to 50mcg.  Pt stated understanding      Pt expressed concerns over previous UA showing blood, no visibile blood at this time. Will review with Dr Goddard.

## 2025-05-29 NOTE — PROGRESS NOTES
Patient arrives for D1 C2 Opdivo/Yervoy. Patient offers no acute complaints; reports resolution of urinary symptoms. Labs reviewed from 5/28/25, within parameters for treatment today. Office already aware of TSH, medication adjustment noted. PIV inserted via anatomical landmarks, entire chamber full of blood return - unable to advance with multiple repositioning. 2nd attempt via US, placed without issue, patient tolerated well. Patient resting on recliner chair, call bell within reach.

## 2025-06-06 ENCOUNTER — TELEPHONE (OUTPATIENT)
Dept: HEMATOLOGY ONCOLOGY | Facility: CLINIC | Age: 60
End: 2025-06-06

## 2025-06-06 NOTE — TELEPHONE ENCOUNTER
Called patient to reschedule missed appointment with Dr. Goddard today. There was no answer and was unable to leave a message due to voicemail box not being set up. Due to that, a Glacier Bayt message was sent stating reason for message and a call back number to call and reschedule.

## 2025-06-18 ENCOUNTER — APPOINTMENT (OUTPATIENT)
Dept: LAB | Facility: CLINIC | Age: 60
End: 2025-06-18
Attending: INTERNAL MEDICINE
Payer: COMMERCIAL

## 2025-06-18 DIAGNOSIS — C22.0 HEPATOCELLULAR CARCINOMA (HCC): ICD-10-CM

## 2025-06-18 LAB
ALBUMIN SERPL BCG-MCNC: 3.9 G/DL (ref 3.5–5)
ALP SERPL-CCNC: 81 U/L (ref 34–104)
ALT SERPL W P-5'-P-CCNC: 14 U/L (ref 7–52)
ANION GAP SERPL CALCULATED.3IONS-SCNC: 3 MMOL/L (ref 4–13)
AST SERPL W P-5'-P-CCNC: 22 U/L (ref 13–39)
BASOPHILS # BLD AUTO: 0.02 THOUSANDS/ÂΜL (ref 0–0.1)
BASOPHILS NFR BLD AUTO: 0 % (ref 0–1)
BILIRUB SERPL-MCNC: 0.54 MG/DL (ref 0.2–1)
BUN SERPL-MCNC: 15 MG/DL (ref 5–25)
CALCIUM SERPL-MCNC: 9.1 MG/DL (ref 8.4–10.2)
CHLORIDE SERPL-SCNC: 102 MMOL/L (ref 96–108)
CO2 SERPL-SCNC: 28 MMOL/L (ref 21–32)
CREAT SERPL-MCNC: 0.97 MG/DL (ref 0.6–1.3)
EOSINOPHIL # BLD AUTO: 0.33 THOUSAND/ÂΜL (ref 0–0.61)
EOSINOPHIL NFR BLD AUTO: 6 % (ref 0–6)
ERYTHROCYTE [DISTWIDTH] IN BLOOD BY AUTOMATED COUNT: 13.4 % (ref 11.6–15.1)
GFR SERPL CREATININE-BSD FRML MDRD: 84 ML/MIN/1.73SQ M
GLUCOSE SERPL-MCNC: 117 MG/DL (ref 65–140)
HCT VFR BLD AUTO: 37.1 % (ref 36.5–49.3)
HGB BLD-MCNC: 12.4 G/DL (ref 12–17)
IMM GRANULOCYTES # BLD AUTO: 0.04 THOUSAND/UL (ref 0–0.2)
IMM GRANULOCYTES NFR BLD AUTO: 1 % (ref 0–2)
LYMPHOCYTES # BLD AUTO: 1.55 THOUSANDS/ÂΜL (ref 0.6–4.47)
LYMPHOCYTES NFR BLD AUTO: 26 % (ref 14–44)
MCH RBC QN AUTO: 31.2 PG (ref 26.8–34.3)
MCHC RBC AUTO-ENTMCNC: 33.4 G/DL (ref 31.4–37.4)
MCV RBC AUTO: 94 FL (ref 82–98)
MONOCYTES # BLD AUTO: 0.57 THOUSAND/ÂΜL (ref 0.17–1.22)
MONOCYTES NFR BLD AUTO: 10 % (ref 4–12)
NEUTROPHILS # BLD AUTO: 3.47 THOUSANDS/ÂΜL (ref 1.85–7.62)
NEUTS SEG NFR BLD AUTO: 57 % (ref 43–75)
NRBC BLD AUTO-RTO: 0 /100 WBCS
PLATELET # BLD AUTO: 142 THOUSANDS/UL (ref 149–390)
PMV BLD AUTO: 9.3 FL (ref 8.9–12.7)
POTASSIUM SERPL-SCNC: 4.1 MMOL/L (ref 3.5–5.3)
PROT SERPL-MCNC: 6.9 G/DL (ref 6.4–8.4)
RBC # BLD AUTO: 3.97 MILLION/UL (ref 3.88–5.62)
SODIUM SERPL-SCNC: 133 MMOL/L (ref 135–147)
T3FREE SERPL-MCNC: 3.04 PG/ML (ref 2.5–3.9)
TSH SERPL DL<=0.05 MIU/L-ACNC: 3.12 UIU/ML (ref 0.45–4.5)
WBC # BLD AUTO: 5.98 THOUSAND/UL (ref 4.31–10.16)

## 2025-06-18 PROCEDURE — 80053 COMPREHEN METABOLIC PANEL: CPT

## 2025-06-18 PROCEDURE — 84443 ASSAY THYROID STIM HORMONE: CPT

## 2025-06-18 PROCEDURE — 84481 FREE ASSAY (FT-3): CPT

## 2025-06-18 PROCEDURE — 36415 COLL VENOUS BLD VENIPUNCTURE: CPT

## 2025-06-18 PROCEDURE — 85025 COMPLETE CBC W/AUTO DIFF WBC: CPT

## 2025-06-19 ENCOUNTER — HOSPITAL ENCOUNTER (OUTPATIENT)
Dept: INFUSION CENTER | Facility: CLINIC | Age: 60
Discharge: HOME | End: 2025-06-19
Attending: INTERNAL MEDICINE
Payer: COMMERCIAL

## 2025-06-19 ENCOUNTER — TELEPHONE (OUTPATIENT)
Dept: HEMATOLOGY ONCOLOGY | Facility: CLINIC | Age: 60
End: 2025-06-19

## 2025-06-19 VITALS
RESPIRATION RATE: 18 BRPM | BODY MASS INDEX: 26.73 KG/M2 | SYSTOLIC BLOOD PRESSURE: 123 MMHG | DIASTOLIC BLOOD PRESSURE: 63 MMHG | HEIGHT: 69 IN | OXYGEN SATURATION: 98 % | HEART RATE: 74 BPM | WEIGHT: 180.5 LBS | TEMPERATURE: 98.4 F

## 2025-06-19 DIAGNOSIS — C22.0 HEPATOCELLULAR CARCINOMA (HCC): Primary | ICD-10-CM

## 2025-06-19 PROCEDURE — 96417 CHEMO IV INFUS EACH ADDL SEQ: CPT

## 2025-06-19 PROCEDURE — 96413 CHEMO IV INFUSION 1 HR: CPT

## 2025-06-19 RX ORDER — SODIUM CHLORIDE 9 MG/ML
20 INJECTION, SOLUTION INTRAVENOUS ONCE
Status: COMPLETED | OUTPATIENT
Start: 2025-06-19 | End: 2025-06-19

## 2025-06-19 RX ADMIN — SODIUM CHLORIDE 20 ML/HR: 0.9 INJECTION, SOLUTION INTRAVENOUS at 15:08

## 2025-06-19 RX ADMIN — SODIUM CHLORIDE 250 MG: 9 INJECTION, SOLUTION INTRAVENOUS at 16:40

## 2025-06-19 RX ADMIN — SODIUM CHLORIDE 85.3 MG: 9 INJECTION, SOLUTION INTRAVENOUS at 15:47

## 2025-06-19 NOTE — PROGRESS NOTES
Patient arrives for D1 C3 Opdivo/Yervoy. Patient reports feeling well, no complaints. Labs reviewed from 6/18/25, within parameters for treatment today. PIV placed, patient tolerated well. Patient resting on recliner chair, call bell within reach.     Fiordaliza ALEXANDRE to check with Dr Goddard when AFP tumor marker is due. Med/onc scheduling to call patient to schedule next appt.

## 2025-06-19 NOTE — TELEPHONE ENCOUNTER
Please contact patient for follow up appointment with Dr Goddard or KARINA Montanez (if with [facility] please put on day Dr Goddard is seeing patients- thanks!

## 2025-06-27 ENCOUNTER — OFFICE VISIT (OUTPATIENT)
Dept: HEMATOLOGY ONCOLOGY | Facility: CLINIC | Age: 60
End: 2025-06-27
Payer: COMMERCIAL

## 2025-06-27 VITALS
DIASTOLIC BLOOD PRESSURE: 80 MMHG | HEART RATE: 78 BPM | SYSTOLIC BLOOD PRESSURE: 140 MMHG | BODY MASS INDEX: 26.96 KG/M2 | TEMPERATURE: 97.4 F | RESPIRATION RATE: 18 BRPM | HEIGHT: 69 IN | OXYGEN SATURATION: 99 % | WEIGHT: 182 LBS

## 2025-06-27 DIAGNOSIS — C22.0 HEPATOCELLULAR CARCINOMA (HCC): Primary | ICD-10-CM

## 2025-06-27 PROCEDURE — 99213 OFFICE O/P EST LOW 20 MIN: CPT | Performed by: INTERNAL MEDICINE

## 2025-06-27 RX ORDER — SODIUM CHLORIDE 9 MG/ML
20 INJECTION, SOLUTION INTRAVENOUS ONCE
OUTPATIENT
Start: 2025-07-10

## 2025-06-27 NOTE — ASSESSMENT & PLAN NOTE
60 y.o. male with metastatic HCC, initially diagnosed on February 2, 2022.  The patient has distant metastatic disease to intra-abdominal lymph nodes and probable lung metastases.  The patient has received several treatments for his metastatic HCC including Ytrium 90 intrahepatic arterial embolization treatment in November 2022.  With this treatment serum Alpha-fetoprotein level came down nicely.  From from June 29, 2023 to August 1, 2024, the patient received frontline systemic therapy with atezolizumab plus bevacizumab for metastatic HCC.  After August 1, 2024 bevacizumab was discontinued due to proteinuria.  The patient receives single agent atezolizumab until September 12, 2024.  Frontline systemic therapy with atezolizumab plus bevacizumab was discontinued due to radiographic progression of metastatic disease as well as serum AFP rise.  On October 15, 2024, he initiated second line systemic targeted therapy with oral anti-VEGFR cabozantinib 40 mg per mouth daily. Initially, he tolerated cabozantinib well, however over the past several days in November 2024, he developed moderate to severe hand-foot syndrome caused by cabozantinib, characterized by erythema, irritation, pain, blisters, thickening and dryness of palms and soles as well as severe erythematous skin rash and superficial ulceration of the skin folds in both groin areas and the scrotum. On November 19, 2024 I advised him to stop cabozantinib. After stopping cabozantinib on November 19, 2024, the patient noticed significant improvement of his hand-foot syndrome.  On December 2025, the patient re-initiated cabozantinib 20 mg PO daily. Mr. Roberts has clinically significant benefit from cabozantinib.  On January 15, 2025, contrast enhanced CT scan of the abdomen and pelvis showed stable HCC. Specifically, there was liver cirrhosis. There were no new or enlarging masses. There were stable treated lesions in hepatic segment 8. There was stable upper  abdominal lymphadenopathy suspicious for metastases.       Interim Assessment: Mr. Roberts took cabozantinib 20 mg per mouth daily until May 1, 2025. Treatment was discontinued due to severe toxicity including moderate to severe hand-foot syndrome and radiographic progression of metastatic HCC. On April 14, 2025, contrast-enhanced CT scan of the chest, abdomen and pelvis showed significant radiographic progression of intra-abdominal metastatic lymph nodes.  Hepatic lesions were stable.  In addition, he had marked serum AFP rise.  Subsequently, he initiated dual immunotherapy with ipilimumab 3 mg/kg intravenously plus nivolumab 1 mg/kg intravenously every 3 weeks.  From May 9, 2025 to June 19, 2025 he received 3 cycles of ipilimumab plus nivolumab.  After cycle 1 of dual immunotherapy he developed severe abdominal pain and had assessment in the emergency department on May 12, 2025.  Contrast-enhanced CT scan of the abdomen and pelvis did not show acute pancreatitis or any acute intra-abdominal process.  The patient did not require inpatient management for acute abdominal pain and responded to a short course of oral steroids.  After cycles 2 and 3 of ipilimumab plus nivolumab he did not have abdominal pain or evidence of immune mediated adverse events. Today, he does not have new complaints.      Plan:  Cycle # 4 of ipilimumab plus nivolumab on July 10, 2025  After 4 cycles of ipilimumab plus nivolumab, initiate maintenance nivolumab 240 mg IV every 2 weeks for up to 2 years. Duration of immunotherapy is until progression of metastatic HCC, development of unmanageable treatment related adverse events, patient's preference, or up to 2 years  Radiographic assessment of response to dual immunotherapy with contrast-enhanced CT scan of the chest, abdomen and pelvis after 4 cycles of ipilimumab plus nivolumab  Periodic monitoring of serum AFP  Return to medical oncology clinic for history and physical exam and to assess  safety and tolerability of nivolumab plus ipilimumab on July 24, 2025     Mr. Roberts understands and agrees with my management recommendations and plan of care. I answered questions to his satisfaction.

## 2025-06-27 NOTE — PROGRESS NOTES
Name: Bassem Roberts      : 1965      MRN: [id number]  Encounter Provider: Josephine Goddard MD  Encounter Date: 2025   Encounter department: [facility]  :  Assessment & Plan  Hepatocellular carcinoma (HCC)  60 y.o. male with metastatic HCC, initially diagnosed on 2022.  The patient has distant metastatic disease to intra-abdominal lymph nodes and probable lung metastases.  The patient has received several treatments for his metastatic HCC including Ytrium 90 intrahepatic arterial embolization treatment in 2022.  With this treatment serum Alpha-fetoprotein level came down nicely.  From from 2023 to 2024, the patient received frontline systemic therapy with atezolizumab plus bevacizumab for metastatic HCC.  After 2024 bevacizumab was discontinued due to proteinuria.  The patient receives single agent atezolizumab until 2024.  Frontline systemic therapy with atezolizumab plus bevacizumab was discontinued due to radiographic progression of metastatic disease as well as serum AFP rise.  On October 15, 2024, he initiated second line systemic targeted therapy with oral anti-VEGFR cabozantinib 40 mg per mouth daily. Initially, he tolerated cabozantinib well, however over the past several days in 2024, he developed moderate to severe hand-foot syndrome caused by cabozantinib, characterized by erythema, irritation, pain, blisters, thickening and dryness of palms and soles as well as severe erythematous skin rash and superficial ulceration of the skin folds in both groin areas and the scrotum. On 2024 I advised him to stop cabozantinib. After stopping cabozantinib on 2024, the patient noticed significant improvement of his hand-foot syndrome.  On 2025, the patient re-initiated cabozantinib 20 mg PO daily. Mr. Roberts has clinically significant benefit from cabozantinib.  On  January 15, 2025, contrast enhanced CT scan of the abdomen and pelvis showed stable HCC. Specifically, there was liver cirrhosis. There were no new or enlarging masses. There were stable treated lesions in hepatic segment 8. There was stable upper abdominal lymphadenopathy suspicious for metastases.       Interim Assessment: Mr. Roberts took cabozantinib 20 mg per mouth daily until May 1, 2025. Treatment was discontinued due to severe toxicity including moderate to severe hand-foot syndrome and radiographic progression of metastatic HCC. On April 14, 2025, contrast-enhanced CT scan of the chest, abdomen and pelvis showed significant radiographic progression of intra-abdominal metastatic lymph nodes.  Hepatic lesions were stable.  In addition, he had marked serum AFP rise.  Subsequently, he initiated dual immunotherapy with ipilimumab 3 mg/kg intravenously plus nivolumab 1 mg/kg intravenously every 3 weeks.  From May 9, 2025 to June 19, 2025 he received 3 cycles of ipilimumab plus nivolumab.  After cycle 1 of dual immunotherapy he developed severe abdominal pain and had assessment in the emergency department on May 12, 2025.  Contrast-enhanced CT scan of the abdomen and pelvis did not show acute pancreatitis or any acute intra-abdominal process.  The patient did not require inpatient management for acute abdominal pain and responded to a short course of oral steroids.  After cycles 2 and 3 of ipilimumab plus nivolumab he did not have abdominal pain or evidence of immune mediated adverse events. Today, he does not have new complaints.      Plan:  Cycle # 4 of ipilimumab plus nivolumab on July 10, 2025  After 4 cycles of ipilimumab plus nivolumab, initiate maintenance nivolumab 240 mg IV every 2 weeks for up to 2 years. Duration of immunotherapy is until progression of metastatic HCC, development of unmanageable treatment related adverse events, patient's preference, or up to 2 years  Radiographic assessment of  response to dual immunotherapy with contrast-enhanced CT scan of the chest, abdomen and pelvis after 4 cycles of ipilimumab plus nivolumab  Periodic monitoring of serum AFP  Return to medical oncology clinic for history and physical exam and to assess safety and tolerability of nivolumab plus ipilimumab on July 24, 2025     Mr. Roberts understands and agrees with my management recommendations and plan of care. I answered questions to his satisfaction.          History of Present Illness   Chief Complaint   Patient presents with    Follow-up   60 y.o. male with metastatic HCC, initially diagnosed on February 2, 2022.  The patient has distant metastatic disease to intra-abdominal lymph nodes and probable lung metastases.  The patient has received several treatments for his metastatic HCC including Ytrium 90 intrahepatic arterial embolization treatment in November 2022.  With this treatment serum Alpha-fetoprotein level came down nicely.  From from June 29, 2023 to August 1, 2024, the patient received frontline systemic therapy with atezolizumab plus bevacizumab for metastatic HCC.  After August 1, 2024 bevacizumab was discontinued due to proteinuria.  The patient receives single agent atezolizumab until September 12, 2024.  Frontline systemic therapy with atezolizumab plus bevacizumab was discontinued due to radiographic progression of metastatic disease as well as serum AFP rise.  On October 15, 2024, he initiated second line systemic targeted therapy with oral anti-VEGFR cabozantinib 40 mg per mouth daily. Initially, he tolerated cabozantinib well, however over the past several days in November 2024, he developed moderate to severe hand-foot syndrome caused by cabozantinib, characterized by erythema, irritation, pain, blisters, thickening and dryness of palms and soles as well as severe erythematous skin rash and superficial ulceration of the skin folds in both groin areas and the scrotum. On November 19, 2024 I  advised him to stop cabozantinib. After stopping cabozantinib on November 19, 2024, the patient noticed significant improvement of his hand-foot syndrome.  On December 2025, the patient re-initiated cabozantinib 20 mg PO daily. Mr. Roberts has clinically significant benefit from cabozantinib.  On January 15, 2025, contrast enhanced CT scan of the abdomen and pelvis showed stable HCC. Specifically, there was liver cirrhosis. There were no new or enlarging masses. There were stable treated lesions in hepatic segment 8. There was stable upper abdominal lymphadenopathy suspicious for metastases.    On April 14, 2025, contrast-enhanced CT scan of the chest, abdomen and pelvis showed radiographic progression of metastatic disease.  Specifically, there was progression of metastatic lymphadenopathy in the retroperitoneum and portacaval region. Stable hepatic lesions and stable scattered pulmonary nodules measuring up to 4 mm in size were identified. No new suspicious nodules were seen.     Interim History:  Mr. Roberts took cabozantinib 20 mg per mouth daily until May 1, 2025. Treatment was discontinued to severe toxicity including moderate to severe moderate hand-foot syndrome and radiographic progression of metastatic HCC.  Specifically, on April 14, 2025, contrast-enhanced CT scan of the chest, abdomen and pelvis showed significant progression of intra-abdominal metastatic lymph nodes.  Hepatic lesions were overall stable.  In addition, he had marked serum AFP rise.  Subsequently he initiated dual immunotherapy with ipilimumab 3 mg/kg intravenously plus nivolumab 1 mg/kg intravenously every 3 weeks.  From May 9, 2025 to June 19, 2025 he received 3 cycles of ipilimumab plus nivolumab.  After cycle 1 of dual immunotherapy he developed severe abdominal pain and had assessment in the emergency department on May 12, 2025.  Contrast-enhanced CT scan of the abdomen and pelvis did not show acute pancreatitis or any acute  intra-abdominal process.  The patient did not require inpatient management of acute abdominal pain and respond to a short course of oral steroids.  After cycles 2 and 3 of ipilimumab plus nivolumab he did not have abdominal pain or evidence of immune mediated adverse events.    Today he does not have new complaints.  He denies new gastrointestinal symptoms such as anorexia, nausea, vomiting, dyne aphasia, dysphagia, hematemesis, melena, hematochezia, abdominal pain, abdominal distention, changes in bowel habits or jaundice.  He denies systemic symptoms such as severe weakness, weight loss, fever, chills, or diaphoresis.  He continues to work on a full-time basis.  His current ECOG performance status is 0.    Oncology History   Cancer Staging   Hepatocellular carcinoma (HCC)  Staging form: Liver (Excluding Intrahepatic Bile Ducts), AJCC 8th Edition  - Clinical stage from 7/13/2022: Stage IVB (rcT3, rcN1, rpM1) - Signed by Taye Lundberg MD on 1/30/2024  Stage prefix: Recurrence  Histologic grade (G): G2  Histologic grading system: 4 grade system  Oncology History   Hepatocellular carcinoma (HCC)   2022 Initial Diagnosis    Hepatocellular carcinoma (HCC)     2/8/2022 Biopsy    [facility] US guided liver biopsy: right liver lobe:  Poorly differentiated HCC with patchy necrosis       7/13/2022 -  Cancer Staged    Staging form: Liver (Excluding Intrahepatic Bile Ducts), AJCC 8th Edition  - Clinical stage from 7/13/2022: Stage IVB (rcT3, cN1, pM1) - Signed by Taye Lundberg MD on 1/30/2024  Stage prefix: Recurrence  Histologic grade (G): G2  Histologic grading system: 4 grade system       6/29/2023 - 9/12/2024 Chemotherapy    alteplase (CATHFLO), 2 mg, Intracatheter, Every 1 Minute as needed, 21 of 24 cycles  atezolizumab (TECENTRIQ) IVPB, 1,200 mg, Intravenous, Once, 21 of 24 cycles  Administration: 1,200 mg (6/29/2023), 1,200 mg (7/20/2023), 1,200 mg (8/10/2023), 1,200 mg (8/31/2023), 1,200 mg (9/21/2023),  1,200 mg (10/10/2023), 1,200 mg (11/2/2023), 1,200 mg (11/24/2023), 1,200 mg (12/14/2023), 1,200 mg (1/4/2024), 1,200 mg (1/26/2024), 1,200 mg (3/7/2024), 1,200 mg (3/28/2024), 1,200 mg (4/18/2024), 1,200 mg (5/9/2024), 1,200 mg (5/30/2024), 1,200 mg (6/20/2024), 1,200 mg (7/11/2024), 1,200 mg (8/1/2024), 1,200 mg (8/22/2024), 1,200 mg (9/12/2024)  bevacizumab-awwb (MVASI) IVPB, 1,345 mg (100 % of original dose 15 mg/kg), Intravenous, Once, 19 of 22 cycles  Dose modification: 15 mg/kg (original dose 15 mg/kg, Cycle 1), 15 mg/kg (original dose 15 mg/kg, Cycle 2), 15 mg/kg (original dose 15 mg/kg, Cycle 3)  Administration: 1,300 mg (6/29/2023), 1,300 mg (7/20/2023), 1,300 mg (8/10/2023), 1,300 mg (8/31/2023), 1,300 mg (9/21/2023), 1,300 mg (10/10/2023), 1,300 mg (11/2/2023), 1,300 mg (11/24/2023), 1,300 mg (12/14/2023), 1,300 mg (1/4/2024), 1,300 mg (1/26/2024), 1,300 mg (3/7/2024), 1,300 mg (3/28/2024), 1,300 mg (4/18/2024), 1,300 mg (5/9/2024), 1,300 mg (5/30/2024), 1,300 mg (6/20/2024), 1,200 mg (7/11/2024), 1,200 mg (8/1/2024)     5/9/2025 -  Chemotherapy    ipilimumab (YERVOY) IVPB, 256 mg, 3 of 4 cycles  Administration: 250 mg (5/9/2025), 250 mg (5/29/2025), 250 mg (6/19/2025)  nivolumab (OPDIVO) IVPB, 1 mg/kg = 85.3 mg, 3 of 10 cycles  Administration: 85.3 mg (5/9/2025), 85.3 mg (5/29/2025), 85.3 mg (6/19/2025)        Pertinent Medical History     Past Medical History:   Diagnosis Date    Hypertension     Liver cancer (HCC)                 Review of Systems   Constitutional:  Negative for activity change, appetite change, chills, diaphoresis, fatigue, fever and unexpected weight change.   HENT:  Negative for congestion, dental problem, ear discharge, ear pain, facial swelling, hearing loss, mouth sores, nosebleeds, postnasal drip, rhinorrhea, sinus pain, sneezing, sore throat, tinnitus, trouble swallowing and voice change.    Eyes:  Negative for photophobia, pain, discharge, redness, itching and visual  "disturbance.   Respiratory:  Negative for apnea, cough, choking, chest tightness, shortness of breath, wheezing and stridor.    Cardiovascular:  Negative for chest pain, palpitations and leg swelling.   Gastrointestinal:  Negative for abdominal distention, abdominal pain, anal bleeding, blood in stool, constipation, diarrhea, nausea, rectal pain and vomiting.   Endocrine: Negative for cold intolerance and heat intolerance.   Genitourinary:  Negative for decreased urine volume, difficulty urinating, dysuria, enuresis, flank pain, frequency, hematuria and urgency.   Musculoskeletal:  Negative for arthralgias, back pain, gait problem, joint swelling, myalgias, neck pain and neck stiffness.   Skin:  Negative for color change, pallor, rash and wound.        Mild intermittent skin itching   Neurological:  Negative for dizziness, tremors, seizures, syncope, facial asymmetry, speech difficulty, weakness, light-headedness, numbness and headaches.   Hematological:  Negative for adenopathy. Does not bruise/bleed easily.   Psychiatric/Behavioral:  Negative for behavioral problems, confusion, dysphoric mood and sleep disturbance. The patient is not nervous/anxious.            Objective   /80 (BP Location: Left arm, Patient Position: Sitting, Cuff Size: Adult)   Pulse 78   Temp (!) 97.4 °F (36.3 °C) (Temporal)   Resp 18   Ht 5' 9\" (1.753 m)   Wt 82.6 kg (182 lb)   SpO2 99%   BMI 26.88 kg/m²     Pain Screening:  Pain Score: 0-No pain    ECOG   0    Physical Exam  Constitutional:       Comments: Male patient well-nourished without respiratory distress   HENT:      Head: Normocephalic and atraumatic.      Nose: Nose normal.      Mouth/Throat:      Mouth: Mucous membranes are moist.      Pharynx: Oropharynx is clear.      Comments: No lesions in the oral mucosa    Eyes:      Extraocular Movements: Extraocular movements intact.      Conjunctiva/sclera: Conjunctivae normal.      Pupils: Pupils are equal, round, and " reactive to light.      Comments: No scleral icterus   Neck:      Comments: No cervical, supraclavicular, axillary, epitrochlear, or inguinal lymphadenopathy.   Cardiovascular:      Rate and Rhythm: Normal rate and regular rhythm.      Pulses: Normal pulses.      Heart sounds: Normal heart sounds.   Pulmonary:      Effort: Pulmonary effort is normal.      Breath sounds: Normal breath sounds.   Abdominal:      General: Bowel sounds are normal.      Palpations: Abdomen is soft.      Comments: Abdomen soft, nontender, nonpainful.  No hepatomegaly.  No splenomegaly.  No rebound tenderness.  No lateral or shifting dullness.  Bowel sounds present, normal.      Musculoskeletal:         General: Normal range of motion.      Cervical back: Normal range of motion and neck supple.     Skin:     General: Skin is warm and dry.      Capillary Refill: Capillary refill takes less than 2 seconds.      Comments: Few ecchymosis in forearms bilaterally.  No skin rash     Neurological:      General: No focal deficit present.      Mental Status: He is alert and oriented to person, place, and time. Mental status is at baseline.     Psychiatric:         Mood and Affect: Mood normal.         Behavior: Behavior normal.         Thought Content: Thought content normal.         Judgment: Judgment normal.       Labs: I have reviewed the following labs:  Lab Results   Component Value Date/Time    WBC 5.98 06/18/2025 07:11 AM    RBC 3.97 06/18/2025 07:11 AM    Hemoglobin 12.4 06/18/2025 07:11 AM    Hematocrit 37.1 06/18/2025 07:11 AM    MCV 94 06/18/2025 07:11 AM    MCH 31.2 06/18/2025 07:11 AM    RDW 13.4 06/18/2025 07:11 AM    Platelets 142 (L) 06/18/2025 07:11 AM    Segmented % 57 06/18/2025 07:11 AM    Lymphocytes % 26 06/18/2025 07:11 AM    Monocytes % 10 06/18/2025 07:11 AM    Eosinophils Relative 6 06/18/2025 07:11 AM    Basophils Relative 0 06/18/2025 07:11 AM    Immature Grans % 1 06/18/2025 07:11 AM    Absolute Neutrophils 3.47  06/18/2025 07:11 AM     Lab Results   Component Value Date/Time    Potassium 4.1 06/18/2025 07:11 AM    Chloride 102 06/18/2025 07:11 AM    CO2 28 06/18/2025 07:11 AM    BUN 15 06/18/2025 07:11 AM    Creatinine 0.97 06/18/2025 07:11 AM    Glucose, Fasting 130 (H) 05/08/2025 07:09 AM    Calcium 9.1 06/18/2025 07:11 AM    Corrected Calcium 9.0 11/27/2024 07:11 AM    AST 22 06/18/2025 07:11 AM    ALT 14 06/18/2025 07:11 AM    Alkaline Phosphatase 81 06/18/2025 07:11 AM    Total Protein 6.9 06/18/2025 07:11 AM    Albumin 3.9 06/18/2025 07:11 AM    Total Bilirubin 0.54 06/18/2025 07:11 AM    eGFR 84 06/18/2025 07:11 AM     Serum AFP levels:    Component      Latest Ref Rng 9/30/2024 10/24/2024 11/27/2024 12/17/2024 1/15/2025   AFP-TUMOR MARKER      0.00 - 9.00 ng/mL 392.19 (H)  785.71 (H)  155.34 (H)  594.88 (H)  732.33 (H)      Component      Latest Ref Rng 3/26/2025   AFP-TUMOR MARKER      0.00 - 9.00 ng/mL 2,322.10 (H)

## 2025-07-08 ENCOUNTER — APPOINTMENT (EMERGENCY)
Dept: CT IMAGING | Facility: HOSPITAL | Age: 60
End: 2025-07-08
Payer: COMMERCIAL

## 2025-07-08 ENCOUNTER — HOSPITAL ENCOUNTER (OUTPATIENT)
Facility: HOSPITAL | Age: 60
Setting detail: OBSERVATION
Discharge: HOME | End: 2025-07-09
Attending: EMERGENCY MEDICINE | Admitting: EMERGENCY MEDICINE
Payer: COMMERCIAL

## 2025-07-08 DIAGNOSIS — Z85.05 HISTORY OF HEPATOCELLULAR CARCINOMA: ICD-10-CM

## 2025-07-08 DIAGNOSIS — C22.0 HEPATOCELLULAR CARCINOMA (HCC): ICD-10-CM

## 2025-07-08 DIAGNOSIS — I10 ESSENTIAL HYPERTENSION: ICD-10-CM

## 2025-07-08 DIAGNOSIS — R10.9 ABDOMINAL PAIN: Primary | ICD-10-CM

## 2025-07-08 DIAGNOSIS — K52.9 COLITIS: ICD-10-CM

## 2025-07-08 DIAGNOSIS — E03.8 OTHER SPECIFIED HYPOTHYROIDISM: ICD-10-CM

## 2025-07-08 DIAGNOSIS — N17.9 AKI (ACUTE KIDNEY INJURY) (HCC): ICD-10-CM

## 2025-07-08 DIAGNOSIS — E03.2 HYPOTHYROIDISM DUE TO MEDICATION: ICD-10-CM

## 2025-07-08 PROBLEM — R79.89 ELEVATED SERUM CREATININE: Status: ACTIVE | Noted: 2025-07-08

## 2025-07-08 LAB
ABO GROUP BLD: NORMAL
ALBUMIN SERPL BCG-MCNC: 4.4 G/DL (ref 3.5–5)
ALP SERPL-CCNC: 69 U/L (ref 34–104)
ALT SERPL W P-5'-P-CCNC: 11 U/L (ref 7–52)
ANION GAP SERPL CALCULATED.3IONS-SCNC: 9 MMOL/L (ref 4–13)
AST SERPL W P-5'-P-CCNC: 16 U/L (ref 13–39)
BACTERIA UR QL AUTO: NORMAL /HPF
BASOPHILS # BLD MANUAL: 0 THOUSAND/UL (ref 0–0.1)
BASOPHILS NFR MAR MANUAL: 0 % (ref 0–1)
BILIRUB SERPL-MCNC: 1.23 MG/DL (ref 0.2–1)
BILIRUB UR QL STRIP: NEGATIVE
BLD GP AB SCN SERPL QL: NEGATIVE
BUN SERPL-MCNC: 22 MG/DL (ref 5–25)
CALCIUM SERPL-MCNC: 10 MG/DL (ref 8.4–10.2)
CARDIAC TROPONIN I PNL SERPL HS: 5 NG/L (ref ?–50)
CHLORIDE SERPL-SCNC: 95 MMOL/L (ref 96–108)
CLARITY UR: CLEAR
CO2 SERPL-SCNC: 29 MMOL/L (ref 21–32)
COLOR UR: ABNORMAL
CREAT SERPL-MCNC: 1.72 MG/DL (ref 0.6–1.3)
EOSINOPHIL # BLD MANUAL: 0.52 THOUSAND/UL (ref 0–0.4)
EOSINOPHIL NFR BLD MANUAL: 8 % (ref 0–6)
ERYTHROCYTE [DISTWIDTH] IN BLOOD BY AUTOMATED COUNT: 13.5 % (ref 11.6–15.1)
GFR SERPL CREATININE-BSD FRML MDRD: 42 ML/MIN/1.73SQ M
GLUCOSE SERPL-MCNC: 103 MG/DL (ref 65–140)
GLUCOSE SERPL-MCNC: 130 MG/DL (ref 65–140)
GLUCOSE SERPL-MCNC: 141 MG/DL (ref 65–140)
GLUCOSE UR STRIP-MCNC: NEGATIVE MG/DL
HCT VFR BLD AUTO: 42.4 % (ref 36.5–49.3)
HGB BLD-MCNC: 14.7 G/DL (ref 12–17)
HGB UR QL STRIP.AUTO: ABNORMAL
KETONES UR STRIP-MCNC: ABNORMAL MG/DL
LACTATE SERPL-SCNC: 0.9 MMOL/L (ref 0.5–2)
LEUKOCYTE ESTERASE UR QL STRIP: NEGATIVE
LIPASE SERPL-CCNC: 13 U/L (ref 11–82)
LYMPHOCYTES # BLD AUTO: 2.16 THOUSAND/UL (ref 0.6–4.47)
LYMPHOCYTES # BLD AUTO: 33 % (ref 14–44)
MAGNESIUM SERPL-MCNC: 1.8 MG/DL (ref 1.9–2.7)
MCH RBC QN AUTO: 30.9 PG (ref 26.8–34.3)
MCHC RBC AUTO-ENTMCNC: 34.7 G/DL (ref 31.4–37.4)
MCV RBC AUTO: 89 FL (ref 82–98)
MONOCYTES # BLD AUTO: 0.66 THOUSAND/UL (ref 0–1.22)
MONOCYTES NFR BLD: 10 % (ref 4–12)
NEUTROPHILS # BLD MANUAL: 3.21 THOUSAND/UL (ref 1.85–7.62)
NEUTS BAND NFR BLD MANUAL: 10 % (ref 0–8)
NEUTS SEG NFR BLD AUTO: 39 % (ref 43–75)
NITRITE UR QL STRIP: NEGATIVE
NON-SQ EPI CELLS URNS QL MICRO: NORMAL /HPF
PH UR STRIP.AUTO: 5.5 [PH]
PLATELET # BLD AUTO: 216 THOUSANDS/UL (ref 149–390)
PLATELET BLD QL SMEAR: ADEQUATE
PMV BLD AUTO: 9.3 FL (ref 8.9–12.7)
POTASSIUM SERPL-SCNC: 3.4 MMOL/L (ref 3.5–5.3)
PROT SERPL-MCNC: 8 G/DL (ref 6.4–8.4)
PROT UR STRIP-MCNC: ABNORMAL MG/DL
RBC # BLD AUTO: 4.75 MILLION/UL (ref 3.88–5.62)
RBC #/AREA URNS AUTO: NORMAL /HPF
RBC MORPH BLD: NORMAL
RH BLD: NEGATIVE
SODIUM SERPL-SCNC: 133 MMOL/L (ref 135–147)
SP GR UR STRIP.AUTO: >=1.05 (ref 1–1.03)
SPECIMEN EXPIRATION DATE: NORMAL
UROBILINOGEN UR STRIP-ACNC: <2 MG/DL
WBC # BLD AUTO: 6.56 THOUSAND/UL (ref 4.31–10.16)
WBC #/AREA URNS AUTO: NORMAL /HPF

## 2025-07-08 PROCEDURE — 99285 EMERGENCY DEPT VISIT HI MDM: CPT

## 2025-07-08 PROCEDURE — 86850 RBC ANTIBODY SCREEN: CPT

## 2025-07-08 PROCEDURE — 83605 ASSAY OF LACTIC ACID: CPT

## 2025-07-08 PROCEDURE — 84484 ASSAY OF TROPONIN QUANT: CPT

## 2025-07-08 PROCEDURE — 36415 COLL VENOUS BLD VENIPUNCTURE: CPT

## 2025-07-08 PROCEDURE — 82948 REAGENT STRIP/BLOOD GLUCOSE: CPT

## 2025-07-08 PROCEDURE — 96365 THER/PROPH/DIAG IV INF INIT: CPT

## 2025-07-08 PROCEDURE — 96361 HYDRATE IV INFUSION ADD-ON: CPT

## 2025-07-08 PROCEDURE — 86900 BLOOD TYPING SEROLOGIC ABO: CPT

## 2025-07-08 PROCEDURE — 71260 CT THORAX DX C+: CPT

## 2025-07-08 PROCEDURE — 99291 CRITICAL CARE FIRST HOUR: CPT | Performed by: EMERGENCY MEDICINE

## 2025-07-08 PROCEDURE — 87505 NFCT AGENT DETECTION GI: CPT

## 2025-07-08 PROCEDURE — 80053 COMPREHEN METABOLIC PANEL: CPT

## 2025-07-08 PROCEDURE — 86901 BLOOD TYPING SEROLOGIC RH(D): CPT

## 2025-07-08 PROCEDURE — 87040 BLOOD CULTURE FOR BACTERIA: CPT

## 2025-07-08 PROCEDURE — 93005 ELECTROCARDIOGRAM TRACING: CPT

## 2025-07-08 PROCEDURE — 74177 CT ABD & PELVIS W/CONTRAST: CPT

## 2025-07-08 PROCEDURE — 96375 TX/PRO/DX INJ NEW DRUG ADDON: CPT

## 2025-07-08 PROCEDURE — 99223 1ST HOSP IP/OBS HIGH 75: CPT | Performed by: HOSPITALIST

## 2025-07-08 PROCEDURE — 83735 ASSAY OF MAGNESIUM: CPT

## 2025-07-08 PROCEDURE — 83690 ASSAY OF LIPASE: CPT

## 2025-07-08 PROCEDURE — 81001 URINALYSIS AUTO W/SCOPE: CPT

## 2025-07-08 PROCEDURE — 85007 BL SMEAR W/DIFF WBC COUNT: CPT

## 2025-07-08 PROCEDURE — 85027 COMPLETE CBC AUTOMATED: CPT

## 2025-07-08 RX ORDER — HEPARIN SODIUM 5000 [USP'U]/ML
5000 INJECTION, SOLUTION INTRAVENOUS; SUBCUTANEOUS EVERY 8 HOURS SCHEDULED
Status: DISCONTINUED | OUTPATIENT
Start: 2025-07-08 | End: 2025-07-09 | Stop reason: HOSPADM

## 2025-07-08 RX ORDER — LEVOTHYROXINE SODIUM 50 UG/1
50 TABLET ORAL DAILY
Status: DISCONTINUED | OUTPATIENT
Start: 2025-07-09 | End: 2025-07-09 | Stop reason: HOSPADM

## 2025-07-08 RX ORDER — SODIUM CHLORIDE, SODIUM GLUCONATE, SODIUM ACETATE, POTASSIUM CHLORIDE, MAGNESIUM CHLORIDE, SODIUM PHOSPHATE, DIBASIC, AND POTASSIUM PHOSPHATE .53; .5; .37; .037; .03; .012; .00082 G/100ML; G/100ML; G/100ML; G/100ML; G/100ML; G/100ML; G/100ML
100 INJECTION, SOLUTION INTRAVENOUS CONTINUOUS
Status: DISPENSED | OUTPATIENT
Start: 2025-07-08 | End: 2025-07-08

## 2025-07-08 RX ORDER — METRONIDAZOLE 500 MG/100ML
500 INJECTION, SOLUTION INTRAVENOUS ONCE
Status: COMPLETED | OUTPATIENT
Start: 2025-07-08 | End: 2025-07-08

## 2025-07-08 RX ORDER — LISINOPRIL 20 MG/1
20 TABLET ORAL DAILY
Status: DISCONTINUED | OUTPATIENT
Start: 2025-07-09 | End: 2025-07-09 | Stop reason: HOSPADM

## 2025-07-08 RX ORDER — HYDROCHLOROTHIAZIDE 25 MG/1
25 TABLET ORAL DAILY
Status: DISCONTINUED | OUTPATIENT
Start: 2025-07-08 | End: 2025-07-09 | Stop reason: HOSPADM

## 2025-07-08 RX ORDER — AMLODIPINE BESYLATE 2.5 MG/1
2.5 TABLET ORAL DAILY
Status: DISCONTINUED | OUTPATIENT
Start: 2025-07-09 | End: 2025-07-09 | Stop reason: HOSPADM

## 2025-07-08 RX ORDER — INSULIN LISPRO 100 [IU]/ML
1-6 INJECTION, SOLUTION INTRAVENOUS; SUBCUTANEOUS
Status: DISCONTINUED | OUTPATIENT
Start: 2025-07-08 | End: 2025-07-09 | Stop reason: HOSPADM

## 2025-07-08 RX ORDER — METHYLPREDNISOLONE SODIUM SUCCINATE 125 MG/2ML
60 INJECTION, POWDER, LYOPHILIZED, FOR SOLUTION INTRAMUSCULAR; INTRAVENOUS DAILY
Status: DISCONTINUED | OUTPATIENT
Start: 2025-07-08 | End: 2025-07-09

## 2025-07-08 RX ORDER — POTASSIUM CHLORIDE 14.9 MG/ML
20 INJECTION INTRAVENOUS
Status: COMPLETED | OUTPATIENT
Start: 2025-07-08 | End: 2025-07-08

## 2025-07-08 RX ADMIN — IOHEXOL 100 ML: 350 INJECTION, SOLUTION INTRAVENOUS at 08:31

## 2025-07-08 RX ADMIN — HEPARIN SODIUM 5000 UNITS: 5000 INJECTION INTRAVENOUS; SUBCUTANEOUS at 13:26

## 2025-07-08 RX ADMIN — SODIUM CHLORIDE 1000 ML: 0.9 INJECTION, SOLUTION INTRAVENOUS at 08:11

## 2025-07-08 RX ADMIN — POTASSIUM CHLORIDE 20 MEQ: 14.9 INJECTION, SOLUTION INTRAVENOUS at 11:28

## 2025-07-08 RX ADMIN — METRONIDAZOLE 500 MG: 500 INJECTION, SOLUTION INTRAVENOUS at 09:14

## 2025-07-08 RX ADMIN — CEFTRIAXONE SODIUM 1000 MG: 10 INJECTION, POWDER, FOR SOLUTION INTRAVENOUS at 09:14

## 2025-07-08 RX ADMIN — Medication 500 MG: at 08:11

## 2025-07-08 RX ADMIN — POTASSIUM CHLORIDE 20 MEQ: 14.9 INJECTION, SOLUTION INTRAVENOUS at 09:38

## 2025-07-08 RX ADMIN — METHYLPREDNISOLONE SODIUM SUCCINATE 60 MG: 125 INJECTION, POWDER, FOR SOLUTION INTRAMUSCULAR; INTRAVENOUS at 13:29

## 2025-07-08 RX ADMIN — SODIUM CHLORIDE, SODIUM GLUCONATE, SODIUM ACETATE, POTASSIUM CHLORIDE, MAGNESIUM CHLORIDE, SODIUM PHOSPHATE, DIBASIC, AND POTASSIUM PHOSPHATE 100 ML/HR: .53; .5; .37; .037; .03; .012; .00082 INJECTION, SOLUTION INTRAVENOUS at 12:52

## 2025-07-08 NOTE — Clinical Note
Case was discussed with PAULINO and the patient's admission status was agreed to be Admission Status: inpatient status to the service of Dr. Mueller .

## 2025-07-08 NOTE — ED ATTENDING ATTESTATION
7/8/2025  I, Marcus Pete MD, saw and evaluated the patient. I have discussed the patient with the resident/non-physician practitioner and agree with the resident's/non-physician practitioner's findings, Plan of Care, and MDM as documented in the resident's/non-physician practitioner's note, except where noted. All available labs and Radiology studies were reviewed.  I was present for key portions of any procedure(s) performed by the resident/non-physician practitioner and I was immediately available to provide assistance.       At this point I agree with the current assessment done in the Emergency Department.  I have conducted an independent evaluation of this patient a history and physical is as follows:    This is a 60-year-old male patient with a relevant past medical history of hepatocellular carcinoma, hep C, hypertension, presenting to the ED today for complaint of rectal bleeding.  Patient states that he has had diarrhea for the past couple days, with associated abdominal pain.  He states that today he started to develop rectal bleeding, eran blood, fresh blood, which concerned him and caused him to come to the ED.  Patient denies any fever or chills.  He denies any vomiting, however has been nauseous.  Prior to the bleeding, his diarrhea was just watery stool, no change in color.  He denies any constipation.  He denies any dysuria frequency or hesitancy.  He denies any other complaints.  On exam he has tenderness to palpation throughout his abdomen, most pronounced in the left lower quadrant.  He does not have any CVA tenderness.  His differential diagnosis includes: Infectious colitis versus typhlitis versus gastroenteritis versus UTI versus other.  Patient has an MENDOZA on his metabolic panel.  He has 10% bands as well.  He received pain medicine, IV fluids here in the ED. patient had a CT scan showing evidence for colitis, and patient was started on antibiotics.  For his MENDOZA, colitis, patient was  requested be hospitalized by the hospitalist Alexandra excepted without any further orders requested.      Critical Care Time Statement: Upon my evaluation, this patient had a high probability of imminent or life-threatening deterioration due to abdominal pain, colitis, which required my direct attention, intervention, and personal management.  I spent a total of 45 minutes directly providing critical care services, including interpretation of complex medical databases and evaluating for the presence of life-threatening injuries or illnesses. This time is exclusive of procedures, teaching, treating other patients, family meetings, and any prior time recorded by providers other than myself.        ED Course  ED Course as of 07/08/25 0926   Tue Jul 08, 2025   0814 Chloride(!): 95   0814 Creatinine(!): 1.72  New since most recent labs.   0842 Bands %(!): 10         Critical Care Time  Procedures

## 2025-07-08 NOTE — ASSESSMENT & PLAN NOTE
Home meds: Amlodipine 2.5 mg daily and lisinopril 20 mg/HCTZ 25 mg daily  Continue amlodipine, hold lisinopril/HCTZ in the setting of MENDOZA

## 2025-07-08 NOTE — ASSESSMENT & PLAN NOTE
Recent Labs     07/08/25  0739   CREATININE 1.72*   EGFR 42     Estimated Creatinine Clearance: 45.7 mL/min (A) (by C-G formula based on SCr of 1.72 mg/dL (H)).    POA: 1.72; (baseline 0.9-1.1)  Likely prerenal from GI loss    IV Fluids Isolyte as above  Monitor BMP  Avoid hypoperfusion of the kidneys, minimize nephrotoxins  RAAS Blockers/Diuretics held: Lisinopril/HCTZ

## 2025-07-08 NOTE — H&P
H&P - Hospitalist   Name: Bassem Roberts 60 y.o. male I MRN: [id number]  Unit/Bed#: ED-42 I Date of Admission: 7/8/2025   Date of Service: 7/8/2025 I Hospital Day: 0     Assessment & Plan  Colitis  CC: Clear watery diarrhea x 10-15 episodes in 24 hours, BRBPR  H/o HCC on ipilimumab and nivolumab  No outside/recent travel/sick contact  Did not meet SIRS criteria, although does have 10% bandemia  CT AP: Diffuse hyperenhancement of the wall of the colon and rectum. Correlate with symptoms   Etiology: More likely from immunotherapy side effect, less suspicion for infectious etiology since no apparent risk factor    Plan:  Start patient on IV Solu-Medrol 60 mg daily(equivalent to 1 mg/kg p.o. prednisone) as per discussion with heme-onc, continue IV for 2 days then transition to p.o. equivalent, taper will be done by outpatient heme-onc provider consider reaching out hematology for further questions  Hold antibiotics since low suspicion for infectious etiology, monitor CBC  Follow-up on stool cultures  Continue IVF Isolyte at 100 mL/h for 10 hours  Diet: Clear liquid diet for now, advance to surgical soft/regular as patient tolerated  Elevated serum creatinine  Recent Labs     07/08/25  0739   CREATININE 1.72*   EGFR 42     Estimated Creatinine Clearance: 45.7 mL/min (A) (by C-G formula based on SCr of 1.72 mg/dL (H)).    POA: 1.72; (baseline 0.9-1.1)  Likely prerenal from GI loss    IV Fluids Isolyte as above  Monitor BMP  Avoid hypoperfusion of the kidneys, minimize nephrotoxins  RAAS Blockers/Diuretics held: Lisinopril/HCTZ  Hepatocellular carcinoma (HCC)  Hepatocellular carcinoma form hepatitis C/IV drug use  On ipilimumab/nivolumab, 4th cycle due on July 10  Follows up with Dr. Goddard  Essential hypertension  Home meds: Amlodipine 2.5 mg daily and lisinopril 20 mg/HCTZ 25 mg daily  Continue amlodipine, hold lisinopril/HCTZ in the setting of MENDOZA      VTE Pharmacologic Prophylaxis: VTE Score: 4 Moderate Risk (Score 3-4)  - Pharmacological DVT Prophylaxis Ordered: heparin.  Code Status: Level 1 - Full Code   Discussion with family: Updated  (wife) at bedside.    Anticipated Length of Stay: Patient will be admitted on an observation basis with an anticipated length of stay of less than 2 midnights secondary to Colitis.    History of Present Illness   Chief Complaint: Diarrhea and BRBPR    Bassem Roberts is a 60 y.o. male with a PMH of metastatic hepatocellular carcinoma who presents with diarrhea and rectal bleeding.    He symptoms started on Thursday, initially he had loose stool which progressed to clear watery diarrhea, 10-15 episodes in 24 hours, had a episode of BRBPR in the morning of the day of admission, patient was also feeling nauseous although no vomiting episodes, does report dizziness yesterday and today which she attributes to dehydration, unable to keep down any liquid or food.    On admission patient's vitals has been stable, labs showed no leukocytosis but 10% of bandemia, elevated creatinine likely prerenal, CT scan showed diffuse colitis.    Interval h/o: Patient follows up with him at oncology as outpatient for his hepatocellular carcinoma, currently patient is on immunotherapy(ipilimumab plus nivolumab) started on May 9, 2025, next cycle#4 is on Yaneli 10, 2025.   Patient had a colonoscopy done in 2022 which showed rectal polyp removed with cold snare, mentioned cannot exclude mass lesion due to inadequate prep.    Review of Systems   Constitutional:  Negative for chills and fever.   HENT:  Negative for ear pain and sore throat.    Eyes:  Negative for pain and visual disturbance.   Respiratory:  Negative for cough and shortness of breath.    Cardiovascular:  Negative for chest pain and palpitations.   Gastrointestinal:  Positive for abdominal pain, blood in stool, diarrhea and nausea. Negative for vomiting.   Genitourinary:  Negative for dysuria and hematuria.   Musculoskeletal:  Negative for  arthralgias and back pain.   Skin:  Negative for color change and rash.   Neurological:  Positive for dizziness. Negative for seizures and syncope.   All other systems reviewed and are negative.      Historical Information   Past Medical History[1]  Past Surgical History[2]  Social History[3]  E-Cigarette/Vaping    E-Cigarette Use Never User      E-Cigarette/Vaping Substances    Nicotine No     THC No     CBD No     Flavoring No     Other No     Unknown No        Social History:  Marital Status: /Civil Union   Patient Pre-hospital Living Situation: Home  Patient Pre-hospital Level of Mobility: walks  Patient Pre-hospital Diet Restrictions: None    Meds/Allergies   I have reviewed home medications with patient personally.  Prior to Admission medications    Medication Sig Start Date End Date Taking? Authorizing Provider   amLODIPine (NORVASC) 2.5 mg tablet TAKE 1 TABLET BY MOUTH EVERY DAY 1/19/25   Josue Hernandez MD   levothyroxine (Synthroid) 50 mcg tablet Take 1 tablet (50 mcg total) by mouth daily 5/29/25   Josephine Goddard MD   levothyroxine 25 mcg tablet TAKE 1 TABLET BY MOUTH EVERY DAY 4/28/25   Josephine Goddard MD   lisinopril-hydrochlorothiazide (PRINZIDE,ZESTORETIC) 20-25 MG per tablet TAKE 1 TABLET BY MOUTH EVERY DAY 2/21/25   Josue Hernandez MD   methocarbamol (ROBAXIN) 500 mg tablet Take 1 tablet (500 mg total) by mouth 3 (three) times a day as needed for muscle spasms  Patient not taking: Reported on 5/1/2025 1/30/25   Josephine Goddard MD   methylPREDNISolone 4 MG tablet therapy pack Take 1 tablet (4 mg total) by mouth 6 (six) times a day for 1 day, THEN 1 tablet (4 mg total) 5 (five) times a day for 1 day, THEN 1 tablet (4 mg total) 4 times a day for 1 day, THEN 1 tablet (4 mg total) 3 (three) times a day for 1 day, THEN 1 tablet (4 mg total) 2 (two) times a day for 1 day, THEN 1 tablet (4 mg total) daily for 1 day. 5/12/25 5/18/25  Selwyn Smallwood MD     No Known Allergies    Objective :  Temp:  [97.5 °F (36.4  °C)-97.6 °F (36.4 °C)] 97.5 °F (36.4 °C)  HR:  [83-84] 84  BP: (134-144)/(70-72) 144/70  Resp:  [16-18] 16  SpO2:  [98 %-99 %] 99 %  O2 Device: None (Room air)    Physical Exam  Vitals and nursing note reviewed.   Constitutional:       General: He is not in acute distress.     Appearance: He is well-developed.   HENT:      Head: Normocephalic and atraumatic.      Mouth/Throat:      Mouth: Mucous membranes are dry.     Eyes:      Conjunctiva/sclera: Conjunctivae normal.      Pupils: Pupils are equal, round, and reactive to light.       Cardiovascular:      Rate and Rhythm: Normal rate and regular rhythm.      Heart sounds: No murmur heard.  Pulmonary:      Effort: Pulmonary effort is normal. No respiratory distress.      Breath sounds: Normal breath sounds. No wheezing, rhonchi or rales.   Abdominal:      Palpations: Abdomen is soft.      Tenderness: There is abdominal tenderness in the periumbilical area and left lower quadrant. There is no guarding or rebound.     Musculoskeletal:         General: No swelling.      Cervical back: Neck supple.      Right lower leg: No edema.      Left lower leg: No edema.     Skin:     General: Skin is warm and dry.      Capillary Refill: Capillary refill takes less than 2 seconds.     Neurological:      Mental Status: He is alert.     Psychiatric:         Mood and Affect: Mood normal.          Lines/Drains:            Lab Results: I have reviewed the following results:  Results from last 7 days   Lab Units 07/08/25  0739   WBC Thousand/uL 6.56   HEMOGLOBIN g/dL 14.7   HEMATOCRIT % 42.4   PLATELETS Thousands/uL 216   BANDS PCT % 10*   LYMPHO PCT % 33   MONO PCT % 10   EOS PCT % 8*     Results from last 7 days   Lab Units 07/08/25  0739   SODIUM mmol/L 133*   POTASSIUM mmol/L 3.4*   CHLORIDE mmol/L 95*   CO2 mmol/L 29   BUN mg/dL 22   CREATININE mg/dL 1.72*   ANION GAP mmol/L 9   CALCIUM mg/dL 10.0   ALBUMIN g/dL 4.4   TOTAL BILIRUBIN mg/dL 1.23*   ALK PHOS U/L 69   ALT U/L 11   AST  U/L 16   GLUCOSE RANDOM mg/dL 103             Lab Results   Component Value Date    HGBA1C 5.5 07/10/2024    HGBA1C 5.7 (H) 2023     Results from last 7 days   Lab Units 25  0913   LACTIC ACID mmol/L 0.9       Imaging Results Review: I reviewed radiology reports from this admission including: CT abdomen/pelvis.  Other Study Results Review: EKG was reviewed.     Administrative Statements       ** Please Note: This note has been constructed using a voice recognition system. **         [1]   Past Medical History:  Diagnosis Date    Hypertension     Liver cancer (HCC)    [2]   Past Surgical History:  Procedure Laterality Date    HERNIA REPAIR      IR Y-90 PRE-ANGIO/EMBO W/ LUNG SCAN  2022    IR Y-90 RADIOEMBOLIZATION  2022    MASTOIDECTOMY     [3]   Social History  Tobacco Use    Smoking status: Former     Current packs/day: 0.00     Average packs/day: 1 pack/day for 30.0 years (30.0 ttl pk-yrs)     Types: Cigarettes     Start date:      Quit date: 2017     Years since quittin.5    Smokeless tobacco: Current    Tobacco comments:     nicotine pouch (on)   Vaping Use    Vaping status: Never Used   Substance and Sexual Activity    Alcohol use: Yes    Drug use: Yes     Types: Marijuana     Comment: medical marijuana    Sexual activity: Yes     Partners: Female

## 2025-07-08 NOTE — ED PROVIDER NOTES
Time reflects when diagnosis was documented in both MDM as applicable and the Disposition within this note       Time User Action Codes Description Comment    7/8/2025  8:47 AM ChaconManju aaron Add [R10.9] Abdominal pain     7/8/2025  9:31 AM Chacon, Manju Add [K52.9] Colitis     7/8/2025  9:31 AM Chacon, Manju Add [N17.9] MENDOZA (acute kidney injury) (HCC)     7/8/2025  9:32 AM ChaconLarry aaronve Add [Z85.05] History of hepatocellular carcinoma           ED Disposition       ED Disposition   Admit    Condition   Stable    Date/Time   Tue Jul 8, 2025  9:32 AM    Comment   Case was discussed with PAULINO and the patient's admission status was agreed to be Admission Status: observation status to the service of Dr. Mueller .               Assessment & Plan       Medical Decision Making  Bassem Roberts is a 60 y.o. male presenting with diarrhea with blood in his stool, nausea, abdominal pain, and lightheadedness. No fevers. Is actively on immunotherapy. Vitals unremarkable. Exam remarkable for primarily LLQ tenderness without rebound. Otherwise grossly unremarkable.      DDx including but not limited to: medication effect, colitis, hepatitis, pancreatitis, enteritis, diverticulitis, appendicitis, gastroenteritis, gastritis, PUD, GERD, gastroparesis, food poisoning, epiploic appendagitis, mesenteric adenitis, mesenteric panniculitis, mesenteric ischemia, IBD, IBS, ileus, bowel obstruction, volvulus, internal hernia, cholecystitis, biliary colic, choledocholithiasis, perforated viscus, tumor, splenic etiology, constipation, AAA, testicular torsion, renal colic, pyelonephritis, UTI; doubt cardiac etiology.     See ED course for further updates and interpretation of results.    Based on these results and H&P, patient work up remarkable for concern for colitis. Also found to have an MENDOZA. Patient was given antibiotics in the ED, and was admitted to medicine service for further monitoring of his renal function and  colitis. Thankfully workup negative for acute cardiopulmonary findings at this time. Had some symptomatic relief in the ED.    Results, clinical impressions, and plan were discussed with patient and family. They expressed understanding and were in agreement with plan. Patient was given the opportunity to ask questions in ED. All questions and concerns were addressed in ED.    After evaluation and workup in the emergency department Discussed patient's case with SLIM regarding admission who accepted the patient for further evaluation and management under Dr. Mueller (PAULINO).    Amount and/or Complexity of Data Reviewed  Labs: ordered. Decision-making details documented in ED Course.  Radiology: ordered. Decision-making details documented in ED Course.    Risk  Prescription drug management.  Decision regarding hospitalization.        ED Course as of 07/08/25 2221 Tue Jul 08, 2025   0717 OP Nivolumab 1 mg/kg + Ipilimumab 3 mg/kg Q 21 Days x 4, followed by Nivolumab 240mg Q 14 Days   0750 CBC and differential  No leukocytosis, baseline hemoglobin, normal platelets.   0801 Comprehensive metabolic panel(!)  Mildly decreased sodium at 133, mild hypokalemia at 3.4, elevated creatinine at 1.72, meeting MENDOZA criteria, also elevated bilirubin at 1.23.   0802 LIPASE: 13  Normal   0812 hs TnI 0hr: 5   0836 Bands %(!): 10  Elevated bands in setting of normal white count. Concerning for possible infection. Will give antibiotics   0846 Type and screen   0858 Discussed results   0909 CT chest abdomen pelvis w contrast  IMPRESSION:  No acute intrathoracic pathology. Redemonstrated stable scattered small nodules, probably postinflammatory.  Diffuse hyperenhancement of the wall of the colon and rectum. Correlate with symptoms.  Treated hepatic tumor.  Upper abdominal lymphadenopathy, slightly improved as compared to May 2025.       Medications   amLODIPine (NORVASC) tablet 2.5 mg (has no administration in time range)   levothyroxine  tablet 50 mcg (has no administration in time range)   lisinopril (ZESTRIL) tablet 20 mg ( Oral Held by provider 7/8/25 1243)     And   hydroCHLOROthiazide tablet 25 mg ( Oral Held by provider 7/8/25 1243)   heparin (porcine) subcutaneous injection 5,000 Units (5,000 Units Subcutaneous Given 7/8/25 1326)   multi-electrolyte (Plasmalyte-A/Isolyte-S PH 7.4/Normosol-R) IV solution (100 mL/hr Intravenous New Bag 7/8/25 1252)   methylPREDNISolone sodium succinate (Solu-MEDROL) injection 60 mg (60 mg Intravenous Given 7/8/25 1329)   insulin lispro (HumALOG/ADMELOG) 100 units/mL subcutaneous injection 1-6 Units (has no administration in time range)   sodium chloride 0.9 % bolus 1,000 mL (0 mL Intravenous Stopped 7/8/25 0911)   acetaminophen (Ofirmev) IVPB 500 mg (0 mg Intravenous Stopped 7/8/25 0826)   iohexol (OMNIPAQUE) 350 MG/ML injection (MULTI-DOSE) 100 mL (100 mL Intravenous Given 7/8/25 0831)   ceftriaxone (ROCEPHIN) 1 g/50 mL in dextrose IVPB (0 mg Intravenous Stopped 7/8/25 0944)   metroNIDAZOLE (FLAGYL) IVPB (premix) 500 mg 100 mL (0 mg Intravenous Stopped 7/8/25 0938)   potassium chloride 20 mEq IVPB (premix) (0 mEq Intravenous Stopped 7/8/25 1301)       ED Risk Strat Scores   HEART Risk Score      Flowsheet Row Most Recent Value   Heart Score Risk Calculator    History 0 Filed at: 07/08/2025 0815   ECG 0 Filed at: 07/08/2025 0815   Age 1 Filed at: 07/08/2025 0815   Risk Factors 1 Filed at: 07/08/2025 0815   Troponin 0 Filed at: 07/08/2025 0815   HEART Score 2 Filed at: 07/08/2025 0815          HEART Risk Score      Flowsheet Row Most Recent Value   Heart Score Risk Calculator    History 0 Filed at: 07/08/2025 0815   ECG 0 Filed at: 07/08/2025 0815   Age 1 Filed at: 07/08/2025 0815   Risk Factors 1 Filed at: 07/08/2025 0815   Troponin 0 Filed at: 07/08/2025 0815   HEART Score 2 Filed at: 07/08/2025 0815                      No data recorded        SBIRT 20yo+      Flowsheet Row Most Recent Value   Initial Alcohol  Screen: US AUDIT-C     1. How often do you have a drink containing alcohol? 0 Filed at: 07/08/2025 1040   2. How many drinks containing alcohol do you have on a typical day you are drinking?  0 Filed at: 07/08/2025 1040   3a. Male UNDER 65: How often do you have five or more drinks on one occasion? 0 Filed at: 07/08/2025 1040   Audit-C Score 0 Filed at: 07/08/2025 1040   ALPHONSO: How many times in the past year have you...    Used an illegal drug or used a prescription medication for non-medical reasons? Never Filed at: 07/08/2025 1040                            History of Present Illness       Chief Complaint   Patient presents with    Rectal Bleeding     Pt had diarrhea starting on Thursday and woke up this morning to blood in stool. States stool is like water. C/o abdominal pain, dizziness, CP, fatigued. Has liver cancer       Past Medical History[1]   Past Surgical History[2]   Family History[3]   Social History[4]   E-Cigarette/Vaping    E-Cigarette Use Never User       E-Cigarette/Vaping Substances    Nicotine No     THC No     CBD No     Flavoring No     Other No     Unknown No       I have reviewed and agree with the history as documented.     Bassem Roberts is a 60 y.o. male with history of hepatocellular carcinoma on active chemotherapy treatment presenting for abdominal pain, diarrhea.    Patient reports feeling in his normal state of health until this past Thursday when he started experiencing diarrhea which has continued until today, describes it as watery.  Patient reports that this morning and yesterday he has had spots, but also increasing amounts of bleeding during these episodes of diarrhea.  Patient also reports associated ryder pain primarily in the left side going down to the suprapubic region.  He also reports episodes of lightheadedness, chest tightness, though these are not currently present on evaluation.  Denies recent fevers, shortness of breath, lower extremity swelling, urinary symptoms.   Does report low appetite, decreased p.o. intake, including decreased fluid intake given that every time he eats or drinks anything he reports it comes out with watery diarrhea.  Patient on chemotherapy, last 3 weeks ago, initially had no symptoms.  Due for next treatment this Thursday, 2 days from now.          Review of Systems   Constitutional:  Positive for fatigue. Negative for chills and fever.   HENT:  Negative for congestion, rhinorrhea and sore throat.    Eyes:  Negative for pain and visual disturbance.   Respiratory:  Negative for cough, chest tightness, shortness of breath, wheezing and stridor.    Cardiovascular:  Positive for chest pain. Negative for palpitations and leg swelling.   Gastrointestinal:  Positive for abdominal pain, blood in stool and diarrhea. Negative for constipation, nausea and vomiting.   Genitourinary:  Negative for dysuria and hematuria.   Musculoskeletal:  Negative for arthralgias and back pain.   Skin:  Negative for color change, pallor and rash.   Neurological:  Positive for light-headedness. Negative for dizziness, syncope, numbness and headaches.   Psychiatric/Behavioral:  Negative for behavioral problems.    All other systems reviewed and are negative.          Objective       ED Triage Vitals   Temperature Pulse Blood Pressure Respirations SpO2 Patient Position - Orthostatic VS   07/08/25 0716 07/08/25 0716 07/08/25 0716 07/08/25 0716 07/08/25 0716 07/08/25 0716   97.6 °F (36.4 °C) 84 134/72 18 98 % Sitting      Temp Source Heart Rate Source BP Location FiO2 (%) Pain Score    07/08/25 0716 07/08/25 0716 07/08/25 0716 -- 07/08/25 1230    Oral Monitor Right arm  No Pain      Vitals      Date and Time Temp Pulse SpO2 Resp BP Pain Score FACES Pain Rating User   07/08/25 1845 98 °F (36.7 °C) 82 95 % -- -- -- -- DII   07/08/25 1822 -- -- -- 16 128/77 -- -- LP   07/08/25 1514 -- 73 96 % 16 108/59 -- -- C(S   07/08/25 1230 97.5 °F (36.4 °C) 84 99 % 16 144/70 No Pain -- KB   07/08/25  0815 -- 83 98 % -- -- -- -- MM   07/08/25 0716 97.6 °F (36.4 °C) 84 98 % 18 134/72 -- -- MD            Physical Exam  Vitals and nursing note reviewed.   Constitutional:       Appearance: Normal appearance. He is well-developed. He is not ill-appearing or toxic-appearing.   HENT:      Head: Normocephalic and atraumatic.      Nose: No rhinorrhea.      Mouth/Throat:      Mouth: Mucous membranes are moist.      Pharynx: Oropharynx is clear.     Eyes:      Extraocular Movements: Extraocular movements intact.      Conjunctiva/sclera: Conjunctivae normal.       Cardiovascular:      Rate and Rhythm: Normal rate and regular rhythm.      Pulses: Normal pulses.      Heart sounds: Normal heart sounds. No murmur heard.  Pulmonary:      Effort: Pulmonary effort is normal. No respiratory distress.      Breath sounds: Normal breath sounds.   Abdominal:      General: Abdomen is flat. Bowel sounds are normal. There is no distension.      Palpations: Abdomen is soft.      Tenderness: There is abdominal tenderness in the suprapubic area, left upper quadrant and left lower quadrant. There is no guarding or rebound.      Comments: Mostly LLQ     Musculoskeletal:      Cervical back: Neck supple.      Right lower leg: No edema.      Left lower leg: No edema.     Skin:     General: Skin is warm and dry.      Capillary Refill: Capillary refill takes less than 2 seconds.     Neurological:      General: No focal deficit present.      Mental Status: He is alert and oriented to person, place, and time.     Psychiatric:         Mood and Affect: Mood normal.         Behavior: Behavior normal.         Results Reviewed       Procedure Component Value Units Date/Time    Fingerstick Glucose (POCT) [[id number]]  (Abnormal) Collected: 07/08/25 1801    Lab Status: Final result Specimen: Blood Updated: 07/08/25 1804     POC Glucose 141 mg/dl     Blood culture #2 [[id number]] Collected: 07/08/25 0913    Lab Status: Preliminary result Specimen: Blood from  Arm, Right Updated: 07/08/25 1540     Blood Culture Received in Microbiology Lab. Culture in Progress.    Blood culture #1 [[id number]] Collected: 07/08/25 0913    Lab Status: Preliminary result Specimen: Blood from Arm, Left Updated: 07/08/25 1501     Blood Culture Received in Microbiology Lab. Culture in Progress.    Magnesium [[id number]]  (Abnormal) Collected: 07/08/25 0739    Lab Status: Final result Specimen: Blood from Arm, Right Updated: 07/08/25 1258     Magnesium 1.8 mg/dL     Urine Microscopic [[id number]]  (Normal) Collected: 07/08/25 1033    Lab Status: Final result Specimen: Urine, Clean Catch Updated: 07/08/25 1119     RBC, UA 1-2 /hpf      WBC, UA 1-2 /hpf      Epithelial Cells None Seen /hpf      Bacteria, UA None Seen /hpf     UA w Reflex to Microscopic w Reflex to Culture [[id number]]  (Abnormal) Collected: 07/08/25 1033    Lab Status: Final result Specimen: Urine, Clean Catch Updated: 07/08/25 1058     Color, UA Light Yellow     Clarity, UA Clear     Specific Gravity, UA >=1.050     pH, UA 5.5     Leukocytes, UA Negative     Nitrite, UA Negative     Protein, UA Trace mg/dl      Glucose, UA Negative mg/dl      Ketones, UA Trace mg/dl      Urobilinogen, UA <2.0 mg/dl      Bilirubin, UA Negative     Occult Blood, UA Small    Stool Enteric Bacterial Panel by PCR [[id number]] Collected: 07/08/25 1032    Lab Status: In process Specimen: Stool from Rectum Updated: 07/08/25 1041    Clostridioides difficile toxin by PCR with EIA [[id number]] Collected: 07/08/25 1032    Lab Status: In process Specimen: Stool from Rectum Updated: 07/08/25 1041    Lactic acid, plasma (w/reflex if result > 2.0) [[id number]]  (Normal) Collected: 07/08/25 0913    Lab Status: Final result Specimen: Blood from Arm, Left Updated: 07/08/25 0953     LACTIC ACID 0.9 mmol/L     Narrative:      Result may be elevated if tourniquet was used during collection.    RBC Morphology Reflex Test [[id number]] Collected: 07/08/25 0739    Lab Status:  Final result Specimen: Blood from Arm, Right Updated: 07/08/25 0901    Manual Differential(PHLEBS Do Not Order) [[id number]]  (Abnormal) Collected: 07/08/25 0739    Lab Status: Final result Specimen: Blood from Arm, Right Updated: 07/08/25 0835     Segmented % 39 %      Bands % 10 %      Lymphocytes % 33 %      Monocytes % 10 %      Eosinophils % 8 %      Basophils % 0 %      Absolute Neutrophils 3.21 Thousand/uL      Absolute Lymphocytes 2.16 Thousand/uL      Absolute Monocytes 0.66 Thousand/uL      Absolute Eosinophils 0.52 Thousand/uL      Absolute Basophils 0.00 Thousand/uL      Total Counted --     RBC Morphology Normal     Platelet Estimate Adequate    CBC and differential [[id number]]  (Normal) Collected: 07/08/25 0739    Lab Status: Final result Specimen: Blood from Arm, Right Updated: 07/08/25 0835     WBC 6.56 Thousand/uL      RBC 4.75 Million/uL      Hemoglobin 14.7 g/dL      Hematocrit 42.4 %      MCV 89 fL      MCH 30.9 pg      MCHC 34.7 g/dL      RDW 13.5 %      MPV 9.3 fL      Platelets 216 Thousands/uL     Narrative:      This is an appended report.  These results have been appended to a previously verified report.    HS Troponin 0hr (reflex protocol) [[id number]]  (Normal) Collected: 07/08/25 0739    Lab Status: Final result Specimen: Blood from Arm, Right Updated: 07/08/25 0809     hs TnI 0hr 5 ng/L     Comprehensive metabolic panel [[id number]]  (Abnormal) Collected: 07/08/25 0739    Lab Status: Final result Specimen: Blood from Arm, Right Updated: 07/08/25 0801     Sodium 133 mmol/L      Potassium 3.4 mmol/L      Chloride 95 mmol/L      CO2 29 mmol/L      ANION GAP 9 mmol/L      BUN 22 mg/dL      Creatinine 1.72 mg/dL      Glucose 103 mg/dL      Calcium 10.0 mg/dL      AST 16 U/L      ALT 11 U/L      Alkaline Phosphatase 69 U/L      Total Protein 8.0 g/dL      Albumin 4.4 g/dL      Total Bilirubin 1.23 mg/dL      eGFR 42 ml/min/1.73sq m     Narrative:      [employer]  guidelines for Chronic Kidney Disease (CKD):     Stage 1 with normal or high GFR (GFR > 90 mL/min/1.73 square meters)    Stage 2 Mild CKD (GFR = 60-89 mL/min/1.73 square meters)    Stage 3A Moderate CKD (GFR = 45-59 mL/min/1.73 square meters)    Stage 3B Moderate CKD (GFR = 30-44 mL/min/1.73 square meters)    Stage 4 Severe CKD (GFR = 15-29 mL/min/1.73 square meters)    Stage 5 End Stage CKD (GFR <15 mL/min/1.73 square meters)  Note: GFR calculation is accurate only with a steady state creatinine    Lipase [[id number]]  (Normal) Collected: 07/08/25 0739    Lab Status: Final result Specimen: Blood from Arm, Right Updated: 07/08/25 0801     Lipase 13 u/L             CT chest abdomen pelvis w contrast   Final Interpretation by Alexandra Robles MD (07/08 0908)   No acute intrathoracic pathology. Redemonstrated stable scattered small nodules, probably postinflammatory.   Diffuse hyperenhancement of the wall of the colon and rectum. Correlate with symptoms.   Treated hepatic tumor.   Upper abdominal lymphadenopathy, slightly improved as compared to May 2025.               Computerized Assisted Algorithm (CAA) may have aided analysis of applicable images.      Workstation performed: [id number]             ECG 12 Lead Documentation Only    Date/Time: 7/8/2025 9:52 PM    Performed by: Manju Chacon MD  Authorized by: Manju Chacon MD    Indications / Diagnosis:  Chest tightness  ECG reviewed by me, the ED Provider: yes    Patient location:  ED  Previous ECG:     Previous ECG:  Unavailable  Interpretation:     Interpretation: normal    Rate:     ECG rate:  81    ECG rate assessment: normal    Rhythm:     Rhythm: sinus rhythm    Ectopy:     Ectopy: none    QRS:     QRS axis:  Normal    QRS intervals:  Normal  Conduction:     Conduction: normal    ST segments:     ST segments:  Normal  T waves:     T waves: normal    Comments:      QTc 418      ED Medication and Procedure Management   Prior to Admission Medications    Prescriptions Last Dose Informant Patient Reported? Taking?   amLODIPine (NORVASC) 2.5 mg tablet 7/8/2025  No Yes   Sig: TAKE 1 TABLET BY MOUTH EVERY DAY   levothyroxine (Synthroid) 50 mcg tablet 7/8/2025  No Yes   Sig: Take 1 tablet (50 mcg total) by mouth daily   levothyroxine 25 mcg tablet Not Taking  No No   Sig: TAKE 1 TABLET BY MOUTH EVERY DAY   Patient not taking: Reported on 7/8/2025   lisinopril-hydrochlorothiazide (PRINZIDE,ZESTORETIC) 20-25 MG per tablet 7/8/2025  No Yes   Sig: TAKE 1 TABLET BY MOUTH EVERY DAY   methocarbamol (ROBAXIN) 500 mg tablet Not Taking  No No   Sig: Take 1 tablet (500 mg total) by mouth 3 (three) times a day as needed for muscle spasms   Patient not taking: Reported on 7/8/2025   methylPREDNISolone 4 MG tablet therapy pack   No No   Sig: Take 1 tablet (4 mg total) by mouth 6 (six) times a day for 1 day, THEN 1 tablet (4 mg total) 5 (five) times a day for 1 day, THEN 1 tablet (4 mg total) 4 times a day for 1 day, THEN 1 tablet (4 mg total) 3 (three) times a day for 1 day, THEN 1 tablet (4 mg total) 2 (two) times a day for 1 day, THEN 1 tablet (4 mg total) daily for 1 day.      Facility-Administered Medications: None     Current Discharge Medication List        CONTINUE these medications which have NOT CHANGED    Details   amLODIPine (NORVASC) 2.5 mg tablet TAKE 1 TABLET BY MOUTH EVERY DAY  Qty: 90 tablet, Refills: 1    Associated Diagnoses: Essential hypertension      !! levothyroxine (Synthroid) 50 mcg tablet Take 1 tablet (50 mcg total) by mouth daily  Qty: 30 tablet, Refills: 1    Associated Diagnoses: Hepatocellular carcinoma (HCC); Hypothyroidism due to medication      lisinopril-hydrochlorothiazide (PRINZIDE,ZESTORETIC) 20-25 MG per tablet TAKE 1 TABLET BY MOUTH EVERY DAY  Qty: 90 tablet, Refills: 1    Associated Diagnoses: Essential hypertension      !! levothyroxine 25 mcg tablet TAKE 1 TABLET BY MOUTH EVERY DAY  Qty: 90 tablet, Refills: 4    Associated Diagnoses: Other  specified hypothyroidism      methocarbamol (ROBAXIN) 500 mg tablet Take 1 tablet (500 mg total) by mouth 3 (three) times a day as needed for muscle spasms  Qty: 60 tablet, Refills: 1    Associated Diagnoses: Muscle spasm      methylPREDNISolone 4 MG tablet therapy pack Take 1 tablet (4 mg total) by mouth 6 (six) times a day for 1 day, THEN 1 tablet (4 mg total) 5 (five) times a day for 1 day, THEN 1 tablet (4 mg total) 4 times a day for 1 day, THEN 1 tablet (4 mg total) 3 (three) times a day for 1 day, THEN 1 tablet (4 mg total) 2 (two) times a day for 1 day, THEN 1 tablet (4 mg total) daily for 1 day.  Qty: 21 tablet, Refills: 0    Associated Diagnoses: Abdominal pain, unspecified abdominal location       !! - Potential duplicate medications found. Please discuss with provider.        No discharge procedures on file.  ED SEPSIS DOCUMENTATION   Time reflects when diagnosis was documented in both MDM as applicable and the Disposition within this note       Time User Action Codes Description Comment    2025  8:47 AM ChaconLarry aaronve Add [R10.9] Abdominal pain     2025  9:31 AM ChaconLarryve Add [K52.9] Colitis     2025  9:31 AM ChaconLarryve Add [N17.9] MENDOZA (acute kidney injury) (HCC)     2025  9:32 AM ChaconLarry aaronve Add [Z85.05] History of hepatocellular carcinoma                    [1]   Past Medical History:  Diagnosis Date    Hypertension     Liver cancer (HCC)    [2]   Past Surgical History:  Procedure Laterality Date    HERNIA REPAIR      IR Y-89 PRE-ANGIO/EMBO W/ LUNG SCAN  2022    IR Y-89 RADIOEMBOLIZATION  2022    MASTOIDECTOMY     [3]   Family History  Problem Relation Name Age of Onset    Cancer Mother     [4]   Social History  Tobacco Use    Smoking status: Former     Current packs/day: 0.00     Average packs/day: 1 pack/day for 30.0 years (30.0 ttl pk-yrs)     Types: Cigarettes     Start date:      Quit date:      Years since quittin.5    Smokeless  tobacco: Current    Tobacco comments:     nicotine pouch (on)   Vaping Use    Vaping status: Never Used   Substance Use Topics    Alcohol use: Yes    Drug use: Yes     Types: Marijuana     Comment: medical marijuana        Manju Chacon MD  07/08/25 [id number]

## 2025-07-08 NOTE — ASSESSMENT & PLAN NOTE
CC: Clear watery diarrhea x 10-15 episodes in 24 hours, BRBPR  H/o HCC on ipilimumab and nivolumab  No outside/recent travel/sick contact  Did not meet SIRS criteria, although does have 10% bandemia  CT AP: Diffuse hyperenhancement of the wall of the colon and rectum. Correlate with symptoms   Etiology: More likely from immunotherapy side effect, less suspicion for infectious etiology since no apparent risk factor    Plan:  Start patient on IV Solu-Medrol 60 mg daily(equivalent to 1 mg/kg p.o. prednisone) as per discussion with heme-onc, continue IV for 2 days then transition to p.o. equivalent, taper will be done by outpatient heme-onc provider consider reaching out hematology for further questions  Hold antibiotics since low suspicion for infectious etiology, monitor CBC  Follow-up on stool cultures  Continue IVF Isolyte at 100 mL/h for 10 hours  Diet: Clear liquid diet for now, advance to surgical soft/regular as patient tolerated

## 2025-07-08 NOTE — ASSESSMENT & PLAN NOTE
Hepatocellular carcinoma form hepatitis C/IV drug use  On ipilimumab/nivolumab, 4th cycle due on July 10  Follows up with Dr. Goddard

## 2025-07-09 ENCOUNTER — TELEPHONE (OUTPATIENT)
Age: 60
End: 2025-07-09

## 2025-07-09 ENCOUNTER — TELEPHONE (OUTPATIENT)
Dept: INFUSION CENTER | Facility: CLINIC | Age: 60
End: 2025-07-09

## 2025-07-09 VITALS
TEMPERATURE: 97.6 F | DIASTOLIC BLOOD PRESSURE: 77 MMHG | OXYGEN SATURATION: 97 % | HEART RATE: 90 BPM | RESPIRATION RATE: 17 BRPM | SYSTOLIC BLOOD PRESSURE: 118 MMHG

## 2025-07-09 LAB
ALBUMIN SERPL BCG-MCNC: 3.8 G/DL (ref 3.5–5)
ALP SERPL-CCNC: 55 U/L (ref 34–104)
ALT SERPL W P-5'-P-CCNC: 6 U/L (ref 7–52)
ANION GAP SERPL CALCULATED.3IONS-SCNC: 11 MMOL/L (ref 4–13)
AST SERPL W P-5'-P-CCNC: 13 U/L (ref 13–39)
ATRIAL RATE: 81 BPM
BASOPHILS # BLD AUTO: 0.02 THOUSANDS/ÂΜL (ref 0–0.1)
BASOPHILS NFR BLD AUTO: 0 % (ref 0–1)
BILIRUB SERPL-MCNC: 0.69 MG/DL (ref 0.2–1)
BUN SERPL-MCNC: 15 MG/DL (ref 5–25)
C COLI+JEJUNI TUF STL QL NAA+PROBE: NEGATIVE
C DIFF TOX GENS STL QL NAA+PROBE: NEGATIVE
CALCIUM SERPL-MCNC: 8.9 MG/DL (ref 8.4–10.2)
CHLORIDE SERPL-SCNC: 100 MMOL/L (ref 96–108)
CO2 SERPL-SCNC: 23 MMOL/L (ref 21–32)
CREAT SERPL-MCNC: 1.04 MG/DL (ref 0.6–1.3)
EC STX1+STX2 GENES STL QL NAA+PROBE: NEGATIVE
EOSINOPHIL # BLD AUTO: 0.04 THOUSAND/ÂΜL (ref 0–0.61)
EOSINOPHIL NFR BLD AUTO: 1 % (ref 0–6)
ERYTHROCYTE [DISTWIDTH] IN BLOOD BY AUTOMATED COUNT: 13.2 % (ref 11.6–15.1)
GFR SERPL CREATININE-BSD FRML MDRD: 77 ML/MIN/1.73SQ M
GLUCOSE P FAST SERPL-MCNC: 112 MG/DL (ref 65–99)
GLUCOSE SERPL-MCNC: 112 MG/DL (ref 65–140)
GLUCOSE SERPL-MCNC: 150 MG/DL (ref 65–140)
GLUCOSE SERPL-MCNC: 185 MG/DL (ref 65–140)
HCT VFR BLD AUTO: 37 % (ref 36.5–49.3)
HGB BLD-MCNC: 12.9 G/DL (ref 12–17)
IMM GRANULOCYTES # BLD AUTO: 0.02 THOUSAND/UL (ref 0–0.2)
IMM GRANULOCYTES NFR BLD AUTO: 0 % (ref 0–2)
LYMPHOCYTES # BLD AUTO: 1.26 THOUSANDS/ÂΜL (ref 0.6–4.47)
LYMPHOCYTES NFR BLD AUTO: 23 % (ref 14–44)
MCH RBC QN AUTO: 31 PG (ref 26.8–34.3)
MCHC RBC AUTO-ENTMCNC: 34.9 G/DL (ref 31.4–37.4)
MCV RBC AUTO: 89 FL (ref 82–98)
MONOCYTES # BLD AUTO: 0.75 THOUSAND/ÂΜL (ref 0.17–1.22)
MONOCYTES NFR BLD AUTO: 14 % (ref 4–12)
NEUTROPHILS # BLD AUTO: 3.46 THOUSANDS/ÂΜL (ref 1.85–7.62)
NEUTS SEG NFR BLD AUTO: 62 % (ref 43–75)
NRBC BLD AUTO-RTO: 0 /100 WBCS
P AXIS: 64 DEGREES
PLATELET # BLD AUTO: 177 THOUSANDS/UL (ref 149–390)
PMV BLD AUTO: 9 FL (ref 8.9–12.7)
POTASSIUM SERPL-SCNC: 3.7 MMOL/L (ref 3.5–5.3)
PR INTERVAL: 196 MS
PROT SERPL-MCNC: 6.9 G/DL (ref 6.4–8.4)
QRS AXIS: 27 DEGREES
QRSD INTERVAL: 84 MS
QT INTERVAL: 360 MS
QTC INTERVAL: 418 MS
RBC # BLD AUTO: 4.16 MILLION/UL (ref 3.88–5.62)
SALMONELLA SP SPAO STL QL NAA+PROBE: NEGATIVE
SHIGELLA SP+EIEC IPAH STL QL NAA+PROBE: NEGATIVE
SODIUM SERPL-SCNC: 134 MMOL/L (ref 135–147)
T WAVE AXIS: 44 DEGREES
VENTRICULAR RATE: 81 BPM
WBC # BLD AUTO: 5.55 THOUSAND/UL (ref 4.31–10.16)

## 2025-07-09 PROCEDURE — 80053 COMPREHEN METABOLIC PANEL: CPT

## 2025-07-09 PROCEDURE — 99239 HOSP IP/OBS DSCHRG MGMT >30: CPT

## 2025-07-09 PROCEDURE — 85025 COMPLETE CBC W/AUTO DIFF WBC: CPT

## 2025-07-09 PROCEDURE — 93010 ELECTROCARDIOGRAM REPORT: CPT | Performed by: INTERNAL MEDICINE

## 2025-07-09 PROCEDURE — 82948 REAGENT STRIP/BLOOD GLUCOSE: CPT

## 2025-07-09 RX ORDER — LEVOTHYROXINE SODIUM 50 UG/1
50 TABLET ORAL DAILY
Qty: 30 TABLET | Refills: 0 | Status: SHIPPED | OUTPATIENT
Start: 2025-07-09

## 2025-07-09 RX ORDER — MAGNESIUM SULFATE 1 G/100ML
1 INJECTION INTRAVENOUS ONCE
Status: COMPLETED | OUTPATIENT
Start: 2025-07-09 | End: 2025-07-09

## 2025-07-09 RX ORDER — PREDNISONE 20 MG/1
60 TABLET ORAL ONCE
Status: DISCONTINUED | OUTPATIENT
Start: 2025-07-10 | End: 2025-07-09 | Stop reason: HOSPADM

## 2025-07-09 RX ORDER — LISINOPRIL AND HYDROCHLOROTHIAZIDE 20; 25 MG/1; MG/1
1 TABLET ORAL DAILY
Qty: 90 TABLET | Refills: 0 | Status: SHIPPED | OUTPATIENT
Start: 2025-07-10

## 2025-07-09 RX ORDER — PREDNISONE 20 MG/1
TABLET ORAL
Qty: 74 TABLET | Refills: 0 | Status: SHIPPED | OUTPATIENT
Start: 2025-07-10 | End: 2025-08-21

## 2025-07-09 RX ADMIN — HYDROCHLOROTHIAZIDE 25 MG: 25 TABLET ORAL at 11:58

## 2025-07-09 RX ADMIN — METHYLPREDNISOLONE SODIUM SUCCINATE 60 MG: 125 INJECTION, POWDER, FOR SOLUTION INTRAMUSCULAR; INTRAVENOUS at 09:34

## 2025-07-09 RX ADMIN — LISINOPRIL 20 MG: 20 TABLET ORAL at 09:34

## 2025-07-09 RX ADMIN — AMLODIPINE BESYLATE 2.5 MG: 2.5 TABLET ORAL at 09:34

## 2025-07-09 RX ADMIN — LEVOTHYROXINE SODIUM 50 MCG: 0.05 TABLET ORAL at 09:34

## 2025-07-09 RX ADMIN — MAGNESIUM SULFATE HEPTAHYDRATE 1 G: 1 INJECTION, SOLUTION INTRAVENOUS at 06:33

## 2025-07-09 NOTE — UTILIZATION REVIEW
Initial Clinical Review    Admission: Date/Time/Statement:   Admission Orders (From admission, onward)       Ordered        07/08/25 0932  Place in Observation  Once                          Orders Placed This Encounter   Procedures    Place in Observation     Standing Status:   Standing     Number of Occurrences:   1     Level of Care:   Med Surg [16]     ED Arrival Information       Expected   -    Arrival   7/8/2025 07:09    Acuity   Urgent              Means of arrival   Walk-In    Escorted by   Family Member    Service   Hospitalist    Admission type   Emergency              Arrival complaint   Diarrhea/blood in stool             Chief Complaint   Patient presents with    Rectal Bleeding     Pt had diarrhea starting on Thursday and woke up this morning to blood in stool. States stool is like water. C/o abdominal pain, dizziness, CP, fatigued. Has liver cancer       Initial Presentation: 60 y.o. male with hx metastatic hepatocellular carcinoma ( is on immunotherapy(ipilimumab plus nivolumab: started on May 9, 2025, next cycle#4 is on Yaneli 10, 2025 ) who presents to ED from home with diarrhea and rectal bleeding.  Symptoms started on Thursday, initially he had loose stool which progressed to clear watery diarrhea, 10-15 episodes in 24 hours, had a episode of BRBPR in the morning of the day of admission, patient was also feeling nauseous. Pt  does report dizziness yesterday and today which pt attributes to dehydration, unable to keep down any liquid or food . On exam, abdominal tenderness in the periumbilical area and left lower quadrant. Abdomen soft. Dry mucous membranes .  Labs : 10 % bandemia ,  creat 1.72 from baseline 0.9-1.1 . K 3.4 . Mag 1.8 . CT A/P shows diffuse colitis . Pt given IVF, IV Tylenol, IV abx, K repletion  in ED. Oncology contacted and recommended to start IV Solumedrol . Pt admitted as  OBS with colitis, elevated serum creat. PLan- IV Solumedrol  x 2 days then transition to po. Hold abx  for  now. Monitor CBC. Obtain stool studies .  ml/hr x 10 h . CL diet- advance as broderick. Monitor BMP  . Hold home Lisinopril./ HCTZ in setting MENDOZA .   Anticipated Length of Stay/Certification Statement: Patient will be admitted on an observation basis with an anticipated length of stay of less than 2 midnights secondary to Colitis.     Date: 7/9    Creat down to 1.04 . IVF completed overnight . K 3.7. Hgb down to 12.9 today from 14.7.     ED Treatment-Medication Administration from 07/08/2025 0708 to 07/08/2025 1839         Date/Time Order Dose Route Action     07/08/2025 0811 sodium chloride 0.9 % bolus 1,000 mL 1,000 mL Intravenous New Bag     07/08/2025 0811 acetaminophen (Ofirmev) IVPB 500 mg 500 mg Intravenous New Bag     07/08/2025 0831 iohexol (OMNIPAQUE) 350 MG/ML injection (MULTI-DOSE) 100 mL 100 mL Intravenous Given     07/08/2025 0914 ceftriaxone (ROCEPHIN) 1 g/50 mL in dextrose IVPB 1,000 mg Intravenous New Bag     07/08/2025 0914 metroNIDAZOLE (FLAGYL) IVPB (premix) 500 mg 100 mL 500 mg Intravenous New Bag     07/08/2025 0938 potassium chloride 20 mEq IVPB (premix) 20 mEq Intravenous New Bag     07/08/2025 1128 potassium chloride 20 mEq IVPB (premix) 20 mEq Intravenous New Bag     07/08/2025 1326 heparin (porcine) subcutaneous injection 5,000 Units 5,000 Units Subcutaneous Given     07/08/2025 1252 multi-electrolyte (Plasmalyte-A/Isolyte-S PH 7.4/Normosol-R) IV solution 100 mL/hr Intravenous New Bag     07/08/2025 1329 methylPREDNISolone sodium succinate (Solu-MEDROL) injection 60 mg 60 mg Intravenous Given            Scheduled Medications:  amLODIPine, 2.5 mg, Oral, Daily  heparin (porcine), 5,000 Units, Subcutaneous, Q8H GOLDIE  [Held by provider] lisinopril, 20 mg, Oral, Daily   And  [Held by provider] hydroCHLOROthiazide, 25 mg, Oral, Daily  insulin lispro, 1-6 Units, Subcutaneous, 4x Daily (AC & HS)  levothyroxine, 50 mcg, Oral, Daily  methylPREDNISolone sodium succinate, 60 mg, Intravenous,  Daily        magnesium sulfate IVPB (premix) SOLN 1 g  Dose: 1 g  Freq: Once Route: IV  Start: 07/09/25 0630 End: 07/09/25 0733  Continuous IV Infusions:   multi-electrolyte (Plasmalyte-A/Isolyte-S PH 7.4/Normosol-R) IV solution  Rate: 100 mL/hr Dose: 100 mL/hr  Freq: Continuous Route: IV  Last Dose: Stopped (07/08/25 2239)  Start: 07/08/25 1230 End: 07/08/25 2251  PRN Meds:     ED Triage Vitals   Temperature Pulse Respirations Blood Pressure SpO2 Pain Score   07/08/25 0716 07/08/25 0716 07/08/25 0716 07/08/25 0716 07/08/25 0716 07/08/25 1230   97.6 °F (36.4 °C) 84 18 134/72 98 % No Pain     Weight (last 2 days)       None            Vital Signs (last 3 days)       Date/Time Temp Pulse Resp BP MAP (mmHg) SpO2 O2 Device Patient Position - Orthostatic VS Sydni Coma Scale Score Pain    07/09/25 07:26:35 97.6 °F (36.4 °C) 78 17 148/80 103 96 % -- -- -- --    07/08/25 22:48:38 -- 71 -- 127/69 88 95 % -- -- -- --    07/08/25 2100 -- -- -- -- -- -- -- -- 15 No Pain    07/08/25 18:45:01 98 °F (36.7 °C) 82 -- -- -- 95 % -- -- -- --    07/08/25 1822 -- -- 16 128/77 -- -- None (Room air) Lying -- --    07/08/25 1514 -- 73 16 108/59 78 96 % None (Room air) Lying -- --    07/08/25 1230 97.5 °F (36.4 °C) 84 16 144/70 100 99 % None (Room air) -- -- No Pain    07/08/25 1034 -- -- -- -- -- -- -- -- 15 --    07/08/25 0815 -- 83 -- -- -- 98 % -- -- -- --    07/08/25 0716 97.6 °F (36.4 °C) 84 18 134/72 95 98 % None (Room air) Sitting -- --              Pertinent Labs/Diagnostic Test Results:   Radiology:  CT chest abdomen pelvis w contrast   Final Interpretation by Alexandra Robles MD (07/08 0908)   No acute intrathoracic pathology. Redemonstrated stable scattered small nodules, probably postinflammatory.   Diffuse hyperenhancement of the wall of the colon and rectum. Correlate with symptoms.   Treated hepatic tumor.   Upper abdominal lymphadenopathy, slightly improved as compared to May 2025.               Computerized Assisted  Algorithm (CAA) may have aided analysis of applicable images.      Workstation performed: [id number]           Cardiology:   7/8 ECG- ED read   Interpretation: normal    Rate:     ECG rate:  81     ECG rate assessment: normal    Rhythm:     Rhythm: sinus rhythm    Ectopy:     Ectopy: none     QTc 418   ECG 12 lead    by Interface, Ris Results In (07/08 0722)        GI:  No orders to display           Results from last 7 days   Lab Units 07/09/25  0503 07/08/25  0739   WBC Thousand/uL 5.55 6.56   HEMOGLOBIN g/dL 12.9 14.7   HEMATOCRIT % 37.0 42.4   PLATELETS Thousands/uL 177 216   TOTAL NEUT ABS Thousands/µL 3.46  --    BANDS PCT %  --  10*         Results from last 7 days   Lab Units 07/09/25  0503 07/08/25  0739   SODIUM mmol/L 134* 133*   POTASSIUM mmol/L 3.7 3.4*   CHLORIDE mmol/L 100 95*   CO2 mmol/L 23 29   ANION GAP mmol/L 11 9   BUN mg/dL 15 22   CREATININE mg/dL 1.04 1.72*   EGFR ml/min/1.73sq m 77 42   CALCIUM mg/dL 8.9 10.0   MAGNESIUM mg/dL  --  1.8*     Results from last 7 days   Lab Units 07/09/25  0503 07/08/25  0739   AST U/L 13 16   ALT U/L 6* 11   ALK PHOS U/L 55 69   TOTAL PROTEIN g/dL 6.9 8.0   ALBUMIN g/dL 3.8 4.4   TOTAL BILIRUBIN mg/dL 0.69 1.23*     Results from last 7 days   Lab Units 07/09/25  0723 07/08/25  2244 07/08/25  1801   POC GLUCOSE mg/dl 150* 130 141*     Results from last 7 days   Lab Units 07/09/25  0503 07/08/25  0739   GLUCOSE RANDOM mg/dL 112 103               Results from last 7 days   Lab Units 07/08/25  0739   HS TNI 0HR ng/L 5                     Results from last 7 days   Lab Units 07/08/25  0913   LACTIC ACID mmol/L 0.9                   Results from last 7 days   Lab Units 07/08/25  0739   LIPASE u/L 13                 Results from last 7 days   Lab Units 07/08/25  1033   CLARITY UA  Clear   COLOR UA  Light Yellow   SPEC GRAV UA  >=1.050*   PH UA  5.5   GLUCOSE UA mg/dl Negative   KETONES UA mg/dl Trace*   BLOOD UA  Small*   PROTEIN UA mg/dl Trace*   NITRITE UA   Negative   BILIRUBIN UA  Negative   UROBILINOGEN UA (BE) mg/dl <2.0   LEUKOCYTES UA  Negative   WBC UA /hpf 1-2   RBC UA /hpf 1-2   BACTERIA UA /hpf None Seen   EPITHELIAL CELLS WET PREP /hpf None Seen                                 Results from last 7 days   Lab Units 07/08/25  0913   BLOOD CULTURE  Received in Microbiology Lab. Culture in Progress.  Received in Microbiology Lab. Culture in Progress.                   Past Medical History[1]  Present on Admission:   Hepatocellular carcinoma (HCC)   Essential hypertension      Admitting Diagnosis: Diarrhea [R19.7]  Colitis [K52.9]  Rectal bleeding [K62.5]  Abdominal pain [R10.9]  MENDOZA (acute kidney injury) (HCC) [N17.9]  History of hepatocellular carcinoma [Z85.05]  Age/Sex: 60 y.o. male    Network Utilization Review Department  ATTENTION: Please call with any questions or concerns to [phone] and carefully listen to the prompts so that you are directed to the right person. All voicemails are confidential.   For Discharge needs, contact Care Management DC Support Team at [phone] opt. 2  Send all requests for admission clinical reviews, approved or denied determinations and any other requests to dedicated fax number below belonging to the campus where the patient is receiving treatment. List of dedicated fax numbers for the Facilities:  FACILITY NAME UR FAX NUMBER   ADMISSION DENIALS (Administrative/Medical Necessity) [phone]   DISCHARGE SUPPORT TEAM (NETWORK) [phone]   PARENT CHILD HEALTH (Maternity/NICU/Pediatrics) [phone]   [facility] [phone]   [facility] [phone]   [facility] [phone]   [facility] [phone]   [facility] [phone]   [facility] [phone]   [facility] [phone]   [facility]  [phone]   [facility] [phone]   [facility] [phone]   [facility] [phone]   [facility] [phone]              [1]   Past Medical History:  Diagnosis Date    Hypertension     Liver cancer (HCC)

## 2025-07-09 NOTE — ASSESSMENT & PLAN NOTE
Recent Labs     07/08/25  0739 07/09/25  0503   CREATININE 1.72* 1.04   EGFR 42 77     Estimated Creatinine Clearance: 75.5 mL/min (by C-G formula based on SCr of 1.04 mg/dL).    POA: 1.72; (baseline 0.9-1.1)  Likely prerenal from GI loss  His renal status has returned to baseline  Avoid hypoperfusion of the kidneys, minimize nephrotoxins  Restarted  lisinopril/HCTZ as his MENDOZA has improved  Patient will need repeat CBC, and CMP from primary care physician and/or hematology

## 2025-07-09 NOTE — TELEPHONE ENCOUNTER
Bassem calling in requesting a hospital follow up with Dr Goddard as he states his chemo is on hold until he sees the Doctor, and is also on a high dose steroid until he sees Dr Goddard. He is scheduled for follow up on 7/25/25, would like to know if he should be seen sooner. Next available HFU with Dr Goddard is in August past his already scheduled appointment. Please call Bassem at [phone], thank you!

## 2025-07-09 NOTE — PLAN OF CARE
Problem: PAIN - ADULT  Goal: Verbalizes/displays adequate comfort level or baseline comfort level  Description: Interventions:  - Encourage patient to monitor pain and request assistance  - Assess pain using appropriate pain scale  - Administer analgesics as ordered based on type and severity of pain and evaluate response  - Implement non-pharmacological measures as appropriate and evaluate response  - Consider cultural and social influences on pain and pain management  - Notify physician/advanced practitioner if interventions unsuccessful or patient reports new pain  - Educate patient/family on pain management process including their role and importance of  reporting pain   - Provide non-pharmacologic/complimentary pain relief interventions  Outcome: Progressing     Problem: INFECTION - ADULT  Goal: Absence or prevention of progression during hospitalization  Description: INTERVENTIONS:  - Assess and monitor for signs and symptoms of infection  - Monitor lab/diagnostic results  - Monitor all insertion sites, i.e. indwelling lines, tubes, and drains  - Monitor endotracheal if appropriate and nasal secretions for changes in amount and color  - Placida appropriate cooling/warming therapies per order  - Administer medications as ordered  - Instruct and encourage patient and family to use good hand hygiene technique  - Identify and instruct in appropriate isolation precautions for identified infection/condition  Outcome: Progressing  Goal: Absence of fever/infection during neutropenic period  Description: INTERVENTIONS:  - Monitor WBC  - Perform strict hand hygiene  - Limit to healthy visitors only  - No plants, dried, fresh or silk flowers with dos santos in patient room  Outcome: Progressing     Problem: SAFETY ADULT  Goal: Patient will remain free of falls  Description: INTERVENTIONS:  - Educate patient/family on patient safety including physical limitations  - Instruct patient to call for assistance with activity   -  Consider consulting OT/PT to assist with strengthening/mobility based on AM PAC & JH-HLM score  - Consult OT/PT to assist with strengthening/mobility   - Keep Call bell within reach  - Keep bed low and locked with side rails adjusted as appropriate  - Keep care items and personal belongings within reach  - Initiate and maintain comfort rounds  - Make Fall Risk Sign visible to staff  - Offer Toileting every 2 Hours, in advance of need  - Initiate/Maintain bed/chair alarm  - Obtain necessary fall risk management equipment  - Apply yellow socks and bracelet for high fall risk patients  - Consider moving patient to room near nurses station  Outcome: Progressing  Goal: Maintain or return to baseline ADL function  Description: INTERVENTIONS:  -  Assess patient's ability to carry out ADLs; assess patient's baseline for ADL function and identify physical deficits which impact ability to perform ADLs (bathing, care of mouth/teeth, toileting, grooming, dressing, etc.)  - Assess/evaluate cause of self-care deficits   - Assess range of motion  - Assess patient's mobility; develop plan if impaired  - Assess patient's need for assistive devices and provide as appropriate  - Encourage maximum independence but intervene and supervise when necessary  - Involve family in performance of ADLs  - Assess for home care needs following discharge   - Consider OT consult to assist with ADL evaluation and planning for discharge  - Provide patient education as appropriate  - Monitor functional capacity and physical performance, use of AM PAC & JH-HLM   - Monitor gait, balance and fatigue with ambulation    Outcome: Progressing  Goal: Maintains/Returns to pre admission functional level  Description: INTERVENTIONS:  - Perform AM-PAC 6 Click Basic Mobility/ Daily Activity assessment daily.  - Set and communicate daily mobility goal to care team and patient/family/caregiver.   - Collaborate with rehabilitation services on mobility goals if  consulted  - Perform Range of Motion 3 times a day.  - Reposition patient every 2 hours.  - Dangle patient 3 times a day  - Stand patient 3 times a day  - Ambulate patient 3 times a day  - Out of bed to chair 3 times a day   - Out of bed for meals 3 times a day  - Out of bed for toileting  - Record patient progress and toleration of activity level   Outcome: Progressing     Problem: DISCHARGE PLANNING  Goal: Discharge to home or other facility with appropriate resources  Description: INTERVENTIONS:  - Identify barriers to discharge w/patient and caregiver  - Arrange for needed discharge resources and transportation as appropriate  - Identify discharge learning needs (meds, wound care, etc.)  - Arrange for interpretive services to assist at discharge as needed  - Refer to Case Management Department for coordinating discharge planning if the patient needs post-hospital services based on physician/advanced practitioner order or complex needs related to functional status, cognitive ability, or social support system  Outcome: Progressing     Problem: Knowledge Deficit  Goal: Patient/family/caregiver demonstrates understanding of disease process, treatment plan, medications, and discharge instructions  Description: Complete learning assessment and assess knowledge base.  Interventions:  - Provide teaching at level of understanding  - Provide teaching via preferred learning methods  Outcome: Progressing

## 2025-07-09 NOTE — DISCHARGE INSTR - AVS FIRST PAGE
Dear Bassem Roberts,     It was our pleasure to care for you here at [facility].  It is our hope that we were always able to exceed the expected standards for your care during your stay.  You were hospitalized due to diarrhea and benign blood per rectum.  You were cared for on the fourth floor by Paulina Balderas MD under the service of Erinn Muller DO with the [facility]ist [facility] who covers for your primary care physician (PCP), Josue Hernandez MD, while you were hospitalized.  If you have any questions or concerns related to this hospitalization, you may contact us at .  For follow up as well as any medication refills, we recommend that you follow up with your primary care physician.  A registered nurse will reach out to you by phone within a few days after your discharge to answer any additional questions that you may have after going home.  However, at this time we provide for you here, the most important instructions / recommendations at discharge:     Notable Medication Adjustments -   START taking prednisone 60 mg tablet for this week.  You will be following up with your hematologist within this week to address your prednisone taper.   Continue taking your blood pressure medication as prescribed  Your levothyroxine was recently adjusted.  STOP taking levothyroxine 25 mcg daily.  START taking levothyroxine 50 mcg by mouth every day.  Testing Required after Discharge -   Your hematologist will decide this  ** Please contact your PCP hematologist to request testing orders for any of the testing recommended here **  Important follow up information -   Please follow up with your primary care physician within one week of discharge.  Please follow-up with your hematologist tomorrow 7/10/25 at 1:40 PM.  Other Instructions -   If you expereince worsening symptoms of diarrhea, bleeding, lightheadness then please return to your nearest emergency  department  Please review this entire after visit summary as additional general instructions including medication list, appointments, activity, diet, any pertinent wound care, and other additional recommendations from your care team that may be provided for you.      Sincerely,     Paulina Balderas MD

## 2025-07-09 NOTE — ASSESSMENT & PLAN NOTE
Hepatocellular carcinoma from hepatitis C/IV drug use  On ipilimumab/nivolumab, 4th cycle due on July 10  Follows up with Dr. Goddard

## 2025-07-09 NOTE — PLAN OF CARE
Problem: PAIN - ADULT  Goal: Verbalizes/displays adequate comfort level or baseline comfort level  Description: Interventions:  - Encourage patient to monitor pain and request assistance  - Assess pain using appropriate pain scale  - Administer analgesics as ordered based on type and severity of pain and evaluate response  - Implement non-pharmacological measures as appropriate and evaluate response  - Consider cultural and social influences on pain and pain management  - Notify physician/advanced practitioner if interventions unsuccessful or patient reports new pain  - Educate patient/family on pain management process including their role and importance of  reporting pain   - Provide non-pharmacologic/complimentary pain relief interventions  Outcome: Progressing     Problem: INFECTION - ADULT  Goal: Absence or prevention of progression during hospitalization  Description: INTERVENTIONS:  - Assess and monitor for signs and symptoms of infection  - Monitor lab/diagnostic results  - Monitor all insertion sites, i.e. indwelling lines, tubes, and drains  - Monitor endotracheal if appropriate and nasal secretions for changes in amount and color  - [place] appropriate cooling/warming therapies per order  - Administer medications as ordered  - Instruct and encourage patient and family to use good hand hygiene technique  - Identify and instruct in appropriate isolation precautions for identified infection/condition  Outcome: Progressing  Goal: Absence of fever/infection during neutropenic period  Description: INTERVENTIONS:  - Monitor WBC  - Perform strict hand hygiene  - Limit to healthy visitors only  - No plants, dried, fresh or silk flowers with dos santos in patient room  Outcome: Progressing     Problem: SAFETY ADULT  Goal: Patient will remain free of falls  Description: INTERVENTIONS:  - Educate patient/family on patient safety including physical limitations  - Instruct patient to call for assistance with activity   -  Consider consulting OT/PT to assist with strengthening/mobility based on AM PAC & JH-HLM score  - Consult OT/PT to assist with strengthening/mobility   - Keep Call bell within reach  - Keep bed low and locked with side rails adjusted as appropriate  - Keep care items and personal belongings within reach  - Initiate and maintain comfort rounds  - Make Fall Risk Sign visible to staff  - Offer Toileting every 2 Hours, in advance of need  - Initiate/Maintain bed/chair alarm  - Obtain necessary fall risk management equipment  - Apply yellow socks and bracelet for high fall risk patients  - Consider moving patient to room near nurses station  Outcome: Progressing  Goal: Maintain or return to baseline ADL function  Description: INTERVENTIONS:  -  Assess patient's ability to carry out ADLs; assess patient's baseline for ADL function and identify physical deficits which impact ability to perform ADLs (bathing, care of mouth/teeth, toileting, grooming, dressing, etc.)  - Assess/evaluate cause of self-care deficits   - Assess range of motion  - Assess patient's mobility; develop plan if impaired  - Assess patient's need for assistive devices and provide as appropriate  - Encourage maximum independence but intervene and supervise when necessary  - Involve family in performance of ADLs  - Assess for home care needs following discharge   - Consider OT consult to assist with ADL evaluation and planning for discharge  - Provide patient education as appropriate  - Monitor functional capacity and physical performance, use of AM PAC & JH-HLM   - Monitor gait, balance and fatigue with ambulation    Outcome: Progressing  Goal: Maintains/Returns to pre admission functional level  Description: INTERVENTIONS:  - Perform AM-PAC 6 Click Basic Mobility/ Daily Activity assessment daily.  - Set and communicate daily mobility goal to care team and patient/family/caregiver.   - Collaborate with rehabilitation services on mobility goals if  consulted  - Perform Range of Motion 3 times a day.  - Reposition patient every 2 hours.  - Dangle patient 3 times a day  - Stand patient 3 times a day  - Ambulate patient 3 times a day  - Out of bed to chair 3 times a day   - Out of bed for meals 3 times a day  - Out of bed for toileting  - Record patient progress and toleration of activity level   Outcome: Progressing     Problem: DISCHARGE PLANNING  Goal: Discharge to home or other facility with appropriate resources  Description: INTERVENTIONS:  - Identify barriers to discharge w/patient and caregiver  - Arrange for needed discharge resources and transportation as appropriate  - Identify discharge learning needs (meds, wound care, etc.)  - Arrange for interpretive services to assist at discharge as needed  - Refer to Case Management Department for coordinating discharge planning if the patient needs post-hospital services based on physician/advanced practitioner order or complex needs related to functional status, cognitive ability, or social support system  Outcome: Progressing     Problem: Knowledge Deficit  Goal: Patient/family/caregiver demonstrates understanding of disease process, treatment plan, medications, and discharge instructions  Description: Complete learning assessment and assess knowledge base.  Interventions:  - Provide teaching at level of understanding  - Provide teaching via preferred learning methods  Outcome: Progressing

## 2025-07-09 NOTE — ASSESSMENT & PLAN NOTE
Home meds: Amlodipine 2.5 mg daily and lisinopril 20 mg/HCTZ 25 mg daily  Continue amlodipine, lisinopril/HCTZ.

## 2025-07-09 NOTE — TELEPHONE ENCOUNTER
TIME OUT taken from Fiordaliza Salamanca RN in Dr. Marques office. Patient is currently admitted. Treatment on 7/10 to be canceled and patient will follow up with Dr. Goddard before next treatment appointment.

## 2025-07-09 NOTE — TELEPHONE ENCOUNTER
"Returned call to patient.  He is very upset and stating doctor wants to keep him another day because \"he doesn't want to do the paperwork\".  Explained this was not the case and that his inpatient team reached out to Dr Goddard to update him on his hospital course and asked for recommendations on his steroid taper.  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  Patient again upset because team initially told him there was no reason for admission and he is not going to stay another day.  Reassured him that the inpatient team made Dr Goddard aware of this as well and understands that patient is addiment to leave.  Made aware team was given guidance on instructions on steroid taper and they would be given upon discharge.  Made patient aware that a follow up appointment can be made this week with KARINA Montanez.  Stated understanding.  Confirmed appointment for tomorrow 7/10/2025 at 1:40pm.  Also made aware team will determine when it is safe for him to get next infusion.  Stated understanding, no further questions at this time    "

## 2025-07-09 NOTE — ASSESSMENT & PLAN NOTE
CC: Clear watery diarrhea x 10-15 episodes in 24 hours, BRBPR  H/o HCC on ipilimumab and nivolumab  No outside/recent travel/sick contact  Did not meet SIRS criteria, although does have 10% bandemia  CT AP: Diffuse hyperenhancement of the wall of the colon and rectum. Correlate with symptoms   Etiology: More likely from immunotherapy side effect, less suspicion for infectious etiology since no apparent risk factor    Plan:  Received 2 full doses of IV Solu-Medrol 60 mg daily.  Patient transition to p.o. prednisone 60 mg daily.  His hematology made aware of pt's disposition and pt has an appointment them tomorrow  antibiotics stopped due to low suspicion of infectious etiology  Patient negative for C. difficile  Follow-up on stool cultures, blood cultures  Continue appropriate oral hydration  Patient transitioned to solid diet which he tolerated well.

## 2025-07-09 NOTE — TELEPHONE ENCOUNTER
Patient would like to speak with Dr Perla because he is in the hospital and is on a heavy steroids and they wont allow him to discharge until he is off of them and she really needs to get out earlier.       He said that he is checking himself out of the hospital within the next half hour.  Please call him back at [phone]

## 2025-07-09 NOTE — DISCHARGE SUMMARY
Discharge Summary - Hospitalist   Name: Bassem Roberts 60 y.o. male I MRN: [id number]  Unit/Bed#: S -01 I Date of Admission: 7/8/2025   Date of Service: 7/9/2025 I Hospital Day: 0     Assessment & Plan  Colitis  CC: Clear watery diarrhea x 10-15 episodes in 24 hours, BRBPR  H/o HCC on ipilimumab and nivolumab  No outside/recent travel/sick contact  Did not meet SIRS criteria, although does have 10% bandemia  CT AP: Diffuse hyperenhancement of the wall of the colon and rectum. Correlate with symptoms   Etiology: More likely from immunotherapy side effect, less suspicion for infectious etiology since no apparent risk factor    Plan:  Received 2 full doses of IV Solu-Medrol 60 mg daily.  Patient transition to p.o. prednisone 60 mg daily.  His hematology made aware of pt's disposition and pt has an appointment them tomorrow  antibiotics stopped due to low suspicion of infectious etiology  Patient negative for C. difficile  Follow-up on stool cultures, blood cultures  Continue appropriate oral hydration  Patient transitioned to solid diet which he tolerated well.  Elevated serum creatinine  Recent Labs     07/08/25  0739 07/09/25  0503   CREATININE 1.72* 1.04   EGFR 42 77     Estimated Creatinine Clearance: 75.5 mL/min (by C-G formula based on SCr of 1.04 mg/dL).    POA: 1.72; (baseline 0.9-1.1)  Likely prerenal from GI loss  His renal status has returned to baseline  Avoid hypoperfusion of the kidneys, minimize nephrotoxins  Restarted  lisinopril/HCTZ as his MENDOZA has improved  Patient will need repeat CBC, and CMP from primary care physician and/or hematology  Hepatocellular carcinoma (HCC)  Hepatocellular carcinoma from hepatitis C/IV drug use  On ipilimumab/nivolumab, 4th cycle due on July 10  Follows up with Dr. Goddard  Essential hypertension  Home meds: Amlodipine 2.5 mg daily and lisinopril 20 mg/HCTZ 25 mg daily  Continue amlodipine, lisinopril/HCTZ.       Medical Problems       Resolved Problems  Date  Reviewed: 7/8/2025   None       Discharging Physician / Practitioner: Alida Shi MD  PCP: Josue Hernandez MD  Admission Date:   Admission Orders (From admission, onward)       Ordered        07/08/25 0932  Place in Observation  Once                          Discharge Date: 07/09/25    Next Steps for Physician/AP Assuming Care:  Patient currently on prednisone 60 mg once daily.  Patient is supposed to be tapering on prednisone down to 10 mg/day.  Patient had minor MENDOZA and will require CBC and CMP to recheck his renal function, monitor electrolytes, and liver function tests    Test Results Pending at Discharge (will require follow up):  Stool and blood cultures    Medication Changes for Discharge & Rationale:   Dear Bassem Roberts,   It was our pleasure to care for you here at [facility].  It is our hope that we were always able to exceed the expected standards for your care during your stay.  You were hospitalized due to diarrhea and benign blood per rectum.  You were cared for on the fourth floor by Paulina Balderas MD under the service of Erinn Muller DO with the [facility] who covers for your primary care physician (PCP), Josue Hernandez MD, while you were hospitalized.  If you have any questions or concerns related to this hospitalization, you may contact us at .  For follow up as well as any medication refills, we recommend that you follow up with your primary care physician.  A registered nurse will reach out to you by phone within a few days after your discharge to answer any additional questions that you may have after going home.  However, at this time we provide for you here, the most important instructions / recommendations at discharge:     Notable Medication Adjustments -   START taking prednisone 60 mg tablet for this week.  You will be following up with your hematologist within this week to address your prednisone taper.    Continue taking your blood pressure medication as prescribed  Your levothyroxine was recently adjusted.  STOP taking levothyroxine 25 mcg daily.  START taking levothyroxine 50 mcg by mouth every day.  Testing Required after Discharge -   Your hematologist will decide this  ** Please contact your PCP hematologist to request testing orders for any of the testing recommended here **  Important follow up information -   Please follow up with your primary care physician within one week of discharge.  Please follow-up with your hematologist tomorrow 7/10/25 at 1:40 PM.  Other Instructions -   If you expereince worsening symptoms of diarrhea, bleeding, lightheadness then please return to your nearest emergency department  Please review this entire after visit summary as additional general instructions including medication list, appointments, activity, diet, any pertinent wound care, and other additional recommendations from your care team that may be provided for you.    Sincerely,   Paulina Balderas MD   See after visit summary for reconciled discharge medications provided to patient and/or family.     Consultations During Hospital Stay:  None    Procedures Performed:   None    Significant Findings / Test Results:   Diffuse hyperenhancement of the wall of the colon and rectum suggestive of diffuse colitis     Incidental Findings:   None      Hospital Course:   Bassem Roberts is a 60 y.o. male patient who originally presented to the hospital on 7/8/2025 due to  a 60 y.o. male with a PMH of metastatic hepatocellular carcinoma who presents with diarrhea and rectal bleeding. He was admitted for observation of his colitis and kidney function. On admission patient's vitals were stable, labs showed no leukocytosis but 10% of bandemia, elevated creatinine due to his dehydration from diarrhea. CT scan of the abdomen was done which showed diffuse colitis.Upon discussion with hematology, pt was started on IV Solu-Medrol 60 mg  daily(equivalent to 1 mg/kg p.o. prednisone) with planned prednisone tapering outpatient. His stool cultures were collected to rule out infectious etiology and his C.diff PCR came back negative.   Pt received IVF Isolyte at 100 mL/h for 10 hours after which his kidney function had normalized. His magnesium was re-pleated and patient was hemodynamically stable for discharge with close follow up with his hematologist.     Please see above list of diagnoses and related plan for additional information.     Discharge Day Visit / Exam:   Subjective: Patient seen and examined at bedside.  Patient extremity eager to go home.  He is pressuring the staff for quick discharge.  When patient advised to me keep for 1 more night per his hematology recommendations he refused and was adamant to be discharged threatening to leave against medical advice. He no longer complains of bloody diarrhea.  Last loose bowel movement was last night and was clear. Today he had a bowel movement that was semiformed with no signs of acute bleeding or bright red blood per rectum.  Patient feels well overall is hemodynamically stable and is awaiting discharge.  Vitals: Blood Pressure: 118/77 (07/09/25 1159)  Pulse: 90 (07/09/25 1159)  Temperature: 97.6 °F (36.4 °C) (07/09/25 0726)  Temp Source: Axillary (07/08/25 1822)  Respirations: 17 (07/09/25 0726)  SpO2: 97 % (07/09/25 1159)  Physical Exam  Constitutional:       Appearance: Normal appearance. He is normal weight.   HENT:      Mouth/Throat:      Mouth: Mucous membranes are moist.      Pharynx: Oropharynx is clear.     Cardiovascular:      Rate and Rhythm: Normal rate and regular rhythm.      Pulses: Normal pulses.      Heart sounds: Normal heart sounds. No murmur heard.  Pulmonary:      Effort: Pulmonary effort is normal.      Breath sounds: Normal breath sounds.   Abdominal:      General: There is no distension.      Palpations: Abdomen is soft.      Tenderness: There is no abdominal tenderness.      Musculoskeletal:         General: No swelling. Normal range of motion.     Skin:     General: Skin is warm.      Coloration: Skin is not pale.     Neurological:      Mental Status: He is alert and oriented to person, place, and time.          Discussion with Family: Patient declined call to .     Discharge instructions/Information to patient and family:   See after visit summary for information provided to patient and family.      Provisions for Follow-Up Care:  See after visit summary for information related to follow-up care and any pertinent home health orders.      Mobility at time of Discharge:   Basic Mobility Inpatient Raw Score: 24  JH-HLM Goal: 8: Walk 250 feet or more  JH-HLM Achieved: 7: Walk 25 feet or more  HLM Goal achieved. Continue to encourage appropriate mobility.     Disposition:   Home    Planned Readmission: none    Administrative Statements   Discharge Statement:  I have spent a total time of 30 minutes in caring for this patient on the day of the visit/encounter. >30 minutes of time was spent on: Documenting in the medical record, Reviewing / ordering tests, medicine, procedures  , and Communicating with other healthcare professionals .    **Please Note: This note may have been constructed using a voice recognition system**

## 2025-07-10 ENCOUNTER — HOSPITAL ENCOUNTER (OUTPATIENT)
Dept: INFUSION CENTER | Facility: CLINIC | Age: 60
Discharge: HOME | End: 2025-07-10
Attending: INTERNAL MEDICINE

## 2025-07-10 ENCOUNTER — OFFICE VISIT (OUTPATIENT)
Dept: HEMATOLOGY ONCOLOGY | Facility: CLINIC | Age: 60
End: 2025-07-10
Payer: COMMERCIAL

## 2025-07-10 VITALS
TEMPERATURE: 97.5 F | HEIGHT: 69 IN | BODY MASS INDEX: 25.77 KG/M2 | WEIGHT: 174 LBS | OXYGEN SATURATION: 99 % | DIASTOLIC BLOOD PRESSURE: 82 MMHG | SYSTOLIC BLOOD PRESSURE: 126 MMHG | HEART RATE: 91 BPM | RESPIRATION RATE: 18 BRPM

## 2025-07-10 DIAGNOSIS — C22.0 HEPATOCELLULAR CARCINOMA (HCC): Primary | ICD-10-CM

## 2025-07-10 PROCEDURE — 99214 OFFICE O/P EST MOD 30 MIN: CPT

## 2025-07-10 NOTE — PROGRESS NOTES
Name: Bassem Roberts      : 1965      MRN: [id number]  Encounter Provider: KARINA Montanez  Encounter Date: 7/10/2025   Encounter department: [facility]  :  Assessment & Plan  Hepatocellular carcinoma (HCC)  60 y.o. male with metastatic HCC, initially diagnosed on 2022.  The patient has distant metastatic disease to intra-abdominal lymph nodes and probable lung metastases.  The patient has received several treatments for his metastatic HCC including Ytrium 90 intrahepatic arterial embolization treatment in 2022.  With this treatment serum Alpha-fetoprotein level came down nicely.  From from 2023 to 2024, the patient received frontline systemic therapy with atezolizumab plus bevacizumab for metastatic HCC.  After 2024 bevacizumab was discontinued due to proteinuria.  The patient receives single agent atezolizumab until 2024.  Frontline systemic therapy with atezolizumab plus bevacizumab was discontinued due to radiographic progression of metastatic disease as well as serum AFP rise.  On October 15, 2024, he initiated second line systemic targeted therapy with oral anti-VEGFR cabozantinib 40 mg per mouth daily. Initially, he tolerated cabozantinib well, however over the past several days in 2024, he developed moderate to severe hand-foot syndrome caused by cabozantinib, characterized by erythema, irritation, pain, blisters, thickening and dryness of palms and soles as well as severe erythematous skin rash and superficial ulceration of the skin folds in both groin areas and the scrotum. On 2024 I advised him to stop cabozantinib. After stopping cabozantinib on 2024, the patient noticed significant improvement of his hand-foot syndrome.  On 2025, the patient re-initiated cabozantinib 20 mg PO daily. Mr. Roberts has clinically significant benefit from  cabozantinib.  On January 15, 2025, contrast enhanced CT scan of the abdomen and pelvis showed stable HCC. Specifically, there was liver cirrhosis. There were no new or enlarging masses. There were stable treated lesions in hepatic segment 8. There was stable upper abdominal lymphadenopathy suspicious for metastases.        Mr. Roberts took cabozantinib 20 mg per mouth daily until May 1, 2025. Treatment was discontinued due to severe toxicity including moderate to severe hand-foot syndrome and radiographic progression of metastatic HCC. On April 14, 2025, contrast-enhanced CT scan of the chest, abdomen and pelvis showed significant radiographic progression of intra-abdominal metastatic lymph nodes.  Hepatic lesions were stable.  In addition, he had marked serum AFP rise.  Subsequently, he initiated dual immunotherapy with ipilimumab 3 mg/kg intravenously plus nivolumab 1 mg/kg intravenously every 3 weeks.  From May 9, 2025 to June 19, 2025 he received 3 cycles of ipilimumab plus nivolumab.  After cycle 1 of dual immunotherapy he developed severe abdominal pain and had assessment in the emergency department on May 12, 2025.  Contrast-enhanced CT scan of the abdomen and pelvis did not show acute pancreatitis or any acute intra-abdominal process.  The patient did not require inpatient management for acute abdominal pain and responded to a short course of oral steroids.  After cycles 2 and 3 of ipilimumab plus nivolumab he did not have abdominal pain or evidence of immune mediated adverse events. \    Interim Assessment: On June 19, 2025 Mr. Roberts received cycle 3 of immunotherapy with ipilimumab plus nivolumab for his hepatocellular carcinoma.  On July 8, 2025 Mr. Roberts was admitted to the hospital for MENDOZA secondary to 10-15 episodes of clear watery diarrhea that had progressively worsened over 5 to 7-day.  Day of admission he saw blood in his stool.  Stool cultures and C. difficile were negative.  CT abdomen  pelvis shows diffuse hyperenhancement of the wall of the colon and rectum.  Upper abdominal lymphadenopathy was slightly improved as compared to May 2025.  Source of his diarrhea was thought to be related to immunotherapy with ipilimumab plus nivolumab.  He received 2 doses of IV Solu-Medrol.  On 7/9/2025 patient was discharged to home on a prednisone taper starting with prednisone 60 mg per mouth daily for 7 days.  At the time of discharge he was tolerating regular diet.  He was initially scheduled on 7/10/2025 to receive cycle 4 of immunotherapy, given his immune mediated colitis treatment will be held at this time.        Plan:  Hold cycle 4 of ipilimumab plus nivolumab on July 10, 2025  Continue prednisone 60 mg per mouth for 7 days, then 50 mg for 7 days, then 40 mg for 7 days, then 30 mg for 7 days, then 20 mg for 7 days, then 10 mg for 7 days  Radiographic assessment of response to dual immunotherapy with contrast-enhanced CT scan of the chest, abdomen and pelvis after 4 cycles of ipilimumab plus nivolumab  Periodic monitoring of serum AFP  CBC with differential and CMP on July 21, 2025  Return to medical oncology clinic for history and physical exam and to assess safety and tolerability of nivolumab plus ipilimumab on July 24, 2025     Mr. Roberts understands and agrees with my management recommendations and plan of care. I answered questions to his satisfaction.   Orders:    CBC and differential; Future    Comprehensive metabolic panel; Future        No follow-ups on file.    History of Present Illness   No chief complaint on file.    Oncology History   Cancer Staging   Hepatocellular carcinoma (HCC)  Staging form: Liver (Excluding Intrahepatic Bile Ducts), AJCC 8th Edition  - Clinical stage from 7/13/2022: Stage IVB (rcT3, rcN1, rpM1) - Signed by Taye Lundberg MD on 1/30/2024  Stage prefix: Recurrence  Histologic grade (G): G2  Histologic grading system: 4 grade system  Oncology History   Hepatocellular  carcinoma (HCC)   2022 Initial Diagnosis    Hepatocellular carcinoma (HCC)     2/8/2022 Biopsy    [facility] Ronald DOSHI US guided liver biopsy: right liver lobe:  Poorly differentiated HCC with patchy necrosis       7/13/2022 -  Cancer Staged    Staging form: Liver (Excluding Intrahepatic Bile Ducts), AJCC 8th Edition  - Clinical stage from 7/13/2022: Stage IVB (rcT3, cN1, pM1) - Signed by Taye Lundberg MD on 1/30/2024  Stage prefix: Recurrence  Histologic grade (G): G2  Histologic grading system: 4 grade system       6/29/2023 - 9/12/2024 Chemotherapy    alteplase (CATHFLO), 2 mg, Intracatheter, Every 1 Minute as needed, 21 of 24 cycles  atezolizumab (TECENTRIQ) IVPB, 1,200 mg, Intravenous, Once, 21 of 24 cycles  Administration: 1,200 mg (6/29/2023), 1,200 mg (7/20/2023), 1,200 mg (8/10/2023), 1,200 mg (8/31/2023), 1,200 mg (9/21/2023), 1,200 mg (10/10/2023), 1,200 mg (11/2/2023), 1,200 mg (11/24/2023), 1,200 mg (12/14/2023), 1,200 mg (1/4/2024), 1,200 mg (1/26/2024), 1,200 mg (3/7/2024), 1,200 mg (3/28/2024), 1,200 mg (4/18/2024), 1,200 mg (5/9/2024), 1,200 mg (5/30/2024), 1,200 mg (6/20/2024), 1,200 mg (7/11/2024), 1,200 mg (8/1/2024), 1,200 mg (8/22/2024), 1,200 mg (9/12/2024)  bevacizumab-awwb (MVASI) IVPB, 1,345 mg (100 % of original dose 15 mg/kg), Intravenous, Once, 19 of 22 cycles  Dose modification: 15 mg/kg (original dose 15 mg/kg, Cycle 1), 15 mg/kg (original dose 15 mg/kg, Cycle 2), 15 mg/kg (original dose 15 mg/kg, Cycle 3)  Administration: 1,300 mg (6/29/2023), 1,300 mg (7/20/2023), 1,300 mg (8/10/2023), 1,300 mg (8/31/2023), 1,300 mg (9/21/2023), 1,300 mg (10/10/2023), 1,300 mg (11/2/2023), 1,300 mg (11/24/2023), 1,300 mg (12/14/2023), 1,300 mg (1/4/2024), 1,300 mg (1/26/2024), 1,300 mg (3/7/2024), 1,300 mg (3/28/2024), 1,300 mg (4/18/2024), 1,300 mg (5/9/2024), 1,300 mg (5/30/2024), 1,300 mg (6/20/2024), 1,200 mg (7/11/2024), 1,200 mg (8/1/2024)     5/9/2025 -  Chemotherapy    ipilimumab (YERVOY)  IVPB, 256 mg, 3 of 4 cycles  Administration: 250 mg (5/9/2025), 250 mg (5/29/2025), 250 mg (6/19/2025)  nivolumab (OPDIVO) IVPB, 1 mg/kg = 85.3 mg, 3 of 10 cycles  Administration: 85.3 mg (5/9/2025), 85.3 mg (5/29/2025), 85.3 mg (6/19/2025)        Pertinent Medical History     07/10/25: 60 y.o. male with metastatic HCC, initially diagnosed on February 2, 2022.  The patient has distant metastatic disease to intra-abdominal lymph nodes and probable lung metastases.  The patient has received several treatments for his metastatic HCC including Ytrium 90 intrahepatic arterial embolization treatment in November 2022.  With this treatment serum Alpha-fetoprotein level came down nicely.  From from June 29, 2023 to August 1, 2024, the patient received frontline systemic therapy with atezolizumab plus bevacizumab for metastatic HCC.  After August 1, 2024 bevacizumab was discontinued due to proteinuria.  The patient receives single agent atezolizumab until September 12, 2024.  Frontline systemic therapy with atezolizumab plus bevacizumab was discontinued due to radiographic progression of metastatic disease as well as serum AFP rise.  On October 15, 2024, he initiated second line systemic targeted therapy with oral anti-VEGFR cabozantinib 40 mg per mouth daily. Initially, he tolerated cabozantinib well, however over the past several days in November 2024, he developed moderate to severe hand-foot syndrome caused by cabozantinib, characterized by erythema, irritation, pain, blisters, thickening and dryness of palms and soles as well as severe erythematous skin rash and superficial ulceration of the skin folds in both groin areas and the scrotum. On November 19, 2024 I advised him to stop cabozantinib. After stopping cabozantinib on November 19, 2024, the patient noticed significant improvement of his hand-foot syndrome.  On December 2025, the patient re-initiated cabozantinib 20 mg PO daily. Mr. Roberts has clinically  significant benefit from cabozantinib.  On January 15, 2025, contrast enhanced CT scan of the abdomen and pelvis showed stable HCC. Specifically, there was liver cirrhosis. There were no new or enlarging masses. There were stable treated lesions in hepatic segment 8. There was stable upper abdominal lymphadenopathy suspicious for metastases.     On April 14, 2025, contrast-enhanced CT scan of the chest, abdomen and pelvis showed radiographic progression of metastatic disease.  Specifically, there was progression of metastatic lymphadenopathy in the retroperitoneum and portacaval region. Stable hepatic lesions and stable scattered pulmonary nodules measuring up to 4 mm in size were identified. No new suspicious nodules were seen.      Mr. Roberts took cabozantinib 20 mg per mouth daily until May 1, 2025. Treatment was discontinued to severe toxicity including moderate to severe moderate hand-foot syndrome and radiographic progression of metastatic HCC.  Specifically, on April 14, 2025, contrast-enhanced CT scan of the chest, abdomen and pelvis showed significant progression of intra-abdominal metastatic lymph nodes.  Hepatic lesions were overall stable.  In addition, he had marked serum AFP rise.  Subsequently he initiated dual immunotherapy with ipilimumab 3 mg/kg intravenously plus nivolumab 1 mg/kg intravenously every 3 weeks.  From May 9, 2025 to June 19, 2025 he received 3 cycles of ipilimumab plus nivolumab.  After cycle 1 of dual immunotherapy he developed severe abdominal pain and had assessment in the emergency department on May 12, 2025.  Contrast-enhanced CT scan of the abdomen and pelvis did not show acute pancreatitis or any acute intra-abdominal process.  The patient did not require inpatient management of acute abdominal pain and respond to a short course of oral steroids.  After cycles 2 and 3 of ipilimumab plus nivolumab he did not have abdominal pain or evidence of immune mediated adverse events.      Interim History: On July 8, 2025 Mr. Roberts was admitted for acute kidney injury secondary to dehydration.  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  He was treated inpatient with steroids and discharged on prednisone taper.  Today he does not have new complaints.  He is tolerating p.o. intake he currently has loose stools without diarrhea.  He states his stools are much improved.  He does still have some small black spots in his stool which are likely old blood from his initial colitis episode.  He denies new gastrointestinal symptoms such as anorexia, nausea, vomiting, dyne aphasia, dysphagia, hematemesis, melena, hematochezia, abdominal pain, abdominal distention, changes in bowel habits or jaundice.  He denies systemic symptoms such as severe weakness, weight loss, fever, chills, or diaphoresis.  He continues to work on a full-time basis.  His current ECOG performance status is 0.     Review of Systems   Constitutional:  Negative for activity change, appetite change, chills, diaphoresis, fatigue, fever and unexpected weight change.   Respiratory:  Negative for cough and shortness of breath.    Cardiovascular:  Negative for chest pain, palpitations and leg swelling.   Gastrointestinal:  Negative for abdominal distention, abdominal pain, anal bleeding, blood in stool, constipation, diarrhea, nausea and vomiting.   Genitourinary:  Negative for hematuria.   Musculoskeletal:  Negative for arthralgias.   Skin:  Negative for color change, pallor and rash.   Hematological:  Negative for adenopathy. Does not bruise/bleed easily.   Psychiatric/Behavioral: Negative.     All other systems reviewed and are negative.          Objective   There were no vitals taken for this visit.    Pain Screening:     ECOG   0  Physical Exam  Constitutional:       General: He is not in acute distress.     Appearance: He is normal weight. He is not toxic-appearing.   HENT:      Mouth/Throat:      Mouth:  Mucous membranes are moist.      Pharynx: Oropharynx is clear.     Eyes:      Conjunctiva/sclera: Conjunctivae normal.      Pupils: Pupils are equal, round, and reactive to light.       Cardiovascular:      Rate and Rhythm: Normal rate and regular rhythm.      Pulses: Normal pulses.      Heart sounds: Normal heart sounds. No murmur heard.  Pulmonary:      Effort: Pulmonary effort is normal. No respiratory distress.      Breath sounds: Normal breath sounds.   Abdominal:      General: Abdomen is flat.      Palpations: Abdomen is soft. There is no mass.      Tenderness: There is no abdominal tenderness. There is no guarding.     Musculoskeletal:      Cervical back: Normal range of motion.      Right lower leg: No edema.      Left lower leg: No edema.   Lymphadenopathy:      Cervical: No cervical adenopathy.     Skin:     General: Skin is warm and dry.      Capillary Refill: Capillary refill takes less than 2 seconds.      Coloration: Skin is not jaundiced or pale.      Findings: No bruising.     Neurological:      General: No focal deficit present.      Mental Status: He is alert and oriented to person, place, and time.     Psychiatric:         Mood and Affect: Mood normal.         Behavior: Behavior normal.         Labs: I have reviewed the following labs:  Lab Results   Component Value Date/Time    WBC 5.55 07/09/2025 05:03 AM    RBC 4.16 07/09/2025 05:03 AM    Hemoglobin 12.9 07/09/2025 05:03 AM    Hematocrit 37.0 07/09/2025 05:03 AM    MCV 89 07/09/2025 05:03 AM    MCH 31.0 07/09/2025 05:03 AM    RDW 13.2 07/09/2025 05:03 AM    Platelets 177 07/09/2025 05:03 AM    Segmented % 62 07/09/2025 05:03 AM    Lymphocytes % 23 07/09/2025 05:03 AM    Monocytes % 14 (H) 07/09/2025 05:03 AM    Eosinophils Relative 1 07/09/2025 05:03 AM    Basophils Relative 0 07/09/2025 05:03 AM    Immature Grans % 0 07/09/2025 05:03 AM    Absolute Neutrophils 3.46 07/09/2025 05:03 AM     Lab Results   Component Value Date/Time    Potassium  3.7 07/09/2025 05:03 AM    Chloride 100 07/09/2025 05:03 AM    CO2 23 07/09/2025 05:03 AM    BUN 15 07/09/2025 05:03 AM    Creatinine 1.04 07/09/2025 05:03 AM    Glucose, Fasting 112 (H) 07/09/2025 05:03 AM    Calcium 8.9 07/09/2025 05:03 AM    Corrected Calcium 9.0 11/27/2024 07:11 AM    AST 13 07/09/2025 05:03 AM    ALT 6 (L) 07/09/2025 05:03 AM    Alkaline Phosphatase 55 07/09/2025 05:03 AM    Total Protein 6.9 07/09/2025 05:03 AM    Albumin 3.8 07/09/2025 05:03 AM    Total Bilirubin 0.69 07/09/2025 05:03 AM    eGFR 77 07/09/2025 05:03 AM       Radiology Results Review: I have reviewed radiology reports from 7/8/2025 including: CT abdomen/pelvis.

## 2025-07-10 NOTE — ASSESSMENT & PLAN NOTE
60 y.o. male with metastatic HCC, initially diagnosed on February 2, 2022.  The patient has distant metastatic disease to intra-abdominal lymph nodes and probable lung metastases.  The patient has received several treatments for his metastatic HCC including Ytrium 90 intrahepatic arterial embolization treatment in November 2022.  With this treatment serum Alpha-fetoprotein level came down nicely.  From from June 29, 2023 to August 1, 2024, the patient received frontline systemic therapy with atezolizumab plus bevacizumab for metastatic HCC.  After August 1, 2024 bevacizumab was discontinued due to proteinuria.  The patient receives single agent atezolizumab until September 12, 2024.  Frontline systemic therapy with atezolizumab plus bevacizumab was discontinued due to radiographic progression of metastatic disease as well as serum AFP rise.  On October 15, 2024, he initiated second line systemic targeted therapy with oral anti-VEGFR cabozantinib 40 mg per mouth daily. Initially, he tolerated cabozantinib well, however over the past several days in November 2024, he developed moderate to severe hand-foot syndrome caused by cabozantinib, characterized by erythema, irritation, pain, blisters, thickening and dryness of palms and soles as well as severe erythematous skin rash and superficial ulceration of the skin folds in both groin areas and the scrotum. On November 19, 2024 I advised him to stop cabozantinib. After stopping cabozantinib on November 19, 2024, the patient noticed significant improvement of his hand-foot syndrome.  On December 2025, the patient re-initiated cabozantinib 20 mg PO daily. Mr. Roberts has clinically significant benefit from cabozantinib.  On January 15, 2025, contrast enhanced CT scan of the abdomen and pelvis showed stable HCC. Specifically, there was liver cirrhosis. There were no new or enlarging masses. There were stable treated lesions in hepatic segment 8. There was stable upper  abdominal lymphadenopathy suspicious for metastases.        Mr. Roberts took cabozantinib 20 mg per mouth daily until May 1, 2025. Treatment was discontinued due to severe toxicity including moderate to severe hand-foot syndrome and radiographic progression of metastatic HCC. On April 14, 2025, contrast-enhanced CT scan of the chest, abdomen and pelvis showed significant radiographic progression of intra-abdominal metastatic lymph nodes.  Hepatic lesions were stable.  In addition, he had marked serum AFP rise.  Subsequently, he initiated dual immunotherapy with ipilimumab 3 mg/kg intravenously plus nivolumab 1 mg/kg intravenously every 3 weeks.  From May 9, 2025 to June 19, 2025 he received 3 cycles of ipilimumab plus nivolumab.  After cycle 1 of dual immunotherapy he developed severe abdominal pain and had assessment in the emergency department on May 12, 2025.  Contrast-enhanced CT scan of the abdomen and pelvis did not show acute pancreatitis or any acute intra-abdominal process.  The patient did not require inpatient management for acute abdominal pain and responded to a short course of oral steroids.  After cycles 2 and 3 of ipilimumab plus nivolumab he did not have abdominal pain or evidence of immune mediated adverse events. \    Interim Assessment: On June 19, 2025 Mr. Roberts received cycle 3 of immunotherapy with ipilimumab plus nivolumab for his hepatocellular carcinoma.  On July 8, 2025 Mr. Roberts was admitted to the hospital for MENDOZA secondary to 10-15 episodes of clear watery diarrhea that had progressively worsened over 5 to 7-day.  Day of admission he saw blood in his stool.  Stool cultures and C. difficile were negative.  CT abdomen pelvis shows diffuse hyperenhancement of the wall of the colon and rectum.  Upper abdominal lymphadenopathy was slightly improved as compared to May 2025.  Source of his diarrhea was thought to be related to immunotherapy with ipilimumab plus nivolumab.  He received 2  doses of IV Solu-Medrol.  On 7/9/2025 patient was discharged to home on a prednisone taper starting with prednisone 60 mg per mouth daily for 7 days.  At the time of discharge he was tolerating regular diet.  He was initially scheduled on 7/10/2025 to receive cycle 4 of immunotherapy, given his immune mediated colitis treatment will be held at this time.        Plan:  Hold cycle 4 of ipilimumab plus nivolumab on July 10, 2025  Continue prednisone 60 mg per mouth for 7 days, then 50 mg for 7 days, then 40 mg for 7 days, then 30 mg for 7 days, then 20 mg for 7 days, then 10 mg for 7 days  Radiographic assessment of response to dual immunotherapy with contrast-enhanced CT scan of the chest, abdomen and pelvis after 4 cycles of ipilimumab plus nivolumab  Periodic monitoring of serum AFP  CBC with differential and CMP on July 21, 2025  Return to medical oncology clinic for history and physical exam and to assess safety and tolerability of nivolumab plus ipilimumab on July 24, 2025     Mr. Roberts understands and agrees with my management recommendations and plan of care. I answered questions to his satisfaction.   Orders:    CBC and differential; Future    Comprehensive metabolic panel; Future

## 2025-07-11 ENCOUNTER — TRANSITIONAL CARE MANAGEMENT (OUTPATIENT)
Dept: FAMILY MEDICINE CLINIC | Facility: CLINIC | Age: 60
End: 2025-07-11

## 2025-07-13 LAB
BACTERIA BLD CULT: NORMAL
BACTERIA BLD CULT: NORMAL

## 2025-07-15 ENCOUNTER — TELEPHONE (OUTPATIENT)
Dept: HEMATOLOGY ONCOLOGY | Facility: CLINIC | Age: 60
End: 2025-07-15

## 2025-07-24 ENCOUNTER — APPOINTMENT (OUTPATIENT)
Dept: LAB | Facility: CLINIC | Age: 60
End: 2025-07-24
Payer: COMMERCIAL

## 2025-07-24 DIAGNOSIS — C22.0 HEPATOCELLULAR CARCINOMA (HCC): ICD-10-CM

## 2025-07-24 LAB
AFP-TM SERPL-MCNC: 4560.18 NG/ML (ref 0–9)
ALBUMIN SERPL BCG-MCNC: 3.9 G/DL (ref 3.5–5)
ALP SERPL-CCNC: 71 U/L (ref 34–104)
ALT SERPL W P-5'-P-CCNC: 25 U/L (ref 7–52)
ANION GAP SERPL CALCULATED.3IONS-SCNC: 8 MMOL/L (ref 4–13)
AST SERPL W P-5'-P-CCNC: 20 U/L (ref 13–39)
BASOPHILS # BLD AUTO: 0.01 THOUSANDS/ÂΜL (ref 0–0.1)
BASOPHILS NFR BLD AUTO: 0 % (ref 0–1)
BILIRUB SERPL-MCNC: 0.85 MG/DL (ref 0.2–1)
BUN SERPL-MCNC: 22 MG/DL (ref 5–25)
CALCIUM SERPL-MCNC: 9.4 MG/DL (ref 8.4–10.2)
CHLORIDE SERPL-SCNC: 102 MMOL/L (ref 96–108)
CO2 SERPL-SCNC: 25 MMOL/L (ref 21–32)
CREAT SERPL-MCNC: 1.2 MG/DL (ref 0.6–1.3)
EOSINOPHIL # BLD AUTO: 0 THOUSAND/ÂΜL (ref 0–0.61)
EOSINOPHIL NFR BLD AUTO: 0 % (ref 0–6)
ERYTHROCYTE [DISTWIDTH] IN BLOOD BY AUTOMATED COUNT: 15.5 % (ref 11.6–15.1)
GFR SERPL CREATININE-BSD FRML MDRD: 65 ML/MIN/1.73SQ M
GLUCOSE SERPL-MCNC: 158 MG/DL (ref 65–140)
HCT VFR BLD AUTO: 39.3 % (ref 36.5–49.3)
HGB BLD-MCNC: 12.7 G/DL (ref 12–17)
IMM GRANULOCYTES # BLD AUTO: 0.06 THOUSAND/UL (ref 0–0.2)
IMM GRANULOCYTES NFR BLD AUTO: 1 % (ref 0–2)
LYMPHOCYTES # BLD AUTO: 0.57 THOUSANDS/ÂΜL (ref 0.6–4.47)
LYMPHOCYTES NFR BLD AUTO: 7 % (ref 14–44)
MCH RBC QN AUTO: 30.5 PG (ref 26.8–34.3)
MCHC RBC AUTO-ENTMCNC: 32.3 G/DL (ref 31.4–37.4)
MCV RBC AUTO: 94 FL (ref 82–98)
MONOCYTES # BLD AUTO: 0.41 THOUSAND/ÂΜL (ref 0.17–1.22)
MONOCYTES NFR BLD AUTO: 5 % (ref 4–12)
NEUTROPHILS # BLD AUTO: 7.62 THOUSANDS/ÂΜL (ref 1.85–7.62)
NEUTS SEG NFR BLD AUTO: 87 % (ref 43–75)
NRBC BLD AUTO-RTO: 0 /100 WBCS
PLATELET # BLD AUTO: 200 THOUSANDS/UL (ref 149–390)
PMV BLD AUTO: 8.7 FL (ref 8.9–12.7)
POTASSIUM SERPL-SCNC: 4.2 MMOL/L (ref 3.5–5.3)
PROT SERPL-MCNC: 6.6 G/DL (ref 6.4–8.4)
RBC # BLD AUTO: 4.17 MILLION/UL (ref 3.88–5.62)
SODIUM SERPL-SCNC: 135 MMOL/L (ref 135–147)
T3FREE SERPL-MCNC: 3.36 PG/ML (ref 2.5–3.9)
TSH SERPL DL<=0.05 MIU/L-ACNC: 1.46 UIU/ML (ref 0.45–4.5)
WBC # BLD AUTO: 8.67 THOUSAND/UL (ref 4.31–10.16)

## 2025-07-24 PROCEDURE — 85025 COMPLETE CBC W/AUTO DIFF WBC: CPT

## 2025-07-24 PROCEDURE — 84443 ASSAY THYROID STIM HORMONE: CPT

## 2025-07-24 PROCEDURE — 82105 ALPHA-FETOPROTEIN SERUM: CPT

## 2025-07-24 PROCEDURE — 84481 FREE ASSAY (FT-3): CPT

## 2025-07-24 PROCEDURE — 80053 COMPREHEN METABOLIC PANEL: CPT

## 2025-07-24 PROCEDURE — 36415 COLL VENOUS BLD VENIPUNCTURE: CPT

## 2025-07-30 DIAGNOSIS — I10 ESSENTIAL HYPERTENSION: ICD-10-CM

## 2025-07-30 RX ORDER — AMLODIPINE BESYLATE 2.5 MG/1
2.5 TABLET ORAL DAILY
Qty: 30 TABLET | Refills: 0 | Status: SHIPPED | OUTPATIENT
Start: 2025-07-30

## 2025-08-07 ENCOUNTER — OFFICE VISIT (OUTPATIENT)
Dept: HEMATOLOGY ONCOLOGY | Facility: CLINIC | Age: 60
End: 2025-08-07
Payer: COMMERCIAL

## 2025-08-07 ENCOUNTER — TELEPHONE (OUTPATIENT)
Dept: HEMATOLOGY ONCOLOGY | Facility: CLINIC | Age: 60
End: 2025-08-07

## 2025-08-07 VITALS
BODY MASS INDEX: 27.18 KG/M2 | HEART RATE: 89 BPM | HEIGHT: 69 IN | OXYGEN SATURATION: 97 % | WEIGHT: 183.5 LBS | TEMPERATURE: 97.7 F | SYSTOLIC BLOOD PRESSURE: 144 MMHG | DIASTOLIC BLOOD PRESSURE: 72 MMHG | RESPIRATION RATE: 18 BRPM

## 2025-08-07 DIAGNOSIS — K52.9 COLITIS: ICD-10-CM

## 2025-08-07 DIAGNOSIS — C22.0 HEPATOCELLULAR CARCINOMA (HCC): Primary | ICD-10-CM

## 2025-08-07 DIAGNOSIS — E03.8 OTHER SPECIFIED HYPOTHYROIDISM: ICD-10-CM

## 2025-08-07 PROCEDURE — 99214 OFFICE O/P EST MOD 30 MIN: CPT | Performed by: INTERNAL MEDICINE

## 2025-08-07 RX ORDER — LEVOTHYROXINE SODIUM 50 UG/1
50 TABLET ORAL DAILY
Qty: 90 TABLET | Refills: 4 | Status: SHIPPED | OUTPATIENT
Start: 2025-08-07 | End: 2025-11-05

## 2025-08-07 RX ORDER — PREDNISONE 10 MG/1
TABLET ORAL
Qty: 14 TABLET | Refills: 0 | Status: SHIPPED | OUTPATIENT
Start: 2025-08-07

## 2025-08-15 ENCOUNTER — TELEPHONE (OUTPATIENT)
Dept: INFUSION CENTER | Facility: CLINIC | Age: 60
End: 2025-08-15

## 2025-08-18 ENCOUNTER — HOSPITAL ENCOUNTER (OUTPATIENT)
Dept: INFUSION CENTER | Facility: CLINIC | Age: 60
Discharge: HOME | End: 2025-08-18
Attending: INTERNAL MEDICINE

## 2025-08-18 ENCOUNTER — APPOINTMENT (OUTPATIENT)
Dept: LAB | Facility: CLINIC | Age: 60
End: 2025-08-18
Attending: INTERNAL MEDICINE
Payer: COMMERCIAL

## 2025-08-18 VITALS
SYSTOLIC BLOOD PRESSURE: 107 MMHG | RESPIRATION RATE: 18 BRPM | OXYGEN SATURATION: 97 % | HEART RATE: 86 BPM | DIASTOLIC BLOOD PRESSURE: 68 MMHG | TEMPERATURE: 97.2 F

## 2025-08-18 DIAGNOSIS — C22.0 HEPATOCELLULAR CARCINOMA (HCC): ICD-10-CM

## 2025-08-18 LAB
ALBUMIN SERPL BCG-MCNC: 3.9 G/DL (ref 3.5–5)
ALP SERPL-CCNC: 76 U/L (ref 34–104)
ALT SERPL W P-5'-P-CCNC: 17 U/L (ref 7–52)
ANION GAP SERPL CALCULATED.3IONS-SCNC: 8 MMOL/L (ref 4–13)
AST SERPL W P-5'-P-CCNC: 19 U/L (ref 13–39)
BASOPHILS # BLD AUTO: 0.04 THOUSANDS/ÂΜL (ref 0–0.1)
BASOPHILS NFR BLD AUTO: 0 % (ref 0–1)
BILIRUB SERPL-MCNC: 0.87 MG/DL (ref 0.2–1)
BUN SERPL-MCNC: 33 MG/DL (ref 5–25)
CALCIUM SERPL-MCNC: 10 MG/DL (ref 8.4–10.2)
CHLORIDE SERPL-SCNC: 95 MMOL/L (ref 96–108)
CO2 SERPL-SCNC: 28 MMOL/L (ref 21–32)
CREAT SERPL-MCNC: 1.63 MG/DL (ref 0.6–1.3)
EOSINOPHIL # BLD AUTO: 0.09 THOUSAND/ÂΜL (ref 0–0.61)
EOSINOPHIL NFR BLD AUTO: 1 % (ref 0–6)
ERYTHROCYTE [DISTWIDTH] IN BLOOD BY AUTOMATED COUNT: 14.5 % (ref 11.6–15.1)
GFR SERPL CREATININE-BSD FRML MDRD: 45 ML/MIN/1.73SQ M
GLUCOSE P FAST SERPL-MCNC: 120 MG/DL (ref 65–99)
HCT VFR BLD AUTO: 38.1 % (ref 36.5–49.3)
HGB BLD-MCNC: 13 G/DL (ref 12–17)
IMM GRANULOCYTES # BLD AUTO: 0.09 THOUSAND/UL (ref 0–0.2)
IMM GRANULOCYTES NFR BLD AUTO: 1 % (ref 0–2)
LYMPHOCYTES # BLD AUTO: 1.98 THOUSANDS/ÂΜL (ref 0.6–4.47)
LYMPHOCYTES NFR BLD AUTO: 22 % (ref 14–44)
MCH RBC QN AUTO: 30.3 PG (ref 26.8–34.3)
MCHC RBC AUTO-ENTMCNC: 34.1 G/DL (ref 31.4–37.4)
MCV RBC AUTO: 89 FL (ref 82–98)
MONOCYTES # BLD AUTO: 0.99 THOUSAND/ÂΜL (ref 0.17–1.22)
MONOCYTES NFR BLD AUTO: 11 % (ref 4–12)
NEUTROPHILS # BLD AUTO: 5.79 THOUSANDS/ÂΜL (ref 1.85–7.62)
NEUTS SEG NFR BLD AUTO: 65 % (ref 43–75)
NRBC BLD AUTO-RTO: 0 /100 WBCS
PLATELET # BLD AUTO: 215 THOUSANDS/UL (ref 149–390)
PMV BLD AUTO: 8.7 FL (ref 8.9–12.7)
POTASSIUM SERPL-SCNC: 4.8 MMOL/L (ref 3.5–5.3)
PROT SERPL-MCNC: 7.3 G/DL (ref 6.4–8.4)
RBC # BLD AUTO: 4.29 MILLION/UL (ref 3.88–5.62)
SODIUM SERPL-SCNC: 131 MMOL/L (ref 135–147)
TSH SERPL DL<=0.05 MIU/L-ACNC: 1.76 UIU/ML (ref 0.45–4.5)
WBC # BLD AUTO: 8.98 THOUSAND/UL (ref 4.31–10.16)

## 2025-08-18 PROCEDURE — 36415 COLL VENOUS BLD VENIPUNCTURE: CPT

## 2025-08-18 PROCEDURE — 80053 COMPREHEN METABOLIC PANEL: CPT

## 2025-08-18 PROCEDURE — 85025 COMPLETE CBC W/AUTO DIFF WBC: CPT

## 2025-08-18 PROCEDURE — 82107 ALPHA-FETOPROTEIN L3: CPT

## 2025-08-20 ENCOUNTER — OFFICE VISIT (OUTPATIENT)
Dept: HEMATOLOGY ONCOLOGY | Facility: CLINIC | Age: 60
End: 2025-08-20
Payer: COMMERCIAL

## 2025-08-20 VITALS
TEMPERATURE: 97.6 F | WEIGHT: 175 LBS | RESPIRATION RATE: 18 BRPM | HEIGHT: 69 IN | OXYGEN SATURATION: 98 % | HEART RATE: 84 BPM | DIASTOLIC BLOOD PRESSURE: 70 MMHG | BODY MASS INDEX: 25.92 KG/M2 | SYSTOLIC BLOOD PRESSURE: 124 MMHG

## 2025-08-20 DIAGNOSIS — K52.9 COLITIS: ICD-10-CM

## 2025-08-20 DIAGNOSIS — C22.0 HEPATOCELLULAR CARCINOMA (HCC): Primary | ICD-10-CM

## 2025-08-20 PROCEDURE — 99214 OFFICE O/P EST MOD 30 MIN: CPT

## 2025-08-20 RX ORDER — MAGNESIUM L-LACTATE 84 MG
84 TABLET, EXTENDED RELEASE ORAL DAILY
COMMUNITY

## 2025-08-22 DIAGNOSIS — C22.0 HEPATOCELLULAR CARCINOMA (HCC): Primary | ICD-10-CM

## 2025-08-22 LAB
AFP L3 MFR SERPL: 91.7 % (ref 0–9.9)
AFP SERPL-MCNC: 4816.4 NG/ML (ref 0–8.4)